# Patient Record
Sex: MALE | Race: WHITE | Employment: UNEMPLOYED | URBAN - METROPOLITAN AREA
[De-identification: names, ages, dates, MRNs, and addresses within clinical notes are randomized per-mention and may not be internally consistent; named-entity substitution may affect disease eponyms.]

---

## 2018-03-14 ENCOUNTER — HOSPITAL ENCOUNTER (EMERGENCY)
Facility: CLINIC | Age: 28
Discharge: HOME | End: 2018-03-14
Attending: FAMILY MEDICINE | Admitting: FAMILY MEDICINE
Payer: COMMERCIAL

## 2018-03-14 VITALS
HEART RATE: 94 BPM | SYSTOLIC BLOOD PRESSURE: 131 MMHG | BODY MASS INDEX: 41.75 KG/M2 | DIASTOLIC BLOOD PRESSURE: 74 MMHG | WEIGHT: 315 LBS | TEMPERATURE: 96.6 F | RESPIRATION RATE: 20 BRPM | HEIGHT: 73 IN | OXYGEN SATURATION: 95 %

## 2018-03-14 DIAGNOSIS — K21.9 GASTROESOPHAGEAL REFLUX DISEASE WITHOUT ESOPHAGITIS: ICD-10-CM

## 2018-03-14 DIAGNOSIS — R07.89 ATYPICAL CHEST PAIN: ICD-10-CM

## 2018-03-14 LAB
ALBUMIN SERPL-MCNC: 3.8 G/DL (ref 3.4–5)
ALP SERPL-CCNC: 87 U/L (ref 40–150)
ALT SERPL W P-5'-P-CCNC: 50 U/L (ref 0–70)
ANION GAP SERPL CALCULATED.3IONS-SCNC: 10 MMOL/L (ref 3–14)
AST SERPL W P-5'-P-CCNC: 23 U/L (ref 0–45)
BASOPHILS # BLD AUTO: 0 10E9/L (ref 0–0.2)
BASOPHILS NFR BLD AUTO: 0.3 %
BILIRUB SERPL-MCNC: 0.2 MG/DL (ref 0.2–1.3)
BUN SERPL-MCNC: 9 MG/DL (ref 7–30)
CALCIUM SERPL-MCNC: 9 MG/DL (ref 8.5–10.1)
CHLORIDE SERPL-SCNC: 103 MMOL/L (ref 94–109)
CO2 SERPL-SCNC: 26 MMOL/L (ref 20–32)
CREAT SERPL-MCNC: 0.69 MG/DL (ref 0.66–1.25)
DIFFERENTIAL METHOD BLD: ABNORMAL
EOSINOPHIL # BLD AUTO: 0.1 10E9/L (ref 0–0.7)
EOSINOPHIL NFR BLD AUTO: 0.7 %
ERYTHROCYTE [DISTWIDTH] IN BLOOD BY AUTOMATED COUNT: 14 % (ref 10–15)
GFR SERPL CREATININE-BSD FRML MDRD: >90 ML/MIN/1.7M2
GLUCOSE SERPL-MCNC: 127 MG/DL (ref 70–99)
HCT VFR BLD AUTO: 41.8 % (ref 40–53)
HGB BLD-MCNC: 12.8 G/DL (ref 13.3–17.7)
IMM GRANULOCYTES # BLD: 0.1 10E9/L (ref 0–0.4)
IMM GRANULOCYTES NFR BLD: 1 %
LYMPHOCYTES # BLD AUTO: 3.7 10E9/L (ref 0.8–5.3)
LYMPHOCYTES NFR BLD AUTO: 25.8 %
MCH RBC QN AUTO: 27 PG (ref 26.5–33)
MCHC RBC AUTO-ENTMCNC: 30.6 G/DL (ref 31.5–36.5)
MCV RBC AUTO: 88 FL (ref 78–100)
MONOCYTES # BLD AUTO: 1.7 10E9/L (ref 0–1.3)
MONOCYTES NFR BLD AUTO: 11.6 %
NEUTROPHILS # BLD AUTO: 8.8 10E9/L (ref 1.6–8.3)
NEUTROPHILS NFR BLD AUTO: 60.6 %
PLATELET # BLD AUTO: 279 10E9/L (ref 150–450)
POTASSIUM SERPL-SCNC: 4.1 MMOL/L (ref 3.4–5.3)
PROT SERPL-MCNC: 8.6 G/DL (ref 6.8–8.8)
RBC # BLD AUTO: 4.74 10E12/L (ref 4.4–5.9)
SODIUM SERPL-SCNC: 139 MMOL/L (ref 133–144)
TROPONIN I SERPL-MCNC: <0.015 UG/L (ref 0–0.04)
WBC # BLD AUTO: 14.5 10E9/L (ref 4–11)

## 2018-03-14 PROCEDURE — 85025 COMPLETE CBC W/AUTO DIFF WBC: CPT | Performed by: FAMILY MEDICINE

## 2018-03-14 PROCEDURE — 93010 ELECTROCARDIOGRAM REPORT: CPT | Mod: Z6 | Performed by: FAMILY MEDICINE

## 2018-03-14 PROCEDURE — 80053 COMPREHEN METABOLIC PANEL: CPT | Performed by: FAMILY MEDICINE

## 2018-03-14 PROCEDURE — 99284 EMERGENCY DEPT VISIT MOD MDM: CPT | Performed by: FAMILY MEDICINE

## 2018-03-14 PROCEDURE — 99284 EMERGENCY DEPT VISIT MOD MDM: CPT | Mod: 25 | Performed by: FAMILY MEDICINE

## 2018-03-14 PROCEDURE — 84484 ASSAY OF TROPONIN QUANT: CPT | Performed by: FAMILY MEDICINE

## 2018-03-14 PROCEDURE — 93005 ELECTROCARDIOGRAM TRACING: CPT | Performed by: FAMILY MEDICINE

## 2018-03-14 RX ORDER — NICOTINE POLACRILEX 4 MG/1
20 GUM, CHEWING ORAL 2 TIMES DAILY
Qty: 30 TABLET | Refills: 0 | Status: SHIPPED | OUTPATIENT
Start: 2018-03-14 | End: 2019-09-13

## 2018-03-14 ASSESSMENT — ENCOUNTER SYMPTOMS
DYSURIA: 0
FREQUENCY: 0
SHORTNESS OF BREATH: 0
WEAKNESS: 0
FEVER: 0
POLYDIPSIA: 0
VOMITING: 0
DIZZINESS: 0
LIGHT-HEADEDNESS: 0
DIARRHEA: 0
EYE PAIN: 0
SINUS PAIN: 0
HEADACHES: 0
COUGH: 0
ABDOMINAL PAIN: 0
SORE THROAT: 0
EYE REDNESS: 0
NERVOUS/ANXIOUS: 0
CHILLS: 0
NAUSEA: 0
BACK PAIN: 0
CONFUSION: 0

## 2018-03-14 NOTE — ED AVS SNAPSHOT
[facility] Emergency Department    [address]     [place] [address]    Phone:  [phone]    Fax:  [phone]                                       Bertram Foreman   MRN: [id number]    Department:  [facility] Emergency Department   Date of Visit:  3/14/2018           Patient Information     Date Of Birth          1990        Your diagnoses for this visit were:     Gastroesophageal reflux disease without esophagitis     Atypical chest pain        You were seen by Lauren, Kenneth IRIZARRY MD.      Follow-up Information     Follow up with Duran Lowe MD In 1 week.    Specialty:  Family Practice    Contact information:    [facility]  [address]  [facility] [address] [phone]          Follow up with [facility] Emergency Department.    Specialty:  EMERGENCY MEDICINE    Why:  If symptoms worsen    Contact information:    [address]   [place] [id number] [phone]    Additional information:    From [address]: Exit at [address] on south side of [place]. Turn right on [address]. Turn left at stoplight on [address]. [facility] will be in view two blocks ahead        Discharge Instructions         *CHEST PAIN, NONCARDIAC    Based on your visit today, the exact cause of your chest pain is not certain. Your condition does not seem serious and your pain does not appear to be coming from your heart. However, sometimes the signs of a serious problem take more time to appear. Therefore, please watch for the warning signs listed below.  HOME CARE:  1. Rest today and avoid strenuous activity.  2. Take any prescribed medicine as directed.  FOLLOW UP with your doctor in 1-3 days.   GET PROMPT MEDICAL ATTENTION if any of the following occur:    A change in the type of pain: if it feels different, becomes more severe, lasts longer, or begins to spread into your shoulder, arm, neck, jaw or back    Shortness of  breath or increased pain with breathing    Cough with blood or dark colored sputum (phlegm)    Weakness, dizziness, or fainting    Fever over 101  F (38.3  C)    Swelling, pain or redness in one leg    0304-9788 [employer]. [address] [place] [address], [place] [address]. All rights reserved. This information is not intended as a substitute for professional medical care. Always follow your healthcare professional's instructions.  This information has been modified by your health care provider with permission from the publisher.      Discharge References/Attachments     GERD (ADULT) (ENGLISH)    CHEST PAIN, NONCARDIAC  (ENGLISH)      24 Hour Appointment Hotline       To make an appointment at any [facility], call [phone] ([phone]). If you don't have a family doctor or clinic, we will help you find one. [facility] clinics are conveniently located to serve the needs of you and your family.             Review of your medicines      START taking        Dose / Directions Last dose taken    omeprazole 20 MG tablet   Dose:  20 mg   Quantity:  30 tablet        Take 1 tablet (20 mg) by mouth 2 times daily   Refills:  0          Our records show that you are taking the medicines listed below. If these are incorrect, please call your family doctor or clinic.        Dose / Directions Last dose taken    albuterol 108 (90 BASE) MCG/ACT Inhaler   Commonly known as:  PROAIR HFA/PROVENTIL HFA/VENTOLIN HFA   Dose:  2 puff   Quantity:  1 Inhaler        Inhale 2 puffs into the lungs every 6 hours as needed for shortness of breath / dyspnea or wheezing   Refills:  0        benzonatate 200 MG capsule   Commonly known as:  TESSALON   Dose:  200 mg   Quantity:  21 capsule        Take 1 capsule (200 mg) by mouth 3 times daily as needed for cough   Refills:  0                Prescriptions were sent or printed at these locations (1 Prescription)                   [facility] - [place], [place] - [address]   [address], [facility] [address]    Telephone:  [phone]   Fax:  [phone]   Hours:                  E-Prescribed (1 of 1)         omeprazole 20 MG tablet                Procedures and tests performed during your visit     CBC with platelets differential    Comprehensive metabolic panel    EKG 12-lead, tracing only    Troponin I      Orders Needing Specimen Collection     None      Pending Results     No orders found from 3/12/2018 to 3/15/2018.            Pending Culture Results     No orders found from 3/12/2018 to 3/15/2018.            Pending Results Instructions     If you had any lab results that were not finalized at the time of your Discharge, you can call the ED Lab Result RN at [phone]. You will be contacted by this team for any positive Lab results or changes in treatment. The nurses are available 7 days a week from 10A to 6:30P.  You can leave a message 24 hours per day and they will return your call.        Thank you for choosing [facility]       Thank you for choosing [facility] for your care. Our goal is always to provide you with excellent care. Hearing back from our patients is one way we can continue to improve our services. Please take a few minutes to complete the written survey that you may receive in the mail after you visit with us. Thank you!        [employer] Information     [employer] lets you send messages to your doctor, view your test results, renew your prescriptions, schedule appointments and more. To sign up, go to [email or web address] . Click on \"Log in\" on the left side of the screen, which will take you to the Welcome page. Then click on \"Sign up Now\" on the right side of the page.     You will be asked to enter the access code listed below, as well as some personal information. Please follow the directions to create your username and password.     Your access code is: [email or web address]  Expires: 2018  7:36 PM     Your access code will  in 90 days. If you need help or a new " code, please call your [facility] clinic or [phone].        Care EveryWhere ID     This is your Care EveryWhere ID. This could be used by other organizations to access your [facility] medical records  [id number]        Equal Access to Services     SOTO GÓMEZ : Levi Kelsey, wamariyada luqadaha, qaybta kaalmada arash, fransisco fisher. So [facility] [phone].    ATENCIÓN: Si habla español, tiene a keyes disposición servicios gratuitos de asistencia lingüística. Llame al [phone].    We comply with applicable federal civil rights laws and [place] laws. We do not discriminate on the basis of race, color, national origin, age, disability, sex, sexual orientation, or gender identity.            After Visit Summary       This is your record. Keep this with you and show to your community pharmacist(s) and doctor(s) at your next visit.

## 2018-03-14 NOTE — ED AVS SNAPSHOT
[facility] Emergency Department    [address] [place] [address]    Phone:  [phone]    Fax:  [phone]                                       Bertram Foreman   MRN: [id number]    Department:  [facility] Emergency Department   Date of Visit:  3/14/2018           After Visit Summary Signature Page     I have received my discharge instructions, and my questions have been answered. I have discussed any challenges I see with this plan with the nurse or doctor.    ..........................................................................................................................................  Patient/Patient Representative Signature      ..........................................................................................................................................  Patient Representative Print Name and Relationship to Patient    ..................................................               ................................................  Date                                            Time    ..........................................................................................................................................  Reviewed by Signature/Title    ...................................................              ..............................................  Date                                                            Time

## 2018-03-14 NOTE — ED PROVIDER NOTES
History     Chief Complaint   Patient presents with     Chest Pain     HPI  Bertram Foreman is a 27 year old male who presents to the emergency room with intermittent chest pains. He attributes these pains to reflux and heartburn. He was laying quietly in bed when he had an episode last night. This improved when he sat up and eventually went away in 10-15 minutes. He did not take any antacids for it. He has had this problems in the past. He had a few more episodes today that went away without treatment. Denies any associated shortness of breath nausea or sweating.    Problem List:    There are no active problems to display for this patient.       Past Medical History:    History reviewed. No pertinent past medical history.    Past Surgical History:    History reviewed. No pertinent surgical history.    Family History:    No family history on file.    Social History:  Marital Status:  Single [1]  Social History   Substance Use Topics     Smoking status: Former Smoker     Packs/day: 0.30     Years: 0.50     Types: Cigarettes     Quit date: 3/1/2014     Smokeless tobacco: Not on file     Alcohol use 0.0 oz/week     0 Standard drinks or equivalent per week      Comment: occ        Medications:      omeprazole 20 MG tablet   benzonatate (TESSALON) 200 MG capsule   albuterol (PROAIR HFA, PROVENTIL HFA, VENTOLIN HFA) 108 (90 BASE) MCG/ACT inhaler         Review of Systems   Constitutional: Negative for chills and fever.   HENT: Negative for congestion, sinus pain and sore throat.    Eyes: Negative for pain and redness.   Respiratory: Negative for cough and shortness of breath.    Cardiovascular: Negative for chest pain.   Gastrointestinal: Negative for abdominal pain, diarrhea, nausea and vomiting.   Endocrine: Negative for polydipsia and polyuria.   Genitourinary: Negative for dysuria and frequency.   Musculoskeletal: Negative for back pain.   Skin: Negative for rash.   Allergic/Immunologic: Negative for  "immunocompromised state.   Neurological: Negative for dizziness, weakness, light-headedness and headaches.   Psychiatric/Behavioral: Negative for confusion. The patient is not nervous/anxious.    All other systems reviewed and are negative.      Physical Exam   BP: 147/63  Pulse: 94  Temp: 96.6  F (35.9  C)  Resp: 16  Height: 185.4 cm (6' 1\")  Weight: (!) 167.8 kg (370 lb)  SpO2: 97 %      Physical Exam   Constitutional: He is oriented to person, place, and time. He appears well-developed and well-nourished.   HENT:   Head: Normocephalic and atraumatic.   Right Ear: External ear normal.   Left Ear: External ear normal.   Nose: Nose normal.   Mouth/Throat: Oropharynx is clear and moist.   Eyes: Conjunctivae and EOM are normal. Pupils are equal, round, and reactive to light.   Neck: Normal range of motion. Neck supple.   Cardiovascular: Normal rate, regular rhythm and normal heart sounds.    Pulmonary/Chest: Effort normal and breath sounds normal.   Abdominal: Soft. Bowel sounds are normal.   Musculoskeletal: Normal range of motion.   Neurological: He is alert and oriented to person, place, and time.   Skin: Skin is warm and dry.   Psychiatric: He has a normal mood and affect. His behavior is normal.   Vitals reviewed.      ED Course     ED Course          EKG Interpretation:      Interpreted by Kenneth Benavides at time of EKG: None   Rhythm: Normal sinus   Rate: Normal  Axis: Normal  Ectopy: None  Conduction: Normal  ST Segments/ T Waves: No ST-T wave changes and No acute ischemic changes  Q Waves: None  Comparison to prior: No old EKG available    Clinical Impression: normal EKG--normal sinus rhythm at a rate of 98 with no acute ST-T changes. Poor R-wave progression which has previously been noted.      Procedures               Critical Care time:  none               Results for orders placed or performed during the hospital encounter of 03/14/18 (from the past 24 hour(s))   CBC with platelets differential "   Result Value Ref Range    WBC 14.5 (H) 4.0 - 11.0 10e9/L    RBC Count 4.74 4.4 - 5.9 10e12/L    Hemoglobin 12.8 (L) 13.3 - 17.7 g/dL    Hematocrit 41.8 40.0 - 53.0 %    MCV 88 78 - 100 fl    MCH 27.0 26.5 - 33.0 pg    MCHC 30.6 (L) 31.5 - 36.5 g/dL    RDW 14.0 10.0 - 15.0 %    Platelet Count 279 150 - 450 10e9/L    Diff Method Automated Method     % Neutrophils 60.6 %    % Lymphocytes 25.8 %    % Monocytes 11.6 %    % Eosinophils 0.7 %    % Basophils 0.3 %    % Immature Granulocytes 1.0 %    Absolute Neutrophil 8.8 (H) 1.6 - 8.3 10e9/L    Absolute Lymphocytes 3.7 0.8 - 5.3 10e9/L    Absolute Monocytes 1.7 (H) 0.0 - 1.3 10e9/L    Absolute Eosinophils 0.1 0.0 - 0.7 10e9/L    Absolute Basophils 0.0 0.0 - 0.2 10e9/L    Abs Immature Granulocytes 0.1 0 - 0.4 10e9/L   Comprehensive metabolic panel   Result Value Ref Range    Sodium 139 133 - 144 mmol/L    Potassium 4.1 3.4 - 5.3 mmol/L    Chloride 103 94 - 109 mmol/L    Carbon Dioxide 26 20 - 32 mmol/L    Anion Gap 10 3 - 14 mmol/L    Glucose 127 (H) 70 - 99 mg/dL    Urea Nitrogen 9 7 - 30 mg/dL    Creatinine 0.69 0.66 - 1.25 mg/dL    GFR Estimate >90 >60 mL/min/1.7m2    GFR Estimate If Black >90 >60 mL/min/1.7m2    Calcium 9.0 8.5 - 10.1 mg/dL    Bilirubin Total 0.2 0.2 - 1.3 mg/dL    Albumin 3.8 3.4 - 5.0 g/dL    Protein Total 8.6 6.8 - 8.8 g/dL    Alkaline Phosphatase 87 40 - 150 U/L    ALT 50 0 - 70 U/L    AST 23 0 - 45 U/L   Troponin I   Result Value Ref Range    Troponin I ES <0.015 0.000 - 0.045 ug/L       Medications - No data to display    Assessments & Plan (with Medical Decision Making)   MDM--27-year-old male who presents to emergency room with some episodes of chest pain. He does have some reflux related symptoms with these. He has not tried any antacids. He has no known cardiac risk factors. He has quit smoking. Does not know his cholesterol. He has no diabetes. He is not being treated for hypertension. There is no exertional component to these episodes.  There is a positional relation in they are worse at night and with him laying flat. I did discuss the possibility that this is reflux related and would recommend treating as such. Avoid eating 2 hours before bed. Some weight loss would improve his overall health. I do not feel these are cardiac however signs and symptoms of cardiac pain and problems were discussed with him and if he has these he needs to return to the emergency room. If he has ongoing problems he may benefit from an endoscopy but at this time I would start him on a PPI and treat him presumptively. Patient is comfortable with this evaluation treatment and discharge plan. All his questions were answered to his satisfaction he is discharged in improved condition.  I have reviewed the nursing notes.    I have reviewed the findings, diagnosis, plan and need for follow up with the patient.       New Prescriptions    OMEPRAZOLE 20 MG TABLET    Take 1 tablet (20 mg) by mouth 2 times daily       Final diagnoses:   Gastroesophageal reflux disease without esophagitis   Atypical chest pain       3/14/2018   [facility] EMERGENCY DEPARTMENT     Lauren, Kenneth IRIZARRY MD  03/14/18 4283

## 2018-03-14 NOTE — ED NOTES
Pt arrives to ED with complaints of stabbing mid chest pain last night when he was in bed. He has been worked up for this before at [facility] and they told him it was Heartburn.

## 2018-03-15 NOTE — DISCHARGE INSTRUCTIONS
*CHEST PAIN, NONCARDIAC    Based on your visit today, the exact cause of your chest pain is not certain. Your condition does not seem serious and your pain does not appear to be coming from your heart. However, sometimes the signs of a serious problem take more time to appear. Therefore, please watch for the warning signs listed below.  HOME CARE:  1. Rest today and avoid strenuous activity.  2. Take any prescribed medicine as directed.  FOLLOW UP with your doctor in 1-3 days.   GET PROMPT MEDICAL ATTENTION if any of the following occur:    A change in the type of pain: if it feels different, becomes more severe, lasts longer, or begins to spread into your shoulder, arm, neck, jaw or back    Shortness of breath or increased pain with breathing    Cough with blood or dark colored sputum (phlegm)    Weakness, dizziness, or fainting    Fever over 101  F (38.3  C)    Swelling, pain or redness in one leg    7681-0159 [employer]. [address] [place] [address]. All rights reserved. This information is not intended as a substitute for professional medical care. Always follow your healthcare professional's instructions.  This information has been modified by your health care provider with permission from the publisher.

## 2018-10-20 ENCOUNTER — HOSPITAL ENCOUNTER (EMERGENCY)
Facility: CLINIC | Age: 28
Discharge: HOME | End: 2018-10-20
Attending: EMERGENCY MEDICINE | Admitting: EMERGENCY MEDICINE
Payer: COMMERCIAL

## 2018-10-20 ENCOUNTER — APPOINTMENT (OUTPATIENT)
Dept: GENERAL RADIOLOGY | Facility: CLINIC | Age: 28
End: 2018-10-20
Attending: EMERGENCY MEDICINE
Payer: COMMERCIAL

## 2018-10-20 VITALS
OXYGEN SATURATION: 96 % | BODY MASS INDEX: 46.18 KG/M2 | RESPIRATION RATE: 16 BRPM | SYSTOLIC BLOOD PRESSURE: 158 MMHG | DIASTOLIC BLOOD PRESSURE: 103 MMHG | TEMPERATURE: 98.9 F | WEIGHT: 315 LBS

## 2018-10-20 DIAGNOSIS — S90.112A CONTUSION OF LEFT GREAT TOE WITHOUT DAMAGE TO NAIL, INITIAL ENCOUNTER: ICD-10-CM

## 2018-10-20 PROCEDURE — 25000132 ZZH RX MED GY IP 250 OP 250 PS 637: Performed by: EMERGENCY MEDICINE

## 2018-10-20 PROCEDURE — 73660 X-RAY EXAM OF TOE(S): CPT | Mod: TC,LT

## 2018-10-20 PROCEDURE — 99283 EMERGENCY DEPT VISIT LOW MDM: CPT | Mod: Z6 | Performed by: EMERGENCY MEDICINE

## 2018-10-20 PROCEDURE — 99283 EMERGENCY DEPT VISIT LOW MDM: CPT | Performed by: EMERGENCY MEDICINE

## 2018-10-20 RX ORDER — IBUPROFEN 800 MG/1
800 TABLET, FILM COATED ORAL ONCE
Status: COMPLETED | OUTPATIENT
Start: 2018-10-20 | End: 2018-10-20

## 2018-10-20 RX ADMIN — IBUPROFEN 800 MG: 800 TABLET ORAL at 17:23

## 2018-10-20 NOTE — ED AVS SNAPSHOT
[facility] Emergency Department    [address] [place] [address]    Phone:  [phone]    Fax:  [phone]                                       Bertram Foreman   MRN: [id number]    Department:  [facility] Emergency Department   Date of Visit:  10/20/2018           Patient Information     Date Of Birth          1990        Your diagnoses for this visit were:     Contusion of left great toe without damage to nail, initial encounter        You were seen by Jame Garzon MD.      Follow-up Information     Follow up with Duran Lowe MD.    Specialty:  Family Practice    Why:  As needed    Contact information:    [facility]  [address] [place]  [facility] [address]  [phone]          Discharge Instructions         Soft Tissue Contusion  You have a contusion. This is also called a bruise. There is swelling and some bleeding under the skin. This injury generally takes a few days to a few weeks to heal.  During that time, the bruise will typically change in color from reddish, to purple-blue, to greenish-yellow, then to yellow-brown.  Home care    Elevate the injured area to reduce pain and swelling. As much as possible, sit or lie down with the injured area raised about the level of your heart. This is especially important during the first 48 hours.    Ice the injured area to help reduce pain and swelling. Wrap a cold source (ice pack or ice cubes in a plastic bag) in a thin towel. Apply to the bruised area for 20 minutes every 1 to 2 hours the first day. Continue this 3 to 4 times a day until the pain and swelling goes away.    Unless another medicine was prescribed, you can take acetaminophen, ibuprofen, or naproxen to control pain. (If you have chronic liver or kidney disease or ever had a stomach ulcer or gastrointestinal bleeding, talk with your doctor before using these medicines.)  Follow-up care  Follow up with your healthcare provider  or our staff as advised. Call if you are not better in 1 to 2 weeks.  When to seek medical advice   Call your healthcare provider right away if you have any of the following:    Increased pain or swelling    Bruise is on an arm or leg and arm or leg becomes cold, blue, numb or tingly    Signs of infection: Warmth, drainage, or increased redness or pain around the contusion    Inability to move the injured area or body part     Bruise is near your eye and you have problems with your eyesight or eye     Frequent bruising for unknown reasons  Date Last Reviewed: 5/1/2017 2000-2017 [employer]. [address] [place], [place] [address]. All rights reserved. This information is not intended as a substitute for professional medical care. Always follow your healthcare professional's instructions.          24 Hour Appointment Hotline       To make an appointment at any [facility] call [phone]). If you don't have a family doctor or clinic, we will help you find one. [facility] clinics are conveniently located to serve the needs of you and your family.             Review of your medicines      Our records show that you are taking the medicines listed below. If these are incorrect, please call your family doctor or clinic.        Dose / Directions Last dose taken    albuterol 108 (90 Base) MCG/ACT inhaler   Commonly known as:  PROAIR HFA/PROVENTIL HFA/VENTOLIN HFA   Dose:  2 puff   Quantity:  1 Inhaler        Inhale 2 puffs into the lungs every 6 hours as needed for shortness of breath / dyspnea or wheezing   Refills:  0        benzonatate 200 MG capsule   Commonly known as:  TESSALON   Dose:  200 mg   Quantity:  21 capsule        Take 1 capsule (200 mg) by mouth 3 times daily as needed for cough   Refills:  0        DEPAKOTE PO        Refills:  0        omeprazole 20 MG tablet   Dose:  20 mg   Quantity:  30 tablet        Take 1 tablet (20 mg) by mouth 2 times daily   Refills:  0      "   TRAZODONE HCL PO        Refills:  0                Procedures and tests performed during your visit     XR Toe Left G/E 2 Views      Orders Needing Specimen Collection     None      Pending Results     Date and Time Order Name Status Description    10/20/2018 1618 XR Toe Left G/E 2 Views Preliminary             Pending Culture Results     No orders found from 10/18/2018 to 10/21/2018.            Pending Results Instructions     If you had any lab results that were not finalized at the time of your Discharge, you can call the ED Lab Result RN at [phone]. You will be contacted by this team for any positive Lab results or changes in treatment. The nurses are available 7 days a week from 10A to 6:30P.  You can leave a message 24 hours per day and they will return your call.        Thank you for choosing [facility]       Thank you for choosing [facility] for your care. Our goal is always to provide you with excellent care. Hearing back from our patients is one way we can continue to improve our services. Please take a few minutes to complete the written survey that you may receive in the mail after you visit with us. Thank you!        [employer] Information     [employer] lets you send messages to your doctor, view your test results, renew your prescriptions, schedule appointments and more. To sign up, go to [email or web address] . Click on \"Log in\" on the left side of the screen, which will take you to the Welcome page. Then click on \"Sign up Now\" on the right side of the page.     You will be asked to enter the access code listed below, as well as some personal information. Please follow the directions to create your username and password.     Your access code is: [email or web address]  Expires: 2019  5:14 PM     Your access code will  in 90 days. If you need help or a new code, please call your [facility] clinic or [phone].        Care EveryWhere ID     This is your Care EveryWhere ID. This could be used by other " organizations to access your [facility] medical records  [id number]        Equal Access to Services     SOTO GÓMEZ : Levi Kelsey, allen hutchison, fransisco bruce. So [facility] [phone].    ATENCIÓN: Si habla español, tiene a keyes disposición servicios gratuitos de asistencia lingüística. Llame al [phone].    We comply with applicable federal civil rights laws and [place] laws. We do not discriminate on the basis of race, color, national origin, age, disability, sex, sexual orientation, or gender identity.            After Visit Summary       This is your record. Keep this with you and show to your community pharmacist(s) and doctor(s) at your next visit.

## 2018-10-20 NOTE — ED AVS SNAPSHOT
[facility] Emergency Department    [address] [place] [address]    Phone:  [phone]    Fax:  [phone]                                       Bertram Foreman   MRN: [id number]    Department:  [facility] Emergency Department   Date of Visit:  10/20/2018           After Visit Summary Signature Page     I have received my discharge instructions, and my questions have been answered. I have discussed any challenges I see with this plan with the nurse or doctor.    ..........................................................................................................................................  Patient/Patient Representative Signature      ..........................................................................................................................................  Patient Representative Print Name and Relationship to Patient    ..................................................               ................................................  Date                                   Time    ..........................................................................................................................................  Reviewed by Signature/Title    ...................................................              ..............................................  Date                                               Time          22EPIC Rev 08/18

## 2018-10-20 NOTE — DISCHARGE INSTRUCTIONS
Soft Tissue Contusion  You have a contusion. This is also called a bruise. There is swelling and some bleeding under the skin. This injury generally takes a few days to a few weeks to heal.  During that time, the bruise will typically change in color from reddish, to purple-blue, to greenish-yellow, then to yellow-brown.  Home care    Elevate the injured area to reduce pain and swelling. As much as possible, sit or lie down with the injured area raised about the level of your heart. This is especially important during the first 48 hours.    Ice the injured area to help reduce pain and swelling. Wrap a cold source (ice pack or ice cubes in a plastic bag) in a thin towel. Apply to the bruised area for 20 minutes every 1 to 2 hours the first day. Continue this 3 to 4 times a day until the pain and swelling goes away.    Unless another medicine was prescribed, you can take acetaminophen, ibuprofen, or naproxen to control pain. (If you have chronic liver or kidney disease or ever had a stomach ulcer or gastrointestinal bleeding, talk with your doctor before using these medicines.)  Follow-up care  Follow up with your healthcare provider or our staff as advised. Call if you are not better in 1 to 2 weeks.  When to seek medical advice   Call your healthcare provider right away if you have any of the following:    Increased pain or swelling    Bruise is on an arm or leg and arm or leg becomes cold, blue, numb or tingly    Signs of infection: Warmth, drainage, or increased redness or pain around the contusion    Inability to move the injured area or body part     Bruise is near your eye and you have problems with your eyesight or eye     Frequent bruising for unknown reasons  Date Last Reviewed: 5/1/2017 2000-2017 [employer]. [address] [place] [address], [place] [address]. All rights reserved. This information is not intended as a substitute for professional medical care. Always follow your healthcare  professional's instructions.

## 2018-11-07 ENCOUNTER — ALLIED HEALTH/NURSE VISIT (OUTPATIENT)
Dept: FAMILY MEDICINE | Facility: OTHER | Age: 28
End: 2018-11-07
Payer: COMMERCIAL

## 2018-11-07 DIAGNOSIS — Z23 NEED FOR PROPHYLACTIC VACCINATION AND INOCULATION AGAINST INFLUENZA: Primary | ICD-10-CM

## 2018-11-07 PROCEDURE — 99207 ZZC NO CHARGE NURSE ONLY: CPT

## 2018-11-07 PROCEDURE — 90471 IMMUNIZATION ADMIN: CPT

## 2018-11-07 PROCEDURE — 90686 IIV4 VACC NO PRSV 0.5 ML IM: CPT

## 2018-11-07 NOTE — MR AVS SNAPSHOT
"              After Visit Summary   11/7/2018    Bertram Foreman    MRN: [id number]           Patient Information     Date Of Birth          1990        Visit Information        Provider Department      [facility]        Today's Diagnoses     Need for prophylactic vaccination and inoculation against influenza    -  1       Follow-ups after your visit        Your next 10 appointments already scheduled     Nov 07, 2018  1:30 PM CST   Nurse Only with [facility])    [address] [facility] [id number] [phone]              Who to contact     If you have questions or need follow up information about today's clinic visit or your schedule please contact [facility] directly at [phone].  Normal or non-critical lab and imaging results will be communicated to you by MyChart, letter or phone within 4 business days after the clinic has received the results. If you do not hear from us within 7 days, please contact the clinic through MyChart or phone. If you have a critical or abnormal lab result, we will notify you by phone as soon as possible.  Submit refill requests through [employer] ([employer]) or call your pharmacy and they will forward the refill request to us. Please allow 3 business days for your refill to be completed.          Additional Information About Your Visit        MyChart Information     [employer]Docs ([employer]) lets you send messages to your doctor, view your test results, renew your prescriptions, schedule appointments and more. To sign up, go to [email or web address] ([employer]) . Click on \"Log in\" on the left side of the screen, which will take you to the Welcome page. Then click on \"Sign up Now\" on the right side of the page.     You will be asked to enter the access code listed below, as well as some personal information. Please follow the directions to create your username and password.     Your access code is: " [email or web address]  Expires: 2019  4:14 PM     Your access code will  in 90 days. If you need help or a new code, please call your [facility] clinic or [phone].        Care EveryWhere ID     This is your Care EveryWhere ID. This could be used by other organizations to access your [facility] medical records  [id number]         Blood Pressure from Last 3 Encounters:   10/20/18 (!) 158/103   18 131/74   16 (!) 156/95    Weight from Last 3 Encounters:   10/20/18 (!) 350 lb (158.8 kg)   18 (!) 370 lb (167.8 kg)   16 (!) 355 lb (161 kg)              We Performed the Following     FLU VACCINE, SPLIT VIRUS, IM (QUADRIVALENT) [63244]- >3 YRS     Vaccine Administration, Initial [46704]        Primary Care Provider Office Phone # Fax #    Duran Lowe [phone]       [facility] [address] [place] [address] [place] [address]        Equal Access to Services     Kern ValleyJOSSELINE : Hadii aad mao hadasho Soomaali, waaxda luqadaha, qaybta kaalmada adeegyada, fransisco brown . So [facility] [phone].    ATENCIÓN: Si habla español, tiene a keyes disposición servicios gratuitos de asistencia lingüística. Llame al [phone].    We comply with applicable federal civil rights laws and [place] laws. We do not discriminate on the basis of race, color, national origin, age, disability, sex, sexual orientation, or gender identity.            Thank you!     Thank you for choosing [facility]  for your care. Our goal is always to provide you with excellent care. Hearing back from our patients is one way we can continue to improve our services. Please take a few minutes to complete the written survey that you may receive in the mail after your visit with us. Thank you!             Your Updated Medication List - Protect others around you: Learn how to safely use, store and throw away your medicines at www.disposemymeds.org.          This  list is accurate as of 11/7/18  1:24 PM.  Always use your most recent med list.                   Brand Name Dispense Instructions for use Diagnosis    albuterol 108 (90 Base) MCG/ACT inhaler    PROAIR HFA/PROVENTIL HFA/VENTOLIN HFA    1 Inhaler    Inhale 2 puffs into the lungs every 6 hours as needed for shortness of breath / dyspnea or wheezing    Cough, Acute bronchitis with coexisting condition requiring prophylactic treatment       benzonatate 200 MG capsule    TESSALON    21 capsule    Take 1 capsule (200 mg) by mouth 3 times daily as needed for cough    Cough       DEPAKOTE PO           omeprazole 20 MG tablet     30 tablet    Take 1 tablet (20 mg) by mouth 2 times daily        TRAZODONE HCL PO

## 2018-11-07 NOTE — NURSING NOTE
Prior to injection verified patient identity using patient's name and date of birth.  Due to injection administration, patient instructed to remain in clinic for 15 minutes  afterwards, and to report any adverse reaction to me immediately.    Celena Serrano MA 11/7/2018  1:22 PM

## 2018-11-07 NOTE — PROGRESS NOTES

## 2018-11-26 ENCOUNTER — NURSE TRIAGE (OUTPATIENT)
Dept: NURSING | Facility: CLINIC | Age: 28
End: 2018-11-26

## 2018-11-27 ENCOUNTER — HOSPITAL ENCOUNTER (EMERGENCY)
Facility: CLINIC | Age: 28
Discharge: HOME | End: 2018-11-27
Attending: FAMILY MEDICINE | Admitting: FAMILY MEDICINE
Payer: COMMERCIAL

## 2018-11-27 VITALS
OXYGEN SATURATION: 97 % | HEART RATE: 76 BPM | WEIGHT: 315 LBS | TEMPERATURE: 98.1 F | SYSTOLIC BLOOD PRESSURE: 145 MMHG | BODY MASS INDEX: 45.39 KG/M2 | RESPIRATION RATE: 18 BRPM | DIASTOLIC BLOOD PRESSURE: 80 MMHG

## 2018-11-27 DIAGNOSIS — M54.16 LUMBAR RADICULOPATHY: ICD-10-CM

## 2018-11-27 PROCEDURE — 99282 EMERGENCY DEPT VISIT SF MDM: CPT | Performed by: FAMILY MEDICINE

## 2018-11-27 PROCEDURE — 99283 EMERGENCY DEPT VISIT LOW MDM: CPT | Mod: Z6 | Performed by: FAMILY MEDICINE

## 2018-11-27 RX ORDER — PREDNISONE 20 MG/1
TABLET ORAL
Qty: 10 TABLET | Refills: 0 | Status: SHIPPED | OUTPATIENT
Start: 2018-11-27 | End: 2019-01-08

## 2018-11-27 RX ORDER — CYCLOBENZAPRINE HCL 10 MG
10 TABLET ORAL 3 TIMES DAILY PRN
Qty: 20 TABLET | Refills: 0 | Status: SHIPPED | OUTPATIENT
Start: 2018-11-27 | End: 2019-02-27

## 2018-11-27 RX ORDER — IBUPROFEN 800 MG/1
800 TABLET, FILM COATED ORAL EVERY 8 HOURS PRN
Qty: 30 TABLET | Refills: 0 | COMMUNITY
Start: 2018-11-27 | End: 2019-12-10

## 2018-11-27 NOTE — ED AVS SNAPSHOT
[facility] Emergency Department    [address] [place] [address]    Phone:  [phone]    Fax:  [phone]                                       Bertram Foreman   MRN: [id number]    Department:  [facility] Emergency Department   Date of Visit:  11/27/2018           Patient Information     Date Of Birth          1990        Your diagnoses for this visit were:     Lumbar radiculopathy        You were seen by Duc Wellington MD.      Follow-up Information     Follow up with Duran Lowe MD. Schedule an appointment as soon as possible for a visit in 5 days.    Specialty:  Family Practice    Why:  If not improving.    Contact information:    [facility]  [address] [place] [address] [place] [address]  [phone]          Discharge Instructions         Possible Causes of Low Back or Leg Pain    The symptoms in your back or leg may be due to pressure on a nerve. This pressure may be caused by a damaged disk or by abnormal bone growth. Either way, you may feel pain, burning, tingling, or numbness. If you have pressure on a nerve that connects to the sciatic nerve, pain may shoot down your leg.    Pressure from the disk  Constant wear and tear can weaken a disk over time and cause back pain. The disk can then be damaged by a sudden movement or injury. If its soft center starts to bulge, the disk may press on a nerve. Or the outside of the disk may tear, and the soft center may squeeze through and pinch a nerve.    Pressure from bone  As a disk wears out, the vertebrae right above and below the disk start to touch. This can put pressure on a nerve. Often, abnormal bone (called bone spurs) grows where the vertebrae rub against each other. This can cause the foramen or the spinal canal to narrow (called stenosis) and press against a nerve.  Date Last Reviewed: 3/1/2018    6153-2689 [employer]. [address] [place] [address], [place] [address].  All rights reserved. This information is not intended as a substitute for professional medical care. Always follow your healthcare professional's instructions.          Relieving Back Pain  Back pain is a common problem. You can strain back muscles by lifting too much weight or just by moving the wrong way. Back strain can be uncomfortable, even painful. And it can take weeks or months to improve. To help yourself feel better and prevent future back strains, try these tips.  Important: Don't give aspirin to children or teens without first discussing it with your child's healthcare provider.   Ice    Ice reduces muscle pain and swelling. It helps most during the first 24 to 48 hours after an injury.    Wrap an ice pack or a bag of frozen peas in a thin towel. Never put ice directly on your skin.    Place the ice where your back hurts the most.    Don t ice for more than 20 minutes at a time.    You can use ice several times a day.  Medicines  Over-the-counter pain relievers include acetaminophen and anti-inflammatory medicines, which includes aspirin, naproxen, or ibuprofen. They can help ease discomfort. Some also reduce swelling.    Tell your healthcare provider about any medicines you are already taking.    Take medicines only as directed.  Manipulation and massage  Having manipulation by an osteopathic doctor or chiropractor may be helpful. Getting a massage also may help.   Heat  After the first 48 hours, heat can relax sore muscles and improve blood flow.    Try a warm bath or shower. Or use a heating pad set on low. To prevent a burn, keep a cloth between you and the heating pad.    Don t use a heating pad for more than 15 minutes at a time. Never sleep on a heating pad.  Date Last Reviewed: 6/1/2018 2000-2018 [employer]. [address] [place] [address], [place] [address]. All rights reserved. This information is not intended as a substitute for professional medical care. Always follow your healthcare  professional's instructions.          Back Care Tips    Caring for your back  These are things you can do to prevent a recurrence of acute back pain and to reduce symptoms from chronic back pain:    Maintain a healthy weight. If you are overweight, losing weight will help most types of back pain.    Exercise is an important part of recovery from most types of back pain. The muscles behind and in front of the spine support the back. This means strengthening both the back muscles and the abdominal muscles will provide better support for your spine.     Swimming and brisk walking are good overall exercises to improve your fitness level.    Practice safe lifting methods (below).    Practice good posture when sitting, standing and walking. Avoid prolonged sitting. This puts more stress on the lower back than standing or walking.    Wear quality shoes with sufficient arch support. Foot and ankle alignment can affect back symptoms. Women should avoid wearing high heels.    Therapeutic massage can help relax the back muscles without stretching them.    During the first 24 to 72 hours after an acute injury or flare-up of chronic back pain, apply an ice pack to the painful area for 20 minutes and then remove it for 20 minutes, over a period of 60 to 90 minutes, or several times a day. As a safety precaution, do not use a heating pad at bedtime. Sleeping on a heating pad can lead to skin burns or tissue damage.    You can alternate ice and heat therapies.  Medicines  Talk to your healthcare provider before using medicines, especially if you have other medical problems or are taking other medicines.    You may use acetaminophen or ibuprofen to control pain, unless your healthcare provider prescribed other pain medicine. If you have chronic conditions like diabetes, liver or kidney disease, stomach ulcers, or gastrointestinal bleeding, or are taking blood thinners, talk with your healthcare provider before taking any  medicines.    Be careful if you are given prescription pain medicines, narcotics, or medicine for muscle spasm. They can cause drowsiness, affect your coordination, reflexes, and judgment. Do not drive or operate heavy machinery while taking these types of medicines. Take prescription pain medicine only as prescribed by your healthcare provider.  Lumbar stretch  Here is a simple stretching exercise that will help relax muscle spasm and keep your back more limber. If exercise makes your back pain worse, don t do it.    Lie on your back with your knees bent and both feet on the ground.    Slowly raise your left knee to your chest as you flatten your lower back against the floor. Hold for 5 seconds.    Relax and repeat the exercise with your right knee.    Do 10 of these exercises for each leg.  Safe lifting method    Don t bend over at the waist to lift an object off the floor.  Instead, bend your knees and hips in a squat.     Keep your back and head upright    Hold the object close to your body, directly in front of you.    Straighten your legs to lift the object.     Lower the object to the floor in the reverse fashion.    If you must slide something across the floor, push it.  Posture tips  Sitting  Sit in chairs with straight backs or low-back support. Keep your knees lower than your hips, with your feet flat on the floor.  When driving, sit up straight. Adjust the seat forward so you are not leaning toward the steering wheel.  A small pillow or rolled towel behind your lower back may help if you are driving long distances.   Standing  When standing for long periods, shift most of your weight to one leg at a time. Alternate legs every few minutes.   Sleeping  The best way to sleep is on your side with your knees bent. Put a low pillow under your head to support your neck in a neutral spine position. Avoid thick pillows that bend your neck to one side. Put a pillow between your legs to further relax your lower  back. If you sleep on your back, put pillows under your knees to support your legs in a slightly flexed position. Use a firm mattress. If your mattress sags, replace it, or use a 1/2-inch plywood board under the mattress to add support.  Follow-up care  Follow up with your healthcare provider, or as advised.  If X-rays, a CT scan or an MRI scan were taken, they will be reviewed by a radiologist. You will be notified of any new findings that may affect your care.  Call 911  Call 911 if any of the following occur:    Trouble breathing    Confusion    Very drowsy    Fainting or loss of consciousness    Rapid or very slow heart rate    Loss of  bowel or bladder control  When to seek medical advice  Call your healthcare provider right away if any of the following occur:    Pain becomes worse or spreads to your arms or legs    Weakness or numbness in one or both arms or legs    Numbness in the groin area  Date Last Reviewed: 6/1/2016 2000-2018 [employer]. [address] [place] [address]. All rights reserved. This information is not intended as a substitute for professional medical care. Always follow your healthcare professional's instructions.          24 Hour Appointment Hotline       To make an appointment at any [facility] clinic, call [phone] ([phone]). If you don't have a family doctor or clinic, we will help you find one. [facility] clinics are conveniently located to serve the needs of you and your family.             Review of your medicines      START taking        Dose / Directions Last dose taken    cyclobenzaprine 10 MG tablet   Commonly known as:  FLEXERIL   Dose:  10 mg   Quantity:  20 tablet        Take 1 tablet (10 mg) by mouth 3 times daily as needed for muscle spasms   Refills:  0        ibuprofen 800 MG tablet   Commonly known as:  ADVIL/MOTRIN   Dose:  800 mg   Quantity:  30 tablet        Take 1 tablet (800 mg) by mouth every 8 hours as needed for pain   Refills:   0        predniSONE 20 MG tablet   Commonly known as:  DELTASONE   Quantity:  10 tablet        Take two tablets (= 40mg) each day for 5 (five) days   Refills:  0          Our records show that you are taking the medicines listed below. If these are incorrect, please call your family doctor or clinic.        Dose / Directions Last dose taken    albuterol 108 (90 Base) MCG/ACT inhaler   Commonly known as:  PROAIR HFA/PROVENTIL HFA/VENTOLIN HFA   Dose:  2 puff   Quantity:  1 Inhaler        Inhale 2 puffs into the lungs every 6 hours as needed for shortness of breath / dyspnea or wheezing   Refills:  0        benzonatate 200 MG capsule   Commonly known as:  TESSALON   Dose:  200 mg   Quantity:  21 capsule        Take 1 capsule (200 mg) by mouth 3 times daily as needed for cough   Refills:  0        DEPAKOTE PO        Refills:  0        omeprazole 20 MG tablet   Dose:  20 mg   Quantity:  30 tablet        Take 1 tablet (20 mg) by mouth 2 times daily   Refills:  0        TRAZODONE HCL PO        Refills:  0                Prescriptions were sent or printed at these locations (3 Prescriptions)                   [address]   [address] [facility] [address]    Telephone:  [phone]   Fax:  [phone]   Hours:                  E-Prescribed (2 of 3)         cyclobenzaprine (FLEXERIL) 10 MG tablet               predniSONE (DELTASONE) 20 MG tablet                 Not Printed or Sent (1 of 3)         ibuprofen (ADVIL/MOTRIN) 800 MG tablet                Orders Needing Specimen Collection     None      Pending Results     No orders found from 11/25/2018 to 11/28/2018.            Pending Culture Results     No orders found from 11/25/2018 to 11/28/2018.            Pending Results Instructions     If you had any lab results that were not finalized at the time of your Discharge, you can call the ED Lab Result RN at [phone]. You will be contacted by this team for any positive Lab results  "or changes in treatment. The nurses are available 7 days a week from 10A to 6:30P.  You can leave a message 24 hours per day and they will return your call.        Thank you for choosing [facility]       Thank you for choosing [facility] for your care. Our goal is always to provide you with excellent care. Hearing back from our patients is one way we can continue to improve our services. Please take a few minutes to complete the written survey that you may receive in the mail after you visit with us. Thank you!        [employer] Information     [employer] lets you send messages to your doctor, view your test results, renew your prescriptions, schedule appointments and more. To sign up, go to [email or web address] . Click on \"Log in\" on the left side of the screen, which will take you to the Welcome page. Then click on \"Sign up Now\" on the right side of the page.     You will be asked to enter the access code listed below, as well as some personal information. Please follow the directions to create your username and password.     Your access code is: [email or web address]  Expires: 2019  4:14 PM     Your access code will  in 90 days. If you need help or a new code, please call your [facility] clinic or [phone].        Care EveryWhere ID     This is your Care EveryWhere ID. This could be used by other organizations to access your [facility] medical records  [id number]        Equal Access to Services     SOTO GÓMEZ : Hadii ramesh Kelsey, waaxda lucharlotte, qaybta kaalmada arash, fransisco brown . So [facility] [phone].    ATENCIÓN: Si habla español, tiene a keyes disposición servicios gratuitos de asistencia lingüística. Llame al [phone].    We comply with applicable federal civil rights laws and [place] laws. We do not discriminate on the basis of race, color, national origin, age, disability, sex, sexual orientation, or gender identity.            After Visit Summary       This " is your record. Keep this with you and show to your community pharmacist(s) and doctor(s) at your next visit.

## 2018-11-27 NOTE — ED AVS SNAPSHOT
[facility] Emergency Department    [address] [place] [address]    Phone:  [phone]    Fax:  [phone]                                       Bertram Foreman   MRN: [id number]    Department:  [facility] Emergency Department   Date of Visit:  11/27/2018           After Visit Summary Signature Page     I have received my discharge instructions, and my questions have been answered. I have discussed any challenges I see with this plan with the nurse or doctor.    ..........................................................................................................................................  Patient/Patient Representative Signature      ..........................................................................................................................................  Patient Representative Print Name and Relationship to Patient    ..................................................               ................................................  Date                                   Time    ..........................................................................................................................................  Reviewed by Signature/Title    ...................................................              ..............................................  Date                                               Time          22EPIC Rev 08/18

## 2018-11-27 NOTE — DISCHARGE INSTRUCTIONS
Possible Causes of Low Back or Leg Pain    The symptoms in your back or leg may be due to pressure on a nerve. This pressure may be caused by a damaged disk or by abnormal bone growth. Either way, you may feel pain, burning, tingling, or numbness. If you have pressure on a nerve that connects to the sciatic nerve, pain may shoot down your leg.    Pressure from the disk  Constant wear and tear can weaken a disk over time and cause back pain. The disk can then be damaged by a sudden movement or injury. If its soft center starts to bulge, the disk may press on a nerve. Or the outside of the disk may tear, and the soft center may squeeze through and pinch a nerve.    Pressure from bone  As a disk wears out, the vertebrae right above and below the disk start to touch. This can put pressure on a nerve. Often, abnormal bone (called bone spurs) grows where the vertebrae rub against each other. This can cause the foramen or the spinal canal to narrow (called stenosis) and press against a nerve.  Date Last Reviewed: 3/1/2018    2930-6531 [employer]. [address] [place] [address]. All rights reserved. This information is not intended as a substitute for professional medical care. Always follow your healthcare professional's instructions.          Relieving Back Pain  Back pain is a common problem. You can strain back muscles by lifting too much weight or just by moving the wrong way. Back strain can be uncomfortable, even painful. And it can take weeks or months to improve. To help yourself feel better and prevent future back strains, try these tips.  Important: Don't give aspirin to children or teens without first discussing it with your child's healthcare provider.   Ice    Ice reduces muscle pain and swelling. It helps most during the first 24 to 48 hours after an injury.    Wrap an ice pack or a bag of frozen peas in a thin towel. Never put ice directly on your skin.    Place the ice where your  back hurts the most.    Don t ice for more than 20 minutes at a time.    You can use ice several times a day.  Medicines  Over-the-counter pain relievers include acetaminophen and anti-inflammatory medicines, which includes aspirin, naproxen, or ibuprofen. They can help ease discomfort. Some also reduce swelling.    Tell your healthcare provider about any medicines you are already taking.    Take medicines only as directed.  Manipulation and massage  Having manipulation by an osteopathic doctor or chiropractor may be helpful. Getting a massage also may help.   Heat  After the first 48 hours, heat can relax sore muscles and improve blood flow.    Try a warm bath or shower. Or use a heating pad set on low. To prevent a burn, keep a cloth between you and the heating pad.    Don t use a heating pad for more than 15 minutes at a time. Never sleep on a heating pad.  Date Last Reviewed: 6/1/2018 2000-2018 [employer]. [address] [place] [address]. All rights reserved. This information is not intended as a substitute for professional medical care. Always follow your healthcare professional's instructions.          Back Care Tips    Caring for your back  These are things you can do to prevent a recurrence of acute back pain and to reduce symptoms from chronic back pain:    Maintain a healthy weight. If you are overweight, losing weight will help most types of back pain.    Exercise is an important part of recovery from most types of back pain. The muscles behind and in front of the spine support the back. This means strengthening both the back muscles and the abdominal muscles will provide better support for your spine.     Swimming and brisk walking are good overall exercises to improve your fitness level.    Practice safe lifting methods (below).    Practice good posture when sitting, standing and walking. Avoid prolonged sitting. This puts more stress on the lower back than standing or  walking.    Wear quality shoes with sufficient arch support. Foot and ankle alignment can affect back symptoms. Women should avoid wearing high heels.    Therapeutic massage can help relax the back muscles without stretching them.    During the first 24 to 72 hours after an acute injury or flare-up of chronic back pain, apply an ice pack to the painful area for 20 minutes and then remove it for 20 minutes, over a period of 60 to 90 minutes, or several times a day. As a safety precaution, do not use a heating pad at bedtime. Sleeping on a heating pad can lead to skin burns or tissue damage.    You can alternate ice and heat therapies.  Medicines  Talk to your healthcare provider before using medicines, especially if you have other medical problems or are taking other medicines.    You may use acetaminophen or ibuprofen to control pain, unless your healthcare provider prescribed other pain medicine. If you have chronic conditions like diabetes, liver or kidney disease, stomach ulcers, or gastrointestinal bleeding, or are taking blood thinners, talk with your healthcare provider before taking any medicines.    Be careful if you are given prescription pain medicines, narcotics, or medicine for muscle spasm. They can cause drowsiness, affect your coordination, reflexes, and judgment. Do not drive or operate heavy machinery while taking these types of medicines. Take prescription pain medicine only as prescribed by your healthcare provider.  Lumbar stretch  Here is a simple stretching exercise that will help relax muscle spasm and keep your back more limber. If exercise makes your back pain worse, don t do it.    Lie on your back with your knees bent and both feet on the ground.    Slowly raise your left knee to your chest as you flatten your lower back against the floor. Hold for 5 seconds.    Relax and repeat the exercise with your right knee.    Do 10 of these exercises for each leg.  Safe lifting method    Don t bend  over at the waist to lift an object off the floor.  Instead, bend your knees and hips in a squat.     Keep your back and head upright    Hold the object close to your body, directly in front of you.    Straighten your legs to lift the object.     Lower the object to the floor in the reverse fashion.    If you must slide something across the floor, push it.  Posture tips  Sitting  Sit in chairs with straight backs or low-back support. Keep your knees lower than your hips, with your feet flat on the floor.  When driving, sit up straight. Adjust the seat forward so you are not leaning toward the steering wheel.  A small pillow or rolled towel behind your lower back may help if you are driving long distances.   Standing  When standing for long periods, shift most of your weight to one leg at a time. Alternate legs every few minutes.   Sleeping  The best way to sleep is on your side with your knees bent. Put a low pillow under your head to support your neck in a neutral spine position. Avoid thick pillows that bend your neck to one side. Put a pillow between your legs to further relax your lower back. If you sleep on your back, put pillows under your knees to support your legs in a slightly flexed position. Use a firm mattress. If your mattress sags, replace it, or use a 1/2-inch plywood board under the mattress to add support.  Follow-up care  Follow up with your healthcare provider, or as advised.  If X-rays, a CT scan or an MRI scan were taken, they will be reviewed by a radiologist. You will be notified of any new findings that may affect your care.  Call 911  Call 911 if any of the following occur:    Trouble breathing    Confusion    Very drowsy    Fainting or loss of consciousness    Rapid or very slow heart rate    Loss of  bowel or bladder control  When to seek medical advice  Call your healthcare provider right away if any of the following occur:    Pain becomes worse or spreads to your arms or legs    Weakness  or numbness in one or both arms or legs    Numbness in the groin area  Date Last Reviewed: 6/1/2016 2000-2018 The [employer] [address] [place] [address], [place] [address]. All rights reserved. This information is not intended as a substitute for professional medical care. Always follow your healthcare professional's instructions.

## 2018-11-27 NOTE — ED PROVIDER NOTES
History     Chief Complaint   Patient presents with     Back Pain     HPI  Bertram Foreman is a 28 year old male who presents with exacerbation of his chronic low back pain.  Patient has been dealing with back pain issues for many years, he has been seen by his primary care doctor who is referred him to physical therapy in the past.  He states for the last 3 days his back pain has been getting significantly worse.  He was just seen in the last week or so for his back pain.  Patient denies any bowel or bladder incontinence but the pain does radiate down his right leg to his calf.  He describes the pain as a sharp stabbing pain.  Patient denies any extremity numbness or weakness.  Patient states hard for him to get up and down out of the chair but he is able to walk.  Patient has been taking ibuprofen but this is not been helping much.    Problem List:    There are no active problems to display for this patient.       Past Medical History:    No past medical history on file.    Past Surgical History:    No past surgical history on file.    Family History:    No family history on file.    Social History:  Marital Status:  Single [1]  Social History   Substance Use Topics     Smoking status: Former Smoker     Packs/day: 0.30     Years: 0.50     Types: Cigarettes     Quit date: 3/1/2014     Smokeless tobacco: Never Used     Alcohol use 0.0 oz/week     0 Standard drinks or equivalent per week      Comment: occ        Medications:      albuterol (PROAIR HFA, PROVENTIL HFA, VENTOLIN HFA) 108 (90 BASE) MCG/ACT inhaler   benzonatate (TESSALON) 200 MG capsule   Divalproex Sodium (DEPAKOTE PO)   omeprazole 20 MG tablet   TRAZODONE HCL PO         Review of Systems   All other systems reviewed and are negative.      Physical Exam   BP: 145/80  Pulse: 76  Temp: 98.1  F (36.7  C)  Resp: 18  Weight: (!) 156 kg (344 lb)  SpO2: 97 %      Physical Exam   Constitutional: He appears well-developed and well-nourished. No distress.    HENT:   Head: Normocephalic and atraumatic.   Mouth/Throat: No oropharyngeal exudate.   Cardiovascular: Normal rate, regular rhythm and normal heart sounds.    No murmur heard.  Musculoskeletal:        Lumbar back: He exhibits tenderness, bony tenderness and pain. He exhibits normal range of motion, no swelling, no edema, no deformity, no laceration, no spasm and normal pulse.        Back:    Skin: He is not diaphoretic.   Nursing note and vitals reviewed.      ED Course     ED Course     Procedures    Patient presents with exacerbation of his chronic back pain.  Patient's been seen multiple times for this in the past.  Exam shows some tenderness over the right lower part of his back.  He has pain that is radiating down into his leg.  I am concerned that the patient could possibly have some type of nerve root irritation, possibly from a herniated disc.  It looks like the patient has not had any imaging of his back and with the patient having multiple bouts of physical therapy with no improvement, patient may benefit from further evaluation with an MRI.  I will have the patient follow-up with his primary care doctor to decide if that is the next best step.  In the meantime again try the patient on a short course of some steroids to see if this helps if this is a nerve root irritation.  Patient will continue ibuprofen and patient was given some muscle relaxants also to use.    Assessments & Plan (with Medical Decision Making)  Lumbar radiculopathy     I have reviewed the nursing notes.    I have reviewed the findings, diagnosis, plan and need for follow up with the patient.              11/27/2018   [facility] EMERGENCY DEPARTMENT     Duc Wellington MD  11/27/18 [id number]

## 2018-11-27 NOTE — ED TRIAGE NOTES
Patient presents with concerns for low back pain radiating into R) leg thigh to foot. He reports seeing a chiropractor and told it was inflammation. He states he has chronic back pain. Soledad Beauchamp RN

## 2018-11-28 ENCOUNTER — DOCUMENTATION ONLY (OUTPATIENT)
Dept: OTHER | Facility: CLINIC | Age: 28
End: 2018-11-28

## 2019-01-08 ENCOUNTER — OFFICE VISIT (OUTPATIENT)
Dept: FAMILY MEDICINE | Facility: OTHER | Age: 29
End: 2019-01-08
Payer: COMMERCIAL

## 2019-01-08 VITALS
BODY MASS INDEX: 40.43 KG/M2 | TEMPERATURE: 96.7 F | RESPIRATION RATE: 16 BRPM | OXYGEN SATURATION: 98 % | HEART RATE: 114 BPM | HEIGHT: 74 IN | DIASTOLIC BLOOD PRESSURE: 86 MMHG | WEIGHT: 315 LBS | SYSTOLIC BLOOD PRESSURE: 118 MMHG

## 2019-01-08 DIAGNOSIS — Z00.00 ROUTINE HISTORY AND PHYSICAL EXAMINATION OF ADULT: Primary | ICD-10-CM

## 2019-01-08 DIAGNOSIS — E66.01 MORBID OBESITY (H): ICD-10-CM

## 2019-01-08 LAB
ALBUMIN SERPL-MCNC: 3.6 G/DL (ref 3.4–5)
ALP SERPL-CCNC: 79 U/L (ref 40–150)
ALT SERPL W P-5'-P-CCNC: 55 U/L (ref 0–70)
ANION GAP SERPL CALCULATED.3IONS-SCNC: 7 MMOL/L (ref 3–14)
AST SERPL W P-5'-P-CCNC: 27 U/L (ref 0–45)
BILIRUB SERPL-MCNC: 0.3 MG/DL (ref 0.2–1.3)
BUN SERPL-MCNC: 9 MG/DL (ref 7–30)
CALCIUM SERPL-MCNC: 9.2 MG/DL (ref 8.5–10.1)
CHLORIDE SERPL-SCNC: 104 MMOL/L (ref 94–109)
CO2 SERPL-SCNC: 28 MMOL/L (ref 20–32)
CREAT SERPL-MCNC: 0.72 MG/DL (ref 0.66–1.25)
ERYTHROCYTE [DISTWIDTH] IN BLOOD BY AUTOMATED COUNT: 13.3 % (ref 10–15)
GFR SERPL CREATININE-BSD FRML MDRD: >90 ML/MIN/{1.73_M2}
GLUCOSE SERPL-MCNC: 182 MG/DL (ref 70–99)
HCT VFR BLD AUTO: 40.7 % (ref 40–53)
HGB BLD-MCNC: 12.9 G/DL (ref 13.3–17.7)
LDLC SERPL DIRECT ASSAY-MCNC: 157 MG/DL
MCH RBC QN AUTO: 28.4 PG (ref 26.5–33)
MCHC RBC AUTO-ENTMCNC: 31.7 G/DL (ref 31.5–36.5)
MCV RBC AUTO: 90 FL (ref 78–100)
PLATELET # BLD AUTO: 280 10E9/L (ref 150–450)
POTASSIUM SERPL-SCNC: 4.1 MMOL/L (ref 3.4–5.3)
PROT SERPL-MCNC: 8.2 G/DL (ref 6.8–8.8)
RBC # BLD AUTO: 4.55 10E12/L (ref 4.4–5.9)
SODIUM SERPL-SCNC: 139 MMOL/L (ref 133–144)
TSH SERPL DL<=0.005 MIU/L-ACNC: 2.13 MU/L (ref 0.4–4)
WBC # BLD AUTO: 11.9 10E9/L (ref 4–11)

## 2019-01-08 PROCEDURE — 80053 COMPREHEN METABOLIC PANEL: CPT | Performed by: INTERNAL MEDICINE

## 2019-01-08 PROCEDURE — 85027 COMPLETE CBC AUTOMATED: CPT | Performed by: INTERNAL MEDICINE

## 2019-01-08 PROCEDURE — 84443 ASSAY THYROID STIM HORMONE: CPT | Performed by: INTERNAL MEDICINE

## 2019-01-08 PROCEDURE — 36415 COLL VENOUS BLD VENIPUNCTURE: CPT | Performed by: INTERNAL MEDICINE

## 2019-01-08 PROCEDURE — 83721 ASSAY OF BLOOD LIPOPROTEIN: CPT | Performed by: INTERNAL MEDICINE

## 2019-01-08 PROCEDURE — 99395 PREV VISIT EST AGE 18-39: CPT | Performed by: INTERNAL MEDICINE

## 2019-01-08 RX ORDER — CYCLOBENZAPRINE HCL 10 MG
10 TABLET ORAL
COMMUNITY
Start: 2018-12-13 | End: 2019-05-16

## 2019-01-08 RX ORDER — QUETIAPINE FUMARATE 300 MG/1
300 TABLET, FILM COATED ORAL AT BEDTIME
Refills: 0 | COMMUNITY
Start: 2018-12-31

## 2019-01-08 ASSESSMENT — MIFFLIN-ST. JEOR: SCORE: 2481.51

## 2019-01-08 ASSESSMENT — PAIN SCALES - GENERAL: PAINLEVEL: MILD PAIN (3)
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
## 2019-01-13 NOTE — RESULT ENCOUNTER NOTE
Please contact the patient and notify him of the following:  The thyroid is well adjusted.  The chemistry panel shows an elevated blood sugar of 182 but the kidney function is normal.  Liver tests are normal.  The cholesterol is elevated with an LDL of 157.  The blood cell count shows a very modest anemia with a hemoglobin of 12.9.    It would appear that this gentleman may have diabetes.  He should set up a follow-up appointment with myself or the provider of his choice to further evaluate this.    Thank you.   DO TERESA JinOI

## 2019-01-14 ENCOUNTER — TELEPHONE (OUTPATIENT)
Dept: INTERNAL MEDICINE | Facility: CLINIC | Age: 29
End: 2019-01-14

## 2019-01-14 NOTE — TELEPHONE ENCOUNTER
----- Message from Tremaine Ruvalcaba DO sent at 1/12/2019  7:25 PM CST -----  Please contact the patient and notify him of the following:  The thyroid is well adjusted.  The chemistry panel shows an elevated blood sugar of 182 but the kidney function is normal.  Liver tests are normal.  The cholesterol is elevated with an LDL of 157.  The blood cell count shows a very modest anemia with a hemoglobin of 12.9.    It would appear that this gentleman may have diabetes.  He should set up a follow-up appointment with myself or the provider of his choice to further evaluate this.    Thank you.   DO TERESA JinOI

## 2019-01-14 NOTE — TELEPHONE ENCOUNTER
Spoke with patient and informed of information below. Patient understood that he should follow up with any provider to discuss about diabetic treatment. At this time patient said he needed sometime to think about it and would call us back. Routing to provider as NICHO Love MA

## 2019-01-29 ENCOUNTER — TRANSFERRED RECORDS (OUTPATIENT)
Dept: HEALTH INFORMATION MANAGEMENT | Facility: CLINIC | Age: 29
End: 2019-01-29

## 2019-02-04 ENCOUNTER — OFFICE VISIT (OUTPATIENT)
Dept: FAMILY MEDICINE | Facility: OTHER | Age: 29
End: 2019-02-04
Payer: COMMERCIAL

## 2019-02-04 VITALS
SYSTOLIC BLOOD PRESSURE: 126 MMHG | TEMPERATURE: 96 F | BODY MASS INDEX: 43.73 KG/M2 | DIASTOLIC BLOOD PRESSURE: 80 MMHG | HEART RATE: 112 BPM | OXYGEN SATURATION: 98 % | WEIGHT: 315 LBS | RESPIRATION RATE: 20 BRPM

## 2019-02-04 DIAGNOSIS — F33.41 RECURRENT MAJOR DEPRESSIVE DISORDER, IN PARTIAL REMISSION (H): ICD-10-CM

## 2019-02-04 DIAGNOSIS — F79 INTELLECTUAL DISABILITY: ICD-10-CM

## 2019-02-04 DIAGNOSIS — R73.09 ELEVATED GLUCOSE LEVEL: Primary | ICD-10-CM

## 2019-02-04 LAB — HBA1C MFR BLD: 6.8 % (ref 0–5.6)

## 2019-02-04 PROCEDURE — 83036 HEMOGLOBIN GLYCOSYLATED A1C: CPT | Performed by: INTERNAL MEDICINE

## 2019-02-04 PROCEDURE — 99213 OFFICE O/P EST LOW 20 MIN: CPT | Performed by: INTERNAL MEDICINE

## 2019-02-04 PROCEDURE — 36415 COLL VENOUS BLD VENIPUNCTURE: CPT | Performed by: INTERNAL MEDICINE

## 2019-02-04 ASSESSMENT — PAIN SCALES - GENERAL: PAINLEVEL: NO PAIN (0)

## 2019-02-04 NOTE — PROGRESS NOTES
SUBJECTIVE:   Bertram Foreman is a 28 year old male who presents to clinic today for the following health issues:      Chief Complaint   Patient presents with     Follow Up     Lab results                         Chief Complaint         The patient is a pleasant 28-year-old male who presents today for follow-up of his recent lab work.  He has a history of multifaceted mental health conditions and sees a mental health care provider for this.  He presents today for follow-up of his recent lab work which demonstrated an elevated blood sugar.  Admits to our conversation regarding diabetes, he notes that he has diabetes.  He states that his health care provider has previously told him that he had an elevated blood sugar.  He is on no specific diet or treatment for this.  He denies any polyuria, polydipsia, or hypoglycemic episodes.                         PAST, FAMILY,SOCIAL HISTORY:     Medical  History:   has no past medical history on file.     Surgical History:   has no past surgical history on file.     Social History:   reports that he quit smoking about 4 years ago. His smoking use included cigarettes. He has a 0.15 pack-year smoking history. he has never used smokeless tobacco. He reports that he drinks alcohol. He reports that he does not use drugs.     Family History:  family history is not on file.            MEDICATIONS  Current Outpatient Medications   Medication Sig Dispense Refill     Divalproex Sodium (DEPAKOTE PO)        ibuprofen (ADVIL/MOTRIN) 800 MG tablet Take 1 tablet (800 mg) by mouth every 8 hours as needed for pain 30 tablet 0     omeprazole 20 MG tablet Take 1 tablet (20 mg) by mouth 2 times daily 30 tablet 0     QUEtiapine (SEROQUEL) 300 MG tablet Take 300 mg by mouth  0     TRAZODONE HCL PO Take 100 mg by mouth At Bedtime        cyclobenzaprine (FLEXERIL) 10 MG tablet Take 10 mg by mouth            --------------------------------------------------------------------------------------------------------------------                              Review of Systems       LUNGS: Pt denies: cough, excess sputum, hemoptysis, or shortness of breath.   HEART: Pt denies: chest pain, arrhythmia, syncope, tachy or bradyarrhythmia.   GI: Pt denies: nausea, vomiting, diarrhea, constipation, melena, or hematochezia.   NEURO: Pt denies: seizures, strokes, diplopia, weakness, paraesthesias, or paralysis.   SKIN: Pt denies: itching, rashes, discoloration, or specific lesions of concern. Denies recent hair loss.   PSYCH: The patient denies significant depression, anxiety, mood imbalance. Specifically denies any suicidal ideation.                                     Examination      /80 (BP Location: Right arm, Patient Position: Sitting, Cuff Size: Adult Large)   Pulse 112   Temp 96  F (35.6  C) (Temporal)   Resp 20   Wt (!) 152.4 kg (336 lb)   SpO2 98%   BMI 43.73 kg/m       LUNGS: clear bilaterally, airflow is brisk, no intercostal retraction or stridor is noted. No coughing is noted during visit.   HEART:  regular without rubs, clicks, gallops, or murmurs. PMI is nondisplaced. Upstrokes are brisk. S1,S2 are heard.   GI: Abdomen is soft, without rebound, guarding or tenderness. Bowel sounds are appropriate. No renal bruits are heard.   NEURO: Pt is alert and appropriate. No neurologic lateralization is noted. Cranial nerves 2-12 are intact. Peripheral sensory and motor function are grossly normal.    SKIN:  warm and dry. No erythema, or rashes are noted. No specific lesions of concern are noted.    PSYCH: The patient appears grossly appropriate. Maintains good eye contact, does not have any jittery or atypical motion. Displays appropriate affect.                                             Decision Making    1. Elevated glucose level  Check A1c and initiate therapy if indicated  - Hemoglobin A1c    2.  Recurrent major depressive disorder, in partial remission (H)  Continue current medication    3. Intellectual disability  Patient has ongoing external support                           FOLLOW UP   I have asked the patient to make an appointment for followup with me In 2 weeks            I have carefully explained the diagnosis and treatment options to the patient.  The patient has displayed an understanding of the above, and all subsequent questions were answered.          DO DONNELL Jin    Portions of this note were produced using [email or web address]  Although every attempt at real-time proof reading has been made, occasional grammar/syntax errors may have been missed.

## 2019-02-04 NOTE — LETTER
February 11, 2019      Bertram Foreman  [address]   [facility] [address]              Dear Bertram, your recent test results are attached.   Hemoglobin A1c is well controlled at 6.8.  This is consistent with Diet-controlled diabetes.  At this time no additional medication is necessary.     Feel free to contact me via the office or My Chart if you have any questions regarding the above.          Resulted Orders   Hemoglobin A1c   Result Value Ref Range    Hemoglobin A1C 6.8 (H) 0 - 5.6 %      Comment:      Normal <5.7% Prediabetes 5.7-6.4%  Diabetes 6.5% or higher - adopted from ADA   consensus guidelines.         If you have any questions or concerns, please call the clinic at the number listed above.       Sincerely,        Tremaine Ruvalcaba, DO

## 2019-02-06 ENCOUNTER — TRANSFERRED RECORDS (OUTPATIENT)
Dept: HEALTH INFORMATION MANAGEMENT | Facility: CLINIC | Age: 29
End: 2019-02-06

## 2019-02-10 NOTE — RESULT ENCOUNTER NOTE
Dear Bertram, your recent test results are attached.  Hemoglobin A1c is well controlled at 6.8.  This is consistent with Diet-controlled diabetes.  At this time no additional medication is necessary.    Feel free to contact me via the office or My Chart if you have any questions regarding the above.

## 2019-02-27 ENCOUNTER — OFFICE VISIT (OUTPATIENT)
Dept: FAMILY MEDICINE | Facility: OTHER | Age: 29
End: 2019-02-27
Payer: COMMERCIAL

## 2019-02-27 VITALS
WEIGHT: 315 LBS | TEMPERATURE: 96.5 F | HEART RATE: 98 BPM | DIASTOLIC BLOOD PRESSURE: 82 MMHG | SYSTOLIC BLOOD PRESSURE: 124 MMHG | OXYGEN SATURATION: 99 % | RESPIRATION RATE: 26 BRPM | BODY MASS INDEX: 44.51 KG/M2

## 2019-02-27 DIAGNOSIS — E66.01 MORBID OBESITY (H): ICD-10-CM

## 2019-02-27 DIAGNOSIS — F79 INTELLECTUAL DISABILITY: ICD-10-CM

## 2019-02-27 DIAGNOSIS — K29.00 ACUTE GASTRITIS WITHOUT HEMORRHAGE, UNSPECIFIED GASTRITIS TYPE: ICD-10-CM

## 2019-02-27 DIAGNOSIS — J01.00 ACUTE NON-RECURRENT MAXILLARY SINUSITIS: Primary | ICD-10-CM

## 2019-02-27 DIAGNOSIS — E11.9 TYPE 2 DIABETES MELLITUS WITHOUT COMPLICATION, WITHOUT LONG-TERM CURRENT USE OF INSULIN (H): ICD-10-CM

## 2019-02-27 PROCEDURE — 99214 OFFICE O/P EST MOD 30 MIN: CPT | Performed by: INTERNAL MEDICINE

## 2019-02-27 RX ORDER — AZITHROMYCIN 250 MG/1
TABLET, FILM COATED ORAL
Qty: 6 TABLET | Refills: 0 | Status: SHIPPED | OUTPATIENT
Start: 2019-02-27 | End: 2019-04-23

## 2019-02-27 RX ORDER — LORATADINE 10 MG/1
10 TABLET ORAL DAILY
Qty: 14 TABLET | Refills: 0 | Status: SHIPPED | OUTPATIENT
Start: 2019-02-27 | End: 2019-05-21

## 2019-02-27 ASSESSMENT — PATIENT HEALTH QUESTIONNAIRE - PHQ9: SUM OF ALL RESPONSES TO PHQ QUESTIONS 1-9: 0

## 2019-02-27 ASSESSMENT — PAIN SCALES - GENERAL: PAINLEVEL: NO PAIN (0)

## 2019-02-27 NOTE — PROGRESS NOTES
SUBJECTIVE:   Bertram Foreman is a 28 year old male who presents to clinic today for the following health issues:      Chief Complaint   Patient presents with     Follow Up     acid reflux is getting worse                 Chief Complaint  The patient is a pleasant 28-year-old gentleman who presents today with worsening of his gastroesophageal reflux.  He has been taking the omeprazole 40 mg twice daily and this has been working well.  He notes he does have a great deal posterior nasal drainage and this seems to be stimulating the reflux symptoms.  Has had no melena or hematochezia.  He has had no emesis.  He notes that when he wakes up in the morning, he has a great deal of posterior nasal drainage which stimulates his reflux symptoms.  Additionally, he has been taking ibuprofen on occasion and I have recommended that he limits this as possible.  With respect to his diabetes, his most recent glycosylated hemoglobin was 6.8.  He watches his diet fairly well and is not currently on any antihyperglycemic medications.  He denies any polyuria, polydipsia, vision changes etc.                       PAST, FAMILY,SOCIAL HISTORY:     Medical  History:   has no past medical history on file.     Surgical History:   has no past surgical history on file.     Social History:   reports that he quit smoking about 5 years ago. His smoking use included cigarettes. He has a 0.15 pack-year smoking history. he has never used smokeless tobacco. He reports that he drinks alcohol. He reports that he does not use drugs.     Family History:  family history is not on file.            MEDICATIONS  Current Outpatient Medications   Medication Sig Dispense Refill     Divalproex Sodium (DEPAKOTE PO)        ibuprofen (ADVIL/MOTRIN) 800 MG tablet Take 1 tablet (800 mg) by mouth every 8 hours as needed for pain 30 tablet 0     loratadine (CLARITIN) 10 MG tablet Take 1 tablet (10 mg) by mouth daily 14 tablet 0     omeprazole 20 MG  tablet Take 1 tablet (20 mg) by mouth 2 times daily 30 tablet 0     QUEtiapine (SEROQUEL) 300 MG tablet Take 300 mg by mouth  0     TRAZODONE HCL PO Take 100 mg by mouth At Bedtime        cyclobenzaprine (FLEXERIL) 10 MG tablet Take 10 mg by mouth           --------------------------------------------------------------------------------------------------------------------                              Review of Systems       LUNGS: Pt denies: cough, excess sputum, hemoptysis, or shortness of breath.   HEART: Pt denies: chest pain, arrhythmia, syncope, tachy or bradyarrhythmia.   GI: Pt denies: nausea, vomiting, diarrhea, constipation, melena, or hematochezia.   NEURO: Pt denies: seizures, strokes, diplopia, weakness, paraesthesias, or paralysis.   SKIN: Pt denies: itching, rashes, discoloration, or specific lesions of concern. Denies recent hair loss.   PSYCH: The patient denies significant depression, anxiety, mood imbalance. Specifically denies any suicidal ideation.                                     Examination      /82 (BP Location: Right arm, Patient Position: Sitting, Cuff Size: Adult Large)   Pulse 98   Temp 96.5  F (35.8  C) (Temporal)   Resp 26   Wt (!) 155.1 kg (342 lb)   SpO2 99%   BMI 44.51 kg/m     Constitutional: The patient appears to be in no acute distress. The patient appears to be adequately hydrated. No acute respiratory or hemodynamic distress is noted at this time.     LUNGS: clear bilaterally, airflow is brisk, no intercostal retraction or stridor is noted. No coughing is noted during visit.   HEART:  regular without rubs, clicks, gallops, or murmurs. PMI is nondisplaced. Upstrokes are brisk. S1,S2 are heard.   GI: Abdomen is soft, without rebound, guarding or tenderness. Bowel sounds are appropriate. No renal bruits are heard.   NEURO: Pt is alert and appropriate. No neurologic lateralization is noted. Cranial nerves 2-12 are intact. Peripheral sensory and motor function are  grossly normal.    SKIN:  warm and dry. No erythema, or rashes are noted. No specific lesions of concern are noted.    PSYCH: The patient appears grossly appropriate. Maintains good eye contact, does not have any jittery or atypical motion. Displays appropriate affect.   ENT: Pharynx is mildly-erythemous, marked/voluminous/yellow PND, no significant nasal obstruction, TM's not red or retracted, hearing intact bilaterally. No carotid bruits are heard. No JVD seen. Thyroid is not nodular or enlarged.                                           Decision Making    1. Acute non-recurrent maxillary sinusitis  Antibiotic, antihistamine, Tylenol, rest.  - azithromycin (ZITHROMAX) 250 MG tablet; Take 2 tablets (500 mg) by mouth daily for 1 day, THEN 1 tablet (250 mg) daily for 4 days.  Dispense: 6 tablet; Refill: 0  - loratadine (CLARITIN) 10 MG tablet; Take 1 tablet (10 mg) by mouth daily  Dispense: 14 tablet; Refill: 0    2. Intellectual disability  Stable and functional    3. Acute gastritis without hemorrhage, unspecified gastritis type  Continue the proton pump inhibitor, elevate the head of bed slightly    4. Morbid obesity (H)  Recommend caloric restriction as tolerated and exercise.    5. Type 2 diabetes mellitus without complication, without long-term current use of insulin (H)  Doing well, will follow blood sugars closely and initiate therapy if symptoms worsen                             FOLLOW UP   I have asked the patient to make an appointment for followup with me in 1 month        I have carefully explained the diagnosis and treatment options to the patient.  The patient has displayed an understanding of the above, and all subsequent questions were answered.      DO DONNELL Jin    Portions of this note were produced using [employer]  Although every attempt at real-time proof reading has been made, occasional grammar/syntax errors may have been missed.

## 2019-04-23 ENCOUNTER — OFFICE VISIT (OUTPATIENT)
Dept: FAMILY MEDICINE | Facility: OTHER | Age: 29
End: 2019-04-23
Payer: COMMERCIAL

## 2019-04-23 VITALS
TEMPERATURE: 97.1 F | WEIGHT: 315 LBS | BODY MASS INDEX: 44.53 KG/M2 | RESPIRATION RATE: 22 BRPM | DIASTOLIC BLOOD PRESSURE: 76 MMHG | SYSTOLIC BLOOD PRESSURE: 120 MMHG | HEART RATE: 96 BPM | OXYGEN SATURATION: 98 %

## 2019-04-23 DIAGNOSIS — Z13.89 SCREENING FOR DIABETIC PERIPHERAL NEUROPATHY: ICD-10-CM

## 2019-04-23 DIAGNOSIS — E11.9 TYPE 2 DIABETES MELLITUS WITHOUT COMPLICATION, WITHOUT LONG-TERM CURRENT USE OF INSULIN (H): ICD-10-CM

## 2019-04-23 DIAGNOSIS — Z11.4 ENCOUNTER FOR SCREENING FOR HIV: Primary | ICD-10-CM

## 2019-04-23 DIAGNOSIS — F79 INTELLECTUAL DISABILITY: ICD-10-CM

## 2019-04-23 DIAGNOSIS — E66.01 MORBID OBESITY (H): ICD-10-CM

## 2019-04-23 LAB
CREAT UR-MCNC: 351 MG/DL
MICROALBUMIN UR-MCNC: 26 MG/L
MICROALBUMIN/CREAT UR: 7.38 MG/G CR (ref 0–17)

## 2019-04-23 PROCEDURE — 99207 C FOOT EXAM  NO CHARGE: CPT | Performed by: INTERNAL MEDICINE

## 2019-04-23 PROCEDURE — 99213 OFFICE O/P EST LOW 20 MIN: CPT | Performed by: INTERNAL MEDICINE

## 2019-04-23 PROCEDURE — 82043 UR ALBUMIN QUANTITATIVE: CPT | Performed by: INTERNAL MEDICINE

## 2019-04-23 ASSESSMENT — PAIN SCALES - GENERAL: PAINLEVEL: MODERATE PAIN (5)

## 2019-04-23 NOTE — PROGRESS NOTES
SUBJECTIVE:   Bertram Foreman is a 28 year old male who presents to clinic today for the following health issues:      HPI  Patient is here today to discuss about a referral for weight loss surgery, wondering if there is something he can take to help him keep focus and have more energy at work. Feels like he is slowing down and is body is not ready for being a  at the [employer].                           Chief Complaint         The patient is a well-known 28-year-old gentleman who presents today to discuss medical weight loss.  He has a history of some mild intellectual disability and recently has started working at a local [employer] as a .  He notes that he has been slightly more tired now that he is doing this.  Recent laboratory work including thyroid were all unremarkable.  He also notes that he is finding himself less attractive to the opposite sex and believes this is a result of his weight.  We have also discussed his ongoing diabetes, weight, smoking, and the combined risk for progressive heart disease.  He will take this under consideration as well.                         PAST, FAMILY,SOCIAL HISTORY:     Medical  History:   has no past medical history on file.     Surgical History:   has no past surgical history on file.     Social History:   reports that he quit smoking about 5 years ago. His smoking use included cigarettes. He has a 0.15 pack-year smoking history. He has never used smokeless tobacco. He reports that he drinks alcohol. He reports that he does not use drugs.     Family History:  family history is not on file.            MEDICATIONS  Current Outpatient Medications   Medication Sig Dispense Refill     Divalproex Sodium (DEPAKOTE PO)        ibuprofen (ADVIL/MOTRIN) 800 MG tablet Take 1 tablet (800 mg) by mouth every 8 hours as needed for pain 30 tablet 0     omeprazole 20 MG tablet Take 1 tablet (20 mg) by mouth 2 times daily 30 tablet 0     QUEtiapine (SEROQUEL) 300 MG tablet Take  300 mg by mouth  0     TRAZODONE HCL PO Take 100 mg by mouth At Bedtime        cyclobenzaprine (FLEXERIL) 10 MG tablet Take 10 mg by mouth       loratadine (CLARITIN) 10 MG tablet Take 1 tablet (10 mg) by mouth daily (Patient not taking: Reported on 4/23/2019) 14 tablet 0         --------------------------------------------------------------------------------------------------------------------                              Review of Systems       LUNGS: Pt denies: cough, excess sputum, hemoptysis, or shortness of breath.   HEART: Pt denies: chest pain, arrhythmia, syncope, tachy or bradyarrhythmia.   GI: Pt denies: nausea, vomiting, diarrhea, constipation, melena, or hematochezia.   NEURO: Pt denies: seizures, strokes, diplopia, weakness, paraesthesias, or paralysis.   SKIN: Pt denies: itching, rashes, discoloration, or specific lesions of concern. Denies recent hair loss.   PSYCH: The patient denies significant depression, anxiety, mood imbalance. Specifically denies any suicidal ideation.                                     Examination      /76 (BP Location: Right arm, Patient Position: Sitting, Cuff Size: Adult Large)   Pulse 96   Temp 97.1  F (36.2  C) (Temporal)   Resp 22   Wt (!) 155.2 kg (342 lb 3 oz)   SpO2 98%   BMI 44.53 kg/m     Constitutional: The patient appears to be in no acute distress. The patient appears to be adequately hydrated. No acute respiratory or hemodynamic distress is noted at this time.     LUNGS: clear bilaterally, airflow is brisk, no intercostal retraction or stridor is noted. No coughing is noted during visit.   HEART:  regular without rubs, clicks, gallops, or murmurs. PMI is nondisplaced. Upstrokes are brisk. S1,S2 are heard.   GI: Abdomen is soft, without rebound, guarding or tenderness. Bowel sounds are appropriate. No renal bruits are heard.  Abdomen is obese.   NEURO: Pt is alert and appropriate. No neurologic lateralization is noted. Cranial nerves 2-12 are intact.  Peripheral sensory and motor function are grossly normal.    SKIN:  warm and dry. No erythema, or rashes are noted. No specific lesions of concern are noted.    PSYCH: The patient appears grossly appropriate. Maintains good eye contact, does not have any jittery or atypical motion. Displays appropriate affect.   Feet: no evidence of skin breakdown or ulceration is noted.  Sensation is intact to monofilament and vibration.  Pulses are strong, capillary refill is brisk.                                        Decision Making    1. Morbid obesity (H)  We will set up for bariatric referral  - BARIATRIC ADULT REFERRAL    2. Type 2 diabetes mellitus without complication, without long-term current use of insulin (H)  Check microalbumin, continue caloric restriction.  We will discuss ACE inhibitors and statin medications in the future.  Currently, I am concerned regarding compliance issues by starting too many medications simultaneously.  Additionally, I believe that if he loses weight, the diabetes will be self correcting.  - Albumin Random Urine Quantitative with Creat Ratio    3. Encounter for screening for HIV  Given    4. Screening for diabetic peripheral neuropathy  The unremarkable  - FOOT EXAM  NO CHARGE [43808.625]    5. Intellectual disability  Stable.  Socially functional                           FOLLOW UP   I have asked the patient to make an appointment for followup with me on an as-needed basis or in 6 months.        I have carefully explained the diagnosis and treatment options to the patient.  The patient has displayed an understanding of the above, and all subsequent questions were answered.      DO DONNELL Jin    Portions of this note were produced using [employer]  Although every attempt at real-time proof reading has been made, occasional grammar/syntax errors may have been missed.

## 2019-04-23 NOTE — LETTER
My Depression Action Plan  Name: Bertram Foreman   Date of Birth 1990  Date: 4/23/2019    My doctor: Duran Lowe   My clinic: [place] [address] [facility] [address] [phone]          GREEN    ZONE   Good Control    What it looks like:     Things are going generally well. You have normal up s and down s. You may even feel depressed from time to time, but bad moods usually last less than a day.   What you need to do:  1. Continue to care for yourself (see self care plan)  2. Check your depression survival kit and update it as needed  3. Follow your physician s recommendations including any medication.  4. Do not stop taking medication unless you consult with your physician first.           YELLOW         ZONE Getting Worse    What it looks like:     Depression is starting to interfere with your life.     It may be hard to get out of bed; you may be starting to isolate yourself from others.    Symptoms of depression are starting to last most all day and this has happened for several days.     You may have suicidal thoughts but they are not constant.   What you need to do:     1. Call your care team, your response to treatment will improve if you keep your care team informed of your progress. Yellow periods are signs an adjustment may need to be made.     2. Continue your self-care, even if you have to fake it!    3. Talk to someone in your support network    4. Open up your depression survival kit           RED    ZONE Medical Alert - Get Help    What it looks like:     Depression is seriously interfering with your life.     You may experience these or other symptoms: You can t get out of bed most days, can t work or engage in other necessary activities, you have trouble taking care of basic hygiene, or basic responsibilities, thoughts of suicide or death that will not go away, self-injurious behavior.     What you need to do:  1. Call your care team and  request a same-day appointment. If they are not available (weekends or after hours) call your local crisis line, emergency room or 911.            Depression Self Care Plan / Survival Kit    Self-Care for Depression  Here s the deal. Your body and mind are really not as separate as most people think.  What you do and think affects how you feel and how you feel influences what you do and think. This means if you do things that people who feel good do, it will help you feel better.  Sometimes this is all it takes.  There is also a place for medication and therapy depending on how severe your depression is, so be sure to consult with your medical provider and/ or Behavioral Health Consultant if your symptoms are worsening or not improving.     In order to better manage my stress, I will:    Exercise  Get some form of exercise, every day. This will help reduce pain and release endorphins, the  feel good  chemicals in your brain. This is almost as good as taking antidepressants!  This is not the same as joining a gym and then never going! (they count on that by the way ) It can be as simple as just going for a walk or doing some gardening, anything that will get you moving.      Hygiene   Maintain good hygiene (Get out of bed in the morning, Make your bed, Brush your teeth, Take a shower, and Get dressed like you were going to work, even if you are unemployed).  If your clothes don't fit try to get ones that do.    Diet  I will strive to eat foods that are good for me, drink plenty of water, and avoid excessive sugar, caffeine, alcohol, and other mood-altering substances.  Some foods that are helpful in depression are: complex carbohydrates, B vitamins, flaxseed, fish or fish oil, fresh fruits and vegetables.    Psychotherapy  I agree to participate in Individual Therapy (if recommended).    Medication  If prescribed medications, I agree to take them.  Missing doses can result in serious side effects.  I understand that  drinking alcohol, or other illicit drug use, may cause potential side effects.  I will not stop my medication abruptly without first discussing it with my provider.    Staying Connected With Others  I will stay in touch with my friends, family members, and my primary care provider/team.    Use your imagination  Be creative.  We all have a creative side; it doesn t matter if it s oil painting, sand castles, or mud pies! This will also kick up the endorphins.    Witness Beauty  (AKA stop and smell the roses) Take a look outside, even in mid-winter. Notice colors, textures. Watch the squirrels and birds.     Service to others  Be of service to others.  There is always someone else in need.  By helping others we can  get out of ourselves  and remember the really important things.  This also provides opportunities for practicing all the other parts of the program.    Humor  Laugh and be silly!  Adjust your TV habits for less news and crime-drama and more comedy.    Control your stress  Try breathing deep, massage therapy, biofeedback, and meditation. Find time to relax each day.     My support system    Clinic Contact:  Phone number:    Contact 1:  Phone number:    Contact 2:  Phone number:    Denominational/:  Phone number:    Therapist:  Phone number:    Local crisis center:    Phone number:    Other community support:  Phone number:

## 2019-04-29 NOTE — RESULT ENCOUNTER NOTE
Dear Bertram, your recent test results are attached.  Microalbumin is normal.    Feel free to contact me via the office or My Chart if you have any questions regarding the above.  Sincerely,  Tremaine Ruvalcaba,  FACOI

## 2019-05-16 ENCOUNTER — OFFICE VISIT (OUTPATIENT)
Dept: SLEEP MEDICINE | Facility: CLINIC | Age: 29
End: 2019-05-16
Payer: COMMERCIAL

## 2019-05-16 VITALS
SYSTOLIC BLOOD PRESSURE: 122 MMHG | HEIGHT: 74 IN | DIASTOLIC BLOOD PRESSURE: 81 MMHG | HEART RATE: 63 BPM | OXYGEN SATURATION: 96 % | WEIGHT: 315 LBS | BODY MASS INDEX: 40.43 KG/M2

## 2019-05-16 DIAGNOSIS — G47.33 OSA (OBSTRUCTIVE SLEEP APNEA): Primary | ICD-10-CM

## 2019-05-16 PROCEDURE — 99203 OFFICE O/P NEW LOW 30 MIN: CPT | Performed by: OTOLARYNGOLOGY

## 2019-05-16 ASSESSMENT — MIFFLIN-ST. JEOR: SCORE: 2583.11

## 2019-05-16 NOTE — PROGRESS NOTES
Sleep Consultation:    Date on this visit: 5/16/2019    Bertram Foreman is sent by Rachell Montoya for a sleep consultation regarding ADAL.    Primary Physician: Tremaine Ruvalcaba     Bertram Foreman reports nightly snoring and poor quality of sleep and apneas for years..     Bertram goes to sleep at 8:00 PM during the week. He wakes up at 9:00 AM with an alarm. He falls asleep in 10 minutes.  Bertram denies difficulty falling asleep.  He wakes up 5-8 times a night for 10 minutes before falling back to sleep.  Bertram wakes up to go to the bathroom and uncertain reasons.  On weekends, Bertram goes to sleep at 10:00 PM.  He wakes up at 10:00 AM with an alarm. He falls asleep in 10 minutes.  Patient gets an average of 12 hours of sleep per night.     Patient does use electronics in bed, watch TV in bed and read in bed.     Bertram does not do shift work.  He works day shifts.      Bertram was diagnosed with ADAL at [facility] lab in 2015 was started on CPAP but had difficulty using it so stopped. He ws told that he has severe ADAL.  Patient is type II diabetic with h/o depression.    He denies sleep walking, sleep talking, enuresis and sleep terrors as a child.  Bertram denies difficulty breathing through his nose, claustrophobia, reflux at night and heartburn.      Bertram has gained 0-5 pounds in the last year.  Patient's [place] Sleepiness score 14/24 consistent with excessive  daytime sleepiness.      Bertram naps 1-2 times per week for 30-60 minutes, feels refreshed after naps. He takes no inadvertant naps.  He doesn't drive..   He uses  3 to 6 sodas/day. Last caffeine intake is usually before noon.    Allergies:    Allergies   Allergen Reactions     Ceclor [Cefaclor] Swelling     Erythromycin GI Disturbance       Medications:    Current Outpatient Medications   Medication Sig Dispense Refill     cyclobenzaprine (FLEXERIL) 10 MG tablet Take 10 mg by mouth       Divalproex Sodium (DEPAKOTE PO)        ibuprofen (ADVIL/MOTRIN)  800 MG tablet Take 1 tablet (800 mg) by mouth every 8 hours as needed for pain 30 tablet 0     loratadine (CLARITIN) 10 MG tablet Take 1 tablet (10 mg) by mouth daily (Patient not taking: Reported on 2019) 14 tablet 0     omeprazole 20 MG tablet Take 1 tablet (20 mg) by mouth 2 times daily 30 tablet 0     QUEtiapine (SEROQUEL) 300 MG tablet Take 300 mg by mouth  0     TRAZODONE HCL PO Take 100 mg by mouth At Bedtime          Problem List:  Patient Active Problem List    Diagnosis Date Noted     Diabetes mellitus, type 2 (H) 2019     Priority: Medium     Intellectual disability 2019     Priority: Medium     Recurrent major depressive disorder, in partial remission (H) 2019     Priority: Medium     Elevated glucose level 2019     Priority: Medium     Morbid obesity (H) 2019     Priority: Medium        Past Medical/Surgical History:  No past medical history on file.  No past surgical history on file.    Social History:  Social History     Socioeconomic History     Marital status: Single     Spouse name: Not on file     Number of children: Not on file     Years of education: Not on file     Highest education level: Not on file   Occupational History     Not on file   Social Needs     Financial resource strain: Not on file     Food insecurity:     Worry: Not on file     Inability: Not on file     Transportation needs:     Medical: Not on file     Non-medical: Not on file   Tobacco Use     Smoking status: Former Smoker     Packs/day: 0.30     Years: 0.50     Pack years: 0.15     Types: Cigarettes     Last attempt to quit: 3/1/2014     Years since quittin.2     Smokeless tobacco: Never Used   Substance and Sexual Activity     Alcohol use: Yes     Alcohol/week: 0.0 oz     Comment: occ     Drug use: No     Sexual activity: Never   Lifestyle     Physical activity:     Days per week: Not on file     Minutes per session: Not on file     Stress: Not on file   Relationships     Social  connections:     Talks on phone: Not on file     Gets together: Not on file     Attends Muslim service: Not on file     Active member of club or organization: Not on file     Attends meetings of clubs or organizations: Not on file     Relationship status: Not on file     Intimate partner violence:     Fear of current or ex partner: Not on file     Emotionally abused: Not on file     Physically abused: Not on file     Forced sexual activity: Not on file   Other Topics Concern     Not on file   Social History Narrative     Not on file       Family History:  No family history on file.    Review of Systems:  A complete review of systems reviewed by me is negative with the exeption of what has been mentioned in the history of present illness.  CONSTITUTIONAL: NEGATIVE for weight gain/loss, fever, chills, sweats or night sweats, drug allergies.  EYES: NEGATIVE for changes in vision, blind spots, double vision.  ENT: NEGATIVE for ear pain, sore throat, sinus pain, post-nasal drip, runny nose, bloody nose  CARDIAC: NEGATIVE for fast heartbeats or fluttering in chest, chest pain or pressure, breathlessness when lying flat, swollen legs or swollen feet.  NEUROLOGIC: NEGATIVE headaches, weakness or numbness in the arms or legs.  DERMATOLOGIC: NEGATIVE for rashes, new moles or change in mole(s)  PULMONARY: NEGATIVE SOB at rest, SOB with activity, dry cough, productive cough, coughing up blood, wheezing or whistling when breathing.    GASTROINTESTINAL: NEGATIVE for nausea or vomitting, loose or watery stools, fat or grease in stools, constipation, abdominal pain, bowel movements black in color or blood noted.  GENITOURINARY: NEGATIVE for pain during urination, blood in urine, urinating more frequently than usual, irregular menstrual periods.  MUSCULOSKELETAL: NEGATIVE for muscle pain, bone or joint pain, swollen joints.  ENDOCRINE: NEGATIVE for increased thirst or urination, diabetes.  LYMPHATIC: NEGATIVE for swollen lymph  "nodes, lumps or bumps in the breasts or nipple discharge.    Physical Examination:  Vitals: Ht 1.867 m (6' 1.5\")   Wt (!) 155.1 kg (342 lb)   BMI 44.51 kg/m    BMI= Body mass index is 44.51 kg/m .         No flowsheet data found.         GENERAL APPEARANCE: healthy, alert, no distress, cooperative and over weight  EYES: Eyes grossly normal to inspection, PERRL and conjunctivae and sclerae normal  HENT: ear canals and TM's normal, nose and mouth without ulcers or lesions, oropharynx crowded, uvula elongated, soft palate dependent and tongue base enlarged  NECK: no adenopathy, no asymmetry, masses, or scars, thyroid normal to palpation and very large neck size  NEURO: Normal strength and tone, mentation intact and speech normal  PSYCH: mentation appears normal and affect normal/bright  Mallampati Class: III.  Tonsillar Stage: 1  hidden by pillars.    Impression/Plan:    The patient with h/o ADAL diagnosed in 2015, unable to use CPAP.  Before trying any therapy we need to have sleep study results will request from [facility].   after his sleep study has been received will sit down  to review the results and discuss plan of care.       Polysomnography reviewed.  Obstructive sleep apnea reviewed.  Complications of untreated sleep apnea were reviewed.  Your BMI is Body mass index is 44.51 kg/m .    What is BMI?  Body mass index (BMI) is one way to tell whether you are at a healthy weight, overweight, or obese. It measures your weight in relation to your height.  A BMI of 18.5 to 24.9 is in the healthy range. A person with a BMI of 25 to 29.9 is considered overweight, and someone with a BMI of 30 or greater is considered obese.  Another way to find out if you are at risk for health problems caused by overweight and obesity is to measure your waist. If you are a woman and your waist is more than 35 inches, or if you are a man and your waist is more than 40 inches, your risk of disease may be higher.  More than " two-thirds of American adults are considered overweight or obese. Being overweight or obese increases the risk for further weight gain.  Excess weight may lead to heart disease and diabetes. Creating and following plans for healthy eating and physical activity may help you improve your health.    Methods for maintaining or losing weight.  Weight control is part of healthy lifestyle and includes exercise, emotional health, and healthy eating habits.  Careful eating habits lifelong is the mainstay of weight control.  Though there are significant health benefits from weight loss, long-term weight loss with diet alone may be very difficult to achieve- studies show long-term success with dietary management in less than 10% of people. Attaining a healthy weight may be especially difficult to achieve in those with severe obesity. In some cases, medications, devices and surgical management might be considered.    What can you do?  If you are overweight or obese and are interested in methods for weight loss, you should discuss this with your provider. In addition, we recommend that you review healthy life styles and methods for weight loss available through the National Institutes of Health patient information sites:   http://win.niddk.nih.gov/publications/index.htm        Liborio Hidalgo MD      CC: Rachell Montoya

## 2019-05-21 ENCOUNTER — OFFICE VISIT (OUTPATIENT)
Dept: FAMILY MEDICINE | Facility: OTHER | Age: 29
End: 2019-05-21
Payer: COMMERCIAL

## 2019-05-21 VITALS
OXYGEN SATURATION: 98 % | HEART RATE: 106 BPM | BODY MASS INDEX: 44.89 KG/M2 | RESPIRATION RATE: 16 BRPM | WEIGHT: 315 LBS | DIASTOLIC BLOOD PRESSURE: 76 MMHG | SYSTOLIC BLOOD PRESSURE: 128 MMHG

## 2019-05-21 DIAGNOSIS — K21.00 GASTROESOPHAGEAL REFLUX DISEASE WITH ESOPHAGITIS: ICD-10-CM

## 2019-05-21 DIAGNOSIS — B09 VIRAL EXANTHEM: Primary | ICD-10-CM

## 2019-05-21 PROCEDURE — 99213 OFFICE O/P EST LOW 20 MIN: CPT | Performed by: INTERNAL MEDICINE

## 2019-05-21 RX ORDER — ACETAMINOPHEN 325 MG/1
325-650 TABLET ORAL PRN
COMMUNITY

## 2019-05-21 ASSESSMENT — PAIN SCALES - GENERAL: PAINLEVEL: WORST PAIN (10)

## 2019-05-21 NOTE — PROGRESS NOTES
Subjective     Bertram Foreman is a 28 year old male who presents to clinic today for the following health issues:    HPI   Rash  Onset: x 2 days    Description:   Location: both hands/wrists  Character: round, raised, red  Itching (Pruritis): no     Progression of Symptoms:  worsening    Accompanying Signs & Symptoms:  Fever: no   Body aches or joint pain: no   Sore throat symptoms: no   Recent cold symptoms: no     History:   Previous similar rash: no     Precipitating factors:   Exposure to similar rash: no   New exposures: None   Recent travel: no     Alleviating factors:  none    Therapies Tried and outcome: lotion                      Chief Complaint         The patient is a pleasant 28-year-old gentleman who presents today with a rash on his hands.  He has a very sparse papular viral exanthem involving his forearms.  They are mildly erythematous, nonpruritic, and show no signs of secondary infection or pustules.  He notes that he recently had an upper respiratory tract infection which has spontaneously resolved.  His other concern is that he would like to discontinue his omeprazole in favor of a probiotic medication and says it is good for heartburn.  I explained the nature of probiotic medication and suggested that this probably will do nothing for his reflux symptoms and he will continue his omeprazole.                                       Examination      /76   Pulse 106   Resp 16   Wt (!) 156.4 kg (344 lb 14.4 oz)   SpO2 98%   BMI 44.89 kg/m     Constitutional: The patient appears to be in no acute distress. The patient appears to be adequately hydrated. No acute respiratory or hemodynamic distress is noted at this time.   LUNGS: clear bilaterally, airflow is brisk, no intercostal retraction or stridor is noted. No coughing is noted during visit.   HEART:  regular without rubs, clicks, gallops, or murmurs. PMI is nondisplaced. Upstrokes are brisk. S1,S2 are heard.   GI: Abdomen is soft,  without rebound, guarding or tenderness. Bowel sounds are appropriate. No renal bruits are heard.  Abdomen is obese.                                           Decision Making    1. Viral exanthem  Observation    2. Gastroesophageal reflux disease with esophagitis  Continue omeprazole, elevation of the head of bed, eating earlier in the evening.                             FOLLOW UP   I have asked the patient to make an appointment for followup with me in 6 months or as needed        I have carefully explained the diagnosis and treatment options to the patient.  The patient has displayed an understanding of the above, and all subsequent questions were answered.      DO DONNELL Jin    Portions of this note were produced using [employer]  Although every attempt at real-time proof reading has been made, occasional grammar/syntax errors may have been missed.

## 2019-05-27 ENCOUNTER — APPOINTMENT (OUTPATIENT)
Dept: GENERAL RADIOLOGY | Facility: CLINIC | Age: 29
End: 2019-05-27
Attending: NURSE PRACTITIONER
Payer: COMMERCIAL

## 2019-05-27 ENCOUNTER — HOSPITAL ENCOUNTER (EMERGENCY)
Facility: CLINIC | Age: 29
Discharge: HOME | End: 2019-05-27
Attending: NURSE PRACTITIONER | Admitting: NURSE PRACTITIONER
Payer: COMMERCIAL

## 2019-05-27 VITALS
SYSTOLIC BLOOD PRESSURE: 153 MMHG | BODY MASS INDEX: 44.85 KG/M2 | DIASTOLIC BLOOD PRESSURE: 92 MMHG | RESPIRATION RATE: 18 BRPM | TEMPERATURE: 98.4 F | OXYGEN SATURATION: 94 % | WEIGHT: 315 LBS | HEART RATE: 82 BPM

## 2019-05-27 DIAGNOSIS — J06.9 VIRAL URI WITH COUGH: ICD-10-CM

## 2019-05-27 PROCEDURE — 99284 EMERGENCY DEPT VISIT MOD MDM: CPT | Mod: Z6 | Performed by: EMERGENCY MEDICINE

## 2019-05-27 PROCEDURE — 25000125 ZZHC RX 250: Performed by: NURSE PRACTITIONER

## 2019-05-27 PROCEDURE — 71046 X-RAY EXAM CHEST 2 VIEWS: CPT | Mod: TC

## 2019-05-27 PROCEDURE — 94640 AIRWAY INHALATION TREATMENT: CPT

## 2019-05-27 PROCEDURE — 99283 EMERGENCY DEPT VISIT LOW MDM: CPT | Mod: 25 | Performed by: EMERGENCY MEDICINE

## 2019-05-27 PROCEDURE — 40000275 ZZH STATISTIC RCP TIME EA 10 MIN

## 2019-05-27 PROCEDURE — 40000274 ZZH STATISTIC RCP CONSULT EA 30 MIN

## 2019-05-27 RX ORDER — CODEINE PHOSPHATE AND GUAIFENESIN 10; 100 MG/5ML; MG/5ML
1-2 SOLUTION ORAL
Qty: 118 ML | Refills: 0 | Status: SHIPPED | OUTPATIENT
Start: 2019-05-27 | End: 2019-06-18

## 2019-05-27 RX ORDER — ALBUTEROL SULFATE 90 UG/1
2 AEROSOL, METERED RESPIRATORY (INHALATION) EVERY 4 HOURS PRN
Qty: 8.5 G | Refills: 0 | Status: SHIPPED | OUTPATIENT
Start: 2019-05-27 | End: 2019-06-18

## 2019-05-27 RX ORDER — IPRATROPIUM BROMIDE AND ALBUTEROL SULFATE 2.5; .5 MG/3ML; MG/3ML
3 SOLUTION RESPIRATORY (INHALATION) ONCE
Status: COMPLETED | OUTPATIENT
Start: 2019-05-27 | End: 2019-05-27

## 2019-05-27 RX ADMIN — IPRATROPIUM BROMIDE AND ALBUTEROL SULFATE 3 ML: .5; 3 SOLUTION RESPIRATORY (INHALATION) at 14:05

## 2019-05-27 NOTE — PROGRESS NOTES
S- Respiratory Therapy  B- Cough/ ADAL  A- Patient is on room air SpO2 = 94%.  Breath sounds diminished through out no change post neb.

## 2019-05-27 NOTE — ED TRIAGE NOTES
Pt w/cough x 2 days with expiratory wheezing -audible and some trouble breathing also notes a sore throat r/t cough.  No known ill contacts.  Up a lot last night with cough.  Denies any n/v/d/f/c.

## 2019-05-27 NOTE — ED PROVIDER NOTES
History     Chief Complaint   Patient presents with     Cough     HPI  Bertram Foreman is a 28 year old male who presents to the ED today with c/o cough, wheezing, nasal congestion, sore throat related to cough and generalized fatigue for 2 days.  Patient denies any hx of asthma or reactive airway disease.  Patient has had no vomiting or diarrhea, has been trying to stay hydrated.  Patient up most of last night coughing.  Patient quit smoking 2-4 weeks ago. Patient has been taking Tylenol for his symptoms with little relief.  He has not had anything for his cough which gets worse when patient is supine.     Allergies:  Allergies   Allergen Reactions     Ceclor [Cefaclor] Swelling     Erythromycin GI Disturbance       Problem List:    Patient Active Problem List    Diagnosis Date Noted     Diabetes mellitus, type 2 (H) 2019     Priority: Medium     Intellectual disability 2019     Priority: Medium     Recurrent major depressive disorder, in partial remission (H) 2019     Priority: Medium     Elevated glucose level 2019     Priority: Medium     Morbid obesity (H) 2019     Priority: Medium        Past Medical History:    History reviewed. No pertinent past medical history.    Past Surgical History:    History reviewed. No pertinent surgical history.    Family History:    History reviewed. No pertinent family history.    Social History:  Marital Status:  Single [1]  Social History     Tobacco Use     Smoking status: Former Smoker     Packs/day: 0.30     Years: 0.50     Pack years: 0.15     Types: Cigarettes     Last attempt to quit: 3/1/2014     Years since quittin.2     Smokeless tobacco: Never Used   Substance Use Topics     Alcohol use: Yes     Alcohol/week: 0.0 oz     Comment: occ     Drug use: No        Medications:      acetaminophen (TYLENOL) 325 MG tablet   Divalproex Sodium (DEPAKOTE PO)   QUEtiapine (SEROQUEL) 300 MG tablet   TRAZODONE HCL PO   ibuprofen (ADVIL/MOTRIN)  800 MG tablet   omeprazole 20 MG tablet         Review of Systems   All other systems reviewed and are negative.      Physical Exam   BP: (!) 153/92  Pulse: 82  Temp: 98.4  F (36.9  C)  Resp: 18  Weight: (!) 156.3 kg (344 lb 9.6 oz)  SpO2: 97 %      Physical Exam   Constitutional: He is oriented to person, place, and time. He appears well-developed and well-nourished. No distress.   Obese male sitting in the chair, mildly pale, clammy, in no acute distress.   HENT:   Head: Normocephalic.   Mouth/Throat: Oropharynx is clear and moist. No oropharyngeal exudate.   Eyes: EOM are normal.   Neck: Normal range of motion.   Cardiovascular: Normal rate, regular rhythm, normal heart sounds and intact distal pulses.   No murmur heard.  Pulmonary/Chest: Effort normal. No respiratory distress. He has wheezes (faint end expiratory right upper). He exhibits no tenderness.   Abdominal: Soft. Bowel sounds are normal. He exhibits no distension. There is no tenderness.   Musculoskeletal: Normal range of motion.   Neurological: He is alert and oriented to person, place, and time.   Skin: Skin is warm and dry. Capillary refill takes less than 2 seconds. He is not diaphoretic. There is pallor.   Psychiatric: He has a normal mood and affect.       ED Course        Procedures    Results for orders placed or performed during the hospital encounter of 05/27/19 (from the past 24 hour(s))   XR Chest 2 Views    Narrative    CHEST TWO VIEWS  5/27/2019 1:33 PM     COMPARISON: None.    HISTORY: Cough.    FINDINGS: The cardiac silhouette, pulmonary vasculature, lungs and  pleural spaces are within normal limits.      Impression    IMPRESSION: Clear lungs.        Medications   ipratropium - albuterol 0.5 mg/2.5 mg/3 mL (DUONEB) neb solution 3 mL (has no administration in time range)       Assessments & Plan (with Medical Decision Making)  Viral URI with cough/wheezing.  Xray negative for infiltrate.  Improved with Duo Neb, recommend ongoing  supportive care at home, rest, fluids, Tylenol/Ibuprofen.  Inhaler for wheezing/sob.  Robitussin AC at bedtime to help sleep.  Narcotic safety and side effects discussed, patient agreeable to plan of care and discharged in stable condition.      I have reviewed the nursing notes.    I have reviewed the findings, diagnosis, plan and need for follow up with the patient.       Medication List      Started    albuterol 108 (90 Base) MCG/ACT inhaler  Commonly known as:  PROAIR HFA  2 puffs, Inhalation, EVERY 4 HOURS PRN     guaiFENesin-codeine 100-10 MG/5ML solution  Commonly known as:  ROBITUSSIN AC  1-2 tsp., Oral, AT BEDTIME PRN            Final diagnoses:   Viral URI with cough       5/27/2019   [facility] EMERGENCY DEPARTMENT     Ksenia Perez, MADELYN CNP  05/27/19 7527

## 2019-05-27 NOTE — ED AVS SNAPSHOT
[facility] Emergency Department  [address] [place] [address]  Phone:  [phone]  Fax:  [phone]                                    Bertram Foreman   MRN: [id number]    Department:  [facility] Emergency Department   Date of Visit:  5/27/2019           After Visit Summary Signature Page    I have received my discharge instructions, and my questions have been answered. I have discussed any challenges I see with this plan with the nurse or doctor.    ..........................................................................................................................................  Patient/Patient Representative Signature      ..........................................................................................................................................  Patient Representative Print Name and Relationship to Patient    ..................................................               ................................................  Date                                   Time    ..........................................................................................................................................  Reviewed by Signature/Title    ...................................................              ..............................................  Date                                               Time          22EPIC Rev 08/18

## 2019-06-01 ENCOUNTER — NURSE TRIAGE (OUTPATIENT)
Dept: NURSING | Facility: CLINIC | Age: 29
End: 2019-06-01

## 2019-06-01 NOTE — TELEPHONE ENCOUNTER
"C/o nasal and sinus congestion since yesterday. No breathing or swallowing difficulty. No sore throat. No fever. o ear or sinus pain. Mild frontal headache. Occasional cough. Seen at ED 5/27 for \"bronchitis\". Asks if he can \"come in and get some medicine for this\". Advised home care. Disc'd home treatments per guideline care advice; disc'd visit usually not needed for cold but call if SOB or other new sx.       Additional Information    Negative: Severe difficulty breathing (e.g., struggling for each breath, speaks in single words)    Negative: Sounds like a life-threatening emergency to the triager    Negative: Runny nose is caused by pollen or other allergies    Negative: Cough is main symptom    Negative: Severe sore throat    Negative: Fever > 104 F (40 C)    Negative: [1] Difficulty breathing AND [2] not severe AND [3] not from stuffy nose (e.g., not relieved by cleaning out the nose)    Negative: Patient sounds very sick or weak to the triager    Negative: [1] Fever > 101 F (38.3 C) AND [2] age > 60    Negative: [1] Fever > 100.0 F (37.8 C) AND [2] bedridden (e.g., nursing home patient, CVA, chronic illness, recovering from surgery)    Negative: [1] Fever > 100.0 F (37.8 C) AND [2] diabetes mellitus or weak immune system (e.g., HIV positive, cancer chemo, splenectomy, chronic steroids)    Negative: Fever present > 3 days (72 hours)    Negative: [1] Fever returns after gone for over 24 hours AND [2] symptoms worse or not improved    Negative: [1] Sinus pain (not just congestion) AND [2] fever    Negative: Earache    Negative: [1] SEVERE sore throat AND [2] present > 24 hours    Negative: [1] Sinus congestion (pressure, fullness) AND [2] present > 10 days    Negative: [1] Nasal discharge AND [2] present > 10 days    Negative: [1] Using nasal washes and pain medicine > 24 hours AND [2] sinus pain (lower forehead, cheekbone, or eye) persists    Negative: Sores with yellow scabs around the nasal opening    Cold " with no complications    Protocols used: COMMON COLD-A-AH

## 2019-06-04 DIAGNOSIS — J01.00 ACUTE NON-RECURRENT MAXILLARY SINUSITIS: ICD-10-CM

## 2019-06-04 RX ORDER — LORATADINE 10 MG/1
TABLET ORAL
Qty: 30 TABLET | Refills: 1 | Status: SHIPPED | OUTPATIENT
Start: 2019-06-04 | End: 2019-12-10

## 2019-06-04 NOTE — TELEPHONE ENCOUNTER
"Requested Prescriptions   Pending Prescriptions Disp Refills     loratadine (CLARITIN) 10 MG tablet [Pharmacy Med Name: LORATADINE 10MG TABS] 14 tablet 0     Sig: TAKE ONE TABLET BY MOUTH ONCE DAILY   Last Written Prescription Date:  NA  Last Fill Quantity: NA,  # refills: NA   Last office visit: 5/21/2019 with prescribing provider:  5/21/19   Future Office Visit:        Antihistamines Protocol Failed - 6/4/2019  3:34 AM        Failed - Medication is active on med list        Passed - Patient is 3-64 years of age     Apply weight-based dosing for peds patients age 3 - 12 years of age.    Forward request to provider for patients under the age of 3 or over the age of 64.          Passed - Recent (12 mo) or future (30 days) visit within the authorizing provider's specialty     Patient had office visit in the last 12 months or has a visit in the next 30 days with authorizing provider or within the authorizing provider's specialty.  See \"Patient Info\" tab in inbasket, or \"Choose Columns\" in Meds & Orders section of the refill encounter.              "

## 2019-06-04 NOTE — TELEPHONE ENCOUNTER
Routing refill request to provider for review/approval because:  Drug not active on patient's medication list    PRASANTH Mckeon, RN  [facility]

## 2019-06-18 ENCOUNTER — OFFICE VISIT (OUTPATIENT)
Dept: ORTHOPEDICS | Facility: CLINIC | Age: 29
End: 2019-06-18
Payer: COMMERCIAL

## 2019-06-18 VITALS
WEIGHT: 315 LBS | HEART RATE: 71 BPM | SYSTOLIC BLOOD PRESSURE: 129 MMHG | HEIGHT: 74 IN | DIASTOLIC BLOOD PRESSURE: 77 MMHG | BODY MASS INDEX: 40.43 KG/M2

## 2019-06-18 DIAGNOSIS — R26.89 FUNCTIONAL GAIT ABNORMALITY: ICD-10-CM

## 2019-06-18 DIAGNOSIS — M25.571 ACUTE RIGHT ANKLE PAIN: Primary | ICD-10-CM

## 2019-06-18 PROCEDURE — 99203 OFFICE O/P NEW LOW 30 MIN: CPT | Performed by: PHYSICAL MEDICINE & REHABILITATION

## 2019-06-18 ASSESSMENT — MIFFLIN-ST. JEOR: SCORE: 2569.5

## 2019-06-18 NOTE — PROGRESS NOTES
Sports Medicine Clinic Visit    PCP: Tremaine Ruvalcaba    CC: Patient presents with:  Right Ankle - Pain      HPI:  Bertram Foreman is a 28 year old male who is seen as a self referral.   He notes right ankle pain that began 2 months ago. He notes that he walks on the outside of his foot. He notes that it feels somewhat unstable in this position. He denies any injury or rolling of the ankle. He notes pain over the lateral ankle. He is not tender over the lateral malleolus. He rates the pain at a  10/10 at its worst and a 0/10 currently.  Symptoms are relieved with no treatment tried to date. Symptoms are worsened by walking. He endorses instability and weakness.   He denies swelling, bruising, popping, grinding, catching, locking, numbness and tingling.  Other treatment has included nothing. He notes difficulty with being on his feet.       Review of Systems:  Musculoskeletal: as above  Remainder of review of systems is negative including constitutional, eyes, ENT, CV, pulmonary, GI, , endocrine, skin, hematologic, and neurologic except as noted in HPI or medical history.    History reviewed. No pertinent past surgical/medical/family/social history other than as mentioned in HPI.    Patient Active Problem List   Diagnosis     Morbid obesity (H)     Intellectual disability     Recurrent major depressive disorder, in partial remission (H)     Elevated glucose level     Diabetes mellitus, type 2 (H)     No past medical history on file.  No past surgical history on file.  No family history on file.  Social History     Socioeconomic History     Marital status: Single     Spouse name: Not on file     Number of children: Not on file     Years of education: Not on file     Highest education level: Not on file   Occupational History     Not on file   Social Needs     Financial resource strain: Not on file     Food insecurity:     Worry: Not on file     Inability: Not on file     Transportation needs:      "Medical: Not on file     Non-medical: Not on file   Tobacco Use     Smoking status: Former Smoker     Packs/day: 0.30     Years: 0.50     Pack years: 0.15     Types: Cigarettes     Last attempt to quit: 3/1/2014     Years since quittin.3     Smokeless tobacco: Never Used   Substance and Sexual Activity     Alcohol use: Yes     Alcohol/week: 0.0 oz     Comment: occ     Drug use: No     Sexual activity: Never   Lifestyle     Physical activity:     Days per week: Not on file     Minutes per session: Not on file     Stress: Not on file   Relationships     Social connections:     Talks on phone: Not on file     Gets together: Not on file     Attends Church service: Not on file     Active member of club or organization: Not on file     Attends meetings of clubs or organizations: Not on file     Relationship status: Not on file     Intimate partner violence:     Fear of current or ex partner: Not on file     Emotionally abused: Not on file     Physically abused: Not on file     Forced sexual activity: Not on file   Other Topics Concern     Not on file   Social History Narrative     Not on file       He works as a  at [employer] in [place]    Current Outpatient Medications   Medication     acetaminophen (TYLENOL) 325 MG tablet     Divalproex Sodium (DEPAKOTE PO)     loratadine (CLARITIN) 10 MG tablet     omeprazole 20 MG tablet     QUEtiapine (SEROQUEL) 300 MG tablet     TRAZODONE HCL PO     ibuprofen (ADVIL/MOTRIN) 800 MG tablet     No current facility-administered medications for this visit.      Allergies   Allergen Reactions     Ceclor [Cefaclor] Swelling     Erythromycin GI Disturbance         Objective:  /77   Pulse 71   Ht 1.867 m (6' 1.5\")   Wt (!) 153.8 kg (339 lb)   BMI 44.12 kg/m      General: Alert and in no distress    Head: Normocephalic, atraumatic  Eyes: no scleral icterus or conjunctival erythema   Oropharynx:  Mucous membranes moist  Skin: no erythema, petechiae, or jaundice  CV: " regular rhythm by palpation, 2+ distal pulses  Resp: normal respiratory effort without conversational dyspnea   Psych: normal mood and affect    Gait: Walks on the lateral portion of his feet  Neuro: Motor strength and sensation as noted below    Musculoskeletal:    Bilateral Foot and Ankle Exam:    Inspection:       no visible ecchymosis       no visible edema or effusion    Foot inspection:  -Fused 2nd and 3rd toes proximally on the left    Tenderness:  None    ROM:        Full active ROM with ankle dorsiflexion, plantarflexion, inversion, eversion, great toe dorsiflexion, and great toe plantarflexion    Strength:       ankle dorsiflexion 5/5 bilaterally       plantarflexion 5/5 bilaterally       inversion 5/5 bilaterally       eversion 5/5 bilaterally       great toe dorsiflexion 5/5 bilaterally       great toe plantarflexion 5/5 bilaterally    Neurovascular:       2+ peripheral pulses bilaterally        sensation grossly intact      Radiology:  No imaging obtained today    Assessment:  1. Acute right ankle pain    2. Functional gait abnormality        Plan:  Discussed the assessment with the patient and developed a plan together:  -Rehab: Physical Therapy: [facility] - [phone]. Please do 5-6 days of exercises per week between formal sessions and the home exercises they provide.  -Referral to orthotics for custom foot orthotics.   -Ice or ice massage 15-20 minutes for pain relief or swelling as needed  -Patient's preferred over the counter medication as directed on packaging as needed for pain or soreness.    Follow up as needed if symptoms fail to improve or worsen. Please call with any questions or concerns.       Celena Nair MD, CAQ Sports Medicine  [facility]

## 2019-06-18 NOTE — LETTER
6/18/2019         RE: Bertram Foreman  [address]  [facility] [address]        Dear Colleague,    Thank you for referring your patient, Bertram Foreman, to the [facility]. Please see a copy of my visit note below.    Sports Medicine Clinic Visit    PCP: Tremaine Ruvalcaba    CC: Patient presents with:  Right Ankle - Pain      HPI:  Bertram Foreman is a 28 year old male who is seen as a self referral.   He notes right ankle pain that began 2 months ago. He notes that he walks on the outside of his foot. He notes that it feels somewhat unstable in this position. He denies any injury or rolling of the ankle. He notes pain over the lateral ankle. He is not tender over the lateral malleolus. He rates the pain at a  10/10 at its worst and a 0/10 currently.  Symptoms are relieved with no treatment tried to date. Symptoms are worsened by walking. He endorses instability and weakness.   He denies swelling, bruising, popping, grinding, catching, locking, numbness and tingling.  Other treatment has included nothing. He notes difficulty with being on his feet.       Review of Systems:  Musculoskeletal: as above  Remainder of review of systems is negative including constitutional, eyes, ENT, CV, pulmonary, GI, , endocrine, skin, hematologic, and neurologic except as noted in HPI or medical history.    History reviewed. No pertinent past surgical/medical/family/social history other than as mentioned in HPI.    Patient Active Problem List   Diagnosis     Morbid obesity (H)     Intellectual disability     Recurrent major depressive disorder, in partial remission (H)     Elevated glucose level     Diabetes mellitus, type 2 (H)     No past medical history on file.  No past surgical history on file.  No family history on file.  Social History     Socioeconomic History     Marital status: Single     Spouse name: Not on file     Number of children: Not on file     Years of education: Not on  "file     Highest education level: Not on file   Occupational History     Not on file   Social Needs     Financial resource strain: Not on file     Food insecurity:     Worry: Not on file     Inability: Not on file     Transportation needs:     Medical: Not on file     Non-medical: Not on file   Tobacco Use     Smoking status: Former Smoker     Packs/day: 0.30     Years: 0.50     Pack years: 0.15     Types: Cigarettes     Last attempt to quit: 3/1/2014     Years since quittin.3     Smokeless tobacco: Never Used   Substance and Sexual Activity     Alcohol use: Yes     Alcohol/week: 0.0 oz     Comment: occ     Drug use: No     Sexual activity: Never   Lifestyle     Physical activity:     Days per week: Not on file     Minutes per session: Not on file     Stress: Not on file   Relationships     Social connections:     Talks on phone: Not on file     Gets together: Not on file     Attends [employer] service: Not on file     Active member of club or organization: Not on file     Attends meetings of clubs or organizations: Not on file     Relationship status: Not on file     Intimate partner violence:     Fear of current or ex partner: Not on file     Emotionally abused: Not on file     Physically abused: Not on file     Forced sexual activity: Not on file   Other Topics Concern     Not on file   Social History Narrative     Not on file       He works as a  at [employer] in [place]    Current Outpatient Medications   Medication     acetaminophen (TYLENOL) 325 MG tablet     Divalproex Sodium (DEPAKOTE PO)     loratadine (CLARITIN) 10 MG tablet     omeprazole 20 MG tablet     QUEtiapine (SEROQUEL) 300 MG tablet     TRAZODONE HCL PO     ibuprofen (ADVIL/MOTRIN) 800 MG tablet     No current facility-administered medications for this visit.      Allergies   Allergen Reactions     Ceclor [Cefaclor] Swelling     Erythromycin GI Disturbance         Objective:  /77   Pulse 71   Ht 1.867 m (6' 1.5\")   Wt (!) 153.8 " kg (339 lb)   BMI 44.12 kg/m       General: Alert and in no distress    Head: Normocephalic, atraumatic  Eyes: no scleral icterus or conjunctival erythema   Oropharynx:  Mucous membranes moist  Skin: no erythema, petechiae, or jaundice  CV: regular rhythm by palpation, 2+ distal pulses  Resp: normal respiratory effort without conversational dyspnea   Psych: normal mood and affect    Gait: Walks on the lateral portion of his feet  Neuro: Motor strength and sensation as noted below    Musculoskeletal:    Bilateral Foot and Ankle Exam:    Inspection:       no visible ecchymosis       no visible edema or effusion    Foot inspection:  -Fused 2nd and 3rd toes proximally on the left    Tenderness:  None    ROM:        Full active ROM with ankle dorsiflexion, plantarflexion, inversion, eversion, great toe dorsiflexion, and great toe plantarflexion    Strength:       ankle dorsiflexion 5/5 bilaterally       plantarflexion 5/5 bilaterally       inversion 5/5 bilaterally       eversion 5/5 bilaterally       great toe dorsiflexion 5/5 bilaterally       great toe plantarflexion 5/5 bilaterally    Neurovascular:       2+ peripheral pulses bilaterally        sensation grossly intact      Radiology:  No imaging obtained today    Assessment:  1. Acute right ankle pain    2. Functional gait abnormality        Plan:  Discussed the assessment with the patient and developed a plan together:  -Rehab: Physical Therapy: [facility] - [phone]. Please do 5-6 days of exercises per week between formal sessions and the home exercises they provide.  -Referral to orthotics for custom foot orthotics.   -Ice or ice massage 15-20 minutes for pain relief or swelling as needed  -Patient's preferred over the counter medication as directed on packaging as needed for pain or soreness.    Follow up as needed if symptoms fail to improve or worsen. Please call with any questions or concerns.       Celena Nair MD, CAQ Sports  Medicine  [facility]      Again, thank you for allowing me to participate in the care of your patient.        Sincerely,        Elaine Nair MD

## 2019-06-18 NOTE — PATIENT INSTRUCTIONS
Today's Plan of Care:  -Rehab: Physical Therapy: [facility] - [phone]. Please do 5-6 days of exercises per week between formal sessions and the home exercises they provide.  -Referral to orthotics. Scheduling will call you.   -Ice or ice massage 15-20 minutes for pain relief or swelling as needed  -Patient's preferred over the counter medication as directed on packaging as needed for pain or soreness.    Follow Up: As needed if symptoms fail to improve or worsen. Please call with any questions or concerns.

## 2019-06-20 ENCOUNTER — HOSPITAL ENCOUNTER (OUTPATIENT)
Dept: PHYSICAL THERAPY | Facility: OTHER | Age: 29
Setting detail: THERAPIES SERIES
End: 2019-06-20
Attending: PHYSICAL MEDICINE & REHABILITATION
Payer: COMMERCIAL

## 2019-06-20 PROCEDURE — 97110 THERAPEUTIC EXERCISES: CPT | Mod: GP | Performed by: PHYSICAL THERAPIST

## 2019-06-20 PROCEDURE — 97161 PT EVAL LOW COMPLEX 20 MIN: CPT | Mod: GP | Performed by: PHYSICAL THERAPIST

## 2019-06-20 NOTE — PROGRESS NOTES
06/20/19 1331   General Information   Type of Visit Initial OP Ortho PT Evaluation   Start of Care Date 06/20/19   Referring Physician khai casiano MD   Patient/Family Goals Statement right ankle pain    Orders Evaluate and Treat   Date of Order 06/18/19   Certification Required? Yes   Medical Diagnosis acute right ankle pain M25.571 functional gait abnormality r26.89   Surgical/Medical history reviewed Yes   Precautions/Limitations no known precautions/limitations   Weight-Bearing Status - LLE full weight-bearing   Weight-Bearing Status - RLE full weight-bearing   Body Part(s)   Body Part(s) Ankle/Foot   Presentation and Etiology   Pertinent history of current problem (include personal factors and/or comorbidities that impact the POC) patient reports started a new job as  and doing alot more walking and started to have right medial foot when he is walking , has turned in 2 week notice . only hurts when at work and walk . has good shoes for work and has regular tennis shoes for outside work . PMH overweight , HTN , DM has a gurdian due to learning disabilty. . 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    Impairments A. Pain;H. Impaired gait   Functional Limitations perform activities of daily living;perform required work activities;perform desired leisure / sports activities   Symptom Location right laterial  foot pain    Onset date of current episode/exacerbation 05/20/19   Chronicity New   Pain rating (0-10 point scale) Best (/10);Worst (/10)   Best (/10) 0/10   Worst (/10) 8/10   Pain quality A. Sharp;B. Dull;C. Aching   Frequency of pain/symptoms C. With activity   Pain/symptoms exacerbated by B. Walking;J. ADL;L. Work tasks   Pain/symptoms eased by C. Rest   Progression of symptoms since onset: Unchanged   Prior Level of Function   Functional Level Prior Comment walk around apartment building    Current Level of Function   Current Community Support Family/friend caregiver    Patient role/employment history A. Employed   Fall Risk Screen   Fall screen completed by PT   Have you fallen 2 or more times in the past year? No   Have you fallen and had an injury in the past year? No   Is patient a fall risk? No   Ankle/Foot Objective Findings   Side (if bilateral, select both right and left) Right   Foot Position In Standing patient stand excessively on heel and laterial foot right > left , shoes are excessively worn on right laterial heel    Ankle/Foot Strength Comments strength is good and no excessive tone noted    Ankle/Foot Flexibility Comments dorsiflexion 0 90/90 right 50 left 45    Anterior Drawer Test neg   Ankle/Foot Special Tests Comments neg inversion / eversion    Planned Therapy Interventions   Planned Therapy Interventions stretching   Clinical Impression   Criteria for Skilled Therapeutic Interventions Met yes, treatment indicated   PT Diagnosis right laterial foot pain    Functional limitations due to impairments LEFS 52/80   Clinical Presentation Stable/Uncomplicated   Clinical Presentation Rationale patient seen due to c/o right laterial foot pain , presents with good strength but decrease flexibility , he is instrucion in HEP and is going to get custom orthotics    Clinical Decision Making (Complexity) Low complexity   Predicted Duration of Therapy Intervention (days/wks) 1x instruction in HEP will leave chart open x 1 month to follow up if needed    Risk & Benefits of therapy have been explained Yes   Patient, Family & other staff in agreement with plan of care Yes   Education Assessment   Preferred Learning Style Listening;Reading;Demonstration   Barriers to Learning No barriers;Cognitive   ORTHO GOALS   PT Ortho Eval Goals 1   Ortho Goal 1   Goal Identifier 1   Goal Description instruction in HEP for gentle stretching    Target Date 07/20/19   Total Evaluation Time   PT Eval, Low Complexity Minutes (61771) 15   Therapy Certification   Certification date from 06/20/19    Certification date to 07/20/19   Medical Diagnosis acute right ankle pain M25.571 functional gait abnormality r26.89

## 2019-06-20 NOTE — PROGRESS NOTES
[facility]          OUTPATIENT PHYSICAL THERAPY ORTHOPEDIC EVALUATION  PLAN OF TREATMENT FOR OUTPATIENT REHABILITATION  (COMPLETE FOR INITIAL CLAIMS ONLY)  Patient's Last Name, First Name, M.I.  YOB: 1990  Bertram Foreman    Provider s Name:  [facility]   Medical Record No.  [id number]   Start of Care Date:  06/20/19   Onset Date:  05/20/19   Type:     _X__PT   ___OT   ___SLP Medical Diagnosis:  acute right ankle pain M25.571 functional gait abnormality r26.89     PT Diagnosis:  right laterial foot pain    Visits from SOC:  1      _________________________________________________________________________________  Plan of Treatment/Functional Goals:  stretching           Goals  Goal Identifier: 1  Goal Description: instruction in HEP for gentle stretching   Target Date: 07/20/19                                                                                 Therapy Frequency:     Predicted Duration of Therapy Intervention:  1x instruction in HEP will leave chart open x 1 month to follow up if needed     Rachael Begum, PT                 I CERTIFY THE NEED FOR THESE SERVICES FURNISHED UNDER        THIS PLAN OF TREATMENT AND WHILE UNDER MY CARE     (Physician co-signature of this document indicates review and certification of the therapy plan).                       Certification Date From:  06/20/19   Certification Date To:  07/20/19    Referring Provider:  khai casiano MD    Initial Assessment        See Epic Evaluation Start of Care Date: 06/20/19

## 2019-07-05 ENCOUNTER — TELEPHONE (OUTPATIENT)
Dept: FAMILY MEDICINE | Facility: OTHER | Age: 29
End: 2019-07-05

## 2019-07-05 DIAGNOSIS — Z79.899 HIGH RISK MEDICATION USE: ICD-10-CM

## 2019-07-05 DIAGNOSIS — Z13.21 ENCOUNTER FOR VITAMIN DEFICIENCY SCREENING: Primary | ICD-10-CM

## 2019-07-05 LAB
ALBUMIN SERPL-MCNC: 3.4 G/DL (ref 3.4–5)
ALP SERPL-CCNC: 82 U/L (ref 40–150)
ALT SERPL W P-5'-P-CCNC: 36 U/L (ref 0–70)
ANION GAP SERPL CALCULATED.3IONS-SCNC: 10 MMOL/L (ref 3–14)
AST SERPL W P-5'-P-CCNC: 18 U/L (ref 0–45)
BASOPHILS # BLD AUTO: 0 10E9/L (ref 0–0.2)
BASOPHILS NFR BLD AUTO: 0.2 %
BILIRUB SERPL-MCNC: 0.2 MG/DL (ref 0.2–1.3)
BUN SERPL-MCNC: 7 MG/DL (ref 7–30)
CALCIUM SERPL-MCNC: 8.9 MG/DL (ref 8.5–10.1)
CHLORIDE SERPL-SCNC: 104 MMOL/L (ref 94–109)
CHOLEST SERPL-MCNC: 226 MG/DL
CO2 SERPL-SCNC: 26 MMOL/L (ref 20–32)
CREAT SERPL-MCNC: 0.56 MG/DL (ref 0.66–1.25)
DEPRECATED CALCIDIOL+CALCIFEROL SERPL-MC: 22 UG/L (ref 20–75)
DIFFERENTIAL METHOD BLD: ABNORMAL
EOSINOPHIL # BLD AUTO: 0.1 10E9/L (ref 0–0.7)
EOSINOPHIL NFR BLD AUTO: 1 %
ERYTHROCYTE [DISTWIDTH] IN BLOOD BY AUTOMATED COUNT: 13.8 % (ref 10–15)
GFR SERPL CREATININE-BSD FRML MDRD: >90 ML/MIN/{1.73_M2}
GLUCOSE SERPL-MCNC: 138 MG/DL (ref 70–99)
HCT VFR BLD AUTO: 39.5 % (ref 40–53)
HDLC SERPL-MCNC: 38 MG/DL
HGB BLD-MCNC: 12.5 G/DL (ref 13.3–17.7)
LDLC SERPL CALC-MCNC: 152 MG/DL
LYMPHOCYTES # BLD AUTO: 4 10E9/L (ref 0.8–5.3)
LYMPHOCYTES NFR BLD AUTO: 32.1 %
MCH RBC QN AUTO: 27.7 PG (ref 26.5–33)
MCHC RBC AUTO-ENTMCNC: 31.6 G/DL (ref 31.5–36.5)
MCV RBC AUTO: 87 FL (ref 78–100)
MONOCYTES # BLD AUTO: 1.1 10E9/L (ref 0–1.3)
MONOCYTES NFR BLD AUTO: 8.4 %
NEUTROPHILS # BLD AUTO: 7.4 10E9/L (ref 1.6–8.3)
NEUTROPHILS NFR BLD AUTO: 58.3 %
NONHDLC SERPL-MCNC: 188 MG/DL
PLATELET # BLD AUTO: 272 10E9/L (ref 150–450)
POTASSIUM SERPL-SCNC: 4 MMOL/L (ref 3.4–5.3)
PROLACTIN SERPL-MCNC: 11 UG/L (ref 2–18)
PROT SERPL-MCNC: 7.9 G/DL (ref 6.8–8.8)
RBC # BLD AUTO: 4.52 10E12/L (ref 4.4–5.9)
SODIUM SERPL-SCNC: 140 MMOL/L (ref 133–144)
T4 FREE SERPL-MCNC: 0.79 NG/DL (ref 0.76–1.46)
TRIGL SERPL-MCNC: 182 MG/DL
TSH SERPL DL<=0.005 MIU/L-ACNC: 3.4 MU/L (ref 0.4–4)
WBC # BLD AUTO: 12.6 10E9/L (ref 4–11)

## 2019-07-05 PROCEDURE — 80061 LIPID PANEL: CPT | Performed by: PHYSICIAN ASSISTANT

## 2019-07-05 PROCEDURE — 80053 COMPREHEN METABOLIC PANEL: CPT | Performed by: PHYSICIAN ASSISTANT

## 2019-07-05 PROCEDURE — 84439 ASSAY OF FREE THYROXINE: CPT | Performed by: PHYSICIAN ASSISTANT

## 2019-07-05 PROCEDURE — 85025 COMPLETE CBC W/AUTO DIFF WBC: CPT | Performed by: PHYSICIAN ASSISTANT

## 2019-07-05 PROCEDURE — 84443 ASSAY THYROID STIM HORMONE: CPT | Performed by: PHYSICIAN ASSISTANT

## 2019-07-05 PROCEDURE — 84146 ASSAY OF PROLACTIN: CPT | Performed by: PHYSICIAN ASSISTANT

## 2019-07-05 PROCEDURE — 82306 VITAMIN D 25 HYDROXY: CPT | Performed by: PHYSICIAN ASSISTANT

## 2019-07-05 PROCEDURE — 36415 COLL VENOUS BLD VENIPUNCTURE: CPT | Performed by: PHYSICIAN ASSISTANT

## 2019-07-05 NOTE — TELEPHONE ENCOUNTER
"Bertram Foreman is a 28 year old male who calls with superglue stuck to his fingers. He states it got on his hands.\"The superglue container states to contact your physician if it gets on your skin\"    Currently his fingers are NOT stuck together. He denies having anything stuck to his hands at this time.  After discussing things Bertram could use to get it off we narrowed it down to soap and water. He will try that.     Agnieszka Rainey RN      "

## 2019-07-08 ENCOUNTER — ALLIED HEALTH/NURSE VISIT (OUTPATIENT)
Dept: FAMILY MEDICINE | Facility: OTHER | Age: 29
End: 2019-07-08
Payer: COMMERCIAL

## 2019-07-08 DIAGNOSIS — Z79.899 ENCOUNTER FOR LONG-TERM (CURRENT) USE OF HIGH-RISK MEDICATION: Primary | ICD-10-CM

## 2019-07-08 PROCEDURE — 93000 ELECTROCARDIOGRAM COMPLETE: CPT

## 2019-07-08 NOTE — PROGRESS NOTES
EKG was done.    Patient presented to clinic with orders from [employer] [facility] from [facility]. High risk medication use.     EKG was reviwed by patients pcp Dr. Jordon sanabria for patient to leave today.   EKG was faxed to [facility] and Order was sent to scanning.  [phone]    Stacy Love MA

## 2019-07-16 ENCOUNTER — TRANSFERRED RECORDS (OUTPATIENT)
Dept: HEALTH INFORMATION MANAGEMENT | Facility: CLINIC | Age: 29
End: 2019-07-16

## 2019-08-21 ENCOUNTER — OFFICE VISIT (OUTPATIENT)
Dept: FAMILY MEDICINE | Facility: OTHER | Age: 29
End: 2019-08-21
Payer: COMMERCIAL

## 2019-08-21 VITALS
SYSTOLIC BLOOD PRESSURE: 130 MMHG | BODY MASS INDEX: 44.51 KG/M2 | WEIGHT: 315 LBS | OXYGEN SATURATION: 99 % | TEMPERATURE: 97.7 F | DIASTOLIC BLOOD PRESSURE: 74 MMHG | RESPIRATION RATE: 18 BRPM | HEART RATE: 106 BPM

## 2019-08-21 DIAGNOSIS — E66.01 MORBID OBESITY (H): ICD-10-CM

## 2019-08-21 DIAGNOSIS — E11.65 TYPE 2 DIABETES MELLITUS WITH HYPERGLYCEMIA, WITHOUT LONG-TERM CURRENT USE OF INSULIN (H): Primary | ICD-10-CM

## 2019-08-21 DIAGNOSIS — F79 INTELLECTUAL DISABILITY: ICD-10-CM

## 2019-08-21 LAB
ANION GAP SERPL CALCULATED.3IONS-SCNC: 11 MMOL/L (ref 3–14)
BUN SERPL-MCNC: 6 MG/DL (ref 7–30)
CALCIUM SERPL-MCNC: 9 MG/DL (ref 8.5–10.1)
CHLORIDE SERPL-SCNC: 103 MMOL/L (ref 94–109)
CO2 SERPL-SCNC: 26 MMOL/L (ref 20–32)
CREAT SERPL-MCNC: 0.59 MG/DL (ref 0.66–1.25)
GFR SERPL CREATININE-BSD FRML MDRD: >90 ML/MIN/{1.73_M2}
GLUCOSE SERPL-MCNC: 184 MG/DL (ref 70–99)
INSULIN SERPL-ACNC: 46.4 MU/L (ref 3–25)
POTASSIUM SERPL-SCNC: 3.5 MMOL/L (ref 3.4–5.3)
SODIUM SERPL-SCNC: 140 MMOL/L (ref 133–144)

## 2019-08-21 PROCEDURE — 36415 COLL VENOUS BLD VENIPUNCTURE: CPT | Performed by: INTERNAL MEDICINE

## 2019-08-21 PROCEDURE — 80048 BASIC METABOLIC PNL TOTAL CA: CPT | Performed by: INTERNAL MEDICINE

## 2019-08-21 PROCEDURE — 99214 OFFICE O/P EST MOD 30 MIN: CPT | Performed by: INTERNAL MEDICINE

## 2019-08-21 PROCEDURE — 83525 ASSAY OF INSULIN: CPT | Performed by: INTERNAL MEDICINE

## 2019-08-21 ASSESSMENT — PAIN SCALES - GENERAL: PAINLEVEL: NO PAIN (0)

## 2019-08-21 NOTE — PROGRESS NOTES
Subjective     Bertram Foreman is a 29 year old male who presents to clinic today for the following health issues:    HPI   Chief Complaint   Patient presents with     Results     discuss results, concerns with high A1C                     Chief Complaint         The patient is a pleasant 29-year-old gentleman who presents today with abnormal lab work.  He had a performed by another provider and most notably he has an A1c of 12.5.  He complains of polyuria with polydipsia.  He has been drinking a lot of soda pop as he has been thirsty a lot.  Oddly, and A1c was performed here in February where it was 6.8.  At that point it was slightly elevated and he was watching his blood sugars with diet.  Obviously that has not worked.  He denies any open wounds.  He has had some acute vision changes but did not recognize this until we discussed it.  He does have a family history of diabetes in his father.  The details are otherwise unknown.                       PAST, FAMILY,SOCIAL HISTORY:     Medical  History:   has no past medical history on file.     Surgical History:   has no past surgical history on file.     Social History:   reports that he quit smoking about 5 years ago. His smoking use included cigarettes. He has a 0.15 pack-year smoking history. He has never used smokeless tobacco. He reports that he drinks alcohol. He reports that he does not use drugs.     Family History:  family history is not on file.            MEDICATIONS  Current Outpatient Medications   Medication Sig Dispense Refill     metFORMIN (GLUCOPHAGE) 500 MG tablet Take 1 tablet (500 mg) by mouth 2 times daily (with meals) 28 tablet 0     acetaminophen (TYLENOL) 325 MG tablet Take 325-650 mg by mouth every 6 hours as needed for mild pain       Divalproex Sodium (DEPAKOTE PO)        ibuprofen (ADVIL/MOTRIN) 800 MG tablet Take 1 tablet (800 mg) by mouth every 8 hours as needed for pain (Patient not taking: Reported on 5/21/2019) 30 tablet 0      loratadine (CLARITIN) 10 MG tablet TAKE ONE TABLET BY MOUTH ONCE DAILY 30 tablet 1     omeprazole 20 MG tablet Take 1 tablet (20 mg) by mouth 2 times daily 30 tablet 0     QUEtiapine (SEROQUEL) 300 MG tablet Take 300 mg by mouth  0     TRAZODONE HCL PO Take 100 mg by mouth At Bedtime            --------------------------------------------------------------------------------------------------------------------                              Review of Systems       LUNGS: Pt denies: cough, excess sputum, hemoptysis, or shortness of breath.   HEART: Pt denies: chest pain, arrhythmia, syncope, tachy or bradyarrhythmia.   GI: Pt denies: nausea, vomiting, diarrhea, constipation, melena, or hematochezia.   NEURO: Pt denies: seizures, strokes, diplopia, weakness, paraesthesias, or paralysis.   SKIN: Pt denies: itching, rashes, discoloration, or specific lesions of concern. Denies recent hair loss.   PSYCH: The patient denies significant depression, anxiety, mood imbalance. Specifically denies any suicidal ideation.   URINARY: Pt denies:  urgency, incontinence, or hesitancy of urine. Denies hematuria, or flank pain.  Patient does have polyuria with polydipsia.  Also notes foam in the urine suggestive of protein.                                     Examination    /74 (BP Location: Right arm, Patient Position: Sitting, Cuff Size: Adult Large)   Pulse 106   Temp 97.7  F (36.5  C) (Temporal)   Resp 18   Wt (!) 155.1 kg (342 lb)   SpO2 99%   BMI 44.51 kg/m     Constitutional: The patient appears to be in no acute distress. The patient appears to be adequately hydrated. No acute respiratory or hemodynamic distress is noted at this time.   LUNGS: clear bilaterally, airflow is brisk, no intercostal retraction or stridor is noted. No coughing is noted during visit.   HEART:  regular without rubs, clicks, gallops, or murmurs. PMI is nondisplaced. Upstrokes are brisk. S1,S2 are heard.   GI: Abdomen is soft, without rebound,  guarding or tenderness. Bowel sounds are appropriate. No renal bruits are heard.   NEURO: Pt is alert and appropriate. No neurologic lateralization is noted. Cranial nerves 2-12 are intact. Peripheral sensory and motor function are grossly normal.    SKIN:  warm and dry. No erythema, or rashes are noted. No specific lesions of concern are noted.    PSYCH: The patient appears grossly appropriate. Maintains good eye contact, does not have any jittery or atypical motion. Displays appropriate affect.                                           Decision Making  1. Type 2 diabetes mellitus with hyperglycemia, without long-term current use of insulin (H)  We will set up diabetic education,  Stop drinking soda pop,  Discussed low carbohydrate diet,  Will obtain insulin level to rule out type 1 diabetes (doubt)  Start metformin      - metFORMIN (GLUCOPHAGE) 500 MG tablet; Take 1 tablet (500 mg) by mouth 2 times daily (with meals)  Dispense: 28 tablet; Refill: 0  - DIABETES EDUCATOR REFERRAL  - Insulin level  - Basic metabolic panel    2. Morbid obesity (H)  Recommended weight loss to responsible caloric restriction/metformin may help    3. Intellectual disability  Continue to follow-up with psychiatric providers as needed.                         FOLLOW UP   I have asked the patient to make an appointment for followup with me in 2 weeks        I have carefully explained the diagnosis and treatment options to the patient.  The patient has displayed an understanding of the above, and all subsequent questions were answered.      DO DONNELL Jin    Portions of this note were produced using Performable  Although every attempt at real-time proof reading has been made, occasional grammar/syntax errors may have been missed.

## 2019-08-27 NOTE — RESULT ENCOUNTER NOTE
Dear Bertram, your recent test results are attached.  The insulin level is elevated suggesting type 2 diabetes.  The chemistry panel shows an elevated blood sugar 184 with excellent kidney function.    Feel free to contact me via the office or My Chart if you have any questions regarding the above.  Sincerely,  DO DONNELL Jin

## 2019-08-28 ENCOUNTER — MYC MEDICAL ADVICE (OUTPATIENT)
Dept: FAMILY MEDICINE | Facility: OTHER | Age: 29
End: 2019-08-28

## 2019-09-11 ENCOUNTER — ALLIED HEALTH/NURSE VISIT (OUTPATIENT)
Dept: EDUCATION SERVICES | Facility: CLINIC | Age: 29
End: 2019-09-11
Payer: COMMERCIAL

## 2019-09-11 DIAGNOSIS — E11.9 TYPE 2 DIABETES MELLITUS WITHOUT COMPLICATION, WITHOUT LONG-TERM CURRENT USE OF INSULIN (H): Primary | ICD-10-CM

## 2019-09-11 PROCEDURE — G0108 DIAB MANAGE TRN  PER INDIV: HCPCS

## 2019-09-11 RX ORDER — LOPERAMIDE HYDROCHLORIDE 2 MG/1
2 TABLET ORAL DAILY PRN
COMMUNITY
End: 2021-01-19

## 2019-09-11 RX ORDER — FLASH GLUCOSE SENSOR
1 KIT MISCELLANEOUS
Qty: 2 EACH | Refills: 11 | Status: SHIPPED | OUTPATIENT
Start: 2019-09-11 | End: 2020-01-16

## 2019-09-11 RX ORDER — FLASH GLUCOSE SCANNING READER
1 EACH MISCELLANEOUS CONTINUOUS
Qty: 1 DEVICE | Refills: 0 | Status: SHIPPED | OUTPATIENT
Start: 2019-09-11 | End: 2020-01-16

## 2019-09-11 RX ORDER — CHOLECALCIFEROL (VITAMIN D3) 50 MCG
1 TABLET ORAL DAILY
COMMUNITY
End: 2021-01-19

## 2019-09-11 NOTE — PROGRESS NOTES
"Diabetes Self-Management Education & Support    Diabetes Education Self Management & Training    SUBJECTIVE/OBJECTIVE:  Diabetes education in the past 24mo: No  Diabetes type: Type 2  Disease course: Improving  Cultural Influences/Ethnic Background:  American  NEW DIAGNOSIS    Diabetes Symptoms & Complications  Blurred vision: No  Fatigue: Yes  Foot ulcerations: No  Polydipsia: Yes  Polyphagia: Yes  Polyuria: Yes  Visual change: No  Weakness: No  Weight loss: Yes  Slow healing wounds: No  Weight trend: Decreasing steadily  Autonomic neuropathy: No  CVA: No  Heart disease: No  Nephropathy: No  Peripheral neuropathy: No  Peripheral Vascular Disease: No  Retinopathy: No  Sexual dysfunction: No    Patient Problem List and Family Medical History reviewed for relevant medical history, current medical status, and diabetes risk factors.    Vitals:  There were no vitals taken for this visit.  Estimated body mass index is 44.51 kg/m  as calculated from the following:    Height as of 6/18/19: 1.867 m (6' 1.5\").    Weight as of 8/21/19: 155.1 kg (342 lb).   Last 3 BP:   BP Readings from Last 3 Encounters:   08/21/19 130/74   06/18/19 129/77   05/27/19 (!) 153/92       History   Smoking Status     Former Smoker     Packs/day: 0.30     Years: 0.50     Types: Cigarettes     Quit date: 3/1/2014   Smokeless Tobacco     Never Used       Labs:  Lab Results   Component Value Date    A1C 6.8 02/04/2019     Lab Results   Component Value Date     08/21/2019     Lab Results   Component Value Date     07/05/2019     HDL Cholesterol   Date Value Ref Range Status   07/05/2019 38 (L) >39 mg/dL Final   ]  GFR Estimate   Date Value Ref Range Status   08/21/2019 >90 >60 mL/min/[1.73_m2] Final     Comment:     Non  GFR Calc  Starting 12/18/2018, serum creatinine based estimated GFR (eGFR) will be   calculated using the Chronic Kidney Disease Epidemiology Collaboration   (CKD-EPI) equation.       GFR Estimate If Black "   Date Value Ref Range Status   08/21/2019 >90 >60 mL/min/[1.73_m2] Final     Comment:      GFR Calc  Starting 12/18/2018, serum creatinine based estimated GFR (eGFR) will be   calculated using the Chronic Kidney Disease Epidemiology Collaboration   (CKD-EPI) equation.       Lab Results   Component Value Date    CR 0.59 08/21/2019     No results found for: MICROALBUMIN    Healthy Eating  Cultural/Jewish diet restrictions?: No  Meal planning: None  Usually no breakfast. Been having something small such as banana with morning medicine.   Lunch yesterday nothing  Supper yesterday was pot pies.   Sometimes eat out: Chinese, burger, sandwich. Friend cooks. Meal examples: sloppy joes. Vegetables - corn, cucumber.   Snacks: chips, cookies.   Meals include: Lunch, Dinner, Snacks  Beverages: Milk, Juice: orange, apple or Uche D, Diet soda, Soda, Sports drinks, Alcohol  Has patient met with a dietitian in the past?: No    Being Active  How intense was your typical exercise? : Light (like stretching or slow walking)  Barrier to exercise: Safety  Walks most days 15-20 minutes    Monitoring  Blood Glucose Meter: Other  Home Glucose (Sugar) Monitoring: Never  Blood glucose trend: Increasing steadily    Taking Medications  Diabetes Medication(s)     Biguanides       metFORMIN (GLUCOPHAGE) 500 MG tablet    Take 1 tablet (500 mg) by mouth 2 times daily (with meals)          Current Treatments: None    Problem Solving  Medical alert: No  Severe weather/disaster plan for diabetes management?: No  DKA prevention plan?: No  Sick day plan for diabetes management?: (P) No    Hypoglycemia symptoms  Confusion: No  Dizziness or Light-Headedness: No  Headaches: Yes  Hunger: Yes  Mood changes: No  Nervousness/Anxiety: Yes  Sleepiness: Yes  Speech difficulty: Yes  Sweats: Yes  Tremors: Yes    Hypoglycemia Complications  Blackouts: No  Hospitalization: No  Nocturnal hypoglycemia: No  Required assistance: No  Required glucagon  injection: No  Seizures: No    Reducing Risks  CAD Risks: Stress  Has dilated eye exam at least once a year?: Yes  Sees dentist every 6 months?: Yes  Sees podiatrist (foot doctor)?: No    Healthy Coping  Informal Support system:: Family, Friends, Neighbors  Patient Activation Measure Survey Score:  YESY Score (Last Two) 4/29/2019   YESY Raw Score 33   Activation Score 65.8   YESY Level 3       ASSESSMENT:  Patient newly diagnosed?  Taking metformin with no side effects. He does have some trouble remembering morning dose. He is avoiding too much sugar intake. Does some walking.   Recommend he start monitoring BG with meter or CGM. Ordered Greer for him today. Also provided         Patient's most recent   Lab Results   Component Value Date    A1C 6.8 02/04/2019    is meeting goal of <7.0    INTERVENTION:   Diabetes knowledge and skills assessment:     Patient is knowledgeable in diabetes management concepts related to:     Patient needs further education on the following diabetes management concepts: Healthy Eating, Being Active, Monitoring, Taking Medication, Problem Solving, Reducing Risks and Healthy Coping    Based on learning assessment above, most appropriate setting for further diabetes education would be: Individual setting.    Education provided today on:  AADE Self-Care Behaviors:  Healthy Eating: portion control  Being Active: relationship to blood glucose  Monitoring: purpose, proper technique, log and interpret results, individual blood glucose targets and frequency of monitoring  Taking Medication: side effects of prescribed medications and when to take medications  Reducing Risks: A1C - goals, relating to blood glucose levels, how often to check  Healthy Coping: recognize feelings about diagnosis and benefits of making appropriate lifestyle changes  Patient was instructed on Accu-Chek Guide meter and was able to provide an accurate return demonstration. Patient's blood glucose reading today was 145  mg/dL.    Opportunities for ongoing education and support in diabetes-self management were discussed.    Pt verbalized understanding of concepts discussed and recommendations provided today.       Education Materials Provided:  [facility] Understanding Diabetes Booklet, BG Log Sheet and Accu-Chek Guide meter kit    PLAN:  See Patient Instructions for co-developed, patient-stated behavior change goals.  AVS printed and provided to patient today. See Follow-Up section for recommended follow-up.    Chiara Hair RDN, BOBY, CDE    Time Spent: 60 minutes  Encounter Type: Individual    Any diabetes medication dose changes were made via the CDE Protocol and Collaborative Practice Agreement with the patient's primary care provider. A copy of this encounter was shared with the provider.

## 2019-09-11 NOTE — PATIENT INSTRUCTIONS
Plan:   -schedule follow up with Dr. Ruvalcaba  -bring your sensor, reader and meter to next appointment  -if any questions you can mychart message me

## 2019-09-11 NOTE — Clinical Note
Just a heads up he did state he has a little trouble remembering metformin in morning.  I did order him a sensor and will help him get started when I see him in 2 weeks. I did recommend he make a follow up appointment with you also. Chiara Hair RDN, LD, CDE

## 2019-09-13 ENCOUNTER — MYC MEDICAL ADVICE (OUTPATIENT)
Dept: FAMILY MEDICINE | Facility: OTHER | Age: 29
End: 2019-09-13

## 2019-09-13 DIAGNOSIS — K29.00 ACUTE GASTRITIS WITHOUT HEMORRHAGE, UNSPECIFIED GASTRITIS TYPE: Primary | ICD-10-CM

## 2019-09-16 ENCOUNTER — PATIENT OUTREACH (OUTPATIENT)
Dept: EDUCATION SERVICES | Facility: CLINIC | Age: 29
End: 2019-09-16

## 2019-09-16 DIAGNOSIS — E11.9 TYPE 2 DIABETES MELLITUS WITHOUT COMPLICATION, WITHOUT LONG-TERM CURRENT USE OF INSULIN (H): Primary | ICD-10-CM

## 2019-09-16 NOTE — PROGRESS NOTES
Patient called requesting strips for Accuchek Guide meter. He will also be getting sensors this week. Will start that at next visit.     Chiara Hair RDN, BBOY, CDE

## 2019-09-20 ENCOUNTER — TELEPHONE (OUTPATIENT)
Dept: FAMILY MEDICINE | Facility: OTHER | Age: 29
End: 2019-09-20

## 2019-09-20 DIAGNOSIS — K29.00 ACUTE GASTRITIS WITHOUT HEMORRHAGE, UNSPECIFIED GASTRITIS TYPE: Primary | ICD-10-CM

## 2019-09-20 NOTE — TELEPHONE ENCOUNTER
PA initiated by pharmacy via Cover my [employer] Interactives  KEY: [id number]      Prior Authorization Retail Medication Request    Medication/Dose: LANsoprazole (PREVACID SOLUTAB) 30 MG ODT  ICD code (if different than what is on RX):  Acute gastritis without hemorrhage, unspecified gastritis type [K29.00]  - Primary   Previously Tried and Failed:  NA  Rationale:  NA    Insurance Name:  PA initiated by pharmacy via Cover my Meds  Insurance ID:  NA      Pharmacy Information (if different than what is on RX)  Name:  [facility]   Phone:  [phone]

## 2019-09-24 NOTE — TELEPHONE ENCOUNTER
PA Initiation    Medication: LANsoprazole (PREVACID SOLUTAB) 30 MG ODT  Insurance Company: [employer] - Phone [phone] Fax [phone]  Pharmacy Filling the Rx: [facility] - [place] - [address]  Filling Pharmacy Phone: [phone]  Filling Pharmacy Fax:    Start Date: 9/24/2019    Central Prior Authorization Team   Phone: [phone]

## 2019-09-25 NOTE — TELEPHONE ENCOUNTER
As it was seem that the insurance company does not wish to assist with the use of the Prevacid, I would recommend the patient uses Prilosec 20 mg twice daily.  This can be obtained over-the-counter or if he chooses, he can request a prescription.    Jordon

## 2019-09-25 NOTE — TELEPHONE ENCOUNTER
Spoke with patient and informed of message below. Patient informed he would like to see if you can send the script for the Prilosec and see if insurance will cover. He said its too much for him to be getting medication OTC.     Stacy Love MA

## 2019-10-07 ENCOUNTER — ALLIED HEALTH/NURSE VISIT (OUTPATIENT)
Dept: EDUCATION SERVICES | Facility: CLINIC | Age: 29
End: 2019-10-07
Payer: COMMERCIAL

## 2019-10-07 DIAGNOSIS — E11.9 TYPE 2 DIABETES MELLITUS WITHOUT COMPLICATION, WITHOUT LONG-TERM CURRENT USE OF INSULIN (H): Primary | ICD-10-CM

## 2019-10-07 PROCEDURE — G0108 DIAB MANAGE TRN  PER INDIV: HCPCS

## 2019-10-07 RX ORDER — LANCETS
1 EACH MISCELLANEOUS 2 TIMES DAILY
Qty: 102 EACH | Refills: 3 | Status: SHIPPED | OUTPATIENT
Start: 2019-10-07 | End: 2020-11-23

## 2019-10-07 NOTE — PROGRESS NOTES
"Diabetes Self-Management Education & Support    Diabetes Education Self Management & Training    SUBJECTIVE/OBJECTIVE:  Presents for: Follow-up  Accompanied by: Self  Diabetes education in the past 24mo: No  Focus of Visit: Monitoring, Healthy Eating  Diabetes type: Type 2  Date of diagnosis: new this year  Disease course: Improving  Diabetes management related comments/concerns: Working on eating healthier  Cultural Influences/Ethnic Background:  American      Diabetes Symptoms & Complications  Blurred vision: No  Fatigue: Yes  Foot ulcerations: No  Polydipsia: Yes  Polyphagia: Yes - but improved  Polyuria: Yes  Visual change: No  Weakness: No  Weight loss: Yes  Slow healing wounds: No  Weight trend: Decreasing steadily  Autonomic neuropathy: No  CVA: No  Heart disease: No  Nephropathy: No  Peripheral neuropathy: No  Peripheral Vascular Disease: No  Retinopathy: No  Sexual dysfunction: No    Patient Problem List and Family Medical History reviewed for relevant medical history, current medical status, and diabetes risk factors.    Vitals:  There were no vitals taken for this visit.  Estimated body mass index is 44.51 kg/m  as calculated from the following:    Height as of 6/18/19: 1.867 m (6' 1.5\").    Weight as of 8/21/19: 155.1 kg (342 lb).   Last 3 BP:   BP Readings from Last 3 Encounters:   08/21/19 130/74   06/18/19 129/77   05/27/19 (!) 153/92       History   Smoking Status     Former Smoker     Packs/day: 0.30     Years: 0.50     Types: Cigarettes     Quit date: 3/1/2014   Smokeless Tobacco     Never Used       Labs:  Lab Results   Component Value Date    A1C 6.8 02/04/2019     Lab Results   Component Value Date     08/21/2019     Lab Results   Component Value Date     07/05/2019     HDL Cholesterol   Date Value Ref Range Status   07/05/2019 38 (L) >39 mg/dL Final   ]  GFR Estimate   Date Value Ref Range Status   08/21/2019 >90 >60 mL/min/[1.73_m2] Final     Comment:     Non  GFR " Calc  Starting 2018, serum creatinine based estimated GFR (eGFR) will be   calculated using the Chronic Kidney Disease Epidemiology Collaboration   (CKD-EPI) equation.       GFR Estimate If Black   Date Value Ref Range Status   2019 >90 >60 mL/min/[1.73_m2] Final     Comment:      GFR Calc  Starting 2018, serum creatinine based estimated GFR (eGFR) will be   calculated using the Chronic Kidney Disease Epidemiology Collaboration   (CKD-EPI) equation.       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 : Yes  Blood Glucose Meter: Accu-check, CGM  Home Glucose (Sugar) Monitorin-2 times per day  Blood glucose trend: Decreasing steadily  Low Glucose Range (mg/dL): 110-130  High Glucose Range (mg/dL): >200  Overall Range (mg/dL): 140-180    14 day average 152.     Taking Medications  Diabetes Medication(s)     Biguanides       metFORMIN (GLUCOPHAGE) 500 MG tablet    Take 1 tablet (500 mg) by mouth 2 times daily (with meals)          Taking Medication Assessed Today: Yes  Current Treatments: Oral Agent (monotherapy)  Problems taking diabetes medications regularly?: Yes  Diabetes  medication side effects?: Yes  Treatment Compliance: Some of the time    Problem Solving  Problem Solving Assessed Today: No  Medical alert: No  Severe weather/disaster plan for diabetes management?: No  DKA prevention plan?: No    Hypoglycemia symptoms  Confusion: No  Dizziness or Light-Headedness: No  Headaches: Yes  Hunger: Yes  Mood changes: No  Nervousness/Anxiety: Yes  Sleepiness: Yes  Speech difficulty: Yes  Sweats: Yes  Tremors: Yes    Hypoglycemia Complications  Blackouts: No  Hospitalization: No  Nocturnal hypoglycemia: No  Required assistance: No  Required glucagon injection: No  Seizures: No    Reducing Risks  CAD Risks: Stress  Has dilated eye exam at least once a year?: Yes  Sees dentist every 6 months?: Yes  Sees podiatrist (foot doctor)?: No    Healthy Coping  Healthy Coping Assessed Today: Yes  Emotional response to diabetes: Acceptance  Informal Support system:: Family, Friends, Neighbors  Stage of change: ACTION (Actively working towards change)  Difficulty affording diabetes management supplies?: No  Patient Activation Measure Survey Score:  YESY Score (Last Two) 4/29/2019   YESY Raw Score 33   Activation Score 65.8   YESY Level 3       ASSESSMENT:  Patient was not able to bring his Greer equipment today for start but has meter, numbers and food journal.   BG are doing fairly well other than a few around 200 in evening. Food journal reveals frequent processed food intake. Would benefit from less processed foods and easy meals with balanced protein/starch and vegetables. His ILS worker does help him with grocery shopping and cooking meals 2 days/week.         Patient's most recent   Lab Results   Component Value Date    A1C 6.8 02/04/2019    is meeting goal of <7.0    INTERVENTION:   Diabetes knowledge and skills assessment:     Patient is knowledgeable in diabetes management concepts related to: Monitoring    Patient needs further education on the following diabetes management concepts: Healthy  Eating and Being Active    Based on learning assessment above, most appropriate setting for further diabetes education would be: Individual setting.    Education provided today on:  AADE Self-Care Behaviors:  Healthy Eating: healthy protein foods  Monitoring: review of BG log and encouragement on average  Taking Medication: side effects of prescribed medications    Opportunities for ongoing education and support in diabetes-self management were discussed.    Pt verbalized understanding of concepts discussed and recommendations provided today.       Education Materials Provided:  My Plate Planner    PLAN:  See Patient Instructions for co-developed, patient-stated behavior change goals.  AVS printed and provided to patient today. See Follow-Up section for recommended follow-up.    Chiara Hair RDN, LD, CDE    Time Spent: 40 minutes  Encounter Type: Individual    Any diabetes medication dose changes were made via the CDE Protocol and Collaborative Practice Agreement with the patient's primary care provider. A copy of this encounter was shared with the provider.

## 2019-10-11 ENCOUNTER — OFFICE VISIT (OUTPATIENT)
Dept: FAMILY MEDICINE | Facility: OTHER | Age: 29
End: 2019-10-11
Payer: COMMERCIAL

## 2019-10-11 VITALS
TEMPERATURE: 96.4 F | SYSTOLIC BLOOD PRESSURE: 120 MMHG | RESPIRATION RATE: 18 BRPM | OXYGEN SATURATION: 98 % | DIASTOLIC BLOOD PRESSURE: 74 MMHG | HEART RATE: 66 BPM | BODY MASS INDEX: 44.44 KG/M2 | WEIGHT: 315 LBS

## 2019-10-11 DIAGNOSIS — F79 INTELLECTUAL DISABILITY: ICD-10-CM

## 2019-10-11 DIAGNOSIS — E66.01 MORBID OBESITY (H): ICD-10-CM

## 2019-10-11 DIAGNOSIS — E11.65 TYPE 2 DIABETES MELLITUS WITH HYPERGLYCEMIA, WITHOUT LONG-TERM CURRENT USE OF INSULIN (H): Primary | ICD-10-CM

## 2019-10-11 LAB — HBA1C MFR BLD: 6.9 % (ref 0–5.6)

## 2019-10-11 PROCEDURE — 99213 OFFICE O/P EST LOW 20 MIN: CPT | Performed by: INTERNAL MEDICINE

## 2019-10-11 PROCEDURE — 83036 HEMOGLOBIN GLYCOSYLATED A1C: CPT | Performed by: INTERNAL MEDICINE

## 2019-10-11 PROCEDURE — 36416 COLLJ CAPILLARY BLOOD SPEC: CPT | Performed by: INTERNAL MEDICINE

## 2019-10-11 ASSESSMENT — PAIN SCALES - GENERAL: PAINLEVEL: NO PAIN (0)

## 2019-10-11 ASSESSMENT — PATIENT HEALTH QUESTIONNAIRE - PHQ9: SUM OF ALL RESPONSES TO PHQ QUESTIONS 1-9: 17

## 2019-10-11 NOTE — PROGRESS NOTES
Subjective     Bertram Foreman is a 29 year old male who presents to clinic today for the following health issues:    HPI   Diabetes Follow-up      How often are you checking your blood sugar? Two times daily    What time of day are you checking your blood sugars (select all that apply)?  Before and after meals    Have you had any blood sugars above 200?  Yes rare    Have you had any blood sugars below 70?  Yes rare    What symptoms do you notice when your blood sugar is low?  None    What concerns do you have today about your diabetes? None     Do you have any of these symptoms? (Select all that apply)  No numbness or tingling in feet.  No redness, sores or blisters on feet.  No complaints of excessive thirst.  No reports of blurry vision.  No significant changes to weight.     Have you had a diabetic eye exam in the last 12 months? No    BP Readings from Last 2 Encounters:   10/11/19 120/74   08/21/19 130/74     Hemoglobin A1C (%)   Date Value   02/04/2019 6.8 (H)     LDL Cholesterol Calculated (mg/dL)   Date Value   07/05/2019 152 (H)     LDL Cholesterol Direct (mg/dL)   Date Value   01/08/2019 157 (H)       Diabetes Management Resources      How many servings of fruits and vegetables do you eat daily?  0-1    On average, how many sweetened beverages do you drink each day (soda, juice, sweet tea, etc)?   1  How many days per week do you miss taking your medication? 1    What makes it hard for you to take your medications?  remembering to take                    Chief Complaint         The patient is a pleasant 29-year-old male who presents today for follow-up of his diabetes.  He is currently taking metformin and has had no problems with hypoglycemia.  He is watching his diet modestly.  His weight has plateaued.  He previously had lost about 20 pounds but has not gained this back.  He is working on diet and exercise.  He denies any hyper or hypoglycemic episodes.  Denies any polyuria or polydipsia.  He denies  acute vision changes.  A1c performed today demonstrates a value of 6.9.  His insulin level is 46 suggesting type 2 diabetes.                         PAST, FAMILY,SOCIAL HISTORY:     Medical  History:   has no past medical history on file.     Surgical History:   has no past surgical history on file.     Social History:   reports that he quit smoking about 5 years ago. His smoking use included cigarettes. He has a 0.15 pack-year smoking history. He has never used smokeless tobacco. He reports current alcohol use. He reports that he does not use drugs.     Family History:  family history is not on file.            MEDICATIONS  Current Outpatient Medications   Medication Sig Dispense Refill     acetaminophen (TYLENOL) 325 MG tablet Take 325-650 mg by mouth every 6 hours as needed for mild pain       blood glucose (ACCU-CHEK GUIDE) test strip Use to test blood sugar 2 times daily or as directed. 50 strip 6     blood glucose monitoring (ACCU-CHEK FASTCLIX) lancets 1 each 2 times daily Use to test blood sugar 1 times daily or as directed. 102 each 3     Divalproex Sodium (DEPAKOTE PO)        loperamide (IMODIUM A-D) 2 MG tablet Take 2 mg by mouth daily as needed for diarrhea (Take 2 pills after the first stool. 1 pill after each subsequent loose stool but no more than 4 pills in 24 hours)       metFORMIN (GLUCOPHAGE) 500 MG tablet Take 1 tablet (500 mg) by mouth 2 times daily (with meals) 28 tablet 0     omeprazole (PRILOSEC) 20 MG DR capsule Take 1 capsule (20 mg) by mouth 2 times daily 180 capsule 0     QUEtiapine (SEROQUEL) 300 MG tablet Take 300 mg by mouth  0     TRAZODONE HCL PO Take 100 mg by mouth At Bedtime        vitamin D3 (CHOLECALCIFEROL) 2000 units (50 mcg) tablet Take 1 tablet by mouth daily       Continuous Blood Gluc  (FREESTYLE WILIAN 14 DAY READER) MARQUIS 1 Device continuous (Patient not taking: Reported on 10/11/2019) 1 Device 0     Continuous Blood Gluc Sensor (FREESTYLE WILIAN 14 DAY SENSOR) [facility]  1 each every 14 days (Patient not taking: Reported on 10/11/2019) 2 each 11     ibuprofen (ADVIL/MOTRIN) 800 MG tablet Take 1 tablet (800 mg) by mouth every 8 hours as needed for pain (Patient not taking: Reported on 5/21/2019) 30 tablet 0     loratadine (CLARITIN) 10 MG tablet TAKE ONE TABLET BY MOUTH ONCE DAILY 30 tablet 1         --------------------------------------------------------------------------------------------------------------------                              Review of Systems       LUNGS: Pt denies: cough, excess sputum, hemoptysis, or shortness of breath.   HEART: Pt denies: chest pain, arrhythmia, syncope, tachy or bradyarrhythmia.   GI: Pt denies: nausea, vomiting, diarrhea, constipation, melena, or hematochezia.   NEURO: Pt denies: seizures, strokes, diplopia, weakness, paraesthesias, or paralysis.   SKIN: Pt denies: itching, rashes, discoloration, or specific lesions of concern. Denies recent hair loss.   PSYCH: The patient denies significant depression, anxiety, mood imbalance. Specifically denies any suicidal ideation.                                     Examination    /74 (BP Location: Right arm, Patient Position: Chair, Cuff Size: Adult Large)   Pulse 66   Temp 96.4  F (35.8  C) (Temporal)   Resp 18   Wt (!) 154.9 kg (341 lb 7 oz)   SpO2 98%   BMI 44.44 kg/m       Constitutional: The patient appears to be in no acute distress. The patient appears to be adequately hydrated. No acute respiratory or hemodynamic distress is noted at this time.   LUNGS: clear bilaterally, airflow is brisk, no intercostal retraction or stridor is noted. No coughing is noted during visit.   HEART:  regular without rubs, clicks, gallops, or murmurs. PMI is nondisplaced. Upstrokes are brisk. S1,S2 are heard.   GI: Abdomen is soft, without rebound, guarding or tenderness. Bowel sounds are appropriate. No renal bruits are heard.  Abdomen is morbidly obese.   NEURO: Pt is alert and appropriate. No  neurologic lateralization is noted. Cranial nerves 2-12 are intact. Peripheral sensory and motor function are grossly normal.    SKIN:  warm and dry. No erythema, or rashes are noted. No specific lesions of concern are noted.    PSYCH: The patient appears grossly appropriate. Maintains good eye contact, does not have any jittery or atypical motion. Displays appropriate affect.                                           Decision Making    1. Type 2 diabetes mellitus with hyperglycemia, without long-term current use of insulin (H)  Lab work as noted.  We will continue metformin.  Will eventually bring on board that statin and ACE inhibitor.  (Patient is somewhat hesitant to initiate multiple medications)  - Hemoglobin A1c    2. Morbid obesity (H)  Recommend weight loss through caloric restriction and exercise as possible    3. Intellectual disability  Modest, otherwise a very pleasant individual.                             FOLLOW UP   I have asked the patient to make an appointment for followup with me in 3 months        I have carefully explained the diagnosis and treatment options to the patient.  The patient has displayed an understanding of the above, and all subsequent questions were answered.      DO DONNELL Jin    Portions of this note were produced using Workboard  Although every attempt at real-time proof reading has been made, occasional grammar/syntax errors may have been missed.

## 2019-10-15 NOTE — RESULT ENCOUNTER NOTE
Dear Bertram, your recent test results are attached.  The A1c is well controlled at 6.9.  Good job controlling her blood sugar.    Feel free to contact me via the office or My Chart if you have any questions regarding the above.  Sincerely,  Tremaine Ruvalcaba,  FACOI

## 2019-10-24 ENCOUNTER — ALLIED HEALTH/NURSE VISIT (OUTPATIENT)
Dept: EDUCATION SERVICES | Facility: CLINIC | Age: 29
End: 2019-10-24
Payer: COMMERCIAL

## 2019-10-24 DIAGNOSIS — E11.9 TYPE 2 DIABETES MELLITUS WITHOUT COMPLICATION, WITHOUT LONG-TERM CURRENT USE OF INSULIN (H): Primary | ICD-10-CM

## 2019-10-24 PROCEDURE — G0108 DIAB MANAGE TRN  PER INDIV: HCPCS

## 2019-10-24 NOTE — PROGRESS NOTES
"Diabetes Self-Management Education & Support    Diabetes Education Self Management & Training    SUBJECTIVE/OBJECTIVE:  Presents for: Follow-up  Accompanied by: Self, Other  Diabetes education in the past 24mo: No  Focus of Visit: Monitoring, Healthy Eating, Being Active  Diabetes type: Type 2  Date of diagnosis: new this year  Disease course: Improving  Diabetes management related comments/concerns: Working on eating healthier  Cultural Influences/Ethnic Background:  American  Accompanied by personal care assistant.     Diabetes Symptoms & Complications  Blurred vision: No  Fatigue: Yes  Foot ulcerations: No  Polydipsia: Yes  Polyphagia: Yes  Polyuria: Yes  Visual change: No  Weakness: No  Weight loss: Yes  Slow healing wounds: No  Weight trend: Decreasing steadily  Autonomic neuropathy: No  CVA: No  Heart disease: No  Nephropathy: No  Peripheral neuropathy: No  Peripheral Vascular Disease: No  Retinopathy: No  Sexual dysfunction: No    Patient Problem List and Family Medical History reviewed for relevant medical history, current medical status, and diabetes risk factors.    Vitals:  There were no vitals taken for this visit.  Estimated body mass index is 44.44 kg/m  as calculated from the following:    Height as of 6/18/19: 1.867 m (6' 1.5\").    Weight as of 10/11/19: 154.9 kg (341 lb 7 oz).   Last 3 BP:   BP Readings from Last 3 Encounters:   10/11/19 120/74   08/21/19 130/74   06/18/19 129/77       History   Smoking Status     Former Smoker     Packs/day: 0.30     Years: 0.50     Types: Cigarettes     Quit date: 3/1/2014   Smokeless Tobacco     Never Used       Labs:  Lab Results   Component Value Date    A1C 6.9 10/11/2019     Lab Results   Component Value Date     08/21/2019     Lab Results   Component Value Date     07/05/2019     HDL Cholesterol   Date Value Ref Range Status   07/05/2019 38 (L) >39 mg/dL Final   ]  GFR Estimate   Date Value Ref Range Status   08/21/2019 >90 >60 mL/min/[1.73_m2] " Final     Comment:     Non  GFR Calc  Starting 2018, serum creatinine based estimated GFR (eGFR) will be   calculated using the Chronic Kidney Disease Epidemiology Collaboration   (CKD-EPI) equation.       GFR Estimate If Black   Date Value Ref Range Status   2019 >90 >60 mL/min/[1.73_m2] Final     Comment:      GFR Calc  Starting 2018, serum creatinine based estimated GFR (eGFR) will be   calculated using the Chronic Kidney Disease Epidemiology Collaboration   (CKD-EPI) equation.       Lab Results   Component Value Date    CR 0.59 2019     No results found for: MICROALBUMIN    Healthy Eating  Healthy Eating Assessed Today: Yes  Cultural/Jew diet restrictions?: No  Meal planning: None  Meals include: Lunch, Dinner, Snacks  Breakfast: some days hungry; some days not  Other: ILS worker helps cook 2 days/week  Beverages: Milk, Juice, Diet soda, Sports drinks, Alcohol  Has patient met with a dietitian in the past?: No    Being Active  Being Active Assessed Today: Yes  Barrier to exercise: Safety, Other    Monitoring  Monitoring Assessed Today: Yes  Did patient bring glucose meter to appointment? : Yes  Blood Glucose Meter: Accu-check, CGM  Home Glucose (Sugar) Monitorin-2 times per day  Blood glucose trend: Decreasing steadily  Low Glucose Range (mg/dL): 110-130  High Glucose Range (mg/dL): >200  Overall Range (mg/dL): 140-180      Taking Medications  Diabetes Medication(s)     Biguanides       metFORMIN (GLUCOPHAGE) 500 MG tablet    Take 1 tablet (500 mg) by mouth 2 times daily (with meals)          Taking Medication Assessed Today: Yes  Current Treatments: Oral Agent (monotherapy)  Problems taking diabetes medications regularly?: Yes  Diabetes medication side effects?: Yes  Treatment Compliance: Some of the time    Problem Solving  Problem Solving Assessed Today: No  Medical alert: No  Severe weather/disaster plan for diabetes management?: No  DKA  prevention plan?: No    Hypoglycemia symptoms  Confusion: No  Dizziness or Light-Headedness: No  Headaches: Yes  Hunger: Yes  Mood changes: No  Nervousness/Anxiety: Yes  Sleepiness: Yes  Speech difficulty: Yes  Sweats: Yes  Tremors: Yes    Hypoglycemia Complications  Blackouts: No  Hospitalization: No  Nocturnal hypoglycemia: No  Required assistance: No  Required glucagon injection: No  Seizures: No    Reducing Risks  CAD Risks: Stress  Has dilated eye exam at least once a year?: Yes  Sees dentist every 6 months?: Yes  Sees podiatrist (foot doctor)?: No    Healthy Coping  Healthy Coping Assessed Today: Yes  Emotional response to diabetes: Acceptance  Informal Support system:: Family, Friends, Neighbors  Stage of change: ACTION (Actively working towards change)  Difficulty affording diabetes management supplies?: No  Support resources: Offerings in Clinic Communities  Patient Activation Measure Survey Score:  YESY Score (Last Two) 4/29/2019   YESY Raw Score 33   Activation Score 65.8   YESY Level 3       ASSESSMENT:  Bertram is not sure he wants to use the Greer sensor because he does not want it to show when he goes swimming. Assured him it is his choice. A1c is in target and he continues to work with his care assistant on activity and healthy eating.     Goals Addressed as of 10/24/2019 at 4:27 PM       Being Active (pt-stated)     Added 10/24/19 by Chiara Hair RD     My Goal: I will go to [facility] once weekly most weeks to swim.     I plan to meet my goal by this date: 12/12/19            Patient's most recent   Lab Results   Component Value Date    A1C 6.9 10/11/2019    is meeting goal of <7.0    INTERVENTION:   Diabetes knowledge and skills assessment:     Patient is knowledgeable in diabetes management concepts related to: Being Active, Monitoring and Taking Medication    Patient needs further education on the following diabetes management concepts: Healthy Eating, Being Active, Monitoring and Reducing  Risks    Based on learning assessment above, most appropriate setting for further diabetes education would be: Individual setting.    Education provided today on:  AADE Self-Care Behaviors:  Healthy Eating: plate planning method and water  Being Active: relationship to blood glucose  Monitoring: individual blood glucose targets and frequency of monitoring  Reducing Risks: A1C - goals, relating to blood glucose levels, how often to check    Opportunities for ongoing education and support in diabetes-self management were discussed.    Pt verbalized understanding of concepts discussed and recommendations provided today.       Education Materials Provided:  BG Log Sheet    PLAN:  See Patient Instructions for co-developed, patient-stated behavior change goals.  AVS printed and provided to patient today. See Follow-Up section for recommended follow-up.    Chiara Hair RDN, BOBY, CDE    Time Spent: 60 minutes  Encounter Type: Individual    Any diabetes medication dose changes were made via the CDE Protocol and Collaborative Practice Agreement with the patient's primary care provider. A copy of this encounter was shared with the provider.

## 2019-10-29 DIAGNOSIS — E11.65 TYPE 2 DIABETES MELLITUS WITH HYPERGLYCEMIA, WITHOUT LONG-TERM CURRENT USE OF INSULIN (H): ICD-10-CM

## 2019-10-30 NOTE — TELEPHONE ENCOUNTER
"Metformin  Last Written Prescription Date:  08/21/2019  Last Fill Quantity: 28,  # refills: 0   Last office visit: 10/11/2019 with prescribing provider:     Future Office Visit:      Prescription approved per [facility] Refill Protocol.    Requested Prescriptions   Pending Prescriptions Disp Refills     metFORMIN (GLUCOPHAGE) 500 MG tablet 28 tablet 0     Sig: Take 1 tablet (500 mg) by mouth 2 times daily (with meals)       Biguanide Agents Passed - 10/29/2019  6:05 PM        Passed - Blood pressure less than 140/90 in past 6 months     BP Readings from Last 3 Encounters:   10/11/19 120/74   08/21/19 130/74   06/18/19 129/77                 Passed - Patient has documented LDL within the past 12 mos.     Recent Labs   Lab Test 07/05/19  1009   *             Passed - Patient has had a Microalbumin in the past 15 mos.     Recent Labs   Lab Test 04/23/19  1508   MICROL 26   UMALCR 7.38             Passed - Patient is age 10 or older        Passed - Patient has documented A1c within the specified period of time.     If HgbA1C is 8 or greater, it needs to be on file within the past 3 months.  If less than 8, must be on file within the past 6 months.     Recent Labs   Lab Test 10/11/19  1533   A1C 6.9*             Passed - Patient's CR is NOT>1.4 OR Patient's EGFR is NOT<45 within past 12 mos.     Recent Labs   Lab Test 08/21/19  0756   GFRESTIMATED >90   GFRESTBLACK >90       Recent Labs   Lab Test 08/21/19  0756   CR 0.59*             Passed - Patient does NOT have a diagnosis of CHF.        Passed - Medication is active on med list        Passed - Recent (6 mo) or future (30 days) visit within the authorizing provider's specialty     Patient had office visit in the last 6 months or has a visit in the next 30 days with authorizing provider or within the authorizing provider's specialty.  See \"Patient Info\" tab in inbasket, or \"Choose Columns\" in Meds & Orders section of the refill encounter.              "

## 2019-11-02 ENCOUNTER — MYC REFILL (OUTPATIENT)
Dept: EDUCATION SERVICES | Facility: CLINIC | Age: 29
End: 2019-11-02

## 2019-11-02 DIAGNOSIS — E11.9 TYPE 2 DIABETES MELLITUS WITHOUT COMPLICATION, WITHOUT LONG-TERM CURRENT USE OF INSULIN (H): ICD-10-CM

## 2019-11-05 NOTE — TELEPHONE ENCOUNTER
"  Requested Prescriptions   Pending Prescriptions Disp Refills     blood glucose (ACCU-CHEK GUIDE) test strip 50 strip 6     Sig: Use to test blood sugar 2 times daily or as directed.   Last Written Prescription Date:  9/16/2019  Last Fill Quantity: 50,  # refills: 6   Last office visit: 10/11/2019 with prescribing provider:     Future Office Visit:        Diabetic Supplies Protocol Passed - 11/5/2019  9:09 AM        Passed - Medication is active on med list        Passed - Patient is 18 years of age or older        Passed - Recent (6 mo) or future (30 days) visit within the authorizing provider's specialty     Patient had office visit in the last 6 months or has a visit in the next 30 days with authorizing provider.  See \"Patient Info\" tab in inbasket, or \"Choose Columns\" in Meds & Orders section of the refill encounter.          Refills current  Denied  Alicia Freeman RN      "

## 2019-11-07 ENCOUNTER — PATIENT OUTREACH (OUTPATIENT)
Dept: EDUCATION SERVICES | Facility: CLINIC | Age: 29
End: 2019-11-07

## 2019-11-07 DIAGNOSIS — E11.9 TYPE 2 DIABETES MELLITUS WITHOUT COMPLICATION, WITHOUT LONG-TERM CURRENT USE OF INSULIN (H): ICD-10-CM

## 2019-11-07 NOTE — TELEPHONE ENCOUNTER
Returned patient call. He would like 2 bottles of the Accu-chek Guide strips ordered to [facility] pharmacy.     Sent new order.     Chiara Hair RDN, LD, CDE

## 2019-12-10 ENCOUNTER — OFFICE VISIT (OUTPATIENT)
Dept: FAMILY MEDICINE | Facility: OTHER | Age: 29
End: 2019-12-10
Payer: COMMERCIAL

## 2019-12-10 VITALS
SYSTOLIC BLOOD PRESSURE: 132 MMHG | TEMPERATURE: 96.2 F | BODY MASS INDEX: 45.29 KG/M2 | DIASTOLIC BLOOD PRESSURE: 88 MMHG | WEIGHT: 315 LBS | RESPIRATION RATE: 16 BRPM | HEART RATE: 100 BPM | OXYGEN SATURATION: 96 %

## 2019-12-10 DIAGNOSIS — Z23 NEED FOR PROPHYLACTIC VACCINATION AND INOCULATION AGAINST INFLUENZA: ICD-10-CM

## 2019-12-10 DIAGNOSIS — G47.33 OSA (OBSTRUCTIVE SLEEP APNEA): Primary | ICD-10-CM

## 2019-12-10 PROCEDURE — 99213 OFFICE O/P EST LOW 20 MIN: CPT | Mod: 25 | Performed by: INTERNAL MEDICINE

## 2019-12-10 PROCEDURE — 90686 IIV4 VACC NO PRSV 0.5 ML IM: CPT | Performed by: INTERNAL MEDICINE

## 2019-12-10 PROCEDURE — 90471 IMMUNIZATION ADMIN: CPT | Performed by: INTERNAL MEDICINE

## 2019-12-10 ASSESSMENT — PAIN SCALES - GENERAL: PAINLEVEL: MODERATE PAIN (4)

## 2019-12-10 NOTE — PROGRESS NOTES
Chief Complaint   Patient presents with     Forms     paperwork for handicapped sticker     The patient who is a pleasant 29-year-old gentleman who I see walking all over town is requesting a handicap parking sticker.  Notably, he does not drive a car.  He states that he is on disability and therefore should have a parking sticker.  His PCA seems quite adamant that he has 1.  I cannot see why he needs a special sticker to park his car close to the store when he does not have a car and is more than capable of walking literally miles per day.  Subsequently I have refused this.  The patient was quite unaffected by this decision but his PCA appeared to be a little miffed.    He also notes that he has been having increased snoring and symptoms associated with obstructive sleep apnea.  I recommended that we set up a sleep study as he certainly has a body habitus consistent with this.  He does have some daytime drowsiness.      Otherwise he is doing well and there are no concerns at this time.                                   Examination      /88   Pulse 100   Temp 96.2  F (35.7  C) (Temporal)   Resp 16   Wt (!) 157.9 kg (348 lb)   SpO2 96%   BMI 45.29 kg/m     LUNGS: clear bilaterally, airflow is brisk, no intercostal retraction or stridor is noted. No coughing is noted during visit.   HEART:  regular without rubs, clicks, gallops, or murmurs. PMI is nondisplaced. Upstrokes are brisk. S1,S2 are heard.   GI: Abdomen is soft, without rebound, guarding or tenderness. Bowel sounds are appropriate. No renal bruits are heard.  Abdomen is morbidly obese.   NEURO: Pt is alert and appropriate. No neurologic lateralization is noted. Cranial nerves 2-12 are intact. Peripheral sensory and motor function are grossly normal.    SKIN:  warm and dry. No erythema, or rashes are noted. No specific lesions of concern are noted.                                            Decision Making    1. ADAL (obstructive sleep apnea)  Set  up sleep study  - SLEEP EVALUATION & MANAGEMENT REFERRAL - [facility] [phone] (Age 13 and up if over 100 lbs); Future    2. Need for prophylactic vaccination and inoculation against influenza  Vaccination given  - INFLUENZA VACCINE IM > 6 MONTHS VALENT IIV4 [78247]  - ADMIN INFLUENZA (For MEDICARE Patients ONLY) []      No paperwork filled out for handicap driving permit.              I have carefully explained the diagnosis and treatment options to the patient.  The patient has displayed an understanding of the above, and all subsequent questions were answered.      DO DONNELL Jin    Portions of this note were produced using [employer]Hub  Although every attempt at real-time proof reading has been made, occasional grammar/syntax errors may have been missed.

## 2019-12-19 ENCOUNTER — TELEPHONE (OUTPATIENT)
Dept: FAMILY MEDICINE | Facility: OTHER | Age: 29
End: 2019-12-19

## 2019-12-19 NOTE — TELEPHONE ENCOUNTER
Reason for Call:  Other prescription    Detailed comments: Patient calling to verify the prescription for Metformin and if it should be taken once a day or twice a day. Please verify     Phone Number Patient can be reached at: Cell number on file:    Telephone Information:   Mobile [phone]       Best Time:      Can we leave a detailed message on this number? YES    Call taken on 12/19/2019 at 10:46 AM by Zara Rainey

## 2020-01-02 ENCOUNTER — ALLIED HEALTH/NURSE VISIT (OUTPATIENT)
Dept: EDUCATION SERVICES | Facility: CLINIC | Age: 30
End: 2020-01-02
Payer: COMMERCIAL

## 2020-01-02 DIAGNOSIS — E11.9 TYPE 2 DIABETES MELLITUS WITHOUT COMPLICATION, WITHOUT LONG-TERM CURRENT USE OF INSULIN (H): Primary | ICD-10-CM

## 2020-01-02 PROCEDURE — G0108 DIAB MANAGE TRN  PER INDIV: HCPCS

## 2020-01-02 NOTE — PROGRESS NOTES
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    Vitals:  There were no vitals taken for this visit.  Estimated body mass index is 45.29 kg/m  as calculated from the following:    Height as of 6/18/19: 1.867 m (6' 1.5\").    Weight as of 12/10/19: 157.9 kg (348 lb).   Last 3 BP:   BP Readings from Last 3 Encounters:   12/10/19 132/88   10/11/19 120/74   08/21/19 130/74       History   Smoking Status     Former Smoker     Packs/day: 0.30     Years: 0.50     Types: Cigarettes     Quit date: 3/1/2014   Smokeless Tobacco     Never Used       Labs:  Lab Results   Component Value Date    A1C 6.9 10/11/2019     Lab Results   Component Value Date     08/21/2019     Lab Results   Component Value Date     07/05/2019     HDL Cholesterol   Date Value Ref Range Status   07/05/2019 38 (L) >39 mg/dL Final   ]  GFR Estimate   Date Value Ref Range Status   08/21/2019 >90 >60 mL/min/[1.73_m2] Final     Comment:     Non  GFR Calc  Starting 12/18/2018, serum creatinine based estimated GFR (eGFR) will be   calculated using the Chronic Kidney Disease Epidemiology Collaboration   (CKD-EPI) equation.       GFR Estimate If Black "   Date Value Ref Range Status   2019 >90 >60 mL/min/[1.73_m2] Final     Comment:      GFR Calc  Starting 2018, serum creatinine based estimated GFR (eGFR) will be   calculated using the Chronic Kidney Disease Epidemiology Collaboration   (CKD-EPI) equation.       Lab Results   Component Value Date    CR 0.59 2019     No results found for: MICROALBUMIN    Healthy Eating  Cultural/Mandaen diet restrictions?: No  Meal planning: Avoiding sweets, Carbohydrate counting, Smaller portions  Meals include: Breakfast, Lunch, Dinner, Snacks  Beverages: Water, Tea, Milk, Juice, Diet soda, Alcohol  Has patient met with a dietitian in the past?: Yes    Being Active  How intense was your typical exercise? : Light (like stretching or slow walking)  Barrier to exercise: None    Monitoring  Blood Glucose Meter: Unknown  Home Glucose (Sugar) Monitorin-2 times per day  Blood glucose trend: Increasing steadily    Forgot meter but numbers have been <180. Yesterday .     Taking Medications  Diabetes Medication(s)     Biguanides       metFORMIN (GLUCOPHAGE) 500 MG tablet    Take 1 tablet (500 mg) by mouth 2 times daily (with meals)          Current Treatments: Diet, Oral Agent (dual therapy)    Problem Solving  Hypoglycemia Frequency: Never  Medical alert: No  Severe weather/disaster plan for diabetes management?: No  DKA prevention plan?: No  Sick day plan for diabetes management?: (P) No       Reducing Risks  CAD Risks: Diabetes Mellitus, Dyslipidemia, Family history, Male sex, Obesity  Has dilated eye exam at least once a year?: Yes  Sees dentist every 6 months?: Yes  Sees podiatrist (foot doctor)?: Yes    Healthy Coping  Informal Support system:: Family, Friends, Other  Difficulty affording diabetes management supplies?: No  Patient Activation Measure Survey Score:  YESY Score (Last Two) 2019   YESY Raw Score 33   Activation Score 65.8   YESY Level 3       ASSESSMENT:  Wants to lose weight  this year. Goes to [place] for swimming about once a week.   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   Doing well overall with food, activity, monitoring and medication. Needs education on preventing complications and follow up guidelines.       Patient's most recent   Lab Results   Component Value Date    A1C 6.9 10/11/2019    is meeting goal of <7.0    INTERVENTION:   Diabetes knowledge and skills assessment:     Patient is knowledgeable in diabetes management concepts related to: Being Active, Monitoring, Taking Medication and Reducing Risks    Patient needs further education on the following diabetes management concepts: Taking Medication and Reducing Risks    Based on learning assessment above, most appropriate setting for further diabetes education would be: Individual setting.    Education provided today on:  AADE Self-Care Behaviors:  Taking Medication: side effects of prescribed medications and confirmation of metformin dose  Reducing Risks: major complications of diabetes, prevention, early diagnostic measures and treatment of complications, foot care, appropriate dental care, annual eye exam and A1C - goals, relating to blood glucose levels, how often to check    Opportunities for ongoing education and support in diabetes-self management were discussed.    Pt verbalized understanding of concepts discussed and recommendations provided today.       Education Materials Provided:  [facility] Taking Charge of Your Diabetes Book    PLAN:  Initial diabetes education complete.   See Patient Instructions for co-developed, patient-stated behavior change goals.      Chiara Hair RDN, BOBY, CDE    Time Spent: 40 minutes  Encounter Type: Individual    Any diabetes medication dose changes were made via the CDE Protocol and Collaborative Practice Agreement with the patient's primary care provider. A copy of this encounter was shared with the provider.

## 2020-01-08 ENCOUNTER — PATIENT OUTREACH (OUTPATIENT)
Dept: EDUCATION SERVICES | Facility: CLINIC | Age: 30
End: 2020-01-08

## 2020-01-08 DIAGNOSIS — E11.9 TYPE 2 DIABETES MELLITUS WITHOUT COMPLICATION, WITHOUT LONG-TERM CURRENT USE OF INSULIN (H): ICD-10-CM

## 2020-01-16 ENCOUNTER — OFFICE VISIT (OUTPATIENT)
Dept: FAMILY MEDICINE | Facility: OTHER | Age: 30
End: 2020-01-16
Payer: COMMERCIAL

## 2020-01-16 VITALS
OXYGEN SATURATION: 97 % | DIASTOLIC BLOOD PRESSURE: 74 MMHG | RESPIRATION RATE: 18 BRPM | HEART RATE: 106 BPM | HEIGHT: 74 IN | SYSTOLIC BLOOD PRESSURE: 132 MMHG | TEMPERATURE: 97 F | BODY MASS INDEX: 40.43 KG/M2 | WEIGHT: 315 LBS

## 2020-01-16 DIAGNOSIS — F79 INTELLECTUAL DISABILITY: ICD-10-CM

## 2020-01-16 DIAGNOSIS — E66.01 MORBID OBESITY (H): ICD-10-CM

## 2020-01-16 DIAGNOSIS — E78.5 HYPERLIPIDEMIA LDL GOAL <130: ICD-10-CM

## 2020-01-16 DIAGNOSIS — Z00.00 ROUTINE GENERAL MEDICAL EXAMINATION AT A HEALTH CARE FACILITY: Primary | ICD-10-CM

## 2020-01-16 DIAGNOSIS — E11.65 TYPE 2 DIABETES MELLITUS WITH HYPERGLYCEMIA, WITHOUT LONG-TERM CURRENT USE OF INSULIN (H): ICD-10-CM

## 2020-01-16 LAB
ALBUMIN SERPL-MCNC: 3.8 G/DL (ref 3.4–5)
ALBUMIN UR-MCNC: ABNORMAL MG/DL
ALP SERPL-CCNC: 75 U/L (ref 40–150)
ALT SERPL W P-5'-P-CCNC: 49 U/L (ref 0–70)
ANION GAP SERPL CALCULATED.3IONS-SCNC: 9 MMOL/L (ref 3–14)
APPEARANCE UR: CLEAR
AST SERPL W P-5'-P-CCNC: 22 U/L (ref 0–45)
BACTERIA #/AREA URNS HPF: ABNORMAL /HPF
BILIRUB SERPL-MCNC: 0.2 MG/DL (ref 0.2–1.3)
BILIRUB UR QL STRIP: NEGATIVE
BUN SERPL-MCNC: 13 MG/DL (ref 7–30)
CALCIUM SERPL-MCNC: 9.4 MG/DL (ref 8.5–10.1)
CHLORIDE SERPL-SCNC: 105 MMOL/L (ref 94–109)
CHOLEST SERPL-MCNC: 230 MG/DL
CO2 SERPL-SCNC: 26 MMOL/L (ref 20–32)
COLOR UR AUTO: YELLOW
CREAT SERPL-MCNC: 0.67 MG/DL (ref 0.66–1.25)
CREAT UR-MCNC: 240 MG/DL
GFR SERPL CREATININE-BSD FRML MDRD: >90 ML/MIN/{1.73_M2}
GLUCOSE SERPL-MCNC: 155 MG/DL (ref 70–99)
GLUCOSE UR STRIP-MCNC: NEGATIVE MG/DL
HBA1C MFR BLD: 7.2 % (ref 0–5.6)
HDLC SERPL-MCNC: 43 MG/DL
HGB UR QL STRIP: ABNORMAL
HYALINE CASTS #/AREA URNS LPF: ABNORMAL /LPF
KETONES UR STRIP-MCNC: ABNORMAL MG/DL
LDLC SERPL CALC-MCNC: 151 MG/DL
LEUKOCYTE ESTERASE UR QL STRIP: NEGATIVE
MICROALBUMIN UR-MCNC: 49 MG/L
MICROALBUMIN/CREAT UR: 19.71 MG/G CR (ref 0–17)
MUCOUS THREADS #/AREA URNS LPF: PRESENT /LPF
NITRATE UR QL: NEGATIVE
NON-SQ EPI CELLS #/AREA URNS LPF: ABNORMAL /LPF
NONHDLC SERPL-MCNC: 187 MG/DL
PH UR STRIP: 6 PH (ref 5–7)
POTASSIUM SERPL-SCNC: 4.2 MMOL/L (ref 3.4–5.3)
PROT SERPL-MCNC: 8.6 G/DL (ref 6.8–8.8)
RBC #/AREA URNS AUTO: ABNORMAL /HPF
SODIUM SERPL-SCNC: 140 MMOL/L (ref 133–144)
SOURCE: ABNORMAL
SP GR UR STRIP: >1.03 (ref 1–1.03)
TRIGL SERPL-MCNC: 180 MG/DL
UROBILINOGEN UR STRIP-ACNC: 0.2 EU/DL (ref 0.2–1)
WBC #/AREA URNS AUTO: ABNORMAL /HPF

## 2020-01-16 PROCEDURE — 82043 UR ALBUMIN QUANTITATIVE: CPT | Performed by: INTERNAL MEDICINE

## 2020-01-16 PROCEDURE — 83036 HEMOGLOBIN GLYCOSYLATED A1C: CPT | Performed by: INTERNAL MEDICINE

## 2020-01-16 PROCEDURE — 80061 LIPID PANEL: CPT | Performed by: INTERNAL MEDICINE

## 2020-01-16 PROCEDURE — 36415 COLL VENOUS BLD VENIPUNCTURE: CPT | Performed by: INTERNAL MEDICINE

## 2020-01-16 PROCEDURE — 99395 PREV VISIT EST AGE 18-39: CPT | Performed by: INTERNAL MEDICINE

## 2020-01-16 PROCEDURE — 80053 COMPREHEN METABOLIC PANEL: CPT | Performed by: INTERNAL MEDICINE

## 2020-01-16 PROCEDURE — 81001 URINALYSIS AUTO W/SCOPE: CPT | Performed by: INTERNAL MEDICINE

## 2020-01-16 ASSESSMENT — PAIN SCALES - GENERAL: PAINLEVEL: NO PAIN (0)

## 2020-01-16 ASSESSMENT — MIFFLIN-ST. JEOR: SCORE: 2610.78

## 2020-01-16 NOTE — RESULT ENCOUNTER NOTE
Dear Bertram, your recent test results are attached.  The urine sample is unremarkable.  The hemoglobin A1c is up slightly at 7.2 from the previous 6.9 only 3 months ago.  This would suggest a slight worsening in overall blood sugar control.    Feel free to contact me via the office or My Chart if you have any questions regarding the above.  Sincerely,  Tremaine Ruvalcaba,  FACOI

## 2020-01-16 NOTE — PROGRESS NOTES
3  SUBJECTIVE:   CC: Bertram Foreman is an 29 year old male who presents for preventive health visit.     Healthy Habits:    Do you get at least three servings of calcium containing foods daily (dairy, green leafy vegetables, etc.)? yes and no, taking calcium and/or vitamin D supplement: no    Amount of exercise or daily activities, outside of work: 1 day(s) per week [employer]    Problems taking medications regularly No    Medication side effects: No    Have you had an eye exam in the past two years? yes    Do you see a dentist twice per year? yes    Do you have sleep apnea, excessive snoring or daytime drowsiness?yes      -------------------------------------    Today's PHQ-2 Score:   PHQ-2 (  [employer]) 2020   Q1: Little interest or pleasure in doing things 1 1   Q2: Feeling down, depressed or hopeless 1 1   PHQ-2 Score 2 2       Abuse: Current or Past(Physical, Sexual or Emotional)- No  Do you feel safe in your environment? Yes        Social History     Tobacco Use     Smoking status: Former Smoker     Packs/day: 0.30     Years: 0.50     Pack years: 0.15     Types: Cigarettes     Last attempt to quit: 3/1/2014     Years since quittin.8     Smokeless tobacco: Never Used   Substance Use Topics     Alcohol use: Yes     Alcohol/week: 0.0 standard drinks     Comment: occ     If you drink alcohol do you typically have >3 drinks per day or >7 drinks per week? No                      Last PSA: No results found for: PSA    Reviewed orders with patient. Reviewed health maintenance and updated orders accordingly - Yes  Lab work is in process  Labs reviewed in EPIC  BP Readings from Last 3 Encounters:   20 132/74   12/10/19 132/88   10/11/19 120/74    Wt Readings from Last 3 Encounters:   20 (!) 157.4 kg (347 lb 1.6 oz)   12/10/19 (!) 157.9 kg (348 lb)   10/11/19 (!) 154.9 kg (341 lb 7 oz)                  Patient Active Problem List   Diagnosis     Morbid obesity (H)     Intellectual  disability     Recurrent major depressive disorder, in partial remission (H)     Elevated glucose level     Diabetes mellitus, type 2 (H)     History reviewed. No pertinent surgical history.    Social History     Tobacco Use     Smoking status: Former Smoker     Packs/day: 0.30     Years: 0.50     Pack years: 0.15     Types: Cigarettes     Last attempt to quit: 3/1/2014     Years since quittin.8     Smokeless tobacco: Never Used   Substance Use Topics     Alcohol use: Yes     Alcohol/week: 0.0 standard drinks     Comment: occ     History reviewed. No pertinent family history.      Current Outpatient Medications   Medication Sig Dispense Refill     acetaminophen (TYLENOL) 325 MG tablet Take 325-650 mg by mouth every 6 hours as needed for mild pain       atorvastatin (LIPITOR) 20 MG tablet Take 1 tablet (20 mg) by mouth daily 60 tablet 0     Bismuth Subsalicylate (PEPTO-BISMOL PO) Take by mouth as needed       blood glucose (ACCU-CHEK GUIDE) test strip Use to test blood sugar 2 times daily or as directed. 100 strip 6     blood glucose monitoring (ACCU-CHEK FASTCLIX) lancets 1 each 2 times daily Use to test blood sugar 1 times daily or as directed. 102 each 3     Divalproex Sodium (DEPAKOTE PO)        loperamide (IMODIUM A-D) 2 MG tablet Take 2 mg by mouth daily as needed for diarrhea (Take 2 pills after the first stool. 1 pill after each subsequent loose stool but no more than 4 pills in 24 hours)       metFORMIN (GLUCOPHAGE) 500 MG tablet Take 1 tablet (500 mg) by mouth 2 times daily (with meals) 60 tablet 5     omeprazole (PRILOSEC) 20 MG DR capsule Take 1 capsule (20 mg) by mouth 2 times daily 180 capsule 0     QUEtiapine (SEROQUEL) 300 MG tablet Take 300 mg by mouth  0     TRAZODONE HCL PO Take 100 mg by mouth At Bedtime        vitamin D3 (CHOLECALCIFEROL) 2000 units (50 mcg) tablet Take 1 tablet by mouth daily       Allergies   Allergen Reactions     Ceclor [Cefaclor] Swelling     Erythromycin GI Disturbance  "      Reviewed and updated as needed this visit by clinical staff  Tobacco  Allergies  Meds  Med Hx  Surg Hx  Fam Hx  Soc Hx        Reviewed and updated as needed this visit by Provider        History reviewed. No pertinent past medical history.   History reviewed. No pertinent surgical history.    ROS:  CONSTITUTIONAL: NEGATIVE for fever, chills, change in weight  INTEGUMENTARY/SKIN: NEGATIVE for worrisome rashes, moles or lesions  EYES: NEGATIVE for vision changes or irritation  ENT: NEGATIVE for ear, mouth and throat problems  RESP: NEGATIVE for significant cough or SOB  CV: NEGATIVE for chest pain, palpitations or peripheral edema  GI: NEGATIVE for nausea, abdominal pain, heartburn, or change in bowel habits   male: negative for dysuria, hematuria, decreased urinary stream, erectile dysfunction, urethral discharge  MUSCULOSKELETAL: NEGATIVE for significant arthralgias or myalgia  NEURO: NEGATIVE for weakness, dizziness or paresthesias  PSYCHIATRIC: NEGATIVE for changes in mood or affect    OBJECTIVE:   /74 (BP Location: Left arm, Patient Position: Sitting, Cuff Size: Adult Large)   Pulse 106   Temp 97  F (36.1  C) (Temporal)   Resp 18   Ht 1.882 m (6' 2.1\")   Wt (!) 157.4 kg (347 lb 1.6 oz)   SpO2 97%   BMI 44.44 kg/m    EXAM:  GENERAL: healthy, alert and no distress  EYES: Eyes grossly normal to inspection, PERRL and conjunctivae and sclerae normal  HENT: ear canals and TM's normal, nose and mouth without ulcers or lesions  NECK: no adenopathy, no asymmetry, masses, or scars and thyroid normal to palpation  RESP: lungs clear to auscultation - no rales, rhonchi or wheezes  CV: regular rate and rhythm, normal S1 S2, no S3 or S4, no murmur, click or rub, no peripheral edema and peripheral pulses strong  ABDOMEN: soft, nontender, no hepatosplenomegaly, no masses and bowel sounds normal  MS: no gross musculoskeletal defects noted, no edema  SKIN: no suspicious lesions or rashes  NEURO: Normal " strength and tone, mentation intact and speech normal  PSYCH: mentation appears normal, affect normal/bright    Diagnostic Test Results:  Results for orders placed or performed in visit on 01/16/20   Hemoglobin A1c     Status: Abnormal   Result Value Ref Range    Hemoglobin A1C 7.2 (H) 0 - 5.6 %   Comprehensive metabolic panel     Status: Abnormal   Result Value Ref Range    Sodium 140 133 - 144 mmol/L    Potassium 4.2 3.4 - 5.3 mmol/L    Chloride 105 94 - 109 mmol/L    Carbon Dioxide 26 20 - 32 mmol/L    Anion Gap 9 3 - 14 mmol/L    Glucose 155 (H) 70 - 99 mg/dL    Urea Nitrogen 13 7 - 30 mg/dL    Creatinine 0.67 0.66 - 1.25 mg/dL    GFR Estimate >90 >60 mL/min/[1.73_m2]    GFR Estimate If Black >90 >60 mL/min/[1.73_m2]    Calcium 9.4 8.5 - 10.1 mg/dL    Bilirubin Total 0.2 0.2 - 1.3 mg/dL    Albumin 3.8 3.4 - 5.0 g/dL    Protein Total 8.6 6.8 - 8.8 g/dL    Alkaline Phosphatase 75 40 - 150 U/L    ALT 49 0 - 70 U/L    AST 22 0 - 45 U/L   Lipid Profile     Status: Abnormal   Result Value Ref Range    Cholesterol 230 (H) <200 mg/dL    Triglycerides 180 (H) <150 mg/dL    HDL Cholesterol 43 >39 mg/dL    LDL Cholesterol Calculated 151 (H) <100 mg/dL    Non HDL Cholesterol 187 (H) <130 mg/dL   *UA reflex to Microscopic and Culture ([facility] (except [place] and [place])     Status: Abnormal   Result Value Ref Range    Color Urine Yellow     Appearance Urine Clear     Glucose Urine Negative NEG^Negative mg/dL    Bilirubin Urine Negative NEG^Negative    Ketones Urine Trace (A) NEG^Negative mg/dL    Specific Gravity Urine >1.030 1.003 - 1.035    Blood Urine Trace (A) NEG^Negative    pH Urine 6.0 5.0 - 7.0 pH    Protein Albumin Urine Trace (A) NEG^Negative mg/dL    Urobilinogen Urine 0.2 0.2 - 1.0 EU/dL    Nitrite Urine Negative NEG^Negative    Leukocyte Esterase Urine Negative NEG^Negative    Source Midstream Urine    Albumin Random Urine Quantitative with Creat Ratio     Status: Abnormal   Result Value  Ref Range    Creatinine Urine 240 mg/dL    Albumin Urine mg/L 49 mg/L    Albumin Urine mg/g Cr 19.71 (H) 0 - 17 mg/g Cr   Urine Microscopic     Status: Abnormal   Result Value Ref Range    WBC Urine 0 - 5 OTO5^0 - 5 /HPF    RBC Urine O - 2 OTO2^O - 2 /HPF    Hyaline Casts O - 2 OTO2^O - 2 /LPF    Squamous Epithelial /LPF Urine Few FEW^Few /LPF    Bacteria Urine Few (A) NEG^Negative /HPF    Mucous Urine Present (A) NEG^Negative /LPF   Results for orders placed or performed in visit on 10/11/19   Hemoglobin A1c     Status: Abnormal   Result Value Ref Range    Hemoglobin A1C 6.9 (H) 0 - 5.6 %   Results for orders placed or performed in visit on 08/21/19   Insulin level     Status: Abnormal   Result Value Ref Range    Insulin 46.4 (H) 3 - 25 mU/L   Basic metabolic panel     Status: Abnormal   Result Value Ref Range    Sodium 140 133 - 144 mmol/L    Potassium 3.5 3.4 - 5.3 mmol/L    Chloride 103 94 - 109 mmol/L    Carbon Dioxide 26 20 - 32 mmol/L    Anion Gap 11 3 - 14 mmol/L    Glucose 184 (H) 70 - 99 mg/dL    Urea Nitrogen 6 (L) 7 - 30 mg/dL    Creatinine 0.59 (L) 0.66 - 1.25 mg/dL    GFR Estimate >90 >60 mL/min/[1.73_m2]    GFR Estimate If Black >90 >60 mL/min/[1.73_m2]    Calcium 9.0 8.5 - 10.1 mg/dL   Results for orders placed or performed in visit on 07/05/19   Comprehensive metabolic panel (BMP + Alb, Alk Phos, ALT, AST, Total. Bili, TP)     Status: Abnormal   Result Value Ref Range    Sodium 140 133 - 144 mmol/L    Potassium 4.0 3.4 - 5.3 mmol/L    Chloride 104 94 - 109 mmol/L    Carbon Dioxide 26 20 - 32 mmol/L    Anion Gap 10 3 - 14 mmol/L    Glucose 138 (H) 70 - 99 mg/dL    Urea Nitrogen 7 7 - 30 mg/dL    Creatinine 0.56 (L) 0.66 - 1.25 mg/dL    GFR Estimate >90 >60 mL/min/[1.73_m2]    GFR Estimate If Black >90 >60 mL/min/[1.73_m2]    Calcium 8.9 8.5 - 10.1 mg/dL    Bilirubin Total 0.2 0.2 - 1.3 mg/dL    Albumin 3.4 3.4 - 5.0 g/dL    Protein Total 7.9 6.8 - 8.8 g/dL    Alkaline Phosphatase 82 40 - 150 U/L     ALT 36 0 - 70 U/L    AST 18 0 - 45 U/L   CBC with platelets and differential     Status: Abnormal   Result Value Ref Range    WBC 12.6 (H) 4.0 - 11.0 10e9/L    RBC Count 4.52 4.4 - 5.9 10e12/L    Hemoglobin 12.5 (L) 13.3 - 17.7 g/dL    Hematocrit 39.5 (L) 40.0 - 53.0 %    MCV 87 78 - 100 fl    MCH 27.7 26.5 - 33.0 pg    MCHC 31.6 31.5 - 36.5 g/dL    RDW 13.8 10.0 - 15.0 %    Platelet Count 272 150 - 450 10e9/L    % Neutrophils 58.3 %    % Lymphocytes 32.1 %    % Monocytes 8.4 %    % Eosinophils 1.0 %    % Basophils 0.2 %    Absolute Neutrophil 7.4 1.6 - 8.3 10e9/L    Absolute Lymphocytes 4.0 0.8 - 5.3 10e9/L    Absolute Monocytes 1.1 0.0 - 1.3 10e9/L    Absolute Eosinophils 0.1 0.0 - 0.7 10e9/L    Absolute Basophils 0.0 0.0 - 0.2 10e9/L    Diff Method Automated Method    Lipid Profile (Chol, Trig, HDL, LDL calc)     Status: Abnormal   Result Value Ref Range    Cholesterol 226 (H) <200 mg/dL    Triglycerides 182 (H) <150 mg/dL    HDL Cholesterol 38 (L) >39 mg/dL    LDL Cholesterol Calculated 152 (H) <100 mg/dL    Non HDL Cholesterol 188 (H) <130 mg/dL   Prolactin     Status: None   Result Value Ref Range    Prolactin 11 2 - 18 ug/L   TSH     Status: None   Result Value Ref Range    TSH 3.40 0.40 - 4.00 mU/L   T4, free     Status: None   Result Value Ref Range    T4 Free 0.79 0.76 - 1.46 ng/dL   Vitamin D Deficiency     Status: None   Result Value Ref Range    Vitamin D Deficiency screening 22 20 - 75 ug/L   Results for orders placed or performed during the hospital encounter of 05/27/19   XR Chest 2 Views     Status: None    Narrative    CHEST TWO VIEWS  5/27/2019 1:33 PM     COMPARISON: None.    HISTORY: Cough.    FINDINGS: The cardiac silhouette, pulmonary vasculature, lungs and  pleural spaces are within normal limits.      Impression    IMPRESSION: Clear lungs.     AMY MELENDEZ MD   Results for orders placed or performed in visit on 04/23/19   Albumin Random Urine Quantitative with Creat Ratio     Status: None    Result Value Ref Range    Creatinine Urine 351 mg/dL    Albumin Urine mg/L 26 mg/L    Albumin Urine mg/g Cr 7.38 0 - 17 mg/g Cr   Results for orders placed or performed in visit on 02/04/19   Hemoglobin A1c     Status: Abnormal   Result Value Ref Range    Hemoglobin A1C 6.8 (H) 0 - 5.6 %   Results for orders placed or performed in visit on 01/08/19   Comprehensive metabolic panel     Status: Abnormal   Result Value Ref Range    Sodium 139 133 - 144 mmol/L    Potassium 4.1 3.4 - 5.3 mmol/L    Chloride 104 94 - 109 mmol/L    Carbon Dioxide 28 20 - 32 mmol/L    Anion Gap 7 3 - 14 mmol/L    Glucose 182 (H) 70 - 99 mg/dL    Urea Nitrogen 9 7 - 30 mg/dL    Creatinine 0.72 0.66 - 1.25 mg/dL    GFR Estimate >90 >60 mL/min/[1.73_m2]    GFR Estimate If Black >90 >60 mL/min/[1.73_m2]    Calcium 9.2 8.5 - 10.1 mg/dL    Bilirubin Total 0.3 0.2 - 1.3 mg/dL    Albumin 3.6 3.4 - 5.0 g/dL    Protein Total 8.2 6.8 - 8.8 g/dL    Alkaline Phosphatase 79 40 - 150 U/L    ALT 55 0 - 70 U/L    AST 27 0 - 45 U/L   CBC with platelets     Status: Abnormal   Result Value Ref Range    WBC 11.9 (H) 4.0 - 11.0 10e9/L    RBC Count 4.55 4.4 - 5.9 10e12/L    Hemoglobin 12.9 (L) 13.3 - 17.7 g/dL    Hematocrit 40.7 40.0 - 53.0 %    MCV 90 78 - 100 fl    MCH 28.4 26.5 - 33.0 pg    MCHC 31.7 31.5 - 36.5 g/dL    RDW 13.3 10.0 - 15.0 %    Platelet Count 280 150 - 450 10e9/L   TSH with free T4 reflex     Status: None   Result Value Ref Range    TSH 2.13 0.40 - 4.00 mU/L   LDL cholesterol direct     Status: Abnormal   Result Value Ref Range    LDL Cholesterol Direct 157 (H) <100 mg/dL   Results for orders placed or performed during the hospital encounter of 10/20/18   XR Toe Left G/E 2 Views     Status: None    Narrative    TOE(S) TWO-THREE VIEWS LEFT  10/20/2018 4:51 PM     HISTORY: Dropped a Bible onto his toe;     COMPARISON: None.    FINDINGS: There is normal osseous alignment.  No fractures are  identified.      Impression    IMPRESSION: Osseous  structures appear intact.    DARÍO EDGE MD   Results for orders placed or performed during the hospital encounter of 03/14/18   CBC with platelets differential     Status: Abnormal   Result Value Ref Range    WBC 14.5 (H) 4.0 - 11.0 10e9/L    RBC Count 4.74 4.4 - 5.9 10e12/L    Hemoglobin 12.8 (L) 13.3 - 17.7 g/dL    Hematocrit 41.8 40.0 - 53.0 %    MCV 88 78 - 100 fl    MCH 27.0 26.5 - 33.0 pg    MCHC 30.6 (L) 31.5 - 36.5 g/dL    RDW 14.0 10.0 - 15.0 %    Platelet Count 279 150 - 450 10e9/L    Diff Method Automated Method     % Neutrophils 60.6 %    % Lymphocytes 25.8 %    % Monocytes 11.6 %    % Eosinophils 0.7 %    % Basophils 0.3 %    % Immature Granulocytes 1.0 %    Absolute Neutrophil 8.8 (H) 1.6 - 8.3 10e9/L    Absolute Lymphocytes 3.7 0.8 - 5.3 10e9/L    Absolute Monocytes 1.7 (H) 0.0 - 1.3 10e9/L    Absolute Eosinophils 0.1 0.0 - 0.7 10e9/L    Absolute Basophils 0.0 0.0 - 0.2 10e9/L    Abs Immature Granulocytes 0.1 0 - 0.4 10e9/L   Comprehensive metabolic panel     Status: Abnormal   Result Value Ref Range    Sodium 139 133 - 144 mmol/L    Potassium 4.1 3.4 - 5.3 mmol/L    Chloride 103 94 - 109 mmol/L    Carbon Dioxide 26 20 - 32 mmol/L    Anion Gap 10 3 - 14 mmol/L    Glucose 127 (H) 70 - 99 mg/dL    Urea Nitrogen 9 7 - 30 mg/dL    Creatinine 0.69 0.66 - 1.25 mg/dL    GFR Estimate >90 >60 mL/min/1.7m2    GFR Estimate If Black >90 >60 mL/min/1.7m2    Calcium 9.0 8.5 - 10.1 mg/dL    Bilirubin Total 0.2 0.2 - 1.3 mg/dL    Albumin 3.8 3.4 - 5.0 g/dL    Protein Total 8.6 6.8 - 8.8 g/dL    Alkaline Phosphatase 87 40 - 150 U/L    ALT 50 0 - 70 U/L    AST 23 0 - 45 U/L   Troponin I     Status: None   Result Value Ref Range    Troponin I ES <0.015 0.000 - 0.045 ug/L   Results for orders placed or performed during the hospital encounter of 12/30/16   XR Abdomen 2 Views     Status: None    Narrative    ABDOMEN TWO VIEWS 12/30/2016 5:09 PM     HISTORY: abdominal pain    COMPARISON: None.      Impression    IMPRESSION:  "Stomach is distended with fluid. Normal bowel gas pattern.  No free air. Increased colonic stool could indicate constipation.    KRISHNA HURT MD   UA reflex to Microscopic and Culture     Status: Abnormal   Result Value Ref Range    Color Urine Yellow     Appearance Urine Clear     Glucose Urine Negative NEG mg/dL    Bilirubin Urine Negative NEG    Ketones Urine Trace (A) NEG mg/dL    Specific Gravity Urine 1.025 1.003 - 1.035    Blood Urine Trace (A) NEG    pH Urine 6.0 5.0 - 7.0 pH    Protein Albumin Urine Negative NEG mg/dL    Urobilinogen Urine 0.2 0.2 - 1.0 EU/dL    Nitrite Urine Negative NEG    Leukocyte Esterase Urine Negative NEG    Source Unspecified Urine    Urine Microscopic     Status: Abnormal   Result Value Ref Range    WBC Urine O - 2 0 - 2 /HPF    RBC Urine 2-5 (A) 0 - 2 /HPF       ASSESSMENT/PLAN:       ICD-10-CM    1. Routine general medical examination at a health care facility Z00.00 Urine Microscopic   2. Type 2 diabetes mellitus with hyperglycemia, without long-term current use of insulin (H) E11.65 Hemoglobin A1c     Comprehensive metabolic panel     *UA reflex to Microscopic and Culture ([place] [facility] (except [place] and [place])     Albumin Random Urine Quantitative with Creat Ratio   3. Morbid obesity (H) E66.01    4. Intellectual disability F79    5. Hyperlipidemia LDL goal <130 E78.5 Lipid Profile     atorvastatin (LIPITOR) 20 MG tablet       COUNSELING:  Reviewed preventive health counseling, as reflected in patient instructions       Regular exercise       Healthy diet/nutrition       Vision screening       Hearing screening    Estimated body mass index is 44.44 kg/m  as calculated from the following:    Height as of this encounter: 1.882 m (6' 2.1\").    Weight as of this encounter: 157.4 kg (347 lb 1.6 oz).    Weight management plan: Discussed healthy diet and exercise guidelines     reports that he quit smoking about 5 years ago. His smoking use included cigarettes. " He has a 0.15 pack-year smoking history. He has never used smokeless tobacco.      Counseling Resources:  ATP IV Guidelines  Pooled Cohorts Equation Calculator  FRAX Risk Assessment  ICSI Preventive Guidelines  Dietary Guidelines for Americans, 2010  USDA's MyPlate  ASA Prophylaxis  Lung CA Screening    Tremaine Ruvalcaba [facility]

## 2020-01-20 RX ORDER — ATORVASTATIN CALCIUM 20 MG/1
20 TABLET, FILM COATED ORAL DAILY
Qty: 60 TABLET | Refills: 0 | Status: SHIPPED | OUTPATIENT
Start: 2020-01-20 | End: 2020-03-17

## 2020-01-21 ENCOUNTER — TRANSFERRED RECORDS (OUTPATIENT)
Dept: HEALTH INFORMATION MANAGEMENT | Facility: CLINIC | Age: 30
End: 2020-01-21

## 2020-01-21 LAB — RETINOPATHY: NEGATIVE

## 2020-01-21 NOTE — RESULT ENCOUNTER NOTE
Dear Bertram, your recent test results are attached.  The microalbumin is normal.  The blood sugar is slightly elevated at 105 the kidney and liver tests are normal.  The cholesterol is elevated with an LDL of 151.  I would strongly recommend initiating statin therapy.    I have sent a prescription for Lipitor 20 mg to be taken daily to your pharmacy.      We should check your cholesterol and liver tests in about 1 month.    Feel free to contact me via the office or My Chart if you have any questions regarding the above.  Sincerely,  Tremaine Ruvalcaba DO [facility]

## 2020-02-11 ENCOUNTER — TRANSFERRED RECORDS (OUTPATIENT)
Dept: HEALTH INFORMATION MANAGEMENT | Facility: CLINIC | Age: 30
End: 2020-02-11

## 2020-02-11 LAB — PHQ9 SCORE: 16

## 2020-02-17 DIAGNOSIS — K29.00 ACUTE GASTRITIS WITHOUT HEMORRHAGE, UNSPECIFIED GASTRITIS TYPE: ICD-10-CM

## 2020-02-17 NOTE — TELEPHONE ENCOUNTER
Prescription approved per [facility] Refill Protocol.    NESSA MckeonN, RN  [facility]

## 2020-02-17 NOTE — TELEPHONE ENCOUNTER
"Requested Prescriptions   Pending Prescriptions Disp Refills     omeprazole (PRILOSEC) 20 MG DR capsule [Pharmacy Med Name: OMEPRAZOLE 20MG CPDR] 56 capsule 1     Sig: TAKE ONE CAPSULE BY MOUTH TWICE A DAY (AM & PM-PLUSPAK)   Last Written Prescription Date:  9/25/19  Last Fill Quantity: 180,  # refills: 0   Last office visit: 1/16/2020 with prescribing provider:  1/16/2020   Future Office Visit:        PPI Protocol Passed - 2/17/2020 11:24 AM        Passed - Not on Clopidogrel (unless Pantoprazole ordered)        Passed - No diagnosis of osteoporosis on record        Passed - Recent (12 mo) or future (30 days) visit within the authorizing provider's specialty     Patient has had an office visit with the authorizing provider or a provider within the authorizing providers department within the previous 12 mos or has a future within next 30 days. See \"Patient Info\" tab in inbasket, or \"Choose Columns\" in Meds & Orders section of the refill encounter.              Passed - Medication is active on med list        Passed - Patient is age 18 or older        "

## 2020-02-18 ENCOUNTER — TELEPHONE (OUTPATIENT)
Dept: FAMILY MEDICINE | Facility: OTHER | Age: 30
End: 2020-02-18

## 2020-02-18 NOTE — TELEPHONE ENCOUNTER
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 Please mail to address on chart. Please advise.     Phone Number Patient can be reached at: Home number on file [phone] (home)    Best Time:     Can we leave a detailed message on this number? YES    Call taken on 2/18/2020 at 8:15 AM by Nadia Arredondo

## 2020-02-18 NOTE — LETTER
"[facility]  [address] [facility] [address]  Phone: [phone]    February 18, 2020        Bertram Foreman  [address]   [facility] [address]          To whom it may concern:    RE: Bertram Dimashunter    The above named patient suffers from a modest intellectual deficit.  He has previously been determined to be \"handicapped\".  Please allow him to have a \"permanent fishing license\".    Please contact me for questions or concerns.      Sincerely,        Tremaine Ruvalcaba, DO  "

## 2020-03-02 ENCOUNTER — OFFICE VISIT (OUTPATIENT)
Dept: FAMILY MEDICINE | Facility: OTHER | Age: 30
End: 2020-03-02
Payer: COMMERCIAL

## 2020-03-02 VITALS
RESPIRATION RATE: 20 BRPM | BODY MASS INDEX: 45.07 KG/M2 | OXYGEN SATURATION: 98 % | WEIGHT: 315 LBS | SYSTOLIC BLOOD PRESSURE: 128 MMHG | TEMPERATURE: 96.6 F | DIASTOLIC BLOOD PRESSURE: 76 MMHG | HEART RATE: 120 BPM

## 2020-03-02 DIAGNOSIS — E11.9 TYPE 2 DIABETES MELLITUS WITHOUT COMPLICATION, WITHOUT LONG-TERM CURRENT USE OF INSULIN (H): ICD-10-CM

## 2020-03-02 DIAGNOSIS — M25.571 PAIN IN JOINT, ANKLE AND FOOT, RIGHT: Primary | ICD-10-CM

## 2020-03-02 PROCEDURE — 99213 OFFICE O/P EST LOW 20 MIN: CPT | Performed by: INTERNAL MEDICINE

## 2020-03-02 ASSESSMENT — PAIN SCALES - GENERAL: PAINLEVEL: NO PAIN (0)

## 2020-03-02 NOTE — PROGRESS NOTES
Subjective     Bertram Foreman is a 29 year old male who presents to clinic today for the following health issues:    HPI   Musculoskeletal problem/pain      Duration: months     Description  Location: right leg, lower     Accompanying signs and symptoms: none    History  Previous similar problem: no   Previous evaluation:  none    Precipitating or alleviating factors:  Trauma or overuse: no   Aggravating factors include: more bothersome at night when he is in bed     Therapies tried and outcome: nothing                      Chief Complaint         The patient is a pleasant 29-year-old gentleman who presents today with right foot discomfort.  He notes that it does not really hurt now or does not hurt when he walks on it.  He states that last summer he was walking on the outside edge of his foot (he does not really know why) but began experiencing some pain.  Now when he goes to bed at night, his right ankle inverts and when he wakes in the morning the lateral aspect of his ankle is uncomfortable.  He notes that upon standing, putting his shoes on, and walking the symptoms resolved.  This is now been happening for several months.                         PAST, FAMILY,SOCIAL HISTORY:     Medical  History:   has no past medical history on file.     Surgical History:   has no past surgical history on file.     Social History:   reports that he quit smoking about 6 years ago. His smoking use included cigarettes. He has a 0.15 pack-year smoking history. He has never used smokeless tobacco. He reports current alcohol use. He reports that he does not use drugs.     Family History:  family history is not on file.            MEDICATIONS  Current Outpatient Medications   Medication Sig Dispense Refill     acetaminophen (TYLENOL) 325 MG tablet Take 325-650 mg by mouth every 6 hours as needed for mild pain       Bismuth Subsalicylate (PEPTO-BISMOL PO) Take by mouth as needed       blood glucose (ACCU-CHEK GUIDE) test strip Use  to test blood sugar 2 times daily or as directed. 100 strip 6     blood glucose monitoring (ACCU-CHEK FASTCLIX) lancets 1 each 2 times daily Use to test blood sugar 1 times daily or as directed. 102 each 3     Divalproex Sodium (DEPAKOTE PO)        loperamide (IMODIUM A-D) 2 MG tablet Take 2 mg by mouth daily as needed for diarrhea (Take 2 pills after the first stool. 1 pill after each subsequent loose stool but no more than 4 pills in 24 hours)       metFORMIN (GLUCOPHAGE) 500 MG tablet Take 1 tablet (500 mg) by mouth 2 times daily (with meals) 60 tablet 5     omeprazole (PRILOSEC) 20 MG DR capsule TAKE ONE CAPSULE BY MOUTH TWICE A DAY (AM & PM-PLUSPAK) 56 capsule 3     QUEtiapine (SEROQUEL) 300 MG tablet Take 300 mg by mouth  0     TRAZODONE HCL PO Take 100 mg by mouth At Bedtime        vitamin D3 (CHOLECALCIFEROL) 2000 units (50 mcg) tablet Take 1 tablet by mouth daily       atorvastatin (LIPITOR) 20 MG tablet Take 1 tablet (20 mg) by mouth daily (Patient not taking: Reported on 3/2/2020) 60 tablet 0         --------------------------------------------------------------------------------------------------------------------                              Review of Systems     LUNGS: Pt denies: cough, excess sputum, hemoptysis, or shortness of breath.   HEART: Pt denies: chest pain, arrhythmia, syncope, tachy or bradyarrhythmia.   GI: Pt denies: nausea, vomiting, diarrhea, constipation, melena, or hematochezia.   NEURO: Pt denies: seizures, strokes, diplopia, weakness, paraesthesias, or paralysis.      Examination       /76 (BP Location: Right arm, Patient Position: Sitting, Cuff Size: Adult Large)   Pulse 120   Temp 96.6  F (35.9  C) (Temporal)   Resp 20   Wt (!) 159.7 kg (352 lb)   SpO2 98%   BMI 45.07 kg/m       Constitutional: The patient appears to be in no acute distress. The patient appears to be adequately hydrated. No acute respiratory or hemodynamic distress is noted at this time.   LUNGS: clear  bilaterally, airflow is brisk, no intercostal retraction or stridor is noted. No coughing is noted during visit.   HEART:  regular without rubs, clicks, gallops, or murmurs. PMI is nondisplaced. Upstrokes are brisk. S1,S2 are heard.   GI: Abdomen is soft, without rebound, guarding or tenderness. Bowel sounds are appropriate. No renal bruits are heard.  Abdomen is obese.   MS: Minimal crepitance is noted in the ankle. No deformity is present. Muscle strength is appropriate and equal bilaterally. No acute joint erythema or swelling is present.  No ligamentous laxity is noted in the ankle.       Decision Making       1. Pain in joint, ankle and foot, right  We will set up with physical therapy to evaluate possible mismatched tenderness tension  - PHYSICAL THERAPY REFERRAL; Future    2. Type 2 diabetes mellitus without complication, without long-term current use of insulin (H)  Discussed diabetes, currently controlled.  Patient will continue current medication.                              FOLLOW UP   I have asked the patient to make an appointment for followup with me in 4 months            I have carefully explained the diagnosis and treatment options to the patient.  The patient has displayed an understanding of the above, and all subsequent questions were answered.      DO DONNELL Jin    Portions of this note were produced using [employer]  Although every attempt at real-time proof reading has been made, occasional grammar/syntax errors may have been missed.

## 2020-03-10 ENCOUNTER — OFFICE VISIT (OUTPATIENT)
Dept: SLEEP MEDICINE | Facility: CLINIC | Age: 30
End: 2020-03-10
Payer: COMMERCIAL

## 2020-03-10 VITALS
HEIGHT: 74 IN | HEART RATE: 78 BPM | OXYGEN SATURATION: 95 % | DIASTOLIC BLOOD PRESSURE: 89 MMHG | WEIGHT: 315 LBS | SYSTOLIC BLOOD PRESSURE: 125 MMHG | BODY MASS INDEX: 40.43 KG/M2

## 2020-03-10 DIAGNOSIS — G47.19 EXCESSIVE DAYTIME SLEEPINESS: ICD-10-CM

## 2020-03-10 DIAGNOSIS — F51.04 PSYCHOPHYSIOLOGICAL INSOMNIA: ICD-10-CM

## 2020-03-10 DIAGNOSIS — R06.83 SNORING: Primary | ICD-10-CM

## 2020-03-10 PROCEDURE — 99213 OFFICE O/P EST LOW 20 MIN: CPT | Performed by: OTOLARYNGOLOGY

## 2020-03-10 RX ORDER — ZOLPIDEM TARTRATE 5 MG/1
TABLET ORAL
Qty: 1 TABLET | Refills: 0 | Status: SHIPPED | OUTPATIENT
Start: 2020-03-10 | End: 2021-01-19

## 2020-03-10 ASSESSMENT — MIFFLIN-ST. JEOR: SCORE: 2633

## 2020-03-10 NOTE — NURSING NOTE
Does Bertram have a CPAP/Bipap?  No     Weight management plan: Patient was referred to their PCP to discuss a diet and exercise plan.

## 2020-03-10 NOTE — PROGRESS NOTES
Sleep Consultation:    Date on this visit: 3/10/2020    Bertram Foreman is sent by Tremaine Denis* for a sleep consultation regarding insomnia possible sleep apnea possible use of inspire device.  Patient is very interested in the inspire device that becomes his first question.  For some reason he has understanding that makes his sleep better.  Today we counseled the patient explained him with inspire device actually is 4..    Primary Physician: Tremaine Ruvalcaba   Patient saw me last year in May 19 at that time there was some issues with sleep maintenance insomnia sleep hygiene.  Patient has special needs and comes in with his .  Does appear that he has sleep schedule is inconsistent.  He does wake up multiple times at night.  Last time he saw me in May 2019 he did not complain of difficulty falling asleep admitted to fall asleep within 10 minutes but now says it is more difficult for sleep as well.  He snores was told he has apneas by his mother.  He had a sleep study almost 5 years ago which showed AHI of 15 but at that time he had great majority of central apneas.  Bertram Foreman Allergies:    Allergies   Allergen Reactions     Ceclor [Cefaclor] Swelling     Erythromycin GI Disturbance       Medications:    Current Outpatient Medications   Medication Sig Dispense Refill     acetaminophen (TYLENOL) 325 MG tablet Take 325-650 mg by mouth every 6 hours as needed for mild pain       atorvastatin (LIPITOR) 20 MG tablet Take 1 tablet (20 mg) by mouth daily (Patient not taking: Reported on 3/2/2020) 60 tablet 0     Bismuth Subsalicylate (PEPTO-BISMOL PO) Take by mouth as needed       blood glucose (ACCU-CHEK GUIDE) test strip Use to test blood sugar 2 times daily or as directed. 100 strip 6     blood glucose monitoring (ACCU-CHEK FASTCLIX) lancets 1 each 2 times daily Use to test blood sugar 1 times daily or as directed. 102 each 3     Divalproex Sodium (DEPAKOTE PO)         loperamide (IMODIUM A-D) 2 MG tablet Take 2 mg by mouth daily as needed for diarrhea (Take 2 pills after the first stool. 1 pill after each subsequent loose stool but no more than 4 pills in 24 hours)       metFORMIN (GLUCOPHAGE) 500 MG tablet Take 1 tablet (500 mg) by mouth 2 times daily (with meals) 60 tablet 5     omeprazole (PRILOSEC) 20 MG DR capsule TAKE ONE CAPSULE BY MOUTH TWICE A DAY (AM & PM-PLUSPAK) 56 capsule 3     QUEtiapine (SEROQUEL) 300 MG tablet Take 300 mg by mouth  0     TRAZODONE HCL PO Take 100 mg by mouth At Bedtime        vitamin D3 (CHOLECALCIFEROL) 2000 units (50 mcg) tablet Take 1 tablet by mouth daily         Problem List:  Patient Active Problem List    Diagnosis Date Noted     Diabetes mellitus, type 2 (H) 2019     Priority: Medium     Intellectual disability 2019     Priority: Medium     Recurrent major depressive disorder, in partial remission (H) 2019     Priority: Medium     Elevated glucose level 2019     Priority: Medium     Morbid obesity (H) 2019     Priority: Medium        Past Medical/Surgical History:  No past medical history on file.  No past surgical history on file.    Social History:  Social History     Socioeconomic History     Marital status: Single     Spouse name: Not on file     Number of children: Not on file     Years of education: Not on file     Highest education level: Not on file   Occupational History     Not on file   Social Needs     Financial resource strain: Not on file     Food insecurity     Worry: Not on file     Inability: Not on file     Transportation needs     Medical: Not on file     Non-medical: Not on file   Tobacco Use     Smoking status: Former Smoker     Packs/day: 0.30     Years: 0.50     Pack years: 0.15     Types: Cigarettes     Last attempt to quit: 3/1/2014     Years since quittin.0     Smokeless tobacco: Never Used   Substance and Sexual Activity     Alcohol use: Yes     Alcohol/week: 0.0 standard drinks      Comment: occ     Drug use: No     Sexual activity: Never   Lifestyle     Physical activity     Days per week: Not on file     Minutes per session: Not on file     Stress: Not on file   Relationships     Social connections     Talks on phone: Not on file     Gets together: Not on file     Attends Congregation service: Not on file     Active member of club or organization: Not on file     Attends meetings of clubs or organizations: Not on file     Relationship status: Not on file     Intimate partner violence     Fear of current or ex partner: Not on file     Emotionally abused: Not on file     Physically abused: Not on file     Forced sexual activity: Not on file   Other Topics Concern     Not on file   Social History Narrative     Not on file       Family History:  No family history on file.    Review of Systems:  A complete review of systems reviewed by me is negative with the exeption of what has been mentioned in the history of present illness.  CONSTITUTIONAL: NEGATIVE for weight gain/loss, fever, chills, sweats or night sweats, drug allergies.  EYES: NEGATIVE for changes in vision, blind spots, double vision.  ENT: NEGATIVE for ear pain, sore throat, sinus pain, post-nasal drip, runny nose, bloody nose  CARDIAC: NEGATIVE for fast heartbeats or fluttering in chest, chest pain or pressure, breathlessness when lying flat, swollen legs or swollen feet.  NEUROLOGIC: NEGATIVE headaches, weakness or numbness in the arms or legs.  DERMATOLOGIC: NEGATIVE for rashes, new moles or change in mole(s)  PULMONARY: NEGATIVE SOB at rest, SOB with activity, dry cough, productive cough, coughing up blood, wheezing or whistling when breathing.    GASTROINTESTINAL: NEGATIVE for nausea or vomitting, loose or watery stools, fat or grease in stools, constipation, abdominal pain, bowel movements black in color or blood noted.  GENITOURINARY: NEGATIVE for pain during urination, blood in urine, urinating more frequently than usual,  irregular menstrual periods.  MUSCULOSKELETAL: NEGATIVE for muscle pain, bone or joint pain, swollen joints.  ENDOCRINE: NEGATIVE for increased thirst or urination, diabetes.  LYMPHATIC: NEGATIVE for swollen lymph nodes, lumps or bumps in the breasts or nipple discharge.    Physical Examination:  Vitals: There were no vitals taken for this visit.  BMI= There is no height or weight on file to calculate BMI.         [place] Total Score 5/16/2019   Total score - [place] 14            GENERAL APPEARANCE: healthy, alert, no distress, cooperative and over weight  EYES: Eyes grossly normal to inspection, PERRL and conjunctivae and sclerae normal  HENT: ear canals and TM's normal, nose and mouth without ulcers or lesions and tongue base enlarged  NECK: no adenopathy, no asymmetry, masses, or scars and thyroid normal to palpation  NEURO: Normal strength and tone, mentation intact and speech normal  PSYCH: mentation appears normal and affect normal/bright  Mallampati Class: II.  Tonsillar Stage: 0  surgically removed.  There is evidence of previous uvulopalatopharyngoplasty with a nice square opening of the oropharyngeal airway.  Impression/Plan:    Patient with history of sleep disordered breathing had uvulopalatopharyngoplasty in the past when not sure as to the status of his sleep apnea now.  Would like to get a comprehensive sleep study to further evaluate.  In regard to his insomnia it appears to have strong component of poor sleep hygiene.  We discussed some sleep hygiene issues.  Also will order comprehensive sleep study to understand his sleep in lab.  Your BMI is Body mass index is 45.07 kg/m .    What is BMI?  Body mass index (BMI) is one way to tell whether you are at a healthy weight, overweight, or obese. It measures your weight in relation to your height.  A BMI of 18.5 to 24.9 is in the healthy range. A person with a BMI of 25 to 29.9 is considered overweight, and someone with a BMI of 30 or greater is  considered obese.  Another way to find out if you are at risk for health problems caused by overweight and obesity is to measure your waist. If you are a woman and your waist is more than 35 inches, or if you are a man and your waist is more than 40 inches, your risk of disease may be higher.  More than two-thirds of American adults are considered overweight or obese. Being overweight or obese increases the risk for further weight gain.  Excess weight may lead to heart disease and diabetes. Creating and following plans for healthy eating and physical activity may help you improve your health.    Methods for maintaining or losing weight.  Weight control is part of healthy lifestyle and includes exercise, emotional health, and healthy eating habits.  Careful eating habits lifelong is the mainstay of weight control.  Though there are significant health benefits from weight loss, long-term weight loss with diet alone may be very difficult to achieve- studies show long-term success with dietary management in less than 10% of people. Attaining a healthy weight may be especially difficult to achieve in those with severe obesity. In some cases, medications, devices and surgical management might be considered.    What can you do?  If you are overweight or obese and are interested in methods for weight loss, you should discuss this with your provider. In addition, we recommend that you review healthy life styles and methods for weight loss available through the National Institutes of Health patient information sites:   http://win.niddk.nih.gov/publications/index.htm      He will follow up with me in approximately two weeks after his sleep study has been competed to review the results and discuss plan of care.       Polysomnography reviewed.  Obstructive sleep apnea reviewed.  Complications of untreated sleep apnea were reviewed.    Liborio Hidalgo MD      CC: Tremaine Denis*

## 2020-03-16 DIAGNOSIS — E78.5 HYPERLIPIDEMIA LDL GOAL <130: ICD-10-CM

## 2020-03-17 RX ORDER — ATORVASTATIN CALCIUM 20 MG/1
TABLET, FILM COATED ORAL
Qty: 56 TABLET | Refills: 0 | Status: SHIPPED | OUTPATIENT
Start: 2020-03-17 | End: 2020-05-14

## 2020-03-17 NOTE — TELEPHONE ENCOUNTER
"Routing refill request to provider for review/approval because:  Labs out of range:  LDL    Lipitor   Last Written Prescription Date:  1/20/20  Last Fill Quantity: 60,  # refills: 0   Last office visit: 3/2/2020 with prescribing provider:  Jordon   Future Office Visit:      Requested Prescriptions   Pending Prescriptions Disp Refills     atorvastatin (LIPITOR) 20 MG tablet [Pharmacy Med Name: ATORVASTATIN CALCIUM 20MG TABS] 56 tablet 0     Sig: TAKE ONE TABLET BY MOUTH ONCE DAILY-AM PLUSPAK       Statins Protocol Passed - 3/16/2020 11:45 AM        Passed - LDL on file in past 12 months     Recent Labs   Lab Test 01/16/20  1056   *             Passed - No abnormal creatine kinase in past 12 months     No lab results found.             Passed - Recent (12 mo) or future (30 days) visit within the authorizing provider's specialty     Patient has had an office visit with the authorizing provider or a provider within the authorizing providers department within the previous 12 mos or has a future within next 30 days. See \"Patient Info\" tab in inbasket, or \"Choose Columns\" in Meds & Orders section of the refill encounter.              Passed - Medication is active on med list        Passed - Patient is age 18 or older           Zully Locke RN on 3/17/2020 at 11:40 AM    "

## 2020-03-26 ENCOUNTER — MYC MEDICAL ADVICE (OUTPATIENT)
Dept: FAMILY MEDICINE | Facility: OTHER | Age: 30
End: 2020-03-26

## 2020-04-25 ENCOUNTER — TELEPHONE (OUTPATIENT)
Dept: FAMILY MEDICINE | Facility: OTHER | Age: 30
End: 2020-04-25

## 2020-04-25 NOTE — TELEPHONE ENCOUNTER
Reason for call:  Other   Patient called regarding (reason for call): call back  Additional comments: Patient would like tomake an appt. Schedule not avaliable to central scheduling    Phone number to reach patient:  Home number on file [phone] (home)    Best Time:  Anytime    Can we leave a detailed message on this number?  YES    Travel screening: Not Applicable

## 2020-04-27 NOTE — TELEPHONE ENCOUNTER
If the patient has the technological capabilities, I would like to have him set up a video visit.  If he does not have the technological capabilities, I suppose, a phone visit would be adequate.  I do not believe he needs to come to the clinic at this time for symptoms that have been present for over a year.    Jordon

## 2020-04-27 NOTE — TELEPHONE ENCOUNTER
Called and spoke to the patient. He said for the last year, every time he yawns his throat feels like it locks up. He said he thought it was normal until he talked to a friend and she said it was not normal. Symptoms have not gotten worse over the last year. He denies any trouble breathing or swallowing.     Patient was hoping to get in to see Dr. Ruvalcaba. RN is not sure if this could be a telephone visit? F2F? Or wait until pandemic is over?    Will route to provider to review.     NESSA MckeonN, RN  [facility]

## 2020-04-27 NOTE — TELEPHONE ENCOUNTER
"Spoke to patient to get more details on what he needs to be seen for. He states\"when yawning his throat locks up.\" He declined a telephone visit, saying, \"It's not that bad. I can just wait until July when the schedules open up.\"   Please triage, and if appropriate for an in person visit, please schedule.    "

## 2020-04-27 NOTE — TELEPHONE ENCOUNTER
Patient states he has had to many phone visits and doesn't want to do anymore states they dont work. He will wait till July and make a real appt.   Karli Cooley MA 4/27/2020

## 2020-05-04 ENCOUNTER — NURSE TRIAGE (OUTPATIENT)
Dept: NURSING | Facility: CLINIC | Age: 30
End: 2020-05-04

## 2020-05-12 ENCOUNTER — TRANSFERRED RECORDS (OUTPATIENT)
Dept: HEALTH INFORMATION MANAGEMENT | Facility: CLINIC | Age: 30
End: 2020-05-12

## 2020-05-14 DIAGNOSIS — E78.5 HYPERLIPIDEMIA LDL GOAL <130: ICD-10-CM

## 2020-05-14 RX ORDER — ATORVASTATIN CALCIUM 20 MG/1
TABLET, FILM COATED ORAL
Qty: 56 TABLET | Refills: 1 | Status: SHIPPED | OUTPATIENT
Start: 2020-05-14 | End: 2020-08-31

## 2020-05-14 NOTE — TELEPHONE ENCOUNTER
Prescription approved per [facility] Refill Protocol.    Pinky Calderon RN

## 2020-05-18 DIAGNOSIS — E11.65 TYPE 2 DIABETES MELLITUS WITH HYPERGLYCEMIA, WITHOUT LONG-TERM CURRENT USE OF INSULIN (H): ICD-10-CM

## 2020-05-19 ENCOUNTER — TELEPHONE (OUTPATIENT)
Dept: FAMILY MEDICINE | Facility: OTHER | Age: 30
End: 2020-05-19

## 2020-05-19 NOTE — TELEPHONE ENCOUNTER
Patient has a diabetic follow up appointment scheduled for tomorrow. Will wait to see if medication gets filled at appointment.     PRASANTH Mckeon, RN  [facility]

## 2020-05-19 NOTE — TELEPHONE ENCOUNTER
Please offer the patient the opportunity to set up a video visit.  We can set up  lab work separate from the visit if he chooses.    Otherwise, he will need to come to [place] and set up a face-to-face visit with lab work.      Jordon

## 2020-05-19 NOTE — TELEPHONE ENCOUNTER
Reason for Call:  Same Day Appointment, Requested Provider:  Tremaine Ruvalcaba DO    PCP: Tremaine Ruvalcaba    Reason for visit: Check My A1c Level, Extreme Sweating + Thirst & Diarrhea.    Duration of symptoms: unknown    Have you been treated for this in the past?     Additional comments: Bertram requested an appointment through [facility], can these symptoms be seen as a telephone visit or does he need a face to face appointment. Please advise.    Can we leave a detailed message on this number? YES    Phone number patient can be reached at: Home number on file [phone] (home)    Best Time:     Call taken on 5/19/2020 at 7:17 AM by Zara Valdovinos

## 2020-05-19 NOTE — TELEPHONE ENCOUNTER
Spoke with patient and informed of message below. Patient states unable to do video, appointment made for face to face patient does not have any COVID symptoms.     Stacy Love MA

## 2020-05-20 ENCOUNTER — OFFICE VISIT (OUTPATIENT)
Dept: FAMILY MEDICINE | Facility: CLINIC | Age: 30
End: 2020-05-20
Payer: COMMERCIAL

## 2020-05-20 VITALS
TEMPERATURE: 96.5 F | WEIGHT: 315 LBS | BODY MASS INDEX: 44.97 KG/M2 | DIASTOLIC BLOOD PRESSURE: 78 MMHG | OXYGEN SATURATION: 96 % | SYSTOLIC BLOOD PRESSURE: 120 MMHG | HEART RATE: 107 BPM | RESPIRATION RATE: 18 BRPM

## 2020-05-20 DIAGNOSIS — E66.01 MORBID OBESITY (H): Primary | ICD-10-CM

## 2020-05-20 DIAGNOSIS — E11.65 TYPE 2 DIABETES MELLITUS WITH HYPERGLYCEMIA, WITHOUT LONG-TERM CURRENT USE OF INSULIN (H): ICD-10-CM

## 2020-05-20 DIAGNOSIS — F79 INTELLECTUAL DISABILITY: ICD-10-CM

## 2020-05-20 DIAGNOSIS — L73.9 FOLLICULITIS: ICD-10-CM

## 2020-05-20 LAB
ANION GAP SERPL CALCULATED.3IONS-SCNC: 7 MMOL/L (ref 3–14)
BUN SERPL-MCNC: 13 MG/DL (ref 7–30)
CALCIUM SERPL-MCNC: 9.1 MG/DL (ref 8.5–10.1)
CHLORIDE SERPL-SCNC: 102 MMOL/L (ref 94–109)
CO2 SERPL-SCNC: 29 MMOL/L (ref 20–32)
CREAT SERPL-MCNC: 0.63 MG/DL (ref 0.66–1.25)
GFR SERPL CREATININE-BSD FRML MDRD: >90 ML/MIN/{1.73_M2}
GLUCOSE SERPL-MCNC: 204 MG/DL (ref 70–99)
HBA1C MFR BLD: 8 % (ref 0–5.6)
POTASSIUM SERPL-SCNC: 4.2 MMOL/L (ref 3.4–5.3)
SODIUM SERPL-SCNC: 138 MMOL/L (ref 133–144)

## 2020-05-20 PROCEDURE — 36415 COLL VENOUS BLD VENIPUNCTURE: CPT | Performed by: INTERNAL MEDICINE

## 2020-05-20 PROCEDURE — 80048 BASIC METABOLIC PNL TOTAL CA: CPT | Performed by: INTERNAL MEDICINE

## 2020-05-20 PROCEDURE — 83036 HEMOGLOBIN GLYCOSYLATED A1C: CPT | Performed by: INTERNAL MEDICINE

## 2020-05-20 PROCEDURE — 99214 OFFICE O/P EST MOD 30 MIN: CPT | Performed by: INTERNAL MEDICINE

## 2020-05-20 RX ORDER — GLIMEPIRIDE 4 MG/1
4 TABLET ORAL
Qty: 90 TABLET | Refills: 0 | Status: SHIPPED | OUTPATIENT
Start: 2020-05-20 | End: 2020-08-04

## 2020-05-20 NOTE — RESULT ENCOUNTER NOTE
Dear Bertram, your recent test results are attached.  The chemistry panel shows a blood sugar of 204.  The hemoglobin A1c has actually gone up from 6.9 only 7 months ago to 8.0.  This would show persistent worsening of blood sugar control.    Please start the glimepiride in addition to her metformin as discussed during her visit      You will be contacted with any outstanding results when they are available.  Feel free to contact me via the office or My Chart if you have any questions regarding the above.  Sincerely,  Tremaine Ruvalcaba DO FACOI

## 2020-05-20 NOTE — PROGRESS NOTES
Subjective     Bertram Foreman is a 29 year old male who presents to clinic today for the following health issues:    HPI   Diabetes Follow-up    How often are you checking your blood sugar? Two times daily  Blood sugar testing frequency justification:  Patient modifying lifestyle changes (diet, exercise) with blood sugars  What time of day are you checking your blood sugars (select all that apply)?  Before and after meals  Have you had any blood sugars above 200?  Yes 5/19 was 242 after breakfast   Have you had any blood sugars below 70?  No    What symptoms do you notice when your blood sugar is low?  None and Not applicable    What concerns do you have today about your diabetes? None and Blood sugar is often over 200     Do you have any of these symptoms? (Select all that apply)  Excessive thirst      BP Readings from Last 2 Encounters:   05/20/20 120/78   03/10/20 125/89     Hemoglobin A1C (%)   Date Value   01/16/2020 7.2 (H)   10/11/2019 6.9 (H)     LDL Cholesterol Calculated (mg/dL)   Date Value   01/16/2020 151 (H)   07/05/2019 152 (H)                 How many servings of fruits and vegetables do you eat daily?  0-1    On average, how many sweetened beverages do you drink each day (Examples: soda, juice, sweet tea, etc.  Do NOT count diet or artificially sweetened beverages)?   2    How many days per week do you exercise enough to make your heart beat faster? 7    How many minutes a day do you exercise enough to make your heart beat faster? 10 - 19  How many days per week do you miss taking your medication? 3, mainly misses morning sometimes, a lot better than before     What makes it hard for you to take your medications?  remembering to take                      Chief Complaint         The patient is a pleasant 29-year-old gentleman who presents today with his supervisor.  He has a history of type 2 diabetes and is currently on metformin 500 mg twice daily.  He has been checking his sugars twice daily  and notes that values are generally under 250.  He very seldom has values under 125.  He has had no hypoglycemic episodes.  He denies any paresthesias.  He has had some mild polyuria and polydipsia.  He notes that he is not drinking any regular soda pop but does drink 2 bottles of diet pop daily but has increased his fluid intake with water considerably.  He denies any edema, shortness of breath or chest pain.  He is tolerating the current pandemic.  He is seldom going out.  He does not wear a mask generally.  He is an obese individual that he is losing some weight slowly.  His current BMI is 45.  He also has some concurrent intellectual disability but is quite pleasant and knowledgeable of current affairs.  He has good insight into his medical condition.                         PAST, FAMILY,SOCIAL HISTORY:     Medical  History:   has no past medical history on file.     Surgical History:   has no past surgical history on file.     Social History:   reports that he quit smoking about 6 years ago. His smoking use included cigarettes. He has a 0.15 pack-year smoking history. He has never used smokeless tobacco. He reports current alcohol use. He reports that he does not use drugs.     Family History:  family history is not on file.            MEDICATIONS  Current Outpatient Medications   Medication Sig Dispense Refill     acetaminophen (TYLENOL) 325 MG tablet Take 325-650 mg by mouth every 6 hours as needed for mild pain       Bismuth Subsalicylate (PEPTO-BISMOL PO) Take by mouth as needed       blood glucose (ACCU-CHEK GUIDE) test strip Use to test blood sugar 2 times daily or as directed. 100 strip 6     blood glucose monitoring (ACCU-CHEK FASTCLIX) lancets 1 each 2 times daily Use to test blood sugar 1 times daily or as directed. 102 each 3     Divalproex Sodium (DEPAKOTE PO)        glimepiride (AMARYL) 4 MG tablet Take 1 tablet (4 mg) by mouth every morning (before breakfast) 90 tablet 0     loperamide (IMODIUM  A-D) 2 MG tablet Take 2 mg by mouth daily as needed for diarrhea (Take 2 pills after the first stool. 1 pill after each subsequent loose stool but no more than 4 pills in 24 hours)       metFORMIN (GLUCOPHAGE) 500 MG tablet Take 1 tablet (500 mg) by mouth 2 times daily (with meals) 60 tablet 5     omeprazole (PRILOSEC) 20 MG DR capsule TAKE ONE CAPSULE BY MOUTH TWICE A DAY (AM & PM-PLUSPAK) 56 capsule 3     QUEtiapine (SEROQUEL) 300 MG tablet Take 300 mg by mouth  0     TRAZODONE HCL PO Take 100 mg by mouth At Bedtime        zolpidem (AMBIEN) 5 MG tablet Take tablet by mouth 15 minutes prior to sleep, for Sleep Study 1 tablet 0     atorvastatin (LIPITOR) 20 MG tablet TAKE ONE TABLET BY MOUTH EVERY MORNING (A.M PLUSPAK) 56 tablet 1     vitamin D3 (CHOLECALCIFEROL) 2000 units (50 mcg) tablet Take 1 tablet by mouth daily           --------------------------------------------------------------------------------------------------------------------                              Review of Systems       LUNGS: Pt denies: cough, excess sputum, hemoptysis, or shortness of breath.   HEART: Pt denies: chest pain, arrhythmia, syncope, tachy or bradyarrhythmia.   GI: Pt denies: nausea, vomiting, diarrhea, constipation, melena, or hematochezia.   NEURO: Pt denies: seizures, strokes, diplopia, weakness, paraesthesias, or paralysis.   SKIN: Pt denies: itching,  discoloration, or specific lesions of concern. Denies recent hair loss.  Does have a slight follicular rash on his bilateral forearms.  Attributes this to possibly washing dishes in dirty water.   PSYCH: The patient denies significant depression, anxiety, mood imbalance. Specifically denies any suicidal ideation.        Examination    /78   Pulse 107   Temp 96.5  F (35.8  C) (Temporal)   Resp 18   Wt (!) 159.3 kg (351 lb 3 oz)   SpO2 96%   BMI 44.97 kg/m      Constitutional: The patient appears to be in no acute distress. The patient appears to be adequately  hydrated. No acute respiratory or hemodynamic distress is noted at this time.   LUNGS: clear bilaterally, airflow is brisk, no intercostal retraction or stridor is noted. No coughing is noted during visit.   HEART:  regular without rubs, clicks, gallops, or murmurs. PMI is nondisplaced. Upstrokes are brisk. S1,S2 are heard.   GI: Abdomen is soft, without rebound, guarding or tenderness. Bowel sounds are appropriate. No renal bruits are heard.   NEURO: Pt is alert and appropriate. No neurologic lateralization is noted. Cranial nerves 2-12 are intact. Peripheral sensory and motor function are grossly normal.    SKIN:  warm and dry. No erythema, is noted. No specific lesions of concern are noted.  Minimal follicular inflammation of the forearms is noted.  It is the distal third of the radial ulnar area.  Minimal inflammation is present.   PSYCH: The patient appears grossly appropriate. Maintains good eye contact, does not have any jittery or atypical motion. Displays appropriate affect.       Decision Making       1. Type 2 diabetes mellitus with hyperglycemia, without long-term current use of insulin (H)  Add glimepiride and check A1c and lab work  Continue weight loss and diet  Limit soda pop  - metFORMIN (GLUCOPHAGE) 500 MG tablet; Take 1 tablet (500 mg) by mouth 2 times daily (with meals)  Dispense: 60 tablet; Refill: 5  - Hemoglobin A1c  - glimepiride (AMARYL) 4 MG tablet; Take 1 tablet (4 mg) by mouth every morning (before breakfast)  Dispense: 90 tablet; Refill: 0  - Basic metabolic panel    2. Morbid obesity (H)  Continue weight loss through caloric restriction    3. Intellectual disability  Stable and functional    4. Folliculitis  Avoid using dirty water, recommend Betadine scrubs with showering for the next week..                           FOLLOW UP   I have asked the patient to make an appointment for followup with me in 4 months for face-to-face and lab work            I have carefully explained the  diagnosis and treatment options to the patient.  The patient has displayed an understanding of the above, and all subsequent questions were answered.      DO DONNELL Jin    Portions of this note were produced using [employer]  Although every attempt at real-time proof reading has been made, occasional grammar/syntax errors may have been missed.

## 2020-05-27 ENCOUNTER — TELEPHONE (OUTPATIENT)
Dept: FAMILY MEDICINE | Facility: OTHER | Age: 30
End: 2020-05-27

## 2020-05-27 NOTE — TELEPHONE ENCOUNTER
Patient is due for a PHQ-9.  Index start date:2/11/2020  Index end date:6/10/2020    Please call patient. Pt is active on [employer]. I have sent a PHQ-9 via [employer] and will postpone the encounter. Dulce Dunham CMA ([facility])

## 2020-06-01 NOTE — TELEPHONE ENCOUNTER
Pt completed PHQ-9.    PHQ 5/31/2020   PHQ-9 Total Score 12   Q9: Thoughts of better off dead/self-harm past 2 weeks Not at all     Dulce Dunham CMA ([facility])

## 2020-06-08 DIAGNOSIS — K29.00 ACUTE GASTRITIS WITHOUT HEMORRHAGE, UNSPECIFIED GASTRITIS TYPE: ICD-10-CM

## 2020-06-10 NOTE — TELEPHONE ENCOUNTER
"  Requested Prescriptions   Pending Prescriptions Disp Refills     omeprazole (PRILOSEC) 20 MG DR capsule [Pharmacy Med Name: OMEPRAZOLE 20MG CPDR] 56 capsule 3     Sig: TAKE ONE CAPSULE BY MOUTH TWICE A DAY (AM & PM-PLUSPAK)   Last Written Prescription Date:  2/17/2020  Last Fill Quantity: 56,  # refills: 3   Last office visit: 5/20/2020 with prescribing provider:     Future Office Visit:          PPI Protocol Passed - 6/8/2020 12:26 PM        Passed - Not on Clopidogrel (unless Pantoprazole ordered)        Passed - No diagnosis of osteoporosis on record        Passed - Recent (12 mo) or future (30 days) visit within the authorizing provider's specialty     Patient has had an office visit with the authorizing provider or a provider within the authorizing providers department within the previous 12 mos or has a future within next 30 days. See \"Patient Info\" tab in inbasket, or \"Choose Columns\" in Meds & Orders section of the refill encounter.              Passed - Medication is active on med list        Passed - Patient is age 18 or older           Prescription approved per [facility] Refill Protocol.  Alicia Freeman RN    "

## 2020-07-06 ENCOUNTER — TELEPHONE (OUTPATIENT)
Dept: FAMILY MEDICINE | Facility: OTHER | Age: 30
End: 2020-07-06

## 2020-07-14 ENCOUNTER — OFFICE VISIT (OUTPATIENT)
Dept: FAMILY MEDICINE | Facility: CLINIC | Age: 30
End: 2020-07-14
Payer: COMMERCIAL

## 2020-07-14 VITALS
HEART RATE: 108 BPM | BODY MASS INDEX: 45.58 KG/M2 | OXYGEN SATURATION: 99 % | SYSTOLIC BLOOD PRESSURE: 126 MMHG | RESPIRATION RATE: 22 BRPM | WEIGHT: 315 LBS | DIASTOLIC BLOOD PRESSURE: 78 MMHG | TEMPERATURE: 96.2 F

## 2020-07-14 DIAGNOSIS — E11.65 TYPE 2 DIABETES MELLITUS WITH HYPERGLYCEMIA, WITHOUT LONG-TERM CURRENT USE OF INSULIN (H): ICD-10-CM

## 2020-07-14 DIAGNOSIS — K63.89 BACTERIAL OVERGROWTH SYNDROME: Primary | ICD-10-CM

## 2020-07-14 DIAGNOSIS — G47.33 OBSTRUCTIVE SLEEP APNEA SYNDROME: ICD-10-CM

## 2020-07-14 DIAGNOSIS — E66.01 MORBID OBESITY (H): ICD-10-CM

## 2020-07-14 PROCEDURE — 99214 OFFICE O/P EST MOD 30 MIN: CPT | Performed by: INTERNAL MEDICINE

## 2020-07-14 RX ORDER — METRONIDAZOLE 500 MG/1
500 TABLET ORAL 3 TIMES DAILY
Qty: 21 TABLET | Refills: 0 | Status: SHIPPED | OUTPATIENT
Start: 2020-07-14 | End: 2020-07-28

## 2020-07-14 RX ORDER — CIPROFLOXACIN 500 MG/1
500 TABLET, FILM COATED ORAL 2 TIMES DAILY
Qty: 14 TABLET | Refills: 0 | Status: SHIPPED | OUTPATIENT
Start: 2020-07-14 | End: 2020-07-28

## 2020-07-14 ASSESSMENT — PAIN SCALES - GENERAL: PAINLEVEL: NO PAIN (0)

## 2020-07-14 NOTE — PROGRESS NOTES
"Subjective     Bertram Foreman is a 29 year old male who presents to clinic today for the following health issues:    HPI   Diarrhea      Duration: on going, worse for 1 month     Description:       Consistency of stool: watery and runny       Blood in stool: no        Number of loose stools past 24 hours: 3    Accompanying signs and symptoms:       Fever: no        Nausea/vomitting: YES- nausea        Abdominal pain: no        Weight loss: no     History (recent antibiotics or travel/ill contacts/med changes/testing done): none     Precipitating or alleviating factors: None    Therapies tried and outcome: PeptoBismol and other oct medications, does not remember the names                   Chief Complaint         The patient is a pleasant 29-year-old gentleman who presents today with his PCA.  He has modestly limited intelligence but is quite understanding of his current situation and very articulately explains the concerns.  He has had some previous nausea which has improved.  He is having \"diarrhea\" with approximately 3 stools per day.  He is are generally soft and foul-smelling.  He notes that they are not high-volume.  They are not associated with any abdominal cramping.  He has tried some Pepto-Bismol and this does seem to be helping.  He has had no significant weight loss.  He is a rather obese individual.  He does have a history of type 2 diabetes which he treats with Amaryl and metformin.  He is currently taking metformin at 500 mg twice daily.  He has not had previous diarrhea associated with metformin in the past.                         PAST, FAMILY,SOCIAL HISTORY:     Medical  History:   has no past medical history on file.     Surgical History:   has no past surgical history on file.     Social History:   reports that he quit smoking about 6 years ago. His smoking use included cigarettes. He has a 0.15 pack-year smoking history. He has never used smokeless tobacco. He reports current alcohol use. He " reports that he does not use drugs.     Family History:  family history is not on file.            MEDICATIONS  Current Outpatient Medications   Medication Sig Dispense Refill     acetaminophen (TYLENOL) 325 MG tablet Take 325-650 mg by mouth every 6 hours as needed for mild pain       Bismuth Subsalicylate (PEPTO-BISMOL PO) Take by mouth as needed       blood glucose (ACCU-CHEK GUIDE) test strip Use to test blood sugar 2 times daily or as directed. 100 strip 6     blood glucose monitoring (ACCU-CHEK FASTCLIX) lancets 1 each 2 times daily Use to test blood sugar 1 times daily or as directed. 102 each 3     ciprofloxacin (CIPRO) 500 MG tablet Take 1 tablet (500 mg) by mouth 2 times daily 14 tablet 0     Divalproex Sodium (DEPAKOTE PO)        glimepiride (AMARYL) 4 MG tablet Take 1 tablet (4 mg) by mouth every morning (before breakfast) 90 tablet 0     loperamide (IMODIUM A-D) 2 MG tablet Take 2 mg by mouth daily as needed for diarrhea (Take 2 pills after the first stool. 1 pill after each subsequent loose stool but no more than 4 pills in 24 hours)       metFORMIN (GLUCOPHAGE) 500 MG tablet Take 1 tablet (500 mg) by mouth 2 times daily (with meals) 60 tablet 5     metroNIDAZOLE (FLAGYL) 500 MG tablet Take 1 tablet (500 mg) by mouth 3 times daily for 7 days 21 tablet 0     omeprazole (PRILOSEC) 20 MG DR capsule TAKE ONE CAPSULE BY MOUTH TWICE A DAY (AM & PM-PLUSPAK) 56 capsule 3     QUEtiapine (SEROQUEL) 300 MG tablet Take 300 mg by mouth  0     TRAZODONE HCL PO Take 100 mg by mouth At Bedtime        vitamin D3 (CHOLECALCIFEROL) 2000 units (50 mcg) tablet Take 1 tablet by mouth daily       zolpidem (AMBIEN) 5 MG tablet Take tablet by mouth 15 minutes prior to sleep, for Sleep Study 1 tablet 0     atorvastatin (LIPITOR) 20 MG tablet TAKE ONE TABLET BY MOUTH EVERY MORNING (A.M PLUSPAK) 56 tablet 1  "        --------------------------------------------------------------------------------------------------------------------                              Review of Systems       LUNGS: Pt denies: cough, excess sputum, hemoptysis, or shortness of breath.   HEART: Pt denies: chest pain, arrhythmia, syncope, tachy or bradyarrhythmia.   GI: Pt denies:  constipation, melena, or hematochezia.  Has had some intermittent nausea with the \"diarrhea\" as described.  Most notably, he notes the foul smell the stool.   NEURO: Pt denies: seizures, strokes, diplopia, weakness, paraesthesias, or paralysis.   SKIN: Pt denies: itching, rashes, discoloration, or specific lesions of concern. Denies recent hair loss.   PSYCH: The patient denies significant depression, anxiety, mood imbalance. Specifically denies any suicidal ideation.        Examination    /78 (BP Location: Right arm, Patient Position: Sitting, Cuff Size: Adult Large)   Pulse 108   Temp 96.2  F (35.7  C) (Temporal)   Resp 22   Wt (!) 161.5 kg (356 lb)   SpO2 99%   BMI 45.58 kg/m      Constitutional: The patient appears to be in no acute distress. The patient appears to be adequately hydrated. No acute respiratory or hemodynamic distress is noted at this time.   LUNGS: clear bilaterally, airflow is brisk, no intercostal retraction or stridor is noted. No coughing is noted during visit.   HEART:  regular without rubs, clicks, gallops, or murmurs. PMI is nondisplaced. Upstrokes are brisk. S1,S2 are heard.   GI: Abdomen is soft, without rebound, guarding or tenderness. Bowel sounds are appropriate. No renal bruits are heard.   NEURO: Pt is alert and appropriate. No neurologic lateralization is noted. Cranial nerves 2-12 are intact. Peripheral sensory and motor function are grossly normal.    SKIN:  warm and dry. No erythema, or rashes are noted. No specific lesions of concern are noted.    PSYCH: The patient appears grossly appropriate. Maintains good eye contact, " does not have any jittery or atypical motion. Displays appropriate affect.       Decision Making       1. Bacterial overgrowth syndrome  Based on his history.  I believe that he is looking a bacterial overgrowth syndrome.  Will place on short course of antibiotics and if this does not show substantial improvement, will then do a pan stool cultures.  At this time I do not believe the patient is capable or willing of doing cultures unless it becomes absolutely necessary.  - ciprofloxacin (CIPRO) 500 MG tablet; Take 1 tablet (500 mg) by mouth 2 times daily  Dispense: 14 tablet; Refill: 0  - metroNIDAZOLE (FLAGYL) 500 MG tablet; Take 1 tablet (500 mg) by mouth 3 times daily for 7 days  Dispense: 21 tablet; Refill: 0    2. Obstructive sleep apnea syndrome  Continue with CPAP use.  Recommend weight loss.    3. Type 2 diabetes mellitus with hyperglycemia, without long-term current use of insulin (H)  Blood sugars have been slowly escalating with most recent A1c of 8.0.  This is primarily as result of dietary indiscretion.  Patient is requesting referral for diabetic education update and I agree with this completely.  - AMBULATORY ADULT DIABETES EDUCATOR REFERRAL; Future    4. Morbid obesity (H)  Recommend weight loss through caloric restriction and exercise as tolerated.                           FOLLOW UP   I have asked the patient to make an appointment for followup with me in 2 weeks if not completely resolved.            I have carefully explained the diagnosis and treatment options to the patient.  The patient has displayed an understanding of the above, and all subsequent questions were answered.      DO DNONELL Jin    Portions of this note were produced using [employer]  Although every attempt at real-time proof reading has been made, occasional grammar/syntax errors may have been missed.

## 2020-07-24 ENCOUNTER — MYC MEDICAL ADVICE (OUTPATIENT)
Dept: FAMILY MEDICINE | Facility: CLINIC | Age: 30
End: 2020-07-24

## 2020-07-27 DIAGNOSIS — K63.89 BACTERIAL OVERGROWTH SYNDROME: ICD-10-CM

## 2020-07-28 RX ORDER — METRONIDAZOLE 500 MG/1
TABLET ORAL
Qty: 21 TABLET | Refills: 0 | Status: SHIPPED | OUTPATIENT
Start: 2020-07-28 | End: 2020-09-08

## 2020-07-28 RX ORDER — CIPROFLOXACIN 500 MG/1
TABLET, FILM COATED ORAL
Qty: 14 TABLET | Refills: 0 | Status: SHIPPED | OUTPATIENT
Start: 2020-07-28 | End: 2020-09-08

## 2020-07-28 NOTE — TELEPHONE ENCOUNTER
Routing refill request to provider for review/approval because:  Drug not on the [facility] refill protocol     PRASANTH Mckeon, RN  [facility]

## 2020-07-31 ENCOUNTER — OFFICE VISIT (OUTPATIENT)
Dept: FAMILY MEDICINE | Facility: CLINIC | Age: 30
End: 2020-07-31
Payer: COMMERCIAL

## 2020-07-31 VITALS
WEIGHT: 315 LBS | SYSTOLIC BLOOD PRESSURE: 130 MMHG | HEART RATE: 84 BPM | BODY MASS INDEX: 46.39 KG/M2 | DIASTOLIC BLOOD PRESSURE: 82 MMHG | TEMPERATURE: 95.9 F | RESPIRATION RATE: 20 BRPM

## 2020-07-31 DIAGNOSIS — K64.4 EXTERNAL HEMORRHOIDS: Primary | ICD-10-CM

## 2020-07-31 PROCEDURE — 99213 OFFICE O/P EST LOW 20 MIN: CPT | Performed by: PHYSICIAN ASSISTANT

## 2020-07-31 RX ORDER — HYDROCORTISONE ACETATE 25 MG/1
25 SUPPOSITORY RECTAL 2 TIMES DAILY
Qty: 10 SUPPOSITORY | Refills: 0 | Status: SHIPPED | OUTPATIENT
Start: 2020-07-31 | End: 2020-08-05

## 2020-07-31 RX ORDER — HYDROCORTISONE 25 MG/G
CREAM TOPICAL 2 TIMES DAILY PRN
Qty: 1 TUBE | Refills: 0 | Status: SHIPPED | OUTPATIENT
Start: 2020-07-31 | End: 2021-03-22

## 2020-07-31 RX ORDER — HYDROCORTISONE VALERATE 2 MG/G
OINTMENT TOPICAL
Qty: 60 G | Refills: 0 | Status: SHIPPED | OUTPATIENT
Start: 2020-07-31 | End: 2020-07-31

## 2020-07-31 ASSESSMENT — PAIN SCALES - GENERAL: PAINLEVEL: MILD PAIN (2)

## 2020-07-31 NOTE — NURSING NOTE
Health Maintenance Due   Topic Date Due     HIV SCREENING  08/15/2005     PNEUMOCOCCAL IMMUNIZATION 19-64 MEDIUM RISK (1 of 1 - PPSV23) 08/15/2009     DIABETIC FOOT EXAM  04/23/2020     Amina DURON LPN

## 2020-07-31 NOTE — PATIENT INSTRUCTIONS
Patient Education     Understanding Hemorrhoids    Hemorrhoid tissues are  cushions  of blood vessels that swell slightly during bowel movements. Too much pressure on the anal canal can make these tissues remain enlarged, become inflamed, and cause symptoms. This can happen both inside and outside the anal canal.  Parts of the anal canal  The parts of the anal canal are:    Internal hemorrhoid tissue is in the upper area of the anal canal.    The rectum is the last several inches of the colon. This is where stool is stored prior to bowel movements.    Anal sphincters are ring-shaped muscles that expand and contract to control the anal opening.    External hemorrhoid tissue lies under the anal skin.    The anus is the passage between the rectum and the outside of the body.  Normal hemorrhoid tissue  Hemorrhoid tissues play an important role in helping your body eliminate waste. Food passes from the stomach through the intestines. The waste (stool) then travels through the colon to the rectum. It is stored in the rectum until it s ready to be passed from the anus. During bowel movements, hemorrhoids swell with blood and become slightly larger. This swelling helps protect and cushion the anal canal as stool passes from the body. Once the stool has passed, the tissues stop swelling and return to normal.  Problem hemorrhoids  Pressure due to straining or other factors can cause hemorrhoid tissues to remain swollen. When this happens to the hemorrhoid tissues in the anal canal they re called internal hemorrhoids. Swollen tissues around the anal opening are called external hemorrhoids. Depending on the location, your symptoms can differ.    Internal hemorrhoids often happen in clusters around the wall of the anal canal. They are usually painless. But they may prolapse (protrude out of the anus) due to straining or pressure from hard stool. After the bowel movement is over, they may then reduce (return inside the body).  Internal hemorrhoids often bleed. They can also discharge mucus.      External hemorrhoids are located at the anal opening, just beneath the skin. These tissues rarely cause problems unless they thrombose (form a blood clot). When this happens, a hard, bluish lump may appear. A thrombosed hemorrhoid also causes sudden, severe pain. In time, the clot may go away on its own. This sometimes leaves a  skin tag  of tissue stretched by the clot.  Hemorrhoid symptoms  Hemorrhoid symptoms may include:    Pain or a burning sensation    Bleeding during bowel movements    Protrusion of tissue from the anus    Itching around the anus  Causes of hemorrhoids  There s no single cause of hemorrhoids. Most often, though, they are caused by too much pressure on the anal canal. This can be due to:    Chronic (ongoing) constipation    Straining during bowel movements    Sitting too long on the toilet    Strenuous exercise or heavy lifting    Pregnancy and childbirth    Aging    Diarrhea  Date Last Reviewed: 7/1/2016 2000-2019 [employer]. [address] [place] [address]. All rights reserved. This information is not intended as a substitute for professional medical care. Always follow your healthcare professional's instructions.           Patient Education     Treating Hemorrhoids: Self-Care    Follow your healthcare provider s advice about caring for your hemorrhoids at home. Some treatments help relieve symptoms right away. Others involve making changes in your diet and exercise habits. These can help ease constipation and prevent hemorrhoid symptoms from coming back.  Relieving symptoms  Your healthcare provider may prescribe anti-inflammatory medicine to help ease your symptoms. The following tips will also help relieve pain and swelling.    Take sitz baths. Taking a sitz bath means sitting in a few inches of warm bath water. Soaking for 10 minutes twice a day can provide welcome relief from painful  hemorrhoids. It can also help the area stay clean.    Develop good bowel habits. Use the bathroom when you need to. Don t ignore the urge to move your bowels. This can lead to constipation, hard stools, and straining. Also, don t read while on the toilet. Sit only as long as needed. Wipe gently with soft, unscented toilet tissue or baby wipes.    Use ice packs. Placing an ice pack on a thrombosed external hemorrhoid can help relieve pain right away. It will also help reduce the blood clot. Use the ice for 15 to 20 minutes at a time. Keep a cloth between the ice and your skin to prevent skin damage.    Use other measures. Laxatives and enemas can help ease constipation. But use them only on your healthcare provider s advice. For symptom relief, try using cotton pads soaked in witch hazel. These are available at most drugstores. Over-the-counter hemorrhoid ointments and petroleum jelly can also provide relief.  Add fiber to your diet  Adding fiber to your diet can help relieve constipation by making stools softer and easier to pass. To increase your fiber intake, your healthcare provider may recommend a bulking agent, such as psyllium. This is a high-fiber supplement available at most grocery stores and drugstores. Eating more fiber-rich foods will also help. There are two types of fiber:    Insoluble fiber is the main ingredient in bulking agents. It s also found in foods such as wheat bran, whole-grain breads, fresh fruits, and vegetables.    Soluble fiber is found in foods such as oat bran. Although soluble fiber is good for you, it may not ease constipation as much as foods high in insoluble fiber.  Drink more water  Along with a high-fiber diet, drinking more water can help ease constipation. This is because insoluble fiber absorbs water, making stools soft and bulky. Be sure to drink plenty of water throughout the day. Drinking fruit juices, such as prune juice or apple juice, can also help prevent  constipation.  Get more exercise  Regular exercise aids digestion and helps prevent constipation. It s also great for your health. So talk with your healthcare provider about starting an exercise program. Low-impact activities, such as swimming or walking, are good places to start. Take it easy at first. And remember to drink plenty of water when you exercise.  High-fiber foods  High-fiber foods offer many benefits. By making your stools softer, they help heal and prevent swollen hemorrhoids. They may also help reduce the risk of colon and rectal cancer. Best of all, they re usually low in calories and taste great. Here are some examples of fiber-rich foods.    Whole grains, such as wheat bran, corn bran, and brown rice.    Vegetables, especially carrots, broccoli, cabbage, and peas.    Fruits, such as apples, bananas, raisins, peaches, and pears.    Nuts and legumes, especially peanuts, lentils, and kidney beans.  Easy ways to add fiber  The tips below offer some simple ways to add more high-fiber foods to your meals.    Start your day with a high-fiber breakfast. Eat a wheat bran cereal along with a sliced banana. Or, try peanut butter on whole-wheat toast.    Eat carrot sticks for snacks. They re easy to prepare, taste great, and are low in calories.    Use whole-grain breads instead of white bread for sandwiches.    Eat fruits for treats. Try an apple and some raisins instead of a candy bar.   Date Last Reviewed: 7/1/2016 2000-2019 [employer]. [address] [place] [address], [place] [address]. All rights reserved. This information is not intended as a substitute for professional medical care. Always follow your healthcare professional's instructions.

## 2020-07-31 NOTE — PROGRESS NOTES
Subjective     Bertram Foreman is a 29 year old male who presents to clinic today for the following health issues:    HPI       Diarrhea  Onset: months    Description:   Consistency of stool: watery, runny, loose, pasty, formed, yellow, frothy, green and explosive  Blood in stool: YES  Number of loose stools in past 24 hours: 3    Progression of Symptoms:  improving    Accompanying Signs & Symptoms:  Fever: no   Nausea or vomiting; no   Abdominal pain: no   Episodes of constipation: no   Weight loss: no     History:   Ill contacts: no   Recent use of antibiotics: YES   Recent travels: no          Recent medication-new or changes(Rx or OTC): YES- Antibiotics    Precipitating factors:   Eating Mexican food    Alleviating factors:   nothing    Therapies Tried and outcome:  Imodium AD, PeptoBismol, Flagyl and Ciprofloxacin; Outcome: no relief      The patient is a 29-year-old male who presents today with a caregiver for follow up on diarrhea and to discuss hemorrhoid treatment.  He has medical history of limited intelligence, but is able to report his own history without difficulty. He has been seen by his PCP for ongoing diarrhea this past month and is completing a course of flagyl with cipro for suspected bacterial overgrowth. Patient informs me that his loose stools are decreasing in frequency. However, he has been experiencing some bleeding when wiping as well as itching around his anus. He notes a history of hemorrhoids in the past and says that these have improved with topical creams. He would like to restart this treatment so that he can attend his father's birthday party in comfort. He is not doing sitz bathes as he cannot fit well in the bathtub in his apartment.     Reviewed and updated as needed this visit by Provider         Review of Systems   Constitutional, HEENT, cardiovascular, pulmonary, gi and gu systems are negative, except as otherwise noted.      Objective    /82 (BP Location: Left arm,  Patient Position: Chair, Cuff Size: Adult Large)   Pulse 84   Temp 95.9  F (35.5  C) (Temporal)   Resp 20   Wt (!) 164.3 kg (362 lb 4.8 oz)   BMI 46.39 kg/m    Body mass index is 46.39 kg/m .  Physical Exam   GENERAL: healthy, alert and no distress  RESP: lungs clear to auscultation - no rales, rhonchi or wheezes  CV: regular rate and rhythm, normal S1 S2, no S3 or S4, no murmur, click or rub, no peripheral edema and peripheral pulses strong  RECTAL (male): normal sphincter tone, no rectal masses, anal skin erythematous without obvious external hemorrhoid or fissure  MS: no gross musculoskeletal defects noted, no edema  NEURO: Normal strength and tone, mentation intact and speech normal  PSYCH: mentation appears normal, affect normal/bright    Diagnostic Test Results:  Labs reviewed in Epic        Assessment & Plan     1. External hemorrhoids  Given the patient's symptoms I will have him use both a suppository for suspected internal hemorrhoids and low strength topical hydrocortisone for external itching. I do not suspect pinworms as he has been having several loose stools over the past months which would trigger flare of hemorrhoids. Reviewed the importance of sitz baths or warm water exposure. Also encouraged regular consumption of fiber or fiber supplement, plenty of water and limit straining/sitting on toilet. Advised the patient to lose weight as well.   - hydrocortisone (ANUSOL-HC) 25 MG suppository; Place 1 suppository (25 mg) rectally 2 times daily for 5 days  Dispense: 10 suppository; Refill: 0  - hydrocortisone, Perianal, (HYDROCORTISONE) 2.5 % cream; Place rectally 2 times daily as needed for hemorrhoids  Dispense: 1 Tube; Refill: 0       No follow-ups on file.    Ted Hart PA-C  [facility]

## 2020-08-01 PROBLEM — E66.01 MORBID OBESITY WITH BODY MASS INDEX OF 45.0-49.9 IN ADULT (H): Status: ACTIVE | Noted: 2017-04-04

## 2020-08-03 ENCOUNTER — TELEPHONE (OUTPATIENT)
Dept: FAMILY MEDICINE | Facility: OTHER | Age: 30
End: 2020-08-03

## 2020-08-03 DIAGNOSIS — E11.65 TYPE 2 DIABETES MELLITUS WITH HYPERGLYCEMIA, WITHOUT LONG-TERM CURRENT USE OF INSULIN (H): ICD-10-CM

## 2020-08-03 NOTE — TELEPHONE ENCOUNTER
[facility] Prior Authorization Team Request    Medication: westcort oint -hydrocortisone valerate 0.2% oint  Dosing:   NDC (required for Medicaid members):     Insurance   BIN: [id number]  PCN: ma  Grp: [id number]  ID: [id number]    CoverMyMeds Key (if applicable):     Additional documentation: closed form-- need pa    [facility]  Phone Number: [phone]  Contact: [facility] (P [id number]) please send all responses to this pool.  Pharmacy NPI (required for Medicaid members):

## 2020-08-04 RX ORDER — GLIMEPIRIDE 4 MG/1
TABLET ORAL
Qty: 90 TABLET | Refills: 0 | Status: SHIPPED | OUTPATIENT
Start: 2020-08-04 | End: 2020-10-27

## 2020-08-04 NOTE — TELEPHONE ENCOUNTER
Central Prior Authorization Team   Phone: [phone]      PA Initiation    Medication: westcort oint -hydrocortisone valerate 0.2% oint-Initiated  Insurance Company: [employer] - Phone [phone] Fax [phone]  Pharmacy Filling the Rx: [facility] [place], [place] - [address]   Filling Pharmacy Phone: [phone]  Filling Pharmacy Fax:    Start Date: 8/4/2020

## 2020-08-04 NOTE — TELEPHONE ENCOUNTER
Routing refill request to provider for review/approval because:  Labs out of range:  Creatinine    NESSA MckeonN, RN  [facility]

## 2020-08-04 NOTE — TELEPHONE ENCOUNTER
Prior Authorization Approval    Authorization Effective Date: 7/5/2020  Authorization Expiration Date: 8/4/2021  Medication: westcort oint -hydrocortisone valerate 0.2% oint-APPROVED  Approved Dose/Quantity:   Reference #:     Insurance Company: [employer] - Phone [phone] Fax [phone]  Expected CoPay:       CoPay Card Available:      Foundation Assistance Needed:    Which Pharmacy is filling the prescription (Not needed for infusion/clinic administered): [facility] [place], [place] - [address] [place] [address]  Pharmacy Notified: Yes  Patient Notified: No    Pharmacy will notify patient when medication is ready.

## 2020-08-08 ENCOUNTER — MYC MEDICAL ADVICE (OUTPATIENT)
Dept: FAMILY MEDICINE | Facility: CLINIC | Age: 30
End: 2020-08-08

## 2020-08-08 DIAGNOSIS — A09 DIARRHEA OF INFECTIOUS ORIGIN: Primary | ICD-10-CM

## 2020-08-11 NOTE — TELEPHONE ENCOUNTER
"I have placed orders for multiple stool cultures/tests.  Please let the patient know that he should stop by the hospital and  the appropriate collection devices and then return them \"full\" to the pharmacy.    Jordon"

## 2020-08-11 NOTE — TELEPHONE ENCOUNTER
I have contacted the pt with the message below. He will  the supplies tomorrow from the lab. Dulce Dunham CMA ([facility])

## 2020-08-19 DIAGNOSIS — A09 DIARRHEA OF INFECTIOUS ORIGIN: ICD-10-CM

## 2020-08-19 LAB
C DIFF TOX B STL QL: NEGATIVE
HEMOCCULT STL QL IA: NEGATIVE
SPECIMEN SOURCE: NORMAL

## 2020-08-19 PROCEDURE — 87506 IADNA-DNA/RNA PROBE TQ 6-11: CPT | Performed by: INTERNAL MEDICINE

## 2020-08-19 PROCEDURE — 36415 COLL VENOUS BLD VENIPUNCTURE: CPT | Performed by: INTERNAL MEDICINE

## 2020-08-19 PROCEDURE — 87329 GIARDIA AG IA: CPT | Mod: 59 | Performed by: INTERNAL MEDICINE

## 2020-08-19 PROCEDURE — 87209 SMEAR COMPLEX STAIN: CPT | Performed by: INTERNAL MEDICINE

## 2020-08-19 PROCEDURE — 82274 ASSAY TEST FOR BLOOD FECAL: CPT | Performed by: INTERNAL MEDICINE

## 2020-08-19 PROCEDURE — 87177 OVA AND PARASITES SMEARS: CPT | Performed by: INTERNAL MEDICINE

## 2020-08-19 PROCEDURE — 87493 C DIFF AMPLIFIED PROBE: CPT | Mod: 59 | Performed by: INTERNAL MEDICINE

## 2020-08-19 PROCEDURE — 87328 CRYPTOSPORIDIUM AG IA: CPT | Performed by: INTERNAL MEDICINE

## 2020-08-20 LAB
C COLI+JEJUNI+LARI FUSA STL QL NAA+PROBE: NOT DETECTED
C PARVUM AG STL QL IA: NEGATIVE
EC STX1 GENE STL QL NAA+PROBE: NOT DETECTED
EC STX2 GENE STL QL NAA+PROBE: NOT DETECTED
ENTERIC PATHOGEN COMMENT: NORMAL
G LAMBLIA AG STL QL IA: NEGATIVE
NOROV GI+II ORF1-ORF2 JNC STL QL NAA+PR: NOT DETECTED
O+P STL MICRO: NORMAL
O+P STL MICRO: NORMAL
RVA NSP5 STL QL NAA+PROBE: NOT DETECTED
SALMONELLA SP RPOD STL QL NAA+PROBE: NOT DETECTED
SHIGELLA SP+EIEC IPAH STL QL NAA+PROBE: NOT DETECTED
SPECIMEN SOURCE: NORMAL
SPECIMEN SOURCE: NORMAL
V CHOL+PARA RFBL+TRKH+TNAA STL QL NAA+PR: NOT DETECTED
Y ENTERO RECN STL QL NAA+PROBE: NOT DETECTED

## 2020-08-25 ENCOUNTER — TRANSFERRED RECORDS (OUTPATIENT)
Dept: HEALTH INFORMATION MANAGEMENT | Facility: CLINIC | Age: 30
End: 2020-08-25

## 2020-08-26 NOTE — RESULT ENCOUNTER NOTE
Dear Bertram, your recent test results are attached.  The ova and parasite test is negative.  Giardia, Cryptosporidium are negative.  No detectable antigen is found for typical colon pathogens  There is no evidence of occult blood in the stool.  C. difficile is negative.  At this time, there is no discernible infectious cause for your diarrhea.    If the diarrhea is continuing, please set up an office visit so we may discuss further work-up.    You will be contacted with any outstanding results when they are available.  Feel free to contact me via the office or My Chart if you have any questions regarding the above.  Sincerely,  DO TERESA JinOI

## 2020-08-31 DIAGNOSIS — E78.5 HYPERLIPIDEMIA LDL GOAL <130: ICD-10-CM

## 2020-08-31 RX ORDER — ATORVASTATIN CALCIUM 20 MG/1
TABLET, FILM COATED ORAL
Qty: 56 TABLET | Refills: 1 | Status: SHIPPED | OUTPATIENT
Start: 2020-08-31 | End: 2020-12-15

## 2020-08-31 NOTE — TELEPHONE ENCOUNTER
Prescription approved per [facility] Refill Protocol.    NESSA MckeonN, RN  [facility]

## 2020-09-06 ENCOUNTER — MYC MEDICAL ADVICE (OUTPATIENT)
Dept: INTERNAL MEDICINE | Facility: CLINIC | Age: 30
End: 2020-09-06

## 2020-09-08 ENCOUNTER — OFFICE VISIT (OUTPATIENT)
Dept: INTERNAL MEDICINE | Facility: CLINIC | Age: 30
End: 2020-09-08
Payer: COMMERCIAL

## 2020-09-08 VITALS
RESPIRATION RATE: 20 BRPM | OXYGEN SATURATION: 100 % | TEMPERATURE: 95 F | WEIGHT: 315 LBS | HEART RATE: 114 BPM | SYSTOLIC BLOOD PRESSURE: 134 MMHG | DIASTOLIC BLOOD PRESSURE: 82 MMHG | BODY MASS INDEX: 46.48 KG/M2

## 2020-09-08 DIAGNOSIS — F33.1 MODERATE EPISODE OF RECURRENT MAJOR DEPRESSIVE DISORDER (H): ICD-10-CM

## 2020-09-08 DIAGNOSIS — Z79.899 HIGH RISK MEDICATION USE: Primary | ICD-10-CM

## 2020-09-08 DIAGNOSIS — F70 MILD INTELLECTUAL DISABILITIES: ICD-10-CM

## 2020-09-08 DIAGNOSIS — E11.9 TYPE 2 DIABETES MELLITUS WITHOUT COMPLICATION, WITHOUT LONG-TERM CURRENT USE OF INSULIN (H): ICD-10-CM

## 2020-09-08 DIAGNOSIS — E66.01 MORBID OBESITY (H): ICD-10-CM

## 2020-09-08 LAB
ALBUMIN SERPL-MCNC: 3.6 G/DL (ref 3.4–5)
ALP SERPL-CCNC: 84 U/L (ref 40–150)
ALT SERPL W P-5'-P-CCNC: 55 U/L (ref 0–70)
AMYLASE SERPL-CCNC: 85 U/L (ref 30–110)
ANION GAP SERPL CALCULATED.3IONS-SCNC: 8 MMOL/L (ref 3–14)
AST SERPL W P-5'-P-CCNC: 28 U/L (ref 0–45)
BASOPHILS # BLD AUTO: 0.1 10E9/L (ref 0–0.2)
BASOPHILS NFR BLD AUTO: 0.6 %
BILIRUB DIRECT SERPL-MCNC: <0.1 MG/DL (ref 0–0.2)
BILIRUB SERPL-MCNC: 0.2 MG/DL (ref 0.2–1.3)
BUN SERPL-MCNC: 9 MG/DL (ref 7–30)
CALCIUM SERPL-MCNC: 9.1 MG/DL (ref 8.5–10.1)
CHLORIDE SERPL-SCNC: 105 MMOL/L (ref 94–109)
CHOLEST SERPL-MCNC: 157 MG/DL
CO2 SERPL-SCNC: 27 MMOL/L (ref 20–32)
CREAT SERPL-MCNC: 0.47 MG/DL (ref 0.66–1.25)
DIFFERENTIAL METHOD BLD: ABNORMAL
EOSINOPHIL NFR BLD AUTO: 5.2 %
ERYTHROCYTE [DISTWIDTH] IN BLOOD BY AUTOMATED COUNT: 13.7 % (ref 10–15)
GFR SERPL CREATININE-BSD FRML MDRD: >90 ML/MIN/{1.73_M2}
GLUCOSE SERPL-MCNC: 142 MG/DL (ref 70–99)
HBA1C MFR BLD: 6.8 % (ref 0–5.6)
HCT VFR BLD AUTO: 40.6 % (ref 40–53)
HDLC SERPL-MCNC: 40 MG/DL
HGB BLD-MCNC: 12.7 G/DL (ref 13.3–17.7)
IMM GRANULOCYTES # BLD: 0.2 10E9/L (ref 0–0.4)
IMM GRANULOCYTES NFR BLD: 1.3 %
LDLC SERPL CALC-MCNC: 83 MG/DL
LYMPHOCYTES # BLD AUTO: 3.4 10E9/L (ref 0.8–5.3)
LYMPHOCYTES NFR BLD AUTO: 28.6 %
MCH RBC QN AUTO: 26.9 PG (ref 26.5–33)
MCHC RBC AUTO-ENTMCNC: 31.3 G/DL (ref 31.5–36.5)
MCV RBC AUTO: 86 FL (ref 78–100)
MONOCYTES # BLD AUTO: 1 10E9/L (ref 0–1.3)
MONOCYTES NFR BLD AUTO: 8.1 %
NEUTROPHILS # BLD AUTO: 6.7 10E9/L (ref 1.6–8.3)
NEUTROPHILS NFR BLD AUTO: 56.2 %
NONHDLC SERPL-MCNC: 117 MG/DL
NRBC # BLD AUTO: 0 10*3/UL
NRBC BLD AUTO-RTO: 0 /100
PLATELET # BLD AUTO: 297 10E9/L (ref 150–450)
POTASSIUM SERPL-SCNC: 4 MMOL/L (ref 3.4–5.3)
PROLACTIN SERPL-MCNC: 8 UG/L (ref 2–18)
PROT SERPL-MCNC: 8.1 G/DL (ref 6.8–8.8)
RBC # BLD AUTO: 4.72 10E12/L (ref 4.4–5.9)
SODIUM SERPL-SCNC: 140 MMOL/L (ref 133–144)
TRIGL SERPL-MCNC: 170 MG/DL
TSH SERPL DL<=0.005 MIU/L-ACNC: 3.18 MU/L (ref 0.4–4)
VALPROATE SERPL-MCNC: 75 MG/L (ref 50–100)
WBC # BLD AUTO: 12 10E9/L (ref 4–11)

## 2020-09-08 PROCEDURE — 83036 HEMOGLOBIN GLYCOSYLATED A1C: CPT | Performed by: INTERNAL MEDICINE

## 2020-09-08 PROCEDURE — 80048 BASIC METABOLIC PNL TOTAL CA: CPT | Performed by: INTERNAL MEDICINE

## 2020-09-08 PROCEDURE — 99207 C FOOT EXAM  NO CHARGE: CPT | Performed by: INTERNAL MEDICINE

## 2020-09-08 PROCEDURE — 80076 HEPATIC FUNCTION PANEL: CPT | Performed by: INTERNAL MEDICINE

## 2020-09-08 PROCEDURE — 36415 COLL VENOUS BLD VENIPUNCTURE: CPT | Performed by: INTERNAL MEDICINE

## 2020-09-08 PROCEDURE — 82306 VITAMIN D 25 HYDROXY: CPT | Performed by: INTERNAL MEDICINE

## 2020-09-08 PROCEDURE — 99214 OFFICE O/P EST MOD 30 MIN: CPT | Performed by: INTERNAL MEDICINE

## 2020-09-08 PROCEDURE — 84146 ASSAY OF PROLACTIN: CPT | Performed by: INTERNAL MEDICINE

## 2020-09-08 PROCEDURE — 80164 ASSAY DIPROPYLACETIC ACD TOT: CPT | Performed by: INTERNAL MEDICINE

## 2020-09-08 PROCEDURE — 85025 COMPLETE CBC W/AUTO DIFF WBC: CPT | Performed by: INTERNAL MEDICINE

## 2020-09-08 PROCEDURE — 82150 ASSAY OF AMYLASE: CPT | Performed by: INTERNAL MEDICINE

## 2020-09-08 PROCEDURE — 84443 ASSAY THYROID STIM HORMONE: CPT | Performed by: INTERNAL MEDICINE

## 2020-09-08 PROCEDURE — 80061 LIPID PANEL: CPT | Performed by: INTERNAL MEDICINE

## 2020-09-08 ASSESSMENT — PAIN SCALES - GENERAL: PAINLEVEL: MILD PAIN (3)

## 2020-09-08 NOTE — PROGRESS NOTES
"Subjective     Bertram Foreman is a 30 year old male who presents to clinic today for the following health issues:    HPI       Diabetes Follow-up    How often are you checking your blood sugar? A few times a month  What time of day are you checking your blood sugars (select all that apply)?  Before and after meals  Have you had any blood sugars above 200?  No  Have you had any blood sugars below 70?  No    What symptoms do you notice when your blood sugar is low?  None    What concerns do you have today about your diabetes? None     Do you have any of these symptoms? (Select all that apply)  Numbness in feet      BP Readings from Last 2 Encounters:   09/08/20 134/82   07/31/20 130/82     Hemoglobin A1C (%)   Date Value   05/20/2020 8.0 (H)   01/16/2020 7.2 (H)     LDL Cholesterol Calculated (mg/dL)   Date Value   01/16/2020 151 (H)   07/05/2019 152 (H)     Patient presents today with a series of \"orders\" from his psychiatric provider.  He would like to have these \"orders\" placed by me.  The patient notes that he is otherwise doing well.  Laboratory orders are placed as requested by his mental health care professional.  He does have a history of type 2 diabetes and notes his blood sugars have been doing better.  He is morbidly obese but notes no significant weight change.  He does have a history of some mild intellectual disability but contact me via My Chart earlier today requesting my opinion regarding male enhancement medications available by mail as promoted on the Internet.  He notes that he has been having some interpersonal relationship limitations due to his penile limitations.  I have suggested the \"medication\" is probably ineffective based on previous testimonial of previous people who purchased it.                         PAST, FAMILY,SOCIAL HISTORY:     Medical  History:   has no past medical history on file.     Surgical History:   has no past surgical history on file.     Social History:   reports " that he quit smoking about 6 years ago. His smoking use included cigarettes. He has a 0.15 pack-year smoking history. He has never used smokeless tobacco. He reports current alcohol use. He reports that he does not use drugs.     Family History:  family history is not on file.            MEDICATIONS  Current Outpatient Medications   Medication Sig Dispense Refill     acetaminophen (TYLENOL) 325 MG tablet Take 325-650 mg by mouth every 6 hours as needed for mild pain       atorvastatin (LIPITOR) 20 MG tablet TAKE ONE TABLET BY MOUTH EVERY MORNING (A.M PLUSPAK) 56 tablet 1     Bismuth Subsalicylate (PEPTO-BISMOL PO) Take by mouth as needed       blood glucose (ACCU-CHEK GUIDE) test strip Use to test blood sugar 2 times daily or as directed. 100 strip 6     blood glucose monitoring (ACCU-CHEK FASTCLIX) lancets 1 each 2 times daily Use to test blood sugar 1 times daily or as directed. 102 each 3     Divalproex Sodium (DEPAKOTE PO)        glimepiride (AMARYL) 4 MG tablet TAKE ONE TABLET BY MOUTH EVERY MORNING BEFORE BREAKFAST (AM PLUSPAK) 90 tablet 0     hydrocortisone, Perianal, (HYDROCORTISONE) 2.5 % cream Place rectally 2 times daily as needed for hemorrhoids 1 Tube 0     loperamide (IMODIUM A-D) 2 MG tablet Take 2 mg by mouth daily as needed for diarrhea (Take 2 pills after the first stool. 1 pill after each subsequent loose stool but no more than 4 pills in 24 hours)       metFORMIN (GLUCOPHAGE) 500 MG tablet Take 1 tablet (500 mg) by mouth 2 times daily (with meals) 60 tablet 5     omeprazole (PRILOSEC) 20 MG DR capsule TAKE ONE CAPSULE BY MOUTH TWICE A DAY (AM & PM-PLUSPAK) 56 capsule 3     QUEtiapine (SEROQUEL) 300 MG tablet Take 300 mg by mouth  0     TRAZODONE HCL PO Take 100 mg by mouth At Bedtime        vitamin D3 (CHOLECALCIFEROL) 2000 units (50 mcg) tablet Take 1 tablet by mouth daily       zolpidem (AMBIEN) 5 MG tablet Take tablet by mouth 15 minutes prior to sleep, for Sleep Study 1 tablet 0          --------------------------------------------------------------------------------------------------------------------                              Review of Systems       LUNGS: Pt denies: cough, excess sputum, hemoptysis, or shortness of breath.   HEART: Pt denies: chest pain, arrhythmia, syncope, tachy or bradyarrhythmia.   GI: Pt denies: nausea, vomiting, diarrhea, constipation, melena, or hematochezia.   NEURO: Pt denies: seizures, strokes, diplopia, weakness, paraesthesias, or paralysis.   SKIN: Pt denies: itching, rashes, discoloration, or specific lesions of concern. Denies recent hair loss.   PSYCH: The patient denies significant  anxiety, mood imbalance. Specifically denies any suicidal ideation.  He notes that his depression is improving with his medications.        Examination    /82 (BP Location: Right arm, Patient Position: Sitting, Cuff Size: Adult Large)   Pulse 114   Temp 95  F (35  C) (Temporal)   Resp 20   Wt (!) 164.7 kg (363 lb)   SpO2 100%   BMI 46.48 kg/m      Constitutional: The patient appears to be in no acute distress. The patient appears to be adequately hydrated. No acute respiratory or hemodynamic distress is noted at this time.   LUNGS: clear bilaterally, airflow is brisk, no intercostal retraction or stridor is noted. No coughing is noted during visit.   HEART:  regular without rubs, clicks, gallops, or murmurs. PMI is nondisplaced. Upstrokes are brisk. S1,S2 are heard.   GI: Abdomen is soft, without rebound, guarding or tenderness. Bowel sounds are appropriate. No renal bruits are heard.   NEURO: Pt is alert and appropriate. No neurologic lateralization is noted. Cranial nerves 2-12 are intact. Peripheral sensory and motor function are grossly normal.    SKIN:  warm and dry. No erythema, or rashes are noted. No specific lesions of concern are noted.    PSYCH: The patient appears grossly appropriate. Maintains good eye contact, does not have any jittery or atypical  motion. Displays appropriate affect.   Feet: no evidence of skin breakdown or ulceration is noted.  Sensation is intact to monofilament and vibration.  Pulses are strong, capillary refill is brisk.       Decision Making       1. Moderate episode of recurrent major depressive disorder (H)  Continue medications as directed by mental health care professional    2. Mild intellectual disabilities  Modest at best    3. High risk medication use  Ordered as per mental health care professional  - Prolactin  - Hemoglobin A1c  - Lipid panel reflex to direct LDL Fasting  - Basic metabolic panel  (Ca, Cl, CO2, Creat, Gluc, K, Na, BUN)  - Valproic acid  - Amylase  - CBC with platelets and differential  - Hepatic panel  - TSH with free T4 reflex  - Vitamin D Deficiency    4. Type 2 diabetes mellitus without complication, without long-term current use of insulin (H)  Improved.  Continue current therapy    5. Morbid obesity (H)  Recommend weight loss through responsible caloric restriction                                 FOLLOW UP   I have asked the patient to make an appointment for followup with me in 4 months            I have carefully explained the diagnosis and treatment options to the patient.  The patient has displayed an understanding of the above, and all subsequent questions were answered.      DO DONNELL Jin    Portions of this note were produced using [employer]  Although every attempt at real-time proof reading has been made, occasional grammar/syntax errors may have been missed.

## 2020-09-09 LAB — DEPRECATED CALCIDIOL+CALCIFEROL SERPL-MC: 31 UG/L (ref 20–75)

## 2020-09-09 NOTE — RESULT ENCOUNTER NOTE
Dear Bertram, your recent test results are attached.  Your valproic acid level is within normal limits.  Prolactin is normal.  Thyroid is normal.  Cholesterol is well controlled.  Liver tests are normal.  Blood sugar is slightly elevated at 142.  Kidney function is normal.  Amylase is normal.  Hemoglobin A1c has gone down from 8.0 only 3 months ago down to 6.8 suggesting a marked improvement in your blood sugar control.  Blood cell count shows a slight increase in the white count but yours has been consistently elevated for years and is showing no trending.  No anemia is noted.      You will be contacted with any outstanding results when they are available.  Feel free to contact me via the office or My Chart if you have any questions regarding the above.  Sincerely,  Tremaine Ruvalcaba DO FACOI

## 2020-09-11 NOTE — RESULT ENCOUNTER NOTE
Dear Bertram, your recent test results are attached.  Your vitamin D level is normal.      You will be contacted with any outstanding results when they are available.  Feel free to contact me via the office or My Chart if you have any questions regarding the above.  Sincerely,  DO DONNELL Jin

## 2020-09-28 DIAGNOSIS — K29.00 ACUTE GASTRITIS WITHOUT HEMORRHAGE, UNSPECIFIED GASTRITIS TYPE: ICD-10-CM

## 2020-09-29 NOTE — TELEPHONE ENCOUNTER
Prescription approved per [facility] [place]ill Protocol.    Pinky Calderon RN

## 2020-10-05 ENCOUNTER — MYC MEDICAL ADVICE (OUTPATIENT)
Dept: INTERNAL MEDICINE | Facility: CLINIC | Age: 30
End: 2020-10-05

## 2020-10-26 DIAGNOSIS — E11.65 TYPE 2 DIABETES MELLITUS WITH HYPERGLYCEMIA, WITHOUT LONG-TERM CURRENT USE OF INSULIN (H): ICD-10-CM

## 2020-10-27 RX ORDER — GLIMEPIRIDE 4 MG/1
TABLET ORAL
Qty: 90 TABLET | Refills: 1 | Status: SHIPPED | OUTPATIENT
Start: 2020-10-27 | End: 2020-12-31

## 2020-10-27 NOTE — TELEPHONE ENCOUNTER
Routing refill request to provider for review/approval because:  Labs out of range:  Creatinine    NESSA MckeonN, RN  [facility]

## 2020-11-10 ENCOUNTER — OFFICE VISIT (OUTPATIENT)
Dept: INTERNAL MEDICINE | Facility: CLINIC | Age: 30
End: 2020-11-10
Payer: COMMERCIAL

## 2020-11-10 VITALS
RESPIRATION RATE: 20 BRPM | OXYGEN SATURATION: 96 % | WEIGHT: 315 LBS | BODY MASS INDEX: 47.8 KG/M2 | TEMPERATURE: 95.6 F | DIASTOLIC BLOOD PRESSURE: 86 MMHG | HEART RATE: 105 BPM | SYSTOLIC BLOOD PRESSURE: 132 MMHG

## 2020-11-10 DIAGNOSIS — Z23 NEED FOR PROPHYLACTIC VACCINATION AND INOCULATION AGAINST INFLUENZA: Primary | ICD-10-CM

## 2020-11-10 DIAGNOSIS — E11.9 TYPE 2 DIABETES MELLITUS WITHOUT COMPLICATION, WITHOUT LONG-TERM CURRENT USE OF INSULIN (H): ICD-10-CM

## 2020-11-10 DIAGNOSIS — E66.01 MORBID OBESITY (H): ICD-10-CM

## 2020-11-10 PROCEDURE — 90686 IIV4 VACC NO PRSV 0.5 ML IM: CPT | Performed by: INTERNAL MEDICINE

## 2020-11-10 PROCEDURE — 90471 IMMUNIZATION ADMIN: CPT | Performed by: INTERNAL MEDICINE

## 2020-11-10 PROCEDURE — 99213 OFFICE O/P EST LOW 20 MIN: CPT | Mod: 25 | Performed by: INTERNAL MEDICINE

## 2020-11-10 ASSESSMENT — PAIN SCALES - GENERAL: PAINLEVEL: MILD PAIN (3)

## 2020-11-10 NOTE — PROGRESS NOTES
"Subjective     Bertram Foreman is a 30 year old male who presents to clinic today for the following health issues:    HPI         Patient would like to consult about weight loss surgery.        EMR reviewed including: History of chief complaint, pertinent distant history, medications, concurrent diagnoses.             Chief Complaint:  #1   Patient wishes to be evaluated for bariatric surgery.  Current weight 373 pounds and rising  Previous attempts at elective dietary restriction have been unsuccessful.  Patient has type 2 diabetes which is fairly well controlled with medication  Patient is only 30 years of age and his current obesity is going to significantly affect his longevity  Patient has a history of \"mild intellectual disability\" but seems to have a very reasonable and rational understanding of what he is asking for and what the results may be.                         PAST, FAMILY,SOCIAL HISTORY:     Medical  History:   has no past medical history on file.     Surgical History:   has no past surgical history on file.     Social History:   reports that he quit smoking about 6 years ago. His smoking use included cigarettes. He has a 0.15 pack-year smoking history. He has never used smokeless tobacco. He reports current alcohol use. He reports that he does not use drugs.     Family History:  family history is not on file.            MEDICATIONS  Current Outpatient Medications   Medication Sig Dispense Refill     acetaminophen (TYLENOL) 325 MG tablet Take 325-650 mg by mouth every 6 hours as needed for mild pain       atorvastatin (LIPITOR) 20 MG tablet TAKE ONE TABLET BY MOUTH EVERY MORNING (A.M PLUSPAK) 56 tablet 1     Bismuth Subsalicylate (PEPTO-BISMOL PO) Take by mouth as needed       blood glucose (ACCU-CHEK GUIDE) test strip Use to test blood sugar 2 times daily or as directed. 100 strip 6     blood glucose monitoring (ACCU-CHEK FASTCLIX) lancets 1 each 2 times daily Use to test blood sugar 1 times " daily or as directed. 102 each 3     Divalproex Sodium (DEPAKOTE PO)        glimepiride (AMARYL) 4 MG tablet TAKE ONE TABLET BY MOUTH EVERY MORNING BEFORE BREAKFAST (AM PLUSPAK) 90 tablet 1     hydrocortisone, Perianal, (HYDROCORTISONE) 2.5 % cream Place rectally 2 times daily as needed for hemorrhoids 1 Tube 0     loperamide (IMODIUM A-D) 2 MG tablet Take 2 mg by mouth daily as needed for diarrhea (Take 2 pills after the first stool. 1 pill after each subsequent loose stool but no more than 4 pills in 24 hours)       metFORMIN (GLUCOPHAGE) 500 MG tablet TAKE ONE TABLET BY MOUTH TWICE A DAY WITH MEALS (AM AND PM.  PLUSPAK) 180 tablet 1     omeprazole (PRILOSEC) 20 MG DR capsule TAKE ONE CAPSULE BY MOUTH TWICE A DAY (AM & PM-PLUSPAK) 56 capsule 3     QUEtiapine (SEROQUEL) 300 MG tablet Take 300 mg by mouth  0     TRAZODONE HCL PO Take 100 mg by mouth At Bedtime        vitamin D3 (CHOLECALCIFEROL) 2000 units (50 mcg) tablet Take 1 tablet by mouth daily       zolpidem (AMBIEN) 5 MG tablet Take tablet by mouth 15 minutes prior to sleep, for Sleep Study 1 tablet 0         --------------------------------------------------------------------------------------------------------------------                              Review of Systems       LUNGS: Pt denies: cough, excess sputum, hemoptysis, or shortness of breath.   HEART: Pt denies: chest pain, arrhythmia, syncope, tachy or bradyarrhythmia.   GI: Pt denies: nausea, vomiting, diarrhea, constipation, melena, or hematochezia.   NEURO: Pt denies: seizures, strokes, diplopia, weakness, paraesthesias, or paralysis.   SKIN: Pt denies: itching, rashes, discoloration, or specific lesions of concern. Denies recent hair loss.   PSYCH: The patient denies significant depression, anxiety, mood imbalance. Specifically denies any suicidal ideation.        Examination    /86   Pulse 105   Temp 95.6  F (35.3  C) (Temporal)   Resp 20   Wt (!) 169.3 kg (373 lb 5 oz)   SpO2 96%    BMI 47.80 kg/m      Constitutional: The patient appears to be in no acute distress. The patient appears to be adequately hydrated. No acute respiratory or hemodynamic distress is noted at this time.   LUNGS: clear bilaterally, airflow is brisk, no intercostal retraction or stridor is noted. No coughing is noted during visit.   HEART:  regular without rubs, clicks, gallops, or murmurs. PMI is nondisplaced. Upstrokes are brisk. S1,S2 are heard.   GI: Abdomen is soft, without rebound, guarding or tenderness. Bowel sounds are appropriate. No renal bruits are heard.  Abdomen is morbidly obese   NEURO: Pt is alert and appropriate. No neurologic lateralization is noted. Cranial nerves 2-12 are intact. Peripheral sensory and motor function are grossly normal.    SKIN:  warm and dry. No erythema, or rashes are noted. No specific lesions of concern are noted.    PSYCH: The patient appears grossly appropriate. Maintains good eye contact, does not have any jittery or atypical motion. Displays appropriate affect.         Decision Making       1. Need for prophylactic vaccination and inoculation against influenza  Given  - INFLUENZA VACCINE IM > 6 MONTHS VALENT IIV4 [09243]    2. Morbid obesity (H)  We will place referral for comprehensive weight management evaluation  - COMPREHENSIVE WEIGHT MANAGEMENT    3. Type 2 diabetes mellitus without complication, without long-term current use of insulin (H)  Continue current medication, diet restriction, activity as possible.                               FOLLOW UP   I have asked the patient to make an appointment for followup with me after he is contacted by the bariatric team.      I have carefully explained the diagnosis and treatment options to the patient.  The patient has displayed an understanding of the above, and all subsequent questions were answered.      DO DONNELL Jin    Portions of this note were produced using [employer]  Although every attempt at  real-time proof reading has been made, occasional grammar/syntax errors may have been missed.

## 2020-11-23 DIAGNOSIS — E11.9 TYPE 2 DIABETES MELLITUS WITHOUT COMPLICATION, WITHOUT LONG-TERM CURRENT USE OF INSULIN (H): ICD-10-CM

## 2020-11-23 RX ORDER — LANCETS
EACH MISCELLANEOUS
Qty: 102 EACH | Refills: 3 | Status: SHIPPED | OUTPATIENT
Start: 2020-11-23 | End: 2021-04-08

## 2020-11-23 NOTE — TELEPHONE ENCOUNTER
Prescription approved per [facility] Refill Protocol.    Pinky Calderon RN

## 2020-11-24 ENCOUNTER — TRANSFERRED RECORDS (OUTPATIENT)
Dept: HEALTH INFORMATION MANAGEMENT | Facility: CLINIC | Age: 30
End: 2020-11-24

## 2020-12-04 ENCOUNTER — OFFICE VISIT (OUTPATIENT)
Dept: FAMILY MEDICINE | Facility: CLINIC | Age: 30
End: 2020-12-04
Payer: COMMERCIAL

## 2020-12-04 VITALS
HEART RATE: 80 BPM | OXYGEN SATURATION: 93 % | SYSTOLIC BLOOD PRESSURE: 134 MMHG | WEIGHT: 315 LBS | BODY MASS INDEX: 47.81 KG/M2 | TEMPERATURE: 95.9 F | DIASTOLIC BLOOD PRESSURE: 80 MMHG | RESPIRATION RATE: 20 BRPM

## 2020-12-04 DIAGNOSIS — E66.01 MORBID OBESITY (H): ICD-10-CM

## 2020-12-04 DIAGNOSIS — K92.1 BLOODY STOOL: Primary | ICD-10-CM

## 2020-12-04 DIAGNOSIS — K13.79 MOUTH SORES: ICD-10-CM

## 2020-12-04 DIAGNOSIS — R19.7 DIARRHEA, UNSPECIFIED TYPE: ICD-10-CM

## 2020-12-04 PROCEDURE — 99214 OFFICE O/P EST MOD 30 MIN: CPT | Performed by: PHYSICIAN ASSISTANT

## 2020-12-04 RX ORDER — DIVALPROEX SODIUM 500 MG/1
TABLET, EXTENDED RELEASE ORAL
COMMUNITY
Start: 2020-11-24

## 2020-12-04 ASSESSMENT — PAIN SCALES - GENERAL: PAINLEVEL: WORST PAIN (10)

## 2020-12-04 NOTE — H&P (VIEW-ONLY)
Subjective     Bertram Foreman is a 30 year old male who presents to clinic today for the following health issues:    HPI         Diarrhea  Onset/Duration: on and off for 1 year  Description:       Consistency of stool: watery and green       Blood in stool: YES       Number of loose stools past 24 hours: 1  Progression of Symptoms: same  Accompanying signs and symptoms:       Fever: no       Nausea/Vomiting: YES       Abdominal pain: no       Weight loss: no       Episodes of constipation: no  History   Ill contacts: no  Recent use of antibiotics: no  Recent travels: no  Recent medication-new or changes(Rx or OTC): no  Precipitating or alleviating factors: banana, strawberry; slight relief  Therapies tried and outcome: Patient has had antibiotics earlier this year without improvement, has been using OTC medications with little relief    Radha is a 30 year old male who is accompanied by [place] county  to discuss ongoing diarrhea and loose stools. The patient says that his symptoms have been occurring for roughly the past year. He was treated for bacterial overgrowth syndrome this past summer by PCP without temporary relief. Stool cultures were subsequently checked which did not return with abnormal findings. Patient was seen by myself for blood while wiping following bowel movements. No external hemorrhoids were seen on exam, patient was given treatment for internal hemorrhoids. He informs me that this too only provided temporary relief. Patient informs me that he continues to experience loose stools, estimates up to 4-5x/day. He also says that at least 2-3x/week he has blood in the stool and while wiping. He has been working to improve his diet including reducing the amount of soda he consumes, but his worker informs me that when she is not around he does like to eat microwavable foods such as hot pockets. It is unclear as to whether his symptoms are worse following consumption of gluten based foods.  The patient also reported that he experiences pains in his knee and back. Though, I see that he has gained 20lbs over the past four months. He also says that he has been low in energy and has been having more canker sores than normal. Review of labs shows that the patient's diabetes is under good control and that he has been working with PCP to reduce weight by adjusting diet and exercise.     Review of Systems   Constitutional, HEENT, cardiovascular, pulmonary, gi and gu systems are negative, except as otherwise noted.      Objective    /80 (BP Location: Right arm, Patient Position: Chair, Cuff Size: Adult Large)   Pulse 80   Temp 95.9  F (35.5  C) (Temporal)   Resp 20   Wt (!) 169.4 kg (373 lb 6.4 oz)   SpO2 93%   BMI 47.81 kg/m    Body mass index is 47.81 kg/m .  Physical Exam   GENERAL: healthy, alert and no distress  EYES: Eyes grossly normal to inspection, PERRL and conjunctivae and sclerae normal  HENT: ear canals and TM's normal, nose and mouth without ulcers or lesions  NECK: no adenopathy, no asymmetry, masses, or scars and thyroid normal to palpation  RESP: lungs clear to auscultation - no rales, rhonchi or wheezes  CV: regular rate and rhythm, normal S1 S2, no S3 or S4, no murmur, click or rub, no peripheral edema and peripheral pulses strong  ABDOMEN: obese, soft, nontender, no hepatosplenomegaly, no masses and bowel sounds normal  MS: no gross musculoskeletal defects noted, no edema, large hump/fat over base of neck/shoulders  NEURO: Normal strength and tone, mentation intact and speech normal  PSYCH: mentation appears normal, affect normal/bright    Future orders placed.         Assessment & Plan     Bloody stool  Discussed with patient differential for ongoing loose stools and need for workup with likely colonoscopy to further evaluate bleeding. He was agreeable to this. I have asked him to keep a log detailing loose stools, diet, symptoms, etc. Advised bland diet and avoidance of foods  "such as hot pockets.   - CRP, inflammation; Future  - ESR: Erythrocyte sedimentation rate; Future  - Tissue transglutaminase joshua IgA and IgG; Future  - Endomysial Antibody IgA by IFA; Future    Diarrhea, unspecified type  See above.   - CRP, inflammation; Future  - ESR: Erythrocyte sedimentation rate; Future  - Tissue transglutaminase joshua IgA and IgG; Future  - Endomysial Antibody IgA by IFA; Future    Mouth sores  No sores present today. Patient will maintain good oral hygiene. Checking inflammatory markers for possible IBD. Consider colonoscopy pending lab results.   - CRP, inflammation; Future  - ESR: Erythrocyte sedimentation rate; Future  - Tissue transglutaminase joshua IgA and IgG; Future  - Endomysial Antibody IgA by IFA; Future    Morbid obesity (H)  Will check patient's cortisol levels, given weight gain and possible buffalo hump on exam.   - Cortisol; Future     BMI:   Estimated body mass index is 47.81 kg/m  as calculated from the following:    Height as of 3/10/20: 1.882 m (6' 2.1\").    Weight as of this encounter: 169.4 kg (373 lb 6.4 oz).   Weight management plan: Discussed healthy diet and exercise guidelines      Return in about 3 months (around 3/4/2021) for Diabetes Recheck.    Ted Hart PA-C  [facility]    "

## 2020-12-04 NOTE — PROGRESS NOTES
Subjective     Bertram Foreman is a 30 year old male who presents to clinic today for the following health issues:    HPI         Diarrhea  Onset/Duration: on and off for 1 year  Description:       Consistency of stool: watery and green       Blood in stool: YES       Number of loose stools past 24 hours: 1  Progression of Symptoms: same  Accompanying signs and symptoms:       Fever: no       Nausea/Vomiting: YES       Abdominal pain: no       Weight loss: no       Episodes of constipation: no  History   Ill contacts: no  Recent use of antibiotics: no  Recent travels: no  Recent medication-new or changes(Rx or OTC): no  Precipitating or alleviating factors: banana, strawberry; slight relief  Therapies tried and outcome: Patient has had antibiotics earlier this year without improvement, has been using OTC medications with little relief    Radha is a 30 year old male who is accompanied by his county  to discuss ongoing diarrhea and loose stools. The patient says that his symptoms have been occurring for roughly the past year. He was treated for bacterial overgrowth syndrome this past summer by PCP with temporary relief. Stool cultures were subsequently checked which did not return with abnormal findings. Patient was seen by myself for blood while wiping following bowel movements. No external hemorrhoids were seen on exam, patient was given treatment for internal hemorrhoids. He informs me that this too only provided temporary relief. Patient informs me that he continues to experience loose stools, estimates up to 4-5x/day. He also says that at least 2-3x/week he has blood in the stool and while wiping. He has been working to improve his diet including reducing the amount of soda he consumes, but his worker informs me that when she is not around he does like to eat microwavable foods such as hot pockets. It is unclear as to whether his symptoms are worse following consumption of gluten based foods. The  patient also reported that he experiences pains in his knee and back. Though, I see that he has gained 20lbs over the past four months. He also says that he has been low in energy and has been having more canker sores than normal. Review of labs shows that the patient's diabetes is under good control and that he has been working with PCP to reduce weight by adjusting diet and exercise.     Review of Systems   Constitutional, HEENT, cardiovascular, pulmonary, gi and gu systems are negative, except as otherwise noted.      Objective    /80 (BP Location: Right arm, Patient Position: Chair, Cuff Size: Adult Large)   Pulse 80   Temp 95.9  F (35.5  C) (Temporal)   Resp 20   Wt (!) 169.4 kg (373 lb 6.4 oz)   SpO2 93%   BMI 47.81 kg/m    Body mass index is 47.81 kg/m .  Physical Exam   GENERAL: healthy, alert and no distress  EYES: Eyes grossly normal to inspection, PERRL and conjunctivae and sclerae normal  HENT: ear canals and TM's normal, nose and mouth without ulcers or lesions  NECK: no adenopathy, no asymmetry, masses, or scars and thyroid normal to palpation  RESP: lungs clear to auscultation - no rales, rhonchi or wheezes  CV: regular rate and rhythm, normal S1 S2, no S3 or S4, no murmur, click or rub, no peripheral edema and peripheral pulses strong  ABDOMEN: obese, soft, nontender, no hepatosplenomegaly, no masses and bowel sounds normal  MS: no gross musculoskeletal defects noted, no edema, large hump/fat over base of neck/shoulders  NEURO: Normal strength and tone, mentation intact and speech normal  PSYCH: mentation appears normal, affect normal/bright    Future orders placed.         Assessment & Plan     Bloody stool  Discussed with patient differential for ongoing loose stools and need for workup with likely colonoscopy to further evaluate bleeding. He was agreeable to this. I have asked him to keep a log detailing loose stools, diet, symptoms, etc. Advised bland diet and avoidance of foods such  "as hot pockets.   - CRP, inflammation; Future  - ESR: Erythrocyte sedimentation rate; Future  - Tissue transglutaminase joshua IgA and IgG; Future  - Endomysial Antibody IgA by IFA; Future    Diarrhea, unspecified type  See above.   - CRP, inflammation; Future  - ESR: Erythrocyte sedimentation rate; Future  - Tissue transglutaminase joshua IgA and IgG; Future  - Endomysial Antibody IgA by IFA; Future    Mouth sores  No sores present today. Patient will maintain good oral hygiene. Checking inflammatory markers for possible IBD. Consider colonoscopy pending lab results.   - CRP, inflammation; Future  - ESR: Erythrocyte sedimentation rate; Future  - Tissue transglutaminase joshua IgA and IgG; Future  - Endomysial Antibody IgA by IFA; Future    Morbid obesity (H)  Will check patient's cortisol levels, given weight gain and possible buffalo hump on exam.   - Cortisol; Future     BMI:   Estimated body mass index is 47.81 kg/m  as calculated from the following:    Height as of 3/10/20: 1.882 m (6' 2.1\").    Weight as of this encounter: 169.4 kg (373 lb 6.4 oz).   Weight management plan: Discussed healthy diet and exercise guidelines      Return in about 3 months (around 3/4/2021) for Diabetes Recheck.    Ted Hart PA-C  [facility]    "

## 2020-12-04 NOTE — NURSING NOTE
Health Maintenance Due   Topic Date Due     Pneumococcal Vaccine: Pediatrics (0 to 5 Years) and At-Risk Patients (6 to 64 Years) (1 of 1 - PPSV23) 08/15/1996     HIV SCREENING  08/15/2005     HEPATITIS C SCREENING  08/15/2008     PHQ-9  11/27/2020     Amina DURON LPN

## 2020-12-05 ASSESSMENT — PATIENT HEALTH QUESTIONNAIRE - PHQ9: SUM OF ALL RESPONSES TO PHQ QUESTIONS 1-9: 12

## 2020-12-10 DIAGNOSIS — E66.01 MORBID OBESITY (H): ICD-10-CM

## 2020-12-10 DIAGNOSIS — K92.1 BLOODY STOOL: ICD-10-CM

## 2020-12-10 DIAGNOSIS — R19.7 DIARRHEA, UNSPECIFIED TYPE: ICD-10-CM

## 2020-12-10 DIAGNOSIS — K13.79 MOUTH SORES: ICD-10-CM

## 2020-12-10 LAB
CORTIS SERPL-MCNC: 9.9 UG/DL (ref 4–22)
CRP SERPL-MCNC: 8 MG/L (ref 0–8)
ERYTHROCYTE [SEDIMENTATION RATE] IN BLOOD BY WESTERGREN METHOD: 15 MM/H (ref 0–15)

## 2020-12-10 PROCEDURE — 86140 C-REACTIVE PROTEIN: CPT | Performed by: PHYSICIAN ASSISTANT

## 2020-12-10 PROCEDURE — 36415 COLL VENOUS BLD VENIPUNCTURE: CPT | Performed by: PHYSICIAN ASSISTANT

## 2020-12-10 PROCEDURE — 83516 IMMUNOASSAY NONANTIBODY: CPT | Performed by: PHYSICIAN ASSISTANT

## 2020-12-10 PROCEDURE — 99000 SPECIMEN HANDLING OFFICE-LAB: CPT | Performed by: PHYSICIAN ASSISTANT

## 2020-12-10 PROCEDURE — 85652 RBC SED RATE AUTOMATED: CPT | Performed by: PHYSICIAN ASSISTANT

## 2020-12-10 PROCEDURE — 86256 FLUORESCENT ANTIBODY TITER: CPT | Mod: 90 | Performed by: PHYSICIAN ASSISTANT

## 2020-12-10 PROCEDURE — 82533 TOTAL CORTISOL: CPT | Performed by: PHYSICIAN ASSISTANT

## 2020-12-11 LAB
TTG IGA SER-ACNC: <1 U/ML
TTG IGG SER-ACNC: 1 U/ML

## 2020-12-12 LAB — ENDOMYSIUM IGA TITR SER IF: NORMAL {TITER}

## 2020-12-14 ENCOUNTER — TELEPHONE (OUTPATIENT)
Dept: FAMILY MEDICINE | Facility: OTHER | Age: 30
End: 2020-12-14

## 2020-12-14 DIAGNOSIS — K92.1 BLOODY STOOL: Primary | ICD-10-CM

## 2020-12-14 DIAGNOSIS — Z11.59 ENCOUNTER FOR SCREENING FOR OTHER VIRAL DISEASES: Primary | ICD-10-CM

## 2020-12-14 DIAGNOSIS — R19.7 DIARRHEA, UNSPECIFIED TYPE: ICD-10-CM

## 2020-12-14 NOTE — TELEPHONE ENCOUNTER
Date of colonoscopy/EGD: 12/28  Surgeon: Dr. Hernandez  Prep:Miralax  Packet:Colonoscopy/EGD instructions mailed to patient's home address.   Date: 12/14/2020      Surgery Scheduler

## 2020-12-14 NOTE — LETTER

## 2020-12-15 DIAGNOSIS — E78.5 HYPERLIPIDEMIA LDL GOAL <130: ICD-10-CM

## 2020-12-15 RX ORDER — ATORVASTATIN CALCIUM 20 MG/1
TABLET, FILM COATED ORAL
Qty: 28 TABLET | Refills: 5 | Status: SHIPPED | OUTPATIENT
Start: 2020-12-15 | End: 2021-06-08

## 2020-12-21 ENCOUNTER — DOCUMENTATION ONLY (OUTPATIENT)
Dept: INTERNAL MEDICINE | Facility: CLINIC | Age: 30
End: 2020-12-21

## 2020-12-21 NOTE — PROGRESS NOTES
The patient should not take the Metformin or the glimepiride the morning of his colonoscopy.  He can review resume these medications the following day.    Thank you.    Jordon

## 2020-12-21 NOTE — PROGRESS NOTES
Called pt and relayed message. He wrote the 2 medications down so he wouldn't forget.  Janel Galdamez, [facility]

## 2020-12-26 DIAGNOSIS — Z11.59 ENCOUNTER FOR SCREENING FOR OTHER VIRAL DISEASES: ICD-10-CM

## 2020-12-26 LAB
SARS-COV-2 RNA SPEC QL NAA+PROBE: NORMAL
SPECIMEN SOURCE: NORMAL

## 2020-12-26 PROCEDURE — U0003 INFECTIOUS AGENT DETECTION BY NUCLEIC ACID (DNA OR RNA); SEVERE ACUTE RESPIRATORY SYNDROME CORONAVIRUS 2 (SARS-COV-2) (CORONAVIRUS DISEASE [COVID-19]), AMPLIFIED PROBE TECHNIQUE, MAKING USE OF HIGH THROUGHPUT TECHNOLOGIES AS DESCRIBED BY CMS-2020-01-R: HCPCS | Performed by: SURGERY

## 2020-12-27 ENCOUNTER — ANESTHESIA EVENT (OUTPATIENT)
Dept: GASTROENTEROLOGY | Facility: CLINIC | Age: 30
End: 2020-12-27
Payer: COMMERCIAL

## 2020-12-27 LAB
LABORATORY COMMENT REPORT: NORMAL
SARS-COV-2 RNA SPEC QL NAA+PROBE: NEGATIVE
SPECIMEN SOURCE: NORMAL

## 2020-12-27 SDOH — HEALTH STABILITY: MENTAL HEALTH: CURRENT SMOKER: 0

## 2020-12-27 ASSESSMENT — LIFESTYLE VARIABLES: TOBACCO_USE: 1

## 2020-12-27 NOTE — ANESTHESIA PREPROCEDURE EVALUATION
Anesthesia Pre-Procedure Evaluation    Patient: Bertram Foreman   MRN: [id number] : 1990          Preoperative Diagnosis: Bloody stool [K92.1]  Diarrhea, unspecified type [R19.7]    Procedure(s):  COLONOSCOPY    Past Medical History:   Diagnosis Date     Sleep apnea      History reviewed. No pertinent surgical history.    Anesthesia Evaluation     . Pt has not had prior anesthetic            ROS/MED HX    ENT/Pulmonary:     (+)sleep apnea, tobacco use, Past use doesn't use CPAP , . .    Neurologic:  - neg neurologic ROS     Cardiovascular:     (+) ----. : . . . :. . No previous cardiac testing       METS/Exercise Tolerance:     Hematologic:  - neg hematologic  ROS       Musculoskeletal:  - neg musculoskeletal ROS       GI/Hepatic:     (+) bowel prep,       Renal/Genitourinary:  - ROS Renal section negative       Endo:     (+) type II DM Obesity, .      Psychiatric:  - neg psychiatric ROS   (+) psychiatric history anxiety and depression      Infectious Disease:  - neg infectious disease ROS       Malignancy:      - no malignancy   Other:    - neg other ROS                      Physical Exam  Normal systems: cardiovascular, pulmonary and dental    Airway   Mallampati: II  TM distance: <3 FB  Neck ROM: full    Dental     Cardiovascular   Rhythm and rate: regular and normal      Pulmonary    breath sounds clear to auscultation            Lab Results   Component Value Date    WBC 12.0 (H) 2020    HGB 12.7 (L) 2020    HCT 40.6 2020     2020    CRP 8.0 12/10/2020    SED 15 12/10/2020     2020    POTASSIUM 4.0 2020    CHLORIDE 105 2020    CO2 27 2020    BUN 9 2020    CR 0.47 (L) 2020     (H) 2020    CORY 9.1 2020    ALBUMIN 3.6 2020    PROTTOTAL 8.1 2020    ALT 55 2020    AST 28 2020    ALKPHOS 84 2020    BILITOTAL 0.2 2020    AMYLASE 85 2020    TSH 3.18 2020    T4 0.79  "07/05/2019       Preop Vitals  BP Readings from Last 3 Encounters:   12/04/20 134/80   11/10/20 132/86   09/08/20 134/82    Pulse Readings from Last 3 Encounters:   12/04/20 80   11/10/20 105   09/08/20 114      Resp Readings from Last 3 Encounters:   12/04/20 20   11/10/20 20   09/08/20 20    SpO2 Readings from Last 3 Encounters:   12/04/20 93%   11/10/20 96%   09/08/20 100%      Temp Readings from Last 1 Encounters:   12/04/20 95.9  F (35.5  C) (Temporal)    Ht Readings from Last 1 Encounters:   03/10/20 1.882 m (6' 2.1\")      Wt Readings from Last 1 Encounters:   12/04/20 (!) 169.4 kg (373 lb 6.4 oz)    Estimated body mass index is 47.81 kg/m  as calculated from the following:    Height as of 3/10/20: 1.882 m (6' 2.1\").    Weight as of 12/4/20: 169.4 kg (373 lb 6.4 oz).       Anesthesia Plan      History & Physical Review  History and physical reviewed and following examination; no interval change.    ASA Status:  2 .        Plan for MAC with Intravenous and Propofol induction. Maintenance will be TIVA.  Reason for MAC:  Deep or markedly invasive procedure (G8)      The patient is not a current smoker      Postoperative Care      Consents  Anesthetic plan, risks, benefits and alternatives discussed with:  Patient.  Use of blood products discussed: No .   .                 MADELYN Thomas CRNA  "

## 2020-12-28 ENCOUNTER — ANESTHESIA (OUTPATIENT)
Dept: GASTROENTEROLOGY | Facility: CLINIC | Age: 30
End: 2020-12-28
Payer: COMMERCIAL

## 2020-12-28 ENCOUNTER — HOSPITAL ENCOUNTER (OUTPATIENT)
Facility: CLINIC | Age: 30
Discharge: HOME | End: 2020-12-28
Attending: SURGERY | Admitting: SURGERY
Payer: COMMERCIAL

## 2020-12-28 VITALS
OXYGEN SATURATION: 95 % | SYSTOLIC BLOOD PRESSURE: 151 MMHG | RESPIRATION RATE: 20 BRPM | DIASTOLIC BLOOD PRESSURE: 83 MMHG | HEART RATE: 68 BPM

## 2020-12-28 LAB
COLONOSCOPY: NORMAL
GLUCOSE BLDC GLUCOMTR-MCNC: 122 MG/DL (ref 70–99)

## 2020-12-28 PROCEDURE — 258N000003 HC RX IP 258 OP 636: Performed by: NURSE ANESTHETIST, CERTIFIED REGISTERED

## 2020-12-28 PROCEDURE — 45380 COLONOSCOPY AND BIOPSY: CPT | Performed by: SURGERY

## 2020-12-28 PROCEDURE — 250N000011 HC RX IP 250 OP 636: Performed by: NURSE ANESTHETIST, CERTIFIED REGISTERED

## 2020-12-28 PROCEDURE — 370N000002 HC ANESTHESIA TECHNICAL FEE, EACH ADDTL 15 MIN: Performed by: SURGERY

## 2020-12-28 PROCEDURE — 250N000009 HC RX 250: Performed by: NURSE ANESTHETIST, CERTIFIED REGISTERED

## 2020-12-28 PROCEDURE — 88305 TISSUE EXAM BY PATHOLOGIST: CPT | Mod: 26 | Performed by: PATHOLOGY

## 2020-12-28 PROCEDURE — 999N001017 HC STATISTIC GLUCOSE BY METER IP

## 2020-12-28 PROCEDURE — 370N000001 HC ANESTHESIA TECHNICAL FEE, 1ST 30 MIN: Performed by: SURGERY

## 2020-12-28 PROCEDURE — 258N000003 HC RX IP 258 OP 636: Performed by: SURGERY

## 2020-12-28 PROCEDURE — 88305 TISSUE EXAM BY PATHOLOGIST: CPT | Mod: TC | Performed by: SURGERY

## 2020-12-28 RX ORDER — PROPOFOL 10 MG/ML
INJECTION, EMULSION INTRAVENOUS CONTINUOUS PRN
Status: DISCONTINUED | OUTPATIENT
Start: 2020-12-28 | End: 2020-12-28

## 2020-12-28 RX ORDER — SODIUM CHLORIDE, SODIUM LACTATE, POTASSIUM CHLORIDE, CALCIUM CHLORIDE 600; 310; 30; 20 MG/100ML; MG/100ML; MG/100ML; MG/100ML
INJECTION, SOLUTION INTRAVENOUS CONTINUOUS
Status: DISCONTINUED | OUTPATIENT
Start: 2020-12-28 | End: 2020-12-28 | Stop reason: HOSPADM

## 2020-12-28 RX ORDER — PROPOFOL 10 MG/ML
INJECTION, EMULSION INTRAVENOUS PRN
Status: DISCONTINUED | OUTPATIENT
Start: 2020-12-28 | End: 2020-12-28

## 2020-12-28 RX ORDER — LIDOCAINE 40 MG/G
CREAM TOPICAL
Status: DISCONTINUED | OUTPATIENT
Start: 2020-12-28 | End: 2020-12-28 | Stop reason: HOSPADM

## 2020-12-28 RX ORDER — LIDOCAINE HYDROCHLORIDE 20 MG/ML
INJECTION, SOLUTION INFILTRATION; PERINEURAL PRN
Status: DISCONTINUED | OUTPATIENT
Start: 2020-12-28 | End: 2020-12-28

## 2020-12-28 RX ADMIN — SODIUM CHLORIDE, POTASSIUM CHLORIDE, SODIUM LACTATE AND CALCIUM CHLORIDE: 600; 310; 30; 20 INJECTION, SOLUTION INTRAVENOUS at 14:17

## 2020-12-28 RX ADMIN — LIDOCAINE HYDROCHLORIDE 1 ML: 10 INJECTION, SOLUTION EPIDURAL; INFILTRATION; INTRACAUDAL; PERINEURAL at 14:17

## 2020-12-28 RX ADMIN — PROPOFOL 30 MG: 10 INJECTION, EMULSION INTRAVENOUS at 14:39

## 2020-12-28 RX ADMIN — PROPOFOL 70 MG: 10 INJECTION, EMULSION INTRAVENOUS at 14:37

## 2020-12-28 RX ADMIN — LIDOCAINE HYDROCHLORIDE 40 MG: 20 INJECTION, SOLUTION INFILTRATION; PERINEURAL at 14:37

## 2020-12-28 RX ADMIN — SODIUM CHLORIDE, POTASSIUM CHLORIDE, SODIUM LACTATE AND CALCIUM CHLORIDE: 600; 310; 30; 20 INJECTION, SOLUTION INTRAVENOUS at 14:32

## 2020-12-28 RX ADMIN — PROPOFOL 150 MCG/KG/MIN: 10 INJECTION, EMULSION INTRAVENOUS at 14:37

## 2020-12-28 NOTE — ANESTHESIA POSTPROCEDURE EVALUATION
Patient: Bertram Foreman    Procedure(s):  COLONOSCOPY, WITH BIOPSY    Diagnosis:Bloody stool [K92.1]  Diarrhea, unspecified type [R19.7]  Diagnosis Additional Information: No value filed.    Anesthesia Type:  MAC    Note:  Anesthesia Post Evaluation    Patient location during evaluation: Phase 2 and Bedside  Patient participation: Able to fully participate in evaluation  Level of consciousness: awake and alert  Pain management: adequate  Airway patency: patent  Cardiovascular status: acceptable  Respiratory status: acceptable  Hydration status: acceptable  PONV: none     Anesthetic complications: None          Last vitals:  Vitals:    12/28/20 1506   BP: (!) 116/104   Pulse: 89   Resp: 20   SpO2: 94%         Electronically Signed By: MADELYN Thomas CRNA  December 28, 2020  3:14 PM

## 2020-12-28 NOTE — INTERVAL H&P NOTE
The History and Physical has been reviewed, the patient has been examined and no changes have occurred in the patient's condition since the H & P was completed.     bloody diarrhea; BMI 47.8; awaiting bariatric sessions?    [facility] Hernandez, DO

## 2020-12-28 NOTE — LETTER
[facility]  [address]   [place] [address]      December 31, 2020    Dear Bertram,  This letter is written to inform you of the results of your recent colonoscopy.  Your examination showed some inflammation and redness at the lower portion of the colon. Your examination was otherwise without abnormality.    SPECIMEN(S):   Colon biopsy, random     FINAL DIAGNOSIS:   Colon, random, biopsy:   - Colonic mucosa with patchy mild congestion and edema, nonspecific.     Given these findings,  I recommend that you follow-up with a gastroenterologist, to discuss additional work-up and potential treatment.     Please remember that this recommendation is made with the understanding that you are not experiencing persistent changes in bowel function, bleeding per rectum, and/or significant abdominal pain. If you experience these symptoms, please contact your primary care provider for a further evaluation.     If you have any questions or concerns about the results of your colonoscopy or the appropriate follow-up, please contact my assistant at [phone]          Sincerely,        [facility],   [facility]  ___

## 2020-12-28 NOTE — ANESTHESIA CARE TRANSFER NOTE
Patient: Bertram Foreman    Procedure(s):  COLONOSCOPY, WITH BIOPSY    Diagnosis: Bloody stool [K92.1]  Diarrhea, unspecified type [R19.7]  Diagnosis Additional Information: No value filed.    Anesthesia Type:   MAC     Note:  Airway :Face Mask  Patient transferred to:Phase II  Handoff Report: Identifed the Patient, Identified the Reponsible Provider, Reviewed the pertinent medical history, Discussed the surgical course, Reviewed Intra-OP anesthesia mangement and issues during anesthesia, Set expectations for post-procedure period and Allowed opportunity for questions and acknowledgement of understanding      Vitals: (Last set prior to Anesthesia Care Transfer)    CRNA VITALS  12/28/2020 1434 - 12/28/2020 1514      12/28/2020             Resp Rate (observed):  (!) 5                Electronically Signed By: MADELYN Thomas CRNA  December 28, 2020  3:14 PM

## 2020-12-28 NOTE — DISCHARGE INSTRUCTIONS
[facility]    Home Care Following Endoscopy          Activity:    You have just undergone an endoscopic procedure usually performed with conscious sedation.  Do not work or operate machinery (including a car) for at least 12 hours.      I encourage you to walk and attempt to pass this air as soon as possible.    Diet:    Return to the diet you were on before your procedure but eat lightly for the first 12-24 hours.    Drink plenty of water.    Resume any regular medications unless otherwise advised by your physician.  Please begin any new medication prescribed as a result of your procedure as directed by your physician.     If you had any biopsy or polyp removed please refrain from aspirin or aspirin products for 2 days.  If on Coumadin please restart as instructed by your physician.   Pain:    You may take Tylenol as needed for pain.  Expected Recovery:    You can expect some mild abdominal fullness and/or discomfort due to the air used to inflate your intestinal tract. It is also normal to have a mild sore throat after upper endoscopy.    Call Your Physician if You Have:    After Colonoscopy:  o Worsening persisting abdominal pain which is worse with activity.  o Fevers (>101 degrees F), chills or shakes.  o Passage of continued blood with bowel movements.     Any questions or concerns about your recovery, please call [phone] or after hours [phone] Nurse Advice Line.    Follow-up Care:    You should receive a call or letter with your results within 1 week. Please call if you have not received a notification of your results.

## 2020-12-29 DIAGNOSIS — E11.65 TYPE 2 DIABETES MELLITUS WITH HYPERGLYCEMIA, WITHOUT LONG-TERM CURRENT USE OF INSULIN (H): ICD-10-CM

## 2020-12-29 DIAGNOSIS — K29.00 ACUTE GASTRITIS WITHOUT HEMORRHAGE, UNSPECIFIED GASTRITIS TYPE: ICD-10-CM

## 2020-12-30 LAB — COPATH REPORT: NORMAL

## 2020-12-31 NOTE — TELEPHONE ENCOUNTER
Prescription approved per [facility] Refill Protocol (Omeprazole).    Ginger Jung, BSN, RN  [facility]

## 2020-12-31 NOTE — TELEPHONE ENCOUNTER
Routing refill request to provider for review/approval because:  Labs out of range:  Creatinine    NESSA MckeonN, RN  [facility]

## 2021-01-04 ENCOUNTER — MYC MEDICAL ADVICE (OUTPATIENT)
Dept: INTERNAL MEDICINE | Facility: CLINIC | Age: 31
End: 2021-01-04

## 2021-01-04 DIAGNOSIS — K52.9 CHRONIC DIARRHEA: Primary | ICD-10-CM

## 2021-01-04 RX ORDER — GLIMEPIRIDE 4 MG/1
TABLET ORAL
Qty: 90 TABLET | Refills: 1 | Status: SHIPPED | OUTPATIENT
Start: 2021-01-04 | End: 2021-07-06

## 2021-01-05 ENCOUNTER — VIRTUAL VISIT (OUTPATIENT)
Dept: SURGERY | Facility: CLINIC | Age: 31
End: 2021-01-05
Payer: COMMERCIAL

## 2021-01-05 ENCOUNTER — TRANSFERRED RECORDS (OUTPATIENT)
Dept: HEALTH INFORMATION MANAGEMENT | Facility: CLINIC | Age: 31
End: 2021-01-05

## 2021-01-05 VITALS — WEIGHT: 315 LBS | BODY MASS INDEX: 39.17 KG/M2 | HEIGHT: 75 IN

## 2021-01-05 DIAGNOSIS — Z53.9 ERRONEOUS ENCOUNTER--DISREGARD: Primary | ICD-10-CM

## 2021-01-05 ASSESSMENT — MIFFLIN-ST. JEOR: SCORE: 2729.61

## 2021-01-05 NOTE — PROGRESS NOTES
No visit.  Patient has a  that goes to all visits with him.  He was instructed to schedule an in person visit.

## 2021-01-13 ENCOUNTER — TELEPHONE (OUTPATIENT)
Dept: FAMILY MEDICINE | Facility: CLINIC | Age: 31
End: 2021-01-13

## 2021-01-13 NOTE — TELEPHONE ENCOUNTER
Patient is calling and stating he is still having issues with diarrhea and would like to talk with Ted Hart. Appointment offered virtual or in clinic. Patient is scheduled for office visit on Tuesday, January 19th at 9:40 am. Rowena Hemphill LPN

## 2021-01-19 ENCOUNTER — OFFICE VISIT (OUTPATIENT)
Dept: SURGERY | Facility: CLINIC | Age: 31
End: 2021-01-19
Payer: COMMERCIAL

## 2021-01-19 ENCOUNTER — OFFICE VISIT (OUTPATIENT)
Dept: FAMILY MEDICINE | Facility: CLINIC | Age: 31
End: 2021-01-19
Payer: COMMERCIAL

## 2021-01-19 VITALS
OXYGEN SATURATION: 93 % | DIASTOLIC BLOOD PRESSURE: 80 MMHG | RESPIRATION RATE: 20 BRPM | HEART RATE: 80 BPM | TEMPERATURE: 94.9 F | WEIGHT: 315 LBS | SYSTOLIC BLOOD PRESSURE: 138 MMHG | BODY MASS INDEX: 47.12 KG/M2

## 2021-01-19 VITALS
DIASTOLIC BLOOD PRESSURE: 65 MMHG | TEMPERATURE: 98.6 F | RESPIRATION RATE: 16 BRPM | WEIGHT: 315 LBS | HEART RATE: 83 BPM | SYSTOLIC BLOOD PRESSURE: 128 MMHG | HEIGHT: 73 IN | BODY MASS INDEX: 41.75 KG/M2 | OXYGEN SATURATION: 95 %

## 2021-01-19 DIAGNOSIS — K92.1 BLOODY STOOL: ICD-10-CM

## 2021-01-19 DIAGNOSIS — E66.01 MORBID OBESITY WITH BODY MASS INDEX OF 45.0-49.9 IN ADULT (H): Primary | ICD-10-CM

## 2021-01-19 DIAGNOSIS — R19.7 DIARRHEA, UNSPECIFIED TYPE: ICD-10-CM

## 2021-01-19 DIAGNOSIS — E11.65 TYPE 2 DIABETES MELLITUS WITH HYPERGLYCEMIA, WITHOUT LONG-TERM CURRENT USE OF INSULIN (H): Primary | ICD-10-CM

## 2021-01-19 DIAGNOSIS — E11.9 TYPE 2 DIABETES MELLITUS WITHOUT COMPLICATION, WITHOUT LONG-TERM CURRENT USE OF INSULIN (H): ICD-10-CM

## 2021-01-19 PROCEDURE — 99215 OFFICE O/P EST HI 40 MIN: CPT | Performed by: PHYSICIAN ASSISTANT

## 2021-01-19 PROCEDURE — 99214 OFFICE O/P EST MOD 30 MIN: CPT | Performed by: PHYSICIAN ASSISTANT

## 2021-01-19 RX ORDER — METFORMIN HCL 500 MG
500 TABLET, EXTENDED RELEASE 24 HR ORAL 2 TIMES DAILY WITH MEALS
Qty: 60 TABLET | Refills: 0 | Status: SHIPPED | OUTPATIENT
Start: 2021-01-19 | End: 2021-02-08

## 2021-01-19 ASSESSMENT — PAIN SCALES - GENERAL: PAINLEVEL: NO PAIN (0)

## 2021-01-19 ASSESSMENT — MIFFLIN-ST. JEOR: SCORE: 2682.43

## 2021-01-19 NOTE — NURSING NOTE
Health Maintenance Due   Topic Date Due     Pneumococcal Vaccine: Pediatrics (0 to 5 Years) and At-Risk Patients (6 to 64 Years) (1 of 1 - PPSV23) 08/15/1996     HIV SCREENING  08/15/2005     HEPATITIS C SCREENING  08/15/2008     MICROALBUMIN  01/16/2021     PREVENTIVE CARE VISIT  01/16/2021     EYE EXAM  01/21/2021     Amina DURON LPN

## 2021-01-19 NOTE — PROGRESS NOTES
Patient was not seen by Rd today and will not be in the surgical weight loss program.  Carmelo Danielle RD, LD  [facility], [place]  [phone]

## 2021-01-19 NOTE — PROGRESS NOTES
Assessment & Plan     Type 2 diabetes mellitus with hyperglycemia, without long-term current use of insulin (H)  Patient will stop immediate release metformin and begin extended release twice daily with meals. He will follow up in March 2021 for next diabetic checkup. Continue checking blood sugars 2-3x/daily.   - metFORMIN (GLUCOPHAGE-XR) 500 MG 24 hr tablet; Take 1 tablet (500 mg) by mouth 2 times daily (with meals)    Bloody stool  Patient was agreeable to meeting with GI per the recommendation of the general surgeon who completed his colonoscopy. He was scheduled for a virtual consult on 01/26/2021.  - GASTROENTEROLOGY ADULT REF CONSULT ONLY; Future    Diarrhea, unspecified type  See above. Patient may continue supportive medications such as loperamide as needed for management of his symptoms. Encouraged him to continue working on reducing instant or microwavable meals and increase fiber in diet.   - GASTROENTEROLOGY ADULT REF CONSULT ONLY; Future        Return in about 2 months (around 3/19/2021) for Diabetes Recheck.    PAWAN Ariza [facility] SHAWNEE Burden is a 30 year old who presents to clinic today for the following health issues     HPI       Diarrhea  Onset/Duration: ongoing  Description:       Consistency of stool: loose       Blood in stool: YES       Number of loose stools past 24 hours: 4  Progression of Symptoms: same  Accompanying signs and symptoms:       Fever: no       Nausea/Vomiting: YES       Abdominal pain: no       Weight loss: no       Episodes of constipation: no  History   Ill contacts: no  Recent use of antibiotics: no  Recent travels: no  Recent medication-new or changes(Rx or OTC): no  Precipitating or alleviating factors: None  Therapies tried and outcome: Imodium AD, PeptoBismol and fiber gumjose rafael Garcia is a 30 year old male who is accompanied by his [facility]  to discuss ongoing diarrhea and bloody stools. Consistent with the  past visit on 12/04/2021, the patient says that his symptoms have been occurring for roughly the past year. The episodes occur weekly or roughly every 4-5 days.  He has been treated with antibiotics and rectal suppositories on suspicion of bacterial overgrowth and internal hemorrhoids respectively. At time of last visit he was advised to improve his diet including reducing the amount of gluten he consumes, his worker informs me that he has cut back to ~5 sodas/week. He did recently complete a colonoscopy which returned with findings of inflammation and redness at the lower portion of the colon and patient was advised to schedule with GI for further evaluation. The patient's worker said that his insurance, [employer], called him to suggest that he wait on this and adjust his metformin instead. I informed them that we can change the medication from immediate to extended, but advised that he still continue with plan to consult with GI.    Review of Systems   Constitutional, HEENT, cardiovascular, pulmonary, gi and gu systems are negative, except as otherwise noted.      Objective    /80 (BP Location: Right arm, Patient Position: Chair, Cuff Size: Adult Large)   Pulse 80   Temp 94.9  F (34.9  C) (Temporal)   Resp 20   Wt (!) 168.7 kg (372 lb)   SpO2 93%   BMI 47.12 kg/m    Body mass index is 47.12 kg/m .  Physical Exam   GENERAL: healthy, alert and no distress  RESP: lungs clear to auscultation - no rales, rhonchi or wheezes  CV: regular rate and rhythm, normal S1 S2, no S3 or S4, no murmur, click or rub, no peripheral edema and peripheral pulses strong  MS: no gross musculoskeletal defects noted, no edema  BACK: no CVA tenderness, no paralumbar tenderness  PSYCH: mentation appears normal, affect normal/bright

## 2021-01-19 NOTE — PROGRESS NOTES
"New Bariatric Surgery Consultation Note      2021    RE: Bertram Foreman  MR#: [id number]  : 1990      Referring provider:       2021   Who referred you? Doctor Jordon       Chief Complaint/Reason for visit: evaluation for possible weight loss surgery    Dear Tremaine Ruvalcaba DO (General),    I had the pleasure of seeing your patient, Bertram Foreman, to evaluate his obesity and consider him for possible weight loss surgery. As you know, Bertram Foreman is 30 year old.  He has a height of 6' .5\", a weight of 369 lbs 9.6 oz, and calculated Body mass index is 49.44 kg/m .    Here today with  Helena. She attends all of his medical appointments. His mother passed away and his father is a  and on the road a lot. She helps him grocery shop and live independently. He lives alone for 2 years. He has a mild intellectual disability and gets services from the [facility]. Prior to that he was in a group home.    Does not know anyone that has had a bariatric surgery.  Was in group home for 6-7 years and lost a substantial amount of weight because they had portion control there and shut down the kitchen by 10 am. Since living on his own 3.5 years and has gained around 100 lbs.  Diagnosed with diabetes . He was seeing a diabetic dietitan monthly up until this last year.  Just established with a new dietitian recently    Helena helps cook one meal per week but patient is responsible for all other meals.  He eats out a lot or does microwaveable meals. Helena only works with patient 20 hours per week.    When asked about making healthy choices patient does not know why he does not. Helena states he wants convenience. No veggies.    Patients medication are put in a bubble pack by the pharmacy.  This was designed because he was having difficulty remembering to take medications.  When asked about buying specific otc vitamins patient states he would need " help to do this.     Patient gets a ride to work.  His transportation is paid by the state.       HISTORY OF PRESENT ILLNESS:  Weight Loss History Reviewed with Patient 1/4/2021   How long have you been overweight? Since early childhood   What is the most that you have ever weighed? 373   What is the most weight you have lost? 2   I have tried the following methods to lose weight -   I have tried the following weight loss medications? (Check all that apply) None   Have you ever had weight loss surgery? No       CO-MORBIDITIES OF OBESITY INCLUDE:     1/4/2021   I have the following health issues associated with obesity: Type II Diabetes, Sleep Apnea, GERD (Reflux)     Has ADAL but has not worn CPAP in years.       PAST MEDICAL HISTORY:  Past Medical History:   Diagnosis Date     Sleep apnea        PAST SURGICAL HISTORY:  Past Surgical History:   Procedure Laterality Date     COLONOSCOPY N/A 12/28/2020    Procedure: COLONOSCOPY, WITH BIOPSY;  Surgeon: Haley Hernandez MD;  Location:  GI       FAMILY HISTORY:   No family history on file.    SOCIAL HISTORY:   Social History Questions Reviewed With Patient 1/4/2021   Which best describes your employment status (select all that apply) I work part-time, I work days   If you work, what is your occupation? 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   Which best describes your marital status: single   Do you have children? No   Who do you have in your support network that can be available to help you for the first 2 weeks after surgery? My Dad, My Sister, My ILS worker.   Who can you count on for support throughout your weight loss surgery journey? My Dad   Can you afford 3 meals a day?  Yes   Can you afford 50-60 dollars a month for vitamins? Yes       HABITS:     1/4/2021   How often do you drink alcohol? Monthly or less   If you do drink alcohol, how many drinks might you have in a day? (one drink = 5 oz. wine, 1 can/bottle of beer, 1 shot liquor) 1 or 2    Do you currently use any of the following Nicotine products? -   Have you ever used any of the following nicotine products? Cigarettes   If you previously used any of these products, what year did you quit? 2018   Have you or are you currently using street drugs or prescription strength medication for which you do not have a prescription for? No   Do you have a history of chemical dependency (alcohol or drug abuse)? No       PSYCHOLOGICAL HISTORY:   Psychological History Reviewed With Patient 1/4/2021   Have you ever attempted suicide? In the last 5 to 10 years.   Have you had thoughts of suicide in the past year? No   Have you ever been hospitalized for mental illness or a suicide attempt? In the last 5 to 10 years.   Do you have a history of chronic pain? Yes   Have you ever been diagnosed with fibromyalgia? No   Are you currently being treated for any of the following? (select all that apply) Depression, Anxiety, I take medication or see a therapist for another mental illness   Are you currently seeing a therapist or counselor?  Yes   Are you currently seeing a psychiatrist? Yes     Last time thought about hurting self was more than 10 years ago.     ROS:     1/4/2021   Skin:  None of the above   HEENT: Headaches   Musculoskeletal: Joint Pain, Back pain   Cardiovascular: Chest pain, Shortness of breath with activity   Pulmonary: Shortness of breath with activity, Snoring, People have told me I stop breathing while asleep, Excessively sleep during the day   Gastrointestinal: Heartburn, Reflux, Diarrhea   Genitourinary: None of the above   Hematological: None of the above   Neurological: Migraine headaches       EATING BEHAVIORS:     1/4/2021   Have you or anyone else thought that you had an eating disorder? No   Do you currently binge eat (eat a large amount of food in a short time)? Yes   Are you an emotional eater? Yes   Do you get up to eat after falling asleep? No       EXERCISE:     1/4/2021   How often do  you exercise? Daily   What is the duration of your exercise (in minutes)? 20 Minutes   What types of exercise do you do? walking, gym membership, swimming   What keeps you from being more active?  Pain, I have recently been sick, I should be more active but I just have not gotten around to it       MEDICATIONS:  Current Outpatient Medications   Medication Sig Dispense Refill     acetaminophen (TYLENOL) 325 MG tablet Take 325-650 mg by mouth as needed for mild pain        atorvastatin (LIPITOR) 20 MG tablet TAKE 1 TABLET BY MOUTH ONCE DAILY EVERY MORNING (AM PLUSPAK) 28 tablet 5     blood glucose (ACCU-CHEK GUIDE) test strip Use to test blood sugar 2 times daily or as directed. 100 strip 6     blood glucose monitoring (ACCU-CHEK FASTCLIX) lancets USE TO TEST TWICE DAILY OR AS DIRECTED 102 each 3     divalproex sodium extended-release (DEPAKOTE ER) 500 MG 24 hr tablet TAKE THREE TABLETS BY MOUTH EVERY NIGHT AT BEDTIME (HS PLUSPAK)       FIBER PO Take 1 chew tab by mouth daily       glimepiride (AMARYL) 4 MG tablet TAKE 1 TABLET BY MOUTH ONCE DAILY EVERY MORNING BEFORE BREAKFAST 90 tablet 1     hydrocortisone, Perianal, (HYDROCORTISONE) 2.5 % cream Place rectally 2 times daily as needed for hemorrhoids (Patient taking differently: Place rectally as needed for hemorrhoids ) 1 Tube 0     metFORMIN (GLUCOPHAGE-XR) 500 MG 24 hr tablet Take 1 tablet (500 mg) by mouth 2 times daily (with meals) 60 tablet 0     Multiple Vitamins-Minerals (MENS MULTIVITAMIN PO)        omeprazole (PRILOSEC) 20 MG DR capsule TAKE 1 CAPSULE BY MOUTH TWICE A DAY (AM & EVENING PLUSPAK) 56 capsule 3     QUEtiapine (SEROQUEL) 300 MG tablet Take 300 mg by mouth At Bedtime   0     TRAZODONE HCL PO Take 100 mg by mouth At Bedtime          ALLERGIES:  Allergies   Allergen Reactions     Ceclor [Cefaclor] Swelling     Erythromycin GI Disturbance       LABS/IMAGING/MEDICAL RECORDS REVIEW: [facility]     PHYSICAL EXAM:  /65 (BP Location: Left arm, Patient  "Position: Sitting, Cuff Size: Adult Large)   Pulse 83   Temp 98.6  F (37  C)   Resp 16   Ht 6' 0.5\" (1.842 m)   Wt (!) 369 lb 9.6 oz (167.6 kg)   SpO2 95%   BMI 49.44 kg/m      GENERAL Pt in NAD.  HEART: No JVD  LUNGS: Breathing unlabored.  MUSC: Gait normal  NEURO: Alert and oriented x3.      After a discussion decided patient is not a candidate for bariatric surgery but would be for the medial weight management.  Since he just established with a diabetic dietitian he decided to continue with her.  Reinforced lifestyle changes with patient.        Sincerely,     Doug Justice PA-C    45 minutes spent on the date of the encounter doing chart review, review of test results, patient visit and documentation     "

## 2021-01-26 ENCOUNTER — VIRTUAL VISIT (OUTPATIENT)
Dept: GASTROENTEROLOGY | Facility: CLINIC | Age: 31
End: 2021-01-26
Payer: COMMERCIAL

## 2021-01-26 ENCOUNTER — TRANSFERRED RECORDS (OUTPATIENT)
Dept: HEALTH INFORMATION MANAGEMENT | Facility: CLINIC | Age: 31
End: 2021-01-26

## 2021-01-26 DIAGNOSIS — K52.9 CHRONIC DIARRHEA: Primary | ICD-10-CM

## 2021-01-26 DIAGNOSIS — K62.89 RECTAL PAIN: ICD-10-CM

## 2021-01-26 DIAGNOSIS — K92.1 BLOODY STOOL: ICD-10-CM

## 2021-01-26 DIAGNOSIS — R19.7 DIARRHEA, UNSPECIFIED TYPE: ICD-10-CM

## 2021-01-26 LAB — RETINOPATHY: NEGATIVE

## 2021-01-26 PROCEDURE — 99203 OFFICE O/P NEW LOW 30 MIN: CPT | Mod: 95 | Performed by: PHYSICIAN ASSISTANT

## 2021-01-26 NOTE — PROGRESS NOTES
"Tirso Foreman is a 30 year old who is being evaluated via a billable video visit.      How would you like to obtain your AVS? MyChart  If the video visit is dropped, the invitation should be resent by: Text to cell phone: [phone]  Will anyone else be joining your video visit? No       Lucille Gruber LPN on 1/26/21 at 2:51 PM      Video Start Time: 3:30 PM    Tahira Burden is a 30 year old with pmhx of morbid obesity and diabetes who presents via video visit today for diarrhea. He is accompanied for today's video visit by Helena (ILS worker)      Consult (Diarrhea; occassional blood in stool; has abdominal pain when needing to have a bowel movement)    He has been having diarrhea off and on for the past year.  He notes the diarrhea typically occurs after eating.  He finds the symptoms occur after eating \"regular\" foods such as pizzas and tacos.  He notes that his diarrhea is improved when he avoids such fatty foods.  He also has some rectal irritation as well as occasional bright red blood per rectum.  He does not have any nighttime stools.  No tenesmus. His evaluation over the past several months includes stool studies, inflammatory markers and celiac screening all of which were unremarkable.  He also recently had a colonoscopy which noted congested and erythematous mucosa in the rectum and rectosigmoid area.  Biopsies showed mild congestion and edema but was otherwise nonspecific.      He has tried Pepto-Bismol in the past which is helped some.  He denies any abdominal cramping.  He is on Metformin for his diabetes and this was recently switched to a time-released capsule but he has not started this yet.              Objective         Colonoscopy 12/28/2020  Impression: - Congested mucosa in the rectum, in the recto-sigmoid colon and in the sigmoid colon. Biopsied.     Patient Name: TIRSO FOREMAN   MR#: [id number]   Specimen #: [id number]   Collected: 12/28/2020   Received: 12/29/2020   Reported: " 12/30/2020 10:19   Ordering Phy(s): [facility]     For improved result formatting, select 'View Enhanced Report Format' under    Linked Documents section.     SPECIMEN(S):   Colon biopsy, random     FINAL DIAGNOSIS:   Colon, random, biopsy:   - Colonic mucosa with patchy mild congestion and edema, nonspecific.     Electronically signed out by:     Yves Elaine M.D., PhD       Vitals:  No vitals were obtained today due to virtual visit.    Physical Exam   GENERAL: Healthy, alert and no distress, morbidly obese  EYES: Eyes grossly normal to inspection.  No discharge or erythema, or obvious scleral/conjunctival abnormalities.  RESP: No audible wheeze, cough, or visible cyanosis.  No visible retractions or increased work of breathing.    SKIN: Visible skin clear. No significant rash, abnormal pigmentation or lesions.  NEURO: Cranial nerves grossly intact.  Mentation and speech appropriate for age.  PSYCH: Mentation appears normal, affect normal/bright, judgement and insight intact, normal speech and appearance well-groomed.    Assessment & Plan     Chronic diarrhea  Bloody stool  Rectal pain     Orders placed this encounter  - NUTRITION REFERRAL  - hydrocortisone (PROCTOCORT) 10 % rectal foam  Dispense: 15 g; Refill: 0      Bertram K Kellen is a 30 year old male who presents via video visit today for chronic diarrhea.  He has had the symptoms off and on over the past year with occasional bright red blood per rectum.  He has had a thorough work-up thus far which includes labs, stool studies and a colonoscopy.  He was noted to have mild congestion and edema in the rectosigmoid area which was nonspecific but remainder of colonoscopy was unremarkable.  After discussion today symptoms of diarrhea seem to be more so related to his diet.  They believe the congested mucosa seen in the rectosigmoid area is a result of his diarrhea.  Recommended initiating fiber supplementation daily as well as adjusting diet.  Will refer to  nutritionist to help make dietary changes.  Will Rx hydrocortisone foam to help with the rectosigmoid irritation.     We will plan to follow-up in several weeks to check progress.       30 minutes spent on the date of the encounter doing chart review, history and exam, documentation and further activities as noted above       Return in about 4 weeks (around 2/23/2021) for using a video visit.    Akbar Lara PA-C  [facility]      Video-Visit Details    Type of service:  Video Visit    Video End Time:3:47 PM    Originating Location (pt. Location): Home    Distant Location (provider location):  [facility]     Platform used for Video Visit: [employer]

## 2021-02-02 ENCOUNTER — VIRTUAL VISIT (OUTPATIENT)
Dept: NUTRITION | Facility: CLINIC | Age: 31
End: 2021-02-02
Attending: PHYSICIAN ASSISTANT
Payer: COMMERCIAL

## 2021-02-02 DIAGNOSIS — E11.9 TYPE 2 DIABETES MELLITUS WITHOUT COMPLICATION, WITHOUT LONG-TERM CURRENT USE OF INSULIN (H): Primary | ICD-10-CM

## 2021-02-02 DIAGNOSIS — E66.01 MORBID OBESITY (H): ICD-10-CM

## 2021-02-02 PROCEDURE — 97802 MEDICAL NUTRITION INDIV IN: CPT | Mod: 95 | Performed by: DIETITIAN, REGISTERED

## 2021-02-02 NOTE — PROGRESS NOTES
Medical Nutrition Therapy  Visit Type: Initial assessment and intervention    Visit Details    How would you like to obtain your AVS? MyChart  If the correspondence for visit is dropped, how would you like your dietitian to reconnect with you:   call back by phone? Yes    Text to cell phone: [phone]   Will anyone else be joining your video visit or telephone call? yes  {If patient encounters technical issues they should call [phone] :15    Type of service:  Telephone (prefers telephone over video)    Start Time: 2:31 PM    End Time:3:10pm    Originating Location (pt. Location): Home    Distant Location (provider location):  [facility] WORKING VIRTUAL FROM HOME     Platform used for Video Visit: Valentin      Referring Provider: Akbar Lara  Formerly [facility]     REASON FOR REFERRAL:   Bertram Foreman is a 30 year old male who is interested in Medical Nutrition Therapy (MNT) and education related to GI issues diarrhea and weight management. Type 2 diabetes. Not struggling with diarrhea any more when started fiber it help been on not even a week. Comes and goes sometimes has it 3-5 days in a role. The Citracal has been helpful and doesn't have loose water stool.   He is accompanied by Helena CARLIN worker goes to all appointments.     NUTRITION ASSESSMENT:   Nutritional Goals 2/2/2021   Nutritional Goal Create healthier eating patterns;Create a plan to lose weight;Work on meal planning/recipes;Overcome emotional eating;Stabilize blood sugars;Eliminate cravings for sugar and refined carbohydrates;Increase energy;Address anxiety and or depression around eating;What to eat for my specific health concerns        Neurological 2/2/2021   Migraine Headaches Past   Tension Headache Current       No flowsheet data found.   No flowsheet data found.   Gastrointestinal 2/2/2021   Constipation Current   Nausea or Vomiting Past;Current   Hemorrhoids Past;Current   Diarrhea Current    Gas/bloating Past;Current   Heartburn/Reflux/GERD Past;Current   Blood in stool Past;Current   Abdominal Pain Current       No flowsheet data found.   Endocrine 2/2/2021   Type 2 diabetes Current   Overweight/obesity Past;Current      Skin 2/2/2021   Acne Past   Dry Skin Past;Current      Cardiopulmonary 2/2/2021   High Cholesterol Current      Musculoskeletal 2/2/2021   Restless Legs Current      Psychological 2/2/2021   Depression/Anxiety Past;Current   Binge eating disorder Past;Current      No flowsheet data found.   No flowsheet data found.     Past Medical History:  Past Medical History:   Diagnosis Date     Sleep apnea        Previous Surgeries:   Past Surgical History:   Procedure Laterality Date     COLONOSCOPY N/A 12/28/2020    Procedure: COLONOSCOPY, WITH BIOPSY;  Surgeon: Haley Hernandez MD;  Location:  GI        Family History:  No family history on file.     Lifestyle History:  Lifestyle 2/2/2021   Do you feel your life is stressful right now?  Yes   What is the cause(s) of stress in your life?  Finances;Work;Emotional problems   Are you currently implementing any strategies to help manage stress? Yes   What are you doing to manage stress?  Breathing exercises;Movement and exercise;Massage;Counseling;TV;Take time for self;Listening to music        Exercise History:  Exercise 2/2/2021   Does your occupation require extended periods of sitting?  No   Does your occupation require extended periods of repetitive movements (ex: walking or lifting)?  Yes   Do you currently participate in any forms of exercise? Yes   Check all the exercises you participate in: Walking;Biking;Swimming   How many times per week do you exercise? 2 times   How long do you usually exercise? 30 min        Sleep History:  Sleep 2/2/2021   How many hours (on average) do you sleep per night? 9-11   What time do you turn off the lights? 10 PM   How long does it take for you to fall asleep? 15-20 mins   What time do you stop using  electronic devices? 10 PM   What time do you wake up? 11 AM   When do you eat your first meal?  11 AM   Do you feel well-rested during the day?  No   Do you take naps?  Yes   Do you have a comfortable bedroom environment (cool, quiet, dark, etc)? Yes   Do you have a sleep routine/ ritual that you do before bed?  Yes   How many hours do you spend per day looking at a screen (TV, computer, tablet and phone)? 3 to 4   Select all factors that apply to your current sleep habits: Difficulty falling asleep;Wake up in the middle of the night;Have nightmares;Take medication for sleep on most night of the week;Snore loudly or have been told you stop breathing;Eat large meals within 3 hours of going to bed;Night sweats;Feel tired/sluggish/fatigued during the day;Grinding teeth        Nutrition History:  Nutrition 2/2/2021   Have you ever had a nutrition consultation? Yes   Do you currently follow a special diet or nutritional program? No   What do you feel are the biggest barriers getting in the way of achieving you nutritional goals? Motivation/Readiness to change;Lack of control over emotions/behaviors;Stress   Do you have any food allergies, sensitivities or intolerances?  No       Digestion 2/2/2021   Do you experience stomach pains/cramping? Daily   Do you experience bloating?  2-3 times per week   Do you experience gas?  2-3 times per week   Do you experience heartburn/acid reflux/indigestion? 2-3 times per week   How often do you have a bowel movement? 3 or more times per day   What is a typical bowel movement like for you? Select all that apply: Loose;Liquid;With blood visible      Food Access:  2/2/2021   Who does the grocery shopping? Self   How often is grocery shopping done? Weekly   Where do you usually receive your groceries from? Select all that apply: [employer];Other   Do you read food labels? No   Who does the cooking? Select all that apply: Self;Assistant   How many meals do you eat out per week?  3 to 5   What  restaurants do you typically choose? [employer] ([employer], [employer], etc.)      Daily Patterns: 2/2/2021   How many days per week do you have breakfast? 3   How many days per week do you have lunch? 6   How many days per week do you have dinner? 7   How many days per week do you have snacks? 5      Protein Intake: 2/2/2021   How many times per day do you typically consume a protein source(s)? 4   What types of protein do you currently eat?  Ground Beef;Steak;Hamburgers;Beef Roast;Pork Chops;Pork Ribs;[place];Deli Ham;Sausage;Tuna;Chicken Breast;Chicken Thighs/Legs;Eggs       Fat Intake:  2/2/2021   How many times per day do you typically consume healthy fat(s)? 1   What types of health fats do you currently eat? Select all that apply:  Santana;Butter;Salad dressings       Fruit Intake:  2/2/2021   How many times per day do you typically consume fruits? 1   What types of fruit do currently eat? Apple;Banana;Cantaloupe;Mandarin oranges;Pineapple       Vegetable Intake:  2/2/2021   How many times per day do you typically consume vegetables? 1   What types of vegetables do you currently eat? Broccoli;Carrots;Celery;Corn;Green onions/scallions;Onions;Peas;Potato (baked, boiled, mashed, French fries);Yam, sweet potato      Grain Intake:  2/2/2021   How many times per day do you typically consume grains? 2   What types of grains do currently eat? Select all that apply:  Breads (non-gluten free);Cereals (non-gluten free);Chips (non-gluten free);Crackers (non-gluten free);Pasta (non-gluten free);Pretzels (non-gluten free)       Dairy Intake:  2/2/2021   How many times per day do you typically consume dairy? 1   What types of dairy do currently eat? Select all that apply:  Milk;Cheese;Yogurt;Ice cream       Non-Dairy Alternative Intake:  2/2/2021   How many times per day do you typically consume non-dairy alternatives? 0   What types of non-dairy alternatives do currently eat? Select all that apply:  Other cheese        Sweets Intake:  2/2/2021   How many times per day do you typically consume sweets? 1      Beverage Intake:  2/2/2021   How many 8 oz cups of water do you typically consume per day?  0 to 1   How many 8 oz cups of caffeine do you typically consume per day?  1 to 2   How many drinks of alcohol do you typically consume per week (1 drink = 5 oz wine, 12 oz beer, 1.5 oz spirits)?   0       Lifestyle Recall:  2/2/2021   What time did you wake up? 8 AM   What time did you go to sleep? 8 PM   What time did you have breakfast? No breakfast   What time did you have a morning snack? 9-10 AM   Where did you have your morning snack? Home   What time did you have lunch? No lunch   What time did you have an afternoon snack? No snack   What time did you have dinner? 3-4 PM   Where did you have dinner?  Restaurant   What time did you have an evening snack? 7-8 PM   Where did you have your evening snack? Home   What time of day did you exercise? No Exercise          Additional concerns: If has breakfast does a replacement shake - Helena will make eggs, barbour or sausage. Does microwave and doesn't do much cooking.   Has the means to do cooking but now just doesn't feel like it since mom passed away.     [email or web address]    MEDICATIONS:  Current Outpatient Medications   Medication Sig Dispense Refill     acetaminophen (TYLENOL) 325 MG tablet Take 325-650 mg by mouth as needed for mild pain        atorvastatin (LIPITOR) 20 MG tablet TAKE 1 TABLET BY MOUTH ONCE DAILY EVERY MORNING (AM PLUSPAK) 28 tablet 5     blood glucose (ACCU-CHEK GUIDE) test strip Use to test blood sugar 2 times daily or as directed. 100 strip 6     blood glucose monitoring (ACCU-CHEK FASTCLIX) lancets USE TO TEST TWICE DAILY OR AS DIRECTED 102 each 3     divalproex sodium extended-release (DEPAKOTE ER) 500 MG 24 hr tablet TAKE THREE TABLETS BY MOUTH EVERY NIGHT AT BEDTIME (HS PLUSPAK)       FIBER PO Take 1 chew tab by mouth daily       glimepiride  (AMARYL) 4 MG tablet TAKE 1 TABLET BY MOUTH ONCE DAILY EVERY MORNING BEFORE BREAKFAST 90 tablet 1     hydrocortisone (PROCTOCORT) 10 % rectal foam Place 1 applicator rectally 2 times daily 15 g 0     hydrocortisone, Perianal, (HYDROCORTISONE) 2.5 % cream Place rectally 2 times daily as needed for hemorrhoids (Patient taking differently: Place rectally as needed for hemorrhoids ) 1 Tube 0     metFORMIN (GLUCOPHAGE-XR) 500 MG 24 hr tablet Take 1 tablet (500 mg) by mouth 2 times daily (with meals) 60 tablet 0     Multiple Vitamins-Minerals (MENS MULTIVITAMIN PO)        omeprazole (PRILOSEC) 20 MG DR capsule TAKE 1 CAPSULE BY MOUTH TWICE A DAY (AM & EVENING PLUSPAK) 56 capsule 3     QUEtiapine (SEROQUEL) 300 MG tablet Take 300 mg by mouth At Bedtime   0     TRAZODONE HCL PO Take 100 mg by mouth At Bedtime          Dietary Supplements 2/2/2021   Individual Vitamin Supplements General multivitamin   Herbal Complimentary products Fiber supplement         ALLERGIES:   Allergies   Allergen Reactions     Ceclor [Cefaclor] Swelling     Erythromycin GI Disturbance        .na  LABS:  Last Basic Metabolic Panel:  Lab Results   Component Value Date     09/08/2020     05/20/2020     01/16/2020      Lab Results   Component Value Date    POTASSIUM 4.0 09/08/2020    POTASSIUM 4.2 05/20/2020    POTASSIUM 4.2 01/16/2020     Lab Results   Component Value Date    CHLORIDE 105 09/08/2020    CHLORIDE 102 05/20/2020    CHLORIDE 105 01/16/2020     Lab Results   Component Value Date    CORY 9.1 09/08/2020    CORY 9.1 05/20/2020    CORY 9.4 01/16/2020     Lab Results   Component Value Date    CO2 27 09/08/2020    CO2 29 05/20/2020    CO2 26 01/16/2020     Lab Results   Component Value Date    BUN 9 09/08/2020    BUN 13 05/20/2020    BUN 13 01/16/2020     Lab Results   Component Value Date    CR 0.47 09/08/2020    CR 0.63 05/20/2020    CR 0.67 01/16/2020     Lab Results   Component Value Date     09/08/2020      05/20/2020     01/16/2020       Last Glucose Profile:   Hemoglobin A1C   Date Value Ref Range Status   09/08/2020 6.8 (H) 0 - 5.6 % Final     Comment:     Normal <5.7% Prediabetes 5.7-6.4%  Diabetes 6.5% or higher - adopted from ADA   consensus guidelines.     05/20/2020 8.0 (H) 0 - 5.6 % Final     Comment:     Normal <5.7% Prediabetes 5.7-6.4%  Diabetes 6.5% or higher - adopted from ADA   consensus guidelines.     01/16/2020 7.2 (H) 0 - 5.6 % Final     Comment:     Normal <5.7% Prediabetes 5.7-6.4%  Diabetes 6.5% or higher - adopted from ADA   consensus guidelines.         Last Lipid Profile:   Cholesterol   Date Value Ref Range Status   09/08/2020 157 <200 mg/dL Final   01/16/2020 230 (H) <200 mg/dL Final     Comment:     Desirable:       <200 mg/dl   07/05/2019 226 (H) <200 mg/dL Final     Comment:     Desirable:       <200 mg/dl     HDL Cholesterol   Date Value Ref Range Status   09/08/2020 40 >39 mg/dL Final   01/16/2020 43 >39 mg/dL Final   07/05/2019 38 (L) >39 mg/dL Final     LDL Cholesterol Calculated   Date Value Ref Range Status   09/08/2020 83 <100 mg/dL Final     Comment:     Desirable:       <100 mg/dl   01/16/2020 151 (H) <100 mg/dL Final     Comment:     Above desirable:  100-129 mg/dl  Borderline High:  130-159 mg/dL  High:             160-189 mg/dL  Very high:       >189 mg/dl     07/05/2019 152 (H) <100 mg/dL Final     Comment:     Above desirable:  100-129 mg/dl  Borderline High:  130-159 mg/dL  High:             160-189 mg/dL  Very high:       >189 mg/dl       Triglycerides   Date Value Ref Range Status   09/08/2020 170 (H) <150 mg/dL Final     Comment:     Borderline high:  150-199 mg/dl  High:             200-499 mg/dl  Very high:       >499 mg/dl  Non Fasting     01/16/2020 180 (H) <150 mg/dL Final     Comment:     Borderline high:  150-199 mg/dl  High:             200-499 mg/dl  Very high:       >499 mg/dl     07/05/2019 182 (H) <150 mg/dL Final     Comment:     Borderline high:   "150-199 mg/dl  High:             200-499 mg/dl  Very high:       >499 mg/dl       No results found for: CHOLHDLRATIO    Most recent CBC:  Recent Labs   Lab Test 09/08/20  1136 07/05/19  1009 01/08/19  1503   WBC 12.0* 12.6* 11.9*   HGB 12.7* 12.5* 12.9*   HCT 40.6 39.5* 40.7    272 280     Most recent hepatic panel:  Recent Labs   Lab Test 09/08/20  1136 01/16/20  1056   ALT 55 49   AST 28 22     Most recent creatinine:  Recent Labs   Lab Test 09/08/20  1136 05/20/20  0956   CR 0.47* 0.63*       No components found for: GFRESETIMATEDLASTLAB(gfrestblack:1@  Lab Results   Component Value Date    ALBUMIN 3.6 09/08/2020       Last Thyroid Profile:   TSH   Date Value Ref Range Status   09/08/2020 3.18 0.40 - 4.00 mU/L Final   07/05/2019 3.40 0.40 - 4.00 mU/L Final   01/08/2019 2.13 0.40 - 4.00 mU/L Final     T4 Free   Date Value Ref Range Status   07/05/2019 0.79 0.76 - 1.46 ng/dL Final       Last Mineral Profile:   No results found for: MIRANDA, IRON, FEB    Autoimmune & Inflammatory   CRP Inflammation   Date Value Ref Range Status   12/10/2020 8.0 0.0 - 8.0 mg/L Final         Last Vitamin Profile:   No results found for: PHD333, ICQH777, OTEX01AMPGL, VITD3, D2VIT, D3VIT, DTOT, [id number], [id number], [id number], [id number], [id number], [id number]    ANTHROPOMETRICS:  Vitals:   BP Readings from Last 1 Encounters:   01/19/21 128/65     Pulse Readings from Last 1 Encounters:   01/19/21 83     Estimated body mass index is 49.44 kg/m  as calculated from the following:    Height as of 1/19/21: 1.842 m (6' 0.5\").    Weight as of 1/19/21: 167.6 kg (369 lb 9.6 oz).    Wt Readings from Last 5 Encounters:   01/19/21 (!) 167.6 kg (369 lb 9.6 oz)   01/19/21 (!) 168.7 kg (372 lb)   01/05/21 (!) 169.2 kg (373 lb)   12/04/20 (!) 169.4 kg (373 lb 6.4 oz)   11/10/20 (!) 169.3 kg (373 lb 5 oz)       Weight Change: would like to lose 100lbs       NUTRITION DIAGNOSIS:   1. Altered nutrition-related laboratory values related to endocrine " dysfunction as evidenced by elevated BMI, HbA1c     2. Altered nutrition-related laboratory values related to cardiac as evidenced by elevated Total chol, elevated TG, LDL, and low HDL.     3. Overweight/Obesity related to food and nutrition related knowledge deficit (excessive CHO intake, Inadequate fiber intake, inappropriate intake of healthy omega 3 fatty acids) as evidenced by nutrition intake record (limited variety of foods), A1c >6%, recent labs.    4. Overweight/obesity related to inability to sustain diet/lifestyle change, as evidenced by many previous attempts at weight loss with weight regain over the past five years.     CARDIOMETABOLIC     Long Term Goals:   Goal: Lipid profile; Decrease TG <150 mg/dL, Decrease LDL <100mg/dL within 6 months   Goal: Lose 20-24lbs in 6 months.   Goal: Decrease Hemoglobin A1c <6 % within 2-6 months   Goal: Increase hemoglobin levels within 6 months     Short Term Goals:    1. Focus on cutting back on carbs and increase protein intake - see handouts and info below   2.   Start having snack 1-2x daily with healthy fats (ex: guac, nuts, hummus) and add a fruit/veggie for fiber- apple or banana with nut/seed butter (almond butter ) or sunflower seed butter  see ideas discussed and provided   3.  Get the daily[employer] wellness journal to set some goals       Test Blood Sugars    Measure your fasting blood sugar daily, first thing in morning before breakfast. Ideally your fasting blood sugar should be between 70-80 mg/dL.     Measure your blood sugar two hours after breakfast and two hours after dinner. Ideally your two-hour sugars should never go over 120mg/dL.     Mediterranean Approach: Eat whole, unprocessed real foods in their unprocessed forms such as fruits, vegetables, whole grains (prefer gluten free), nuts, legumes, extra virgin olive oil, spices, modest amounts of poultry, and fish.      Cardiometabolic Food plan - 1,800 calories per day     Protein 10-12  servings per day - include at each meal to stabilize blood sugars   (Choose 3oz or 21g per meal and aim for 1oz of 7g for snacks)   - Strive for 1-2 servings of fish per week especially of higher omega-3 fatty acid containing fish such as salmon.     Legumes 2 serving per day     Dairy alternatives 2-3 serving per day     Nuts and seeds 3-4 servings per day - great to incorporate as snacks    Fats and oils 4 servings per day     Non starchy vegetables 8-10 servings per day     Starchy vegetable limit 1 serving per day as they tend to impact blood sugar (they are moderate-GI).     Fruits 2 servings per day - best to couple with a little bit of protein or fat to offset a rise in blood sugars (they are low-moderate-GI foods).     Whole grains 2 serving per day - try gluten free whole grains instead      Choose Low Glycemic (GI) foods: Regulate your sugar levels by eating foods that do not spike blood sugars.  Eat low -GI foods so only small fluctuations in blood glucose and insulin levels are produced.      Examples of low-GI foods: nuts, seeds, GF oats, most vegetables especially non-starchy and fruits.     Medium or high-GI foods should be eaten with a protein or fat, both of which blunt the glycemic effect of these foods. This reduces the overall glycemic impact of a meal.   Ex: Most grains and starchy veggies are medium/high GI.     Avoid foods containing refined sugars, artificial sweeteners, and refined grains they are considered high-GI because they lead to sharp increases in blood sugars levels, which increase insulin sensitivity causing increased TG, and low good cholesterol (HDL).   Ex: cakes, cookies, pies, bread, sodas, fruit drinks, presweetened tea, coffee drinks, energy or sport drinks, flavored milk and other processed foods.     Choose foods high in fiber: Aim for at least 5 grams of fiber per serving of food or a total of 25-35 grams fiber per day. Remember, when looking at the label, you can take the  fiber away from the total carbs. Ex:15g of total carbs - 4g of fiber = 11g net carbs     Insoluble fiber acts like a bulky  inner broom,  sweeping out debris from the intestine and creating more motility and movement.      Soluble fiber attracts water and swells, creating a gel that slows digestion.  Also, slows the release of glucose from foods into the blood which stops spikes in blood sugar levels.  Soluble fiber traps toxins in the gut, helping to carry them to excretion and provides healthy bacteria in the digestive tract.     Choose High Quality Fats: Adding anti-inflammatory fats into your diet such as fish (salmon, herring, mackerel, and sardines), omega 3 eggs, elidia seeds, ground flax seeds/milk, hemp seeds/milk, walnuts and some other certain leafy greens will increase omega-3 fats to omeaga-6 fats ratio.     Therapeutic fats both monounsaturated and polyunsaturated to include daily: ground flaxseeds, unsalted mixed nuts, avocados, olives, extra-virgin olive oil.     Emphasize high-quality oils and fats in the diet daily such as avocado oil, coconut oil, flaxseed, olive, sesame. Ex: 1 tsp to 1 tbsp of MCT oil from coconuts can be added into coffee, smoothies, and salad dressings per day.     Avoid trans fats found in processed foods     Drink more water. Hydration is critical, so drink at least six to eight glasses of water a day. Drink more water between meals and less at meals.     Try adding herbal teas (sugar free) or lemon/lime/cucumber/fruit to water for flavor. Avoid artificial sweetener packets to flavor your water.     Cut back on coffee switch to green tea. Avoid adding sugar and milk to coffee instead use dairy alternatives such as almond, flax, coconut milk.       Increase physical activity. Moving our body helps move our bowels and speeds up your metabolism.     Exercise 15-60 minutes daily--whether that looks like burst training, yoga, or vigorous walking.      NUTRITION  RESOURCES:  1. Purchase Eat Fat Get Thin by Kenneth Membreno Book/Cookbook   2. [facility] Cardiometabolic Packet     Functional Nutrition Fundamentals     Comprehensive Guide    Meal plan with recipes           PATIENT'S BEHAVIOR CHANGE GOALS:   See nutrition intervention for patient stated behavior change goals. AVS was printed and given to patient at today's appointment.    MONITOR / EVALUATE:  Registered Dietitian will monitor/evaluate the following:     Beliefs and attitudes related to food    Food and nutrition knowledge / skills    Food / Beverage / Nutrient intake     Pertinent Labs    Progress toward meeting stated nutrition-related goals    Readiness to change nutrition-related behaviors    Weight change    Digestion     COORDINATION OF CARE:  Follow up with gastroenterology      FOLLOW-UP:  Follow-up appointment scheduled on 3/16/21 at 10am video visit      Time spent in minutes: 40 minutes  Encounter: Individual    Angie Levy RD, CLT, LD  Integrative Registered Dietitian

## 2021-02-03 NOTE — PATIENT INSTRUCTIONS
NUTRITION DIAGNOSIS:   1. Altered nutrition-related laboratory values related to endocrine dysfunction as evidenced by elevated BMI, HbA1c     2. Altered nutrition-related laboratory values related to cardiac as evidenced by elevated Total chol, elevated TG, LDL, and low HDL.     3. Overweight/Obesity related to food and nutrition related knowledge deficit (excessive CHO intake, Inadequate fiber intake, inappropriate intake of healthy omega 3 fatty acids) as evidenced by nutrition intake record (limited variety of foods), A1c >6%, recent labs.    4. Overweight/obesity related to inability to sustain diet/lifestyle change, as evidenced by many previous attempts at weight loss with weight regain over the past five years.     CARDIOMETABOLIC     Long Term Goals:   Goal: Lipid profile; Decrease TG <150 mg/dL, Decrease LDL <100mg/dL within 6 months   Goal: Lose 20-24lbs in 6 months.   Goal: Decrease Hemoglobin A1c <6 % within 2-6 months   Goal: Increase hemoglobin levels within 6 months     Short Term Goals:    1. Focus on cutting back on carbs and increase protein intake - see handouts and info below   2.   Start having snack 1-2x daily with healthy fats (ex: guac, nuts, hummus) and add a fruit/veggie for fiber- apple or banana with nut/seed butter (almond butter ) or sunflower seed butter  see ideas discussed and provided   3.  Get the [employer] - green wellness journal to set some goals       Test Blood Sugars    Measure your fasting blood sugar daily, first thing in morning before breakfast. Ideally your fasting blood sugar should be between 70-80 mg/dL.     Measure your blood sugar two hours after breakfast and two hours after dinner. Ideally your two-hour sugars should never go over 120mg/dL.     Mediterranean Approach: Eat whole, unprocessed real foods in their unprocessed forms such as fruits, vegetables, whole grains (prefer gluten free), nuts, legumes, extra virgin olive oil, spices, modest amounts of  poultry, and fish.      Cardiometabolic Food plan - 1,800 calories per day     Protein 10-12 servings per day - include at each meal to stabilize blood sugars   (Choose 3oz or 21g per meal and aim for 1oz of 7g for snacks)   - Strive for 1-2 servings of fish per week especially of higher omega-3 fatty acid containing fish such as salmon.     Legumes 2 serving per day     Dairy alternatives 2-3 serving per day     Nuts and seeds 3-4 servings per day - great to incorporate as snacks    Fats and oils 4 servings per day     Non starchy vegetables 8-10 servings per day     Starchy vegetable limit 1 serving per day as they tend to impact blood sugar (they are moderate-GI).     Fruits 2 servings per day - best to couple with a little bit of protein or fat to offset a rise in blood sugars (they are low-moderate-GI foods).     Whole grains 2 serving per day - try gluten free whole grains instead      Choose Low Glycemic (GI) foods: Regulate your sugar levels by eating foods that do not spike blood sugars.  Eat low -GI foods so only small fluctuations in blood glucose and insulin levels are produced.      Examples of low-GI foods: nuts, seeds, GF oats, most vegetables especially non-starchy and fruits.     Medium or high-GI foods should be eaten with a protein or fat, both of which blunt the glycemic effect of these foods. This reduces the overall glycemic impact of a meal.   Ex: Most grains and starchy veggies are medium/high GI.     Avoid foods containing refined sugars, artificial sweeteners, and refined grains they are considered high-GI because they lead to sharp increases in blood sugars levels, which increase insulin sensitivity causing increased TG, and low good cholesterol (HDL).   Ex: cakes, cookies, pies, bread, sodas, fruit drinks, presweetened tea, coffee drinks, energy or sport drinks, flavored milk and other processed foods.     Choose foods high in fiber: Aim for at least 5 grams of fiber per serving of food  or a total of 25-35 grams fiber per day. Remember, when looking at the label, you can take the fiber away from the total carbs. Ex:15g of total carbs - 4g of fiber = 11g net carbs     Insoluble fiber acts like a bulky  inner broom,  sweeping out debris from the intestine and creating more motility and movement.      Soluble fiber attracts water and swells, creating a gel that slows digestion.  Also, slows the release of glucose from foods into the blood which stops spikes in blood sugar levels.  Soluble fiber traps toxins in the gut, helping to carry them to excretion and provides healthy bacteria in the digestive tract.     Choose High Quality Fats: Adding anti-inflammatory fats into your diet such as fish (salmon, herring, mackerel, and sardines), omega 3 eggs, elidia seeds, ground flax seeds/milk, hemp seeds/milk, walnuts and some other certain leafy greens will increase omega-3 fats to omeaga-6 fats ratio.     Therapeutic fats both monounsaturated and polyunsaturated to include daily: ground flaxseeds, unsalted mixed nuts, avocados, olives, extra-virgin olive oil.     Emphasize high-quality oils and fats in the diet daily such as avocado oil, coconut oil, flaxseed, olive, sesame. Ex: 1 tsp to 1 tbsp of MCT oil from coconuts can be added into coffee, smoothies, and salad dressings per day.     Avoid trans fats found in processed foods     Drink more water. Hydration is critical, so drink at least six to eight glasses of water a day. Drink more water between meals and less at meals.     Try adding herbal teas (sugar free) or lemon/lime/cucumber/fruit to water for flavor. Avoid artificial sweetener packets to flavor your water.     Cut back on coffee switch to green tea. Avoid adding sugar and milk to coffee instead use dairy alternatives such as almond, flax, coconut milk.       Increase physical activity. Moving our body helps move our bowels and speeds up your metabolism.     Exercise 15-60 minutes daily--whether  that looks like burst training, yoga, or vigorous walking.      NUTRITION RESOURCES:  1. Purchase Eat Fat Get Thin by Kenneth Membreno Book/Cookbook   2. [facility] Cardiometabolic Packet     Functional Nutrition Fundamentals     Comprehensive Guide    Meal plan with recipes

## 2021-02-08 DIAGNOSIS — E11.65 TYPE 2 DIABETES MELLITUS WITH HYPERGLYCEMIA, WITHOUT LONG-TERM CURRENT USE OF INSULIN (H): ICD-10-CM

## 2021-02-08 RX ORDER — METFORMIN HCL 500 MG
TABLET, EXTENDED RELEASE 24 HR ORAL
Qty: 60 TABLET | Refills: 0 | Status: SHIPPED | OUTPATIENT
Start: 2021-02-08 | End: 2021-03-16

## 2021-02-08 NOTE — TELEPHONE ENCOUNTER
"  Requested Prescriptions   Pending Prescriptions Disp Refills     metFORMIN (GLUCOPHAGE-XR) 500 MG 24 hr tablet [Pharmacy Med Name: METFORMIN 500MG ER TABLET] 60 tablet 0     Sig: TAKE 1 TABLET (500 MG) BY MOUTH 2 TIMES DAILY WITH MEALS   1/19/2021    Biguanide Agents Passed - 2/8/2021  1:18 AM        Passed - Patient is age 10 or older        Passed - Patient has documented A1c within the specified period of time.     If HgbA1C is 8 or greater, it needs to be on file within the past 3 months.  If less than 8, must be on file within the past 6 months.     Recent Labs   Lab Test 09/08/20  1136   A1C 6.8*           Passed - Patient's CR is NOT>1.4 OR Patient's EGFR is NOT<45 within past 12 mos.     Recent Labs   Lab Test 09/08/20  1136   GFRESTIMATED >90   GFRESTBLACK >90     Recent Labs   Lab Test 09/08/20  1136   CR 0.47*           Passed - Patient does NOT have a diagnosis of CHF.        Passed - Medication is active on med list        Passed - Recent (6 mo) or future (30 days) visit within the authorizing provider's specialty     Patient had office visit in the last 6 months or has a visit in the next 30 days with authorizing provider or within the authorizing provider's specialty.  See \"Patient Info\" tab in inbasket, or \"Choose Columns\" in Meds & Orders section of the refill encounter.             Prescription approved per [facility] Refill Protocol.  Alicia Freeman RN      "

## 2021-02-10 ENCOUNTER — TELEPHONE (OUTPATIENT)
Dept: GASTROENTEROLOGY | Facility: CLINIC | Age: 31
End: 2021-02-10

## 2021-02-10 DIAGNOSIS — K62.89 RECTAL PAIN: Primary | ICD-10-CM

## 2021-02-10 NOTE — TELEPHONE ENCOUNTER
Fax received from pharmacy, Cortifoam is currently unavailable.  Please consider changing to Proctofoam.      Forwarding to provider to review/advise.    Mckenna Rodriguez RN

## 2021-02-11 NOTE — TELEPHONE ENCOUNTER
rx for proctofoam sent to pharmacy.     Akbar Lara PA-C  Gastroenterology  [facility]

## 2021-02-27 ENCOUNTER — HEALTH MAINTENANCE LETTER (OUTPATIENT)
Age: 31
End: 2021-02-27

## 2021-03-16 ENCOUNTER — VIRTUAL VISIT (OUTPATIENT)
Dept: NUTRITION | Facility: CLINIC | Age: 31
End: 2021-03-16
Payer: COMMERCIAL

## 2021-03-16 DIAGNOSIS — E11.9 TYPE 2 DIABETES MELLITUS WITHOUT COMPLICATION, WITHOUT LONG-TERM CURRENT USE OF INSULIN (H): Primary | ICD-10-CM

## 2021-03-16 DIAGNOSIS — E11.65 TYPE 2 DIABETES MELLITUS WITH HYPERGLYCEMIA, WITHOUT LONG-TERM CURRENT USE OF INSULIN (H): ICD-10-CM

## 2021-03-16 DIAGNOSIS — E66.01 MORBID OBESITY WITH BODY MASS INDEX OF 45.0-49.9 IN ADULT (H): ICD-10-CM

## 2021-03-16 PROCEDURE — 97803 MED NUTRITION INDIV SUBSEQ: CPT | Mod: 95 | Performed by: DIETITIAN, REGISTERED

## 2021-03-16 RX ORDER — METFORMIN HCL 500 MG
TABLET, EXTENDED RELEASE 24 HR ORAL
Qty: 60 TABLET | Refills: 0 | Status: SHIPPED | OUTPATIENT
Start: 2021-03-16 | End: 2021-04-15

## 2021-03-16 NOTE — PROGRESS NOTES
Medical Nutrition Therapy  Visit Type: Reassessment and intervention    Visit Details    How would you like to obtain your AVS? MyChart  If the correspondence for visit is dropped, how would you like your dietitian to reconnect with you:   call back by phone? Yes    Text to cell phone: [phone]   Will anyone else be joining your video visit or telephone call? yes  {If patient encounters technical issues they should call [phone] :69    Type of service:  Telephone (waited on video visit and called pt he prefers telephone over video)    Start Time: 10:03 PM    End Time: 10:25PM    Originating Location (pt. Location): Home    Distant Location (provider location):  [facility] WORKING VIRTUAL FROM HOME           Referring Provider: Akbar Lara  [facility]     REASON FOR REFERRAL:   Bertram Foreman is a 30 year old male who is interested in Medical Nutrition Therapy (MNT) and education related to GI issues diarrhea and weight management. Type 2 diabetes. Not struggling with diarrhea any more when started fiber it help been on not even a week. Comes and goes sometimes has it 3-5 days in a role. The Citracal has been helpful and doesn't have loose water stool.   He is accompanied by [facility] worker goes to all appointments.     Changes since previous consult Yes         Behavior Status:Improvement shown  Barriers Include:Motivation/Ready to change , Lack of social support, Lack of cooking skills , Lack of control over emotions/behaviors and Lack of nutrition knowlege       Has been taking the Citracal and seems to be helping with the diarrhea. His BMs are now more soft and formed and every other day and not diarrhea every day. Has been trying to work on portion sizes but still struggling with cutting back on carbs and increasing protein intake. He has been drinking pop since a kid and hard to kick the habit. He has been doing egg bakes with broccoli. He has been  having snacks but still struggling with picking healthy options. He continues to go to chips/crackers. He did try almonds once as a snack. He is willing to do it again. Open to trying more nuts and seeds instead of chips. He likes sunflower seeds. He hasn't had sunflower seed butter before and open to it. He likes ants on a log so open to try as snack. He loves quac and open to trying with veggies. He has never tried it with veggies.     NUTRITION ASSESSMENT:   Nutritional Goals 2/2/2021   Nutritional Goal Create healthier eating patterns;Create a plan to lose weight;Work on meal planning/recipes;Overcome emotional eating;Stabilize blood sugars;Eliminate cravings for sugar and refined carbohydrates;Increase energy;Address anxiety and or depression around eating;What to eat for my specific health concerns        Neurological 2/2/2021   Migraine Headaches Past   Tension Headache Current       No flowsheet data found.   No flowsheet data found.   Gastrointestinal 2/2/2021   Constipation Current   Nausea or Vomiting Past;Current   Hemorrhoids Past;Current   Diarrhea Current   Gas/bloating Past;Current   Heartburn/Reflux/GERD Past;Current   Blood in stool Past;Current   Abdominal Pain Current       No flowsheet data found.   Endocrine 2/2/2021   Type 2 diabetes Current   Overweight/obesity Past;Current      Skin 2/2/2021   Acne Past   Dry Skin Past;Current      Cardiopulmonary 2/2/2021   High Cholesterol Current      Musculoskeletal 2/2/2021   Restless Legs Current      Psychological 2/2/2021   Depression/Anxiety Past;Current   Binge eating disorder Past;Current      No flowsheet data found.   No flowsheet data found.     Past Medical History:  Past Medical History:   Diagnosis Date     Sleep apnea        Previous Surgeries:   Past Surgical History:   Procedure Laterality Date     COLONOSCOPY N/A 12/28/2020    Procedure: COLONOSCOPY, WITH BIOPSY;  Surgeon: Haley Hernandez MD;  Location:  GI        Family History:  No  family history on file.     Lifestyle History:  Lifestyle 2/2/2021   Do you feel your life is stressful right now?  Yes   What is the cause(s) of stress in your life?  Finances;Work;Emotional problems   Are you currently implementing any strategies to help manage stress? Yes   What are you doing to manage stress?  Breathing exercises;Movement and exercise;Massage;Counseling;TV;Take time for self;Listening to music        Exercise History:  Exercise 2/2/2021   Does your occupation require extended periods of sitting?  No   Does your occupation require extended periods of repetitive movements (ex: walking or lifting)?  Yes   Do you currently participate in any forms of exercise? Yes   Check all the exercises you participate in: Walking;Biking;Swimming   How many times per week do you exercise? 2 times   How long do you usually exercise? 30 min        Sleep History:  Sleep 2/2/2021   How many hours (on average) do you sleep per night? 9-11   What time do you turn off the lights? 10 PM   How long does it take for you to fall asleep? 15-20 mins   What time do you stop using electronic devices? 10 PM   What time do you wake up? 11 AM   When do you eat your first meal?  11 AM   Do you feel well-rested during the day?  No   Do you take naps?  Yes   Do you have a comfortable bedroom environment (cool, quiet, dark, etc)? Yes   Do you have a sleep routine/ ritual that you do before bed?  Yes   How many hours do you spend per day looking at a screen (TV, computer, tablet and phone)? 3 to 4   Select all factors that apply to your current sleep habits: Difficulty falling asleep;Wake up in the middle of the night;Have nightmares;Take medication for sleep on most night of the week;Snore loudly or have been told you stop breathing;Eat large meals within 3 hours of going to bed;Night sweats;Feel tired/sluggish/fatigued during the day;Grinding teeth        Nutrition History:  Nutrition 2/2/2021   Have you ever had a nutrition  consultation? Yes   Do you currently follow a special diet or nutritional program? No   What do you feel are the biggest barriers getting in the way of achieving you nutritional goals? Motivation/Readiness to change;Lack of control over emotions/behaviors;Stress   Do you have any food allergies, sensitivities or intolerances?  No       Digestion 2/2/2021   Do you experience stomach pains/cramping? Daily   Do you experience bloating?  2-3 times per week   Do you experience gas?  2-3 times per week   Do you experience heartburn/acid reflux/indigestion? 2-3 times per week   How often do you have a bowel movement? 3 or more times per day   What is a typical bowel movement like for you? Select all that apply: Loose;Liquid;With blood visible      Food Access:  2/2/2021   Who does the grocery shopping? Self   How often is grocery shopping done? Weekly   Where do you usually receive your groceries from? Select all that apply: [employer];Other   Do you read food labels? No   Who does the cooking? Select all that apply: Self;Assistant   How many meals do you eat out per week?  3 to 5   What restaurants do you typically choose? [employer] ([employer], [employer], etc.)      Daily Patterns: 2/2/2021   How many days per week do you have breakfast? 3   How many days per week do you have lunch? 6   How many days per week do you have dinner? 7   How many days per week do you have snacks? 5      Protein Intake: 2/2/2021   How many times per day do you typically consume a protein source(s)? 4   What types of protein do you currently eat?  Ground Beef;Steak;Hamburgers;Beef Roast;Pork Chops;Pork Ribs;[place];Deli Ham;Sausage;Tuna;Chicken Breast;Chicken Thighs/Legs;Eggs       Fat Intake:  2/2/2021   How many times per day do you typically consume healthy fat(s)? 1   What types of health fats do you currently eat? Select all that apply:  Santana;Butter;Salad dressings       Fruit Intake:  2/2/2021   How many times per day do you  typically consume fruits? 1   What types of fruit do currently eat? Apple;Banana;Cantaloupe;Mandarin oranges;Pineapple       Vegetable Intake:  2/2/2021   How many times per day do you typically consume vegetables? 1   What types of vegetables do you currently eat? Broccoli;Carrots;Celery;Corn;Green onions/scallions;Onions;Peas;Potato (baked, boiled, mashed, French fries);Yam, sweet potato      Grain Intake:  2/2/2021   How many times per day do you typically consume grains? 2   What types of grains do currently eat? Select all that apply:  Breads (non-gluten free);Cereals (non-gluten free);Chips (non-gluten free);Crackers (non-gluten free);Pasta (non-gluten free);Pretzels (non-gluten free)       Dairy Intake:  2/2/2021   How many times per day do you typically consume dairy? 1   What types of dairy do currently eat? Select all that apply:  Milk;Cheese;Yogurt;Ice cream       Non-Dairy Alternative Intake:  2/2/2021   How many times per day do you typically consume non-dairy alternatives? 0   What types of non-dairy alternatives do currently eat? Select all that apply:  Other cheese       Sweets Intake:  2/2/2021   How many times per day do you typically consume sweets? 1      Beverage Intake:  2/2/2021   How many 8 oz cups of water do you typically consume per day?  0 to 1   How many 8 oz cups of caffeine do you typically consume per day?  1 to 2   How many drinks of alcohol do you typically consume per week (1 drink = 5 oz wine, 12 oz beer, 1.5 oz spirits)?   0       Lifestyle Recall:  2/2/2021   What time did you wake up? 8 AM   What time did you go to sleep? 8 PM   What time did you have breakfast? No breakfast   What time did you have a morning snack? 9-10 AM   Where did you have your morning snack? Home   What time did you have lunch? No lunch   What time did you have an afternoon snack? No snack   What time did you have dinner? 3-4 PM   Where did you have dinner?  Restaurant   What time did you have an evening  snack? 7-8 PM   Where did you have your evening snack? Home   What time of day did you exercise? No Exercise          Additional concerns: If has breakfast does a replacement shake - [facility] will make eggs, barbour or sausage. Does microwave and doesn't do much cooking.   Has the means to do cooking but now just doesn't feel like it since mom passed away.     [email or web address]    MEDICATIONS:  Current Outpatient Medications   Medication Sig Dispense Refill     acetaminophen (TYLENOL) 325 MG tablet Take 325-650 mg by mouth as needed for mild pain        atorvastatin (LIPITOR) 20 MG tablet TAKE 1 TABLET BY MOUTH ONCE DAILY EVERY MORNING (AM PLUSPAK) 28 tablet 5     blood glucose (ACCU-CHEK GUIDE) test strip Use to test blood sugar 2 times daily or as directed. 100 strip 6     blood glucose monitoring (ACCU-CHEK FASTCLIX) lancets USE TO TEST TWICE DAILY OR AS DIRECTED 102 each 3     divalproex sodium extended-release (DEPAKOTE ER) 500 MG 24 hr tablet TAKE THREE TABLETS BY MOUTH EVERY NIGHT AT BEDTIME (HS PLUSPAK)       FIBER PO Take 1 chew tab by mouth daily       glimepiride (AMARYL) 4 MG tablet TAKE 1 TABLET BY MOUTH ONCE DAILY EVERY MORNING BEFORE BREAKFAST 90 tablet 1     hydrocortisone, Perianal, (HYDROCORTISONE) 2.5 % cream Place rectally 2 times daily as needed for hemorrhoids (Patient taking differently: Place rectally as needed for hemorrhoids ) 1 Tube 0     metFORMIN (GLUCOPHAGE-XR) 500 MG 24 hr tablet TAKE 1 TABLET (500 MG) BY MOUTH 2 TIMES DAILY WITH MEALS 60 tablet 0     Multiple Vitamins-Minerals (MENS MULTIVITAMIN PO)        omeprazole (PRILOSEC) 20 MG DR capsule TAKE 1 CAPSULE BY MOUTH TWICE A DAY (AM & EVENING PLUSPAK) 56 capsule 3     pramoxine-HC (PRAMOSONE) 1-2.5 % external cream Place rectally 3 times daily 28.4 g 0     QUEtiapine (SEROQUEL) 300 MG tablet Take 300 mg by mouth At Bedtime   0     TRAZODONE HCL PO Take 100 mg by mouth At Bedtime          Dietary Supplements 2/2/2021    Individual Vitamin Supplements General multivitamin   Herbal Complimentary products Fiber supplement         ALLERGIES:   Allergies   Allergen Reactions     Ceclor [Cefaclor] Swelling     Erythromycin GI Disturbance        .na  LABS:  Last Basic Metabolic Panel:  Lab Results   Component Value Date     09/08/2020     05/20/2020     01/16/2020      Lab Results   Component Value Date    POTASSIUM 4.0 09/08/2020    POTASSIUM 4.2 05/20/2020    POTASSIUM 4.2 01/16/2020     Lab Results   Component Value Date    CHLORIDE 105 09/08/2020    CHLORIDE 102 05/20/2020    CHLORIDE 105 01/16/2020     Lab Results   Component Value Date    CORY 9.1 09/08/2020    CORY 9.1 05/20/2020    CORY 9.4 01/16/2020     Lab Results   Component Value Date    CO2 27 09/08/2020    CO2 29 05/20/2020    CO2 26 01/16/2020     Lab Results   Component Value Date    BUN 9 09/08/2020    BUN 13 05/20/2020    BUN 13 01/16/2020     Lab Results   Component Value Date    CR 0.47 09/08/2020    CR 0.63 05/20/2020    CR 0.67 01/16/2020     Lab Results   Component Value Date     09/08/2020     05/20/2020     01/16/2020       Last Glucose Profile:   Hemoglobin A1C   Date Value Ref Range Status   09/08/2020 6.8 (H) 0 - 5.6 % Final     Comment:     Normal <5.7% Prediabetes 5.7-6.4%  Diabetes 6.5% or higher - adopted from ADA   consensus guidelines.     05/20/2020 8.0 (H) 0 - 5.6 % Final     Comment:     Normal <5.7% Prediabetes 5.7-6.4%  Diabetes 6.5% or higher - adopted from ADA   consensus guidelines.     01/16/2020 7.2 (H) 0 - 5.6 % Final     Comment:     Normal <5.7% Prediabetes 5.7-6.4%  Diabetes 6.5% or higher - adopted from ADA   consensus guidelines.         Last Lipid Profile:   Cholesterol   Date Value Ref Range Status   09/08/2020 157 <200 mg/dL Final   01/16/2020 230 (H) <200 mg/dL Final     Comment:     Desirable:       <200 mg/dl   07/05/2019 226 (H) <200 mg/dL Final     Comment:     Desirable:       <200 mg/dl      HDL Cholesterol   Date Value Ref Range Status   09/08/2020 40 >39 mg/dL Final   01/16/2020 43 >39 mg/dL Final   07/05/2019 38 (L) >39 mg/dL Final     LDL Cholesterol Calculated   Date Value Ref Range Status   09/08/2020 83 <100 mg/dL Final     Comment:     Desirable:       <100 mg/dl   01/16/2020 151 (H) <100 mg/dL Final     Comment:     Above desirable:  100-129 mg/dl  Borderline High:  130-159 mg/dL  High:             160-189 mg/dL  Very high:       >189 mg/dl     07/05/2019 152 (H) <100 mg/dL Final     Comment:     Above desirable:  100-129 mg/dl  Borderline High:  130-159 mg/dL  High:             160-189 mg/dL  Very high:       >189 mg/dl       Triglycerides   Date Value Ref Range Status   09/08/2020 170 (H) <150 mg/dL Final     Comment:     Borderline high:  150-199 mg/dl  High:             200-499 mg/dl  Very high:       >499 mg/dl  Non Fasting     01/16/2020 180 (H) <150 mg/dL Final     Comment:     Borderline high:  150-199 mg/dl  High:             200-499 mg/dl  Very high:       >499 mg/dl     07/05/2019 182 (H) <150 mg/dL Final     Comment:     Borderline high:  150-199 mg/dl  High:             200-499 mg/dl  Very high:       >499 mg/dl       No results found for: CHOLHDLRATIO    Most recent CBC:  Recent Labs   Lab Test 09/08/20  1136 07/05/19  1009 01/08/19  1503   WBC 12.0* 12.6* 11.9*   HGB 12.7* 12.5* 12.9*   HCT 40.6 39.5* 40.7    272 280     Most recent hepatic panel:  Recent Labs   Lab Test 09/08/20  1136 01/16/20  1056   ALT 55 49   AST 28 22     Most recent creatinine:  Recent Labs   Lab Test 09/08/20  1136 05/20/20  0956   CR 0.47* 0.63*       No components found for: GFRESETIMATEDLASTLAB(gfrestblack:1@  Lab Results   Component Value Date    ALBUMIN 3.6 09/08/2020       Last Thyroid Profile:   TSH   Date Value Ref Range Status   09/08/2020 3.18 0.40 - 4.00 mU/L Final   07/05/2019 3.40 0.40 - 4.00 mU/L Final   01/08/2019 2.13 0.40 - 4.00 mU/L Final     T4 Free   Date Value Ref Range  "Status   07/05/2019 0.79 0.76 - 1.46 ng/dL Final       Last Mineral Profile:   No results found for: MIRANDA, IRON, FEB    Autoimmune & Inflammatory   CRP Inflammation   Date Value Ref Range Status   12/10/2020 8.0 0.0 - 8.0 mg/L Final         Last Vitamin Profile:   No results found for: YCX377, XIOW554, [id number], VITD3, D2VIT, D3VIT, DTOT, [id number], [id number], [id number], [id number], [id number], [id number]    ANTHROPOMETRICS:  Vitals:   BP Readings from Last 1 Encounters:   01/19/21 128/65     Pulse Readings from Last 1 Encounters:   01/19/21 83     Estimated body mass index is 49.44 kg/m  as calculated from the following:    Height as of 1/19/21: 1.842 m (6' 0.5\").    Weight as of 1/19/21: 167.6 kg (369 lb 9.6 oz).    Wt Readings from Last 5 Encounters:   01/19/21 (!) 167.6 kg (369 lb 9.6 oz)   01/19/21 (!) 168.7 kg (372 lb)   01/05/21 (!) 169.2 kg (373 lb)   12/04/20 (!) 169.4 kg (373 lb 6.4 oz)   11/10/20 (!) 169.3 kg (373 lb 5 oz)       Weight Change: would like to lose 100lbs       NUTRITION DIAGNOSIS:   1. Altered nutrition-related laboratory values related to endocrine dysfunction as evidenced by elevated BMI, HbA1c     2. Altered nutrition-related laboratory values related to cardiac as evidenced by elevated Total chol, elevated TG, LDL, and low HDL.     3. Overweight/Obesity related to food and nutrition related knowledge deficit (excessive CHO intake, Inadequate fiber intake, inappropriate intake of healthy omega 3 fatty acids) as evidenced by nutrition intake record (limited variety of foods), A1c >6%, recent labs.    4. Overweight/obesity related to inability to sustain diet/lifestyle change, as evidenced by many previous attempts at weight loss with weight regain over the past five years.     NUTRITION INTERVENTION   CARDIOMETABOLIC     Long Term Goals:   Goal: Lipid profile; Decrease TG <150 mg/dL, Decrease LDL <100mg/dL within 6 months   Goal: Lose 20-24lbs in 6 months.   Goal: Decrease Hemoglobin " A1c <6 % within 2-6 months   Goal: Increase hemoglobin levels within 6 months     Short Term Goals:    1. Focus on cutting back on carbs and increase protein intake - see handouts and info below - for example try to cut back on bag of chips and or 1 can of pop per day.  2.   Start having snack 1-2x daily with healthy fats (ex: guac, nuts, hummus) and add a fruit/veggie for fiber- apple or banana with nut/seed butter (almond butter ) or sunflower seeds (sunflower seed butter with celery ) see ideas discussed and provided   3.  Get the [employer]greatness[employer] wellness journal to set some goals       Test Blood Sugars    Measure your fasting blood sugar daily, first thing in morning before breakfast. Ideally your fasting blood sugar should be between 70-80 mg/dL.     Measure your blood sugar two hours after breakfast and two hours after dinner. Ideally your two-hour sugars should never go over 120mg/dL.     Mediterranean Approach: Eat whole, unprocessed real foods in their unprocessed forms such as fruits, vegetables, whole grains (prefer gluten free), nuts, legumes, extra virgin olive oil, spices, modest amounts of poultry, and fish.      Cardiometabolic Food plan - 1,800 calories per day     Protein 10-12 servings per day - include at each meal to stabilize blood sugars   (Choose 3oz or 21g per meal and aim for 1oz of 7g for snacks)   - Strive for 1-2 servings of fish per week especially of higher omega-3 fatty acid containing fish such as salmon.     Legumes 2 serving per day     Dairy alternatives 2-3 serving per day     Nuts and seeds 3-4 servings per day - great to incorporate as snacks    Fats and oils 4 servings per day     Non starchy vegetables 8-10 servings per day     Starchy vegetable limit 1 serving per day as they tend to impact blood sugar (they are moderate-GI).     Fruits 2 servings per day - best to couple with a little bit of protein or fat to offset a rise in blood sugars (they are  low-moderate-GI foods).     Whole grains 2 serving per day - try gluten free whole grains instead      Choose Low Glycemic (GI) foods: Regulate your sugar levels by eating foods that do not spike blood sugars.  Eat low -GI foods so only small fluctuations in blood glucose and insulin levels are produced.      Examples of low-GI foods: nuts, seeds, GF oats, most vegetables especially non-starchy and fruits.     Medium or high-GI foods should be eaten with a protein or fat, both of which blunt the glycemic effect of these foods. This reduces the overall glycemic impact of a meal.   Ex: Most grains and starchy veggies are medium/high GI.     Avoid foods containing refined sugars, artificial sweeteners, and refined grains they are considered high-GI because they lead to sharp increases in blood sugars levels, which increase insulin sensitivity causing increased TG, and low good cholesterol (HDL).   Ex: cakes, cookies, pies, bread, sodas, fruit drinks, presweetened tea, coffee drinks, energy or sport drinks, flavored milk and other processed foods.     Choose foods high in fiber: Aim for at least 5 grams of fiber per serving of food or a total of 25-35 grams fiber per day. Remember, when looking at the label, you can take the fiber away from the total carbs. Ex:15g of total carbs - 4g of fiber = 11g net carbs     Insoluble fiber acts like a bulky  inner broom,  sweeping out debris from the intestine and creating more motility and movement.      Soluble fiber attracts water and swells, creating a gel that slows digestion.  Also, slows the release of glucose from foods into the blood which stops spikes in blood sugar levels.  Soluble fiber traps toxins in the gut, helping to carry them to excretion and provides healthy bacteria in the digestive tract.     Choose High Quality Fats: Adding anti-inflammatory fats into your diet such as fish (salmon, herring, mackerel, and sardines), omega 3 eggs, elidia seeds, ground flax  seeds/milk, hemp seeds/milk, walnuts and some other certain leafy greens will increase omega-3 fats to omeaga-6 fats ratio.     Therapeutic fats both monounsaturated and polyunsaturated to include daily: ground flaxseeds, unsalted mixed nuts, avocados, olives, extra-virgin olive oil.     Emphasize high-quality oils and fats in the diet daily such as avocado oil, coconut oil, flaxseed, olive, sesame. Ex: 1 tsp to 1 tbsp of MCT oil from coconuts can be added into coffee, smoothies, and salad dressings per day.     Avoid trans fats found in processed foods     Drink more water. Hydration is critical, so drink at least six to eight glasses of water a day. Drink more water between meals and less at meals.     Try adding herbal teas (sugar free) or lemon/lime/cucumber/fruit to water for flavor. Avoid artificial sweetener packets to flavor your water.     Cut back on coffee switch to green tea. Avoid adding sugar and milk to coffee instead use dairy alternatives such as almond, flax, coconut milk.       Increase physical activity. Moving our body helps move our bowels and speeds up your metabolism.     Exercise 15-60 minutes daily--whether that looks like burst training, yoga, or vigorous walking.      NUTRITION RESOURCES:  1. Purchase Eat Fat Get Thin by Kenneth Membreno Book/Cookbook   2. [facility] Cardiometabolic Packet     Functional Nutrition Fundamentals     Comprehensive Guide    Meal plan with recipes           PATIENT'S BEHAVIOR CHANGE GOALS:   See nutrition intervention for patient stated behavior change goals. AVS was printed and given to patient at today's appointment.    MONITOR / EVALUATE:  Registered Dietitian will monitor/evaluate the following:     Beliefs and attitudes related to food    Food and nutrition knowledge / skills    Food / Beverage / Nutrient intake     Pertinent Labs    Progress toward meeting stated nutrition-related goals    Readiness to change nutrition-related behaviors    Weight change    Digestion      COORDINATION OF CARE:  Follow up with gastroenterology      FOLLOW-UP:  Follow-up appointment scheduled on 4/13/21 at 11am phone visit      Time spent in minutes: 21 minutes  Encounter: Individual    Angie Levy RD, CLT, LD  Integrative Registered Dietitian

## 2021-03-16 NOTE — PATIENT INSTRUCTIONS
CARDIOMETABOLIC     Long Term Goals:   Goal: Lipid profile; Decrease TG <150 mg/dL, Decrease LDL <100mg/dL within 6 months   Goal: Lose 20-24lbs in 6 months.   Goal: Decrease Hemoglobin A1c <6 % within 2-6 months   Goal: Increase hemoglobin levels within 6 months     Short Term Goals:    1. Focus on cutting back on carbs and increase protein intake - see handouts and info below - for example try to cut back on bag of chips and or 1 can of pop per day.  2.   Start having snack 1-2x daily with healthy fats (ex: guac, nuts, hummus) and add a fruit/veggie for fiber- apple or banana with nut/seed butter (almond butter ) or sunflower seeds (sunflower seed butter with celery ) see ideas discussed and provided   3.  Get the [employer] wellness journal to set some goals       Test Blood Sugars    Measure your fasting blood sugar daily, first thing in morning before breakfast. Ideally your fasting blood sugar should be between 70-80 mg/dL.     Measure your blood sugar two hours after breakfast and two hours after dinner. Ideally your two-hour sugars should never go over 120mg/dL.     Mediterranean Approach: Eat whole, unprocessed real foods in their unprocessed forms such as fruits, vegetables, whole grains (prefer gluten free), nuts, legumes, extra virgin olive oil, spices, modest amounts of poultry, and fish.      Cardiometabolic Food plan - 1,800 calories per day     Protein 10-12 servings per day - include at each meal to stabilize blood sugars   (Choose 3oz or 21g per meal and aim for 1oz of 7g for snacks)   - Strive for 1-2 servings of fish per week especially of higher omega-3 fatty acid containing fish such as salmon.     Legumes 2 serving per day     Dairy alternatives 2-3 serving per day     Nuts and seeds 3-4 servings per day - great to incorporate as snacks    Fats and oils 4 servings per day     Non starchy vegetables 8-10 servings per day     Starchy vegetable limit 1 serving per day as they  tend to impact blood sugar (they are moderate-GI).     Fruits 2 servings per day - best to couple with a little bit of protein or fat to offset a rise in blood sugars (they are low-moderate-GI foods).     Whole grains 2 serving per day - try gluten free whole grains instead      Choose Low Glycemic (GI) foods: Regulate your sugar levels by eating foods that do not spike blood sugars.  Eat low -GI foods so only small fluctuations in blood glucose and insulin levels are produced.      Examples of low-GI foods: nuts, seeds, GF oats, most vegetables especially non-starchy and fruits.     Medium or high-GI foods should be eaten with a protein or fat, both of which blunt the glycemic effect of these foods. This reduces the overall glycemic impact of a meal.   Ex: Most grains and starchy veggies are medium/high GI.     Avoid foods containing refined sugars, artificial sweeteners, and refined grains they are considered high-GI because they lead to sharp increases in blood sugars levels, which increase insulin sensitivity causing increased TG, and low good cholesterol (HDL).   Ex: cakes, cookies, pies, bread, sodas, fruit drinks, presweetened tea, coffee drinks, energy or sport drinks, flavored milk and other processed foods.     Choose foods high in fiber: Aim for at least 5 grams of fiber per serving of food or a total of 25-35 grams fiber per day. Remember, when looking at the label, you can take the fiber away from the total carbs. Ex:15g of total carbs - 4g of fiber = 11g net carbs     Insoluble fiber acts like a bulky  inner broom,  sweeping out debris from the intestine and creating more motility and movement.      Soluble fiber attracts water and swells, creating a gel that slows digestion.  Also, slows the release of glucose from foods into the blood which stops spikes in blood sugar levels.  Soluble fiber traps toxins in the gut, helping to carry them to excretion and provides healthy bacteria in the digestive  tract.     Choose High Quality Fats: Adding anti-inflammatory fats into your diet such as fish (salmon, herring, mackerel, and sardines), omega 3 eggs, elidia seeds, ground flax seeds/milk, hemp seeds/milk, walnuts and some other certain leafy greens will increase omega-3 fats to omeaga-6 fats ratio.     Therapeutic fats both monounsaturated and polyunsaturated to include daily: ground flaxseeds, unsalted mixed nuts, avocados, olives, extra-virgin olive oil.     Emphasize high-quality oils and fats in the diet daily such as avocado oil, coconut oil, flaxseed, olive, sesame. Ex: 1 tsp to 1 tbsp of MCT oil from coconuts can be added into coffee, smoothies, and salad dressings per day.     Avoid trans fats found in processed foods     Drink more water. Hydration is critical, so drink at least six to eight glasses of water a day. Drink more water between meals and less at meals.     Try adding herbal teas (sugar free) or lemon/lime/cucumber/fruit to water for flavor. Avoid artificial sweetener packets to flavor your water.     Cut back on coffee switch to green tea. Avoid adding sugar and milk to coffee instead use dairy alternatives such as almond, flax, coconut milk.       Increase physical activity. Moving our body helps move our bowels and speeds up your metabolism.     Exercise 15-60 minutes daily--whether that looks like burst training, yoga, or vigorous walking.      NUTRITION RESOURCES:  1. Purchase Eat Fat Get Thin by Kenneth Membreno Book/Cookbook   2. [facility] Cardiometabolic Packet     Functional Nutrition Fundamentals     Comprehensive Guide    Meal plan with recipes

## 2021-03-16 NOTE — TELEPHONE ENCOUNTER
"Routing refill request to provider for review/approval because:  Labs not current:  A1C  T'd up 1 month for provider review.    Requested Prescriptions   Pending Prescriptions Disp Refills     metFORMIN (GLUCOPHAGE-XR) 500 MG 24 hr tablet [Pharmacy Med Name: METFORMIN 500MG ER TABLET] 60 tablet 0     Sig: TAKE 1 TABLET (500 MG) BY MOUTH 2 TIMES DAILY WITH MEALS   Last Written Prescription Date:  60  Last Fill Quantity: 0,  # refills: 2/8/2021   Last office visit: 1/19/2021 with prescribing provider:     Future Office Visit:        Biguanide Agents Failed - 3/16/2021  9:29 AM        Failed - Patient has documented A1c within the specified period of time.     If HgbA1C is 8 or greater, it needs to be on file within the past 3 months.  If less than 8, must be on file within the past 6 months.     Recent Labs   Lab Test 09/08/20  1136   A1C 6.8*           Passed - Patient is age 10 or older        Passed - Patient's CR is NOT>1.4 OR Patient's EGFR is NOT<45 within past 12 mos.     Recent Labs   Lab Test 09/08/20  1136   GFRESTIMATED >90   GFRESTBLACK >90     Recent Labs   Lab Test 09/08/20  1136   CR 0.47*           Passed - Patient does NOT have a diagnosis of CHF.        Passed - Medication is active on med list        Passed - Recent (6 mo) or future (30 days) visit within the authorizing provider's specialty     Patient had office visit in the last 6 months or has a visit in the next 30 days with authorizing provider or within the authorizing provider's specialty.  See \"Patient Info\" tab in inbasket, or \"Choose Columns\" in Meds & Orders section of the refill encounter.             Alicia Freeman RN      "

## 2021-03-17 DIAGNOSIS — E11.9 TYPE 2 DIABETES MELLITUS WITHOUT COMPLICATION, WITHOUT LONG-TERM CURRENT USE OF INSULIN (H): ICD-10-CM

## 2021-03-18 RX ORDER — BLOOD SUGAR DIAGNOSTIC
STRIP MISCELLANEOUS
Qty: 500 STRIP | Refills: 1 | Status: SHIPPED | OUTPATIENT
Start: 2021-03-18 | End: 2021-04-08

## 2021-03-18 NOTE — TELEPHONE ENCOUNTER
Prescription approved per [facility] Refill Protocol.    Pinky Calderon RN

## 2021-03-19 ENCOUNTER — TELEPHONE (OUTPATIENT)
Dept: GASTROENTEROLOGY | Facility: CLINIC | Age: 31
End: 2021-03-19

## 2021-03-19 DIAGNOSIS — K64.4 EXTERNAL HEMORRHOIDS: ICD-10-CM

## 2021-03-19 NOTE — TELEPHONE ENCOUNTER
PRIOR AUTHORIZATION DENIED    Medication: pramoxine-HC (PRAMOSONE) 1-2.5 % external cream--DENIED    Denial Date: 3/19/2021    Denial Rational: Per insurance rep, medication is excluded.  Unable to do a PA.

## 2021-03-19 NOTE — TELEPHONE ENCOUNTER
Prior Authorization Retail Medication Request    Medication/Dose: Hydrocortisone Ace- Pramoxine 2.5- 1% Cream  ICD code (if different than what is on RX):    Previously Tried and Failed:    Rationale:      Insurance Name:    Insurance ID:        Pharmacy Information (if different than what is on RX)  Name:    Phone:      Lucille Gruber LPN

## 2021-03-22 RX ORDER — HYDROCORTISONE 25 MG/G
CREAM TOPICAL 2 TIMES DAILY PRN
Qty: 28 G | Refills: 0 | Status: SHIPPED | OUTPATIENT
Start: 2021-03-22 | End: 2022-10-05

## 2021-03-22 NOTE — TELEPHONE ENCOUNTER
LPN spoke with patient and notified him that the original Rx was denied by his insurance and that an alternative Rx for Hydrocortisone cream was sent to his pharmacy. Patient instructed to contact his pharmacy to find out when this will be ready for . Patient verbalized understanding and had no further questions.    Lucille Gruber LPN

## 2021-03-30 ENCOUNTER — OFFICE VISIT (OUTPATIENT)
Dept: FAMILY MEDICINE | Facility: CLINIC | Age: 31
End: 2021-03-30
Payer: COMMERCIAL

## 2021-03-30 VITALS
RESPIRATION RATE: 20 BRPM | HEART RATE: 76 BPM | BODY MASS INDEX: 49.3 KG/M2 | DIASTOLIC BLOOD PRESSURE: 80 MMHG | TEMPERATURE: 95.6 F | WEIGHT: 315 LBS | SYSTOLIC BLOOD PRESSURE: 124 MMHG

## 2021-03-30 DIAGNOSIS — E66.01 MORBID OBESITY (H): ICD-10-CM

## 2021-03-30 DIAGNOSIS — R19.7 DIARRHEA, UNSPECIFIED TYPE: Primary | ICD-10-CM

## 2021-03-30 DIAGNOSIS — E11.65 TYPE 2 DIABETES MELLITUS WITH HYPERGLYCEMIA, WITHOUT LONG-TERM CURRENT USE OF INSULIN (H): ICD-10-CM

## 2021-03-30 LAB
CREAT UR-MCNC: 313 MG/DL
HBA1C MFR BLD: 7.5 % (ref 0–5.6)
MICROALBUMIN UR-MCNC: 124 MG/L
MICROALBUMIN/CREAT UR: 39.62 MG/G CR (ref 0–17)

## 2021-03-30 PROCEDURE — 36415 COLL VENOUS BLD VENIPUNCTURE: CPT | Performed by: PHYSICIAN ASSISTANT

## 2021-03-30 PROCEDURE — 99214 OFFICE O/P EST MOD 30 MIN: CPT | Performed by: PHYSICIAN ASSISTANT

## 2021-03-30 PROCEDURE — 83036 HEMOGLOBIN GLYCOSYLATED A1C: CPT | Performed by: PHYSICIAN ASSISTANT

## 2021-03-30 PROCEDURE — 82043 UR ALBUMIN QUANTITATIVE: CPT | Performed by: PHYSICIAN ASSISTANT

## 2021-03-30 RX ORDER — IBUPROFEN 200 MG
600-800 TABLET ORAL EVERY 6 HOURS PRN
COMMUNITY
End: 2023-04-27

## 2021-03-30 ASSESSMENT — PAIN SCALES - GENERAL: PAINLEVEL: NO PAIN (0)

## 2021-03-30 NOTE — PROGRESS NOTES
Assessment & Plan     Diarrhea, unspecified type  Morbid obesity  Patient has been working with both gastroenterology and nutritionist to improve his diarrhea and to lose weight. His  informs me that he has switched from citrucel to metamucil with improvement both in the frequency and consistency of his bowel movements. He says that he does forget to take it at times and will notice increased symptoms following this. He is currently using the fiber supplements twice daily. Has follow up with nutritionist later next month. Re-iterated the importance of cutting back on processed, prepackaged foods and including more raw fruits/vegetables.     Type 2 diabetes mellitus with hyperglycemia, without long-term current use of insulin (H)  Last A1c was checked in September 2020, patient is overdue for recheck. Will have him do this today. He is monitoring his feet daily and using special orthotics to reduce aches/pains. I suspect that if he is able to lose weight this will also help to reduce the discomfort in the feet.   - Albumin Random Urine Quantitative with Creat Ratio  - Hemoglobin A1c    Return in about 6 months (around 9/30/2021) for Return for scheduled annual checkup with PCP.    PAWAN Ariza [facility] SHAWNEE Burden is a 30 year old who presents for the following health issues     HPI     Diarrhea  Onset/Duration: recheck  Description:       Consistency of stool: watery and pasty       Blood in stool: YES       Number of loose stools past 24 hours: 3-4  Progression of Symptoms: same  Accompanying signs and symptoms:       Fever: no       Nausea/Vomiting: no       Abdominal pain: no       Weight loss: no       Episodes of constipation: no  History   Ill contacts: no  Recent use of antibiotics: no  Recent travels: no  Recent medication-new or changes(Rx or OTC): no  Precipitating or alleviating factors: None  Therapies tried and outcome:  Citracel/Metamucil    Patient is a 30 year old male who presents with his  today for follow up on diarrhea. They informed me that when they made the appointment the patient was experiencing increased symptoms and some blood on wiping. He has since resumed twice daily use of supplemental fiber and his symptoms have resolved. I reviewed the recent GI and nutritionist visits with him who both emphasized the importance of consistency with the recommended dietary changes. The patient is working on this as well as increasing exercise. He has been able to return to work as he is no longer experiencing fecal urgency. No other health concerns at this time.     Review of Systems   Constitutional, HEENT, cardiovascular, pulmonary, gi and gu systems are negative, except as otherwise noted.      Objective    /80 (BP Location: Right arm, Patient Position: Chair, Cuff Size: Adult Large)   Pulse 76   Temp 95.6  F (35.3  C) (Temporal)   Resp 20   Wt (!) 167.2 kg (368 lb 9.6 oz)   BMI 49.30 kg/m    Body mass index is 49.3 kg/m .  Physical Exam   GENERAL: healthy, alert and no distress  EYES: Eyes grossly normal to inspection, PERRL and conjunctivae and sclerae normal  HENT: ear canals and TM's normal, nose and mouth without ulcers or lesions  NECK: no adenopathy, no asymmetry, masses, or scars and thyroid normal to palpation  RESP: lungs clear to auscultation - no rales, rhonchi or wheezes  CV: regular rate and rhythm, normal S1 S2, no S3 or S4, no murmur, click or rub, no peripheral edema and peripheral pulses strong  ABDOMEN: soft, nontender, no hepatosplenomegaly, no masses and bowel sounds normal  MS: no gross musculoskeletal defects noted, no edema  NEURO: Normal strength and tone, mentation intact and speech normal  PSYCH: mentation appears normal, affect normal/bright    No results found for this or any previous visit (from the past 24 hour(s)).

## 2021-04-06 ENCOUNTER — TELEPHONE (OUTPATIENT)
Dept: SLEEP MEDICINE | Facility: CLINIC | Age: 31
End: 2021-04-06

## 2021-04-06 DIAGNOSIS — F51.04 PSYCHOPHYSIOLOGICAL INSOMNIA: ICD-10-CM

## 2021-04-06 DIAGNOSIS — R06.83 SNORING: Primary | ICD-10-CM

## 2021-04-06 DIAGNOSIS — R06.81 APNEA: ICD-10-CM

## 2021-04-06 DIAGNOSIS — E11.9 TYPE 2 DIABETES MELLITUS WITHOUT COMPLICATION, WITHOUT LONG-TERM CURRENT USE OF INSULIN (H): ICD-10-CM

## 2021-04-06 RX ORDER — ZOLPIDEM TARTRATE 5 MG/1
TABLET ORAL
Qty: 1 TABLET | Refills: 0 | Status: SHIPPED | OUTPATIENT
Start: 2021-04-06 | End: 2021-11-30

## 2021-04-08 RX ORDER — BLOOD SUGAR DIAGNOSTIC
STRIP MISCELLANEOUS
Qty: 100 STRIP | Refills: 1 | Status: SHIPPED | OUTPATIENT
Start: 2021-04-08 | End: 2024-04-09

## 2021-04-08 RX ORDER — LANCETS 33 GAUGE
EACH MISCELLANEOUS
Qty: 100 EACH | Refills: 1 | Status: SHIPPED | OUTPATIENT
Start: 2021-04-08 | End: 2022-08-03

## 2021-04-08 RX ORDER — BLOOD-GLUCOSE METER
EACH MISCELLANEOUS
Qty: 1 KIT | Refills: 0 | Status: SHIPPED | OUTPATIENT
Start: 2021-04-08

## 2021-04-08 NOTE — TELEPHONE ENCOUNTER
Routing refill request to provider for review/approval because:  Patient needs to switch testing supplies. Will route to provider    PRASANTH Mckeon, RN  [facility]

## 2021-04-13 ENCOUNTER — VIRTUAL VISIT (OUTPATIENT)
Dept: NUTRITION | Facility: CLINIC | Age: 31
End: 2021-04-13
Payer: COMMERCIAL

## 2021-04-13 DIAGNOSIS — E66.01 MORBID OBESITY (H): ICD-10-CM

## 2021-04-13 DIAGNOSIS — E11.9 TYPE 2 DIABETES MELLITUS WITHOUT COMPLICATION, WITHOUT LONG-TERM CURRENT USE OF INSULIN (H): Primary | ICD-10-CM

## 2021-04-13 DIAGNOSIS — K52.9 CHRONIC DIARRHEA: ICD-10-CM

## 2021-04-13 DIAGNOSIS — E11.65 TYPE 2 DIABETES MELLITUS WITH HYPERGLYCEMIA, WITHOUT LONG-TERM CURRENT USE OF INSULIN (H): ICD-10-CM

## 2021-04-13 PROCEDURE — 98968 PH1 ASSMT&MGMT NQHP 21-30: CPT | Mod: 95 | Performed by: DIETITIAN, REGISTERED

## 2021-04-13 NOTE — PROGRESS NOTES
Medical Nutrition Therapy  Visit Type: reassessment and intervention    Visit Details    How would you like to obtain your AVS? MyChart  If the correspondence for visit is dropped, how would you like your dietitian to reconnect with you:   call back by phone? Yes    Text to cell phone: [phone]   Will anyone else be joining your video visit or telephone call? yes  {If patient encounters technical issues they should call [phone] :70    Type of service:  Telephone (waited on video visit and called pt he prefers telephone over video)    Start Time: 11:02 am  End Time: 11:38am    Originating Location (pt. Location): Home    Distant Location (provider location):  [facility] WORKING VIRTUAL FROM HOME       Referring Provider: Akbar Lara  [facility]     REASON FOR REFERRAL:   Bertram Foreman is a 30 year old male who is interested in Medical Nutrition Therapy (MNT) and education related to GI issues diarrhea and weight management. Type 2 diabetes. Not struggling with diarrhea any more when started fiber it help been on not even a week. Comes and goes sometimes has it 3-5 days in a role. The Citracal has been helpful and doesn't have loose water stool.   He is accompanied by [facility] worker goes to all appointments.     Changes since previous consult Yes         Behavior Status:Improvement shown  Barriers Include:Motivation/Ready to change , Lack of social support, Lack of cooking skills , Lack of control over emotions/behaviors and Lack of nutrition knowlege     Bm's still soft and formed and more like 1-2x per week having diarrhea. Still struggling with cutting back on portion sizes to help decrease overall carbs. He has been trying to do more healthy fats instead. Bought avocados (made homemade guac), got nuts, asparagus, had a salad. He has been doing better and working on making these habits more consistent. He noticed that adding in more water, protein and  healthy fats has given him more energy.     He likes the vitamin enhanced water and trying to do this instead of pop. He likes the variety of flavors. When has been doing the water daily and only doing pop at home anymore and used to do 7-8 cans per day and not buying any cans of pop now. He has only been having 1 pop small glass when out to eat about 1 day per week.     He did weigh himself and has been maintaining the weight vs climbing. He has been going to the [employer] once per week to swim. He wants to go more. He wants to get outside and exercise more too. He has a bike and hopes to see more improvement once he gets to ride it more. He had been continually gaining weight so maintaining is really good progress. Wants to keep working on the goals we set at our last consult with emphasis on activity for weight loss.    He hasn't been checking blood sugars for a few months now and suppose to be checking twice daily.      He needs new diabetes device so going to be following up with the referring doctor. Last A1c 2 weeks ago was 7.5 which is up from 6.8 about 7months ago. Glucose by meter showed 122 mg/dL 3 months ago     Notes 3/16/21  Has been taking the Citracal and seems to be helping with the diarrhea. His BMs are now more soft and formed and every other day and not diarrhea every day. Has been trying to work on portion sizes but still struggling with cutting back on carbs and increasing protein intake. He has been drinking pop since a kid and hard to kick the habit. He has been doing egg bakes with broccoli. He has been having snacks but still struggling with picking healthy options. He continues to go to chips/crackers. He did try almonds once as a snack. He is willing to do it again. Open to trying more nuts and seeds instead of chips. He likes sunflower seeds. He hasn't had sunflower seed butter before and open to it. He likes ants on a log so open to try as snack. He loves quac and open to trying  with veggies. He has never tried it with veggies.     NUTRITION ASSESSMENT:   Nutritional Goals 2/2/2021   Nutritional Goal Create healthier eating patterns;Create a plan to lose weight;Work on meal planning/recipes;Overcome emotional eating;Stabilize blood sugars;Eliminate cravings for sugar and refined carbohydrates;Increase energy;Address anxiety and or depression around eating;What to eat for my specific health concerns        Neurological 2/2/2021   Migraine Headaches Past   Tension Headache Current       No flowsheet data found.   No flowsheet data found.   Gastrointestinal 2/2/2021   Constipation Current   Nausea or Vomiting Past;Current   Hemorrhoids Past;Current   Diarrhea Current   Gas/bloating Past;Current   Heartburn/Reflux/GERD Past;Current   Blood in stool Past;Current   Abdominal Pain Current       No flowsheet data found.   Endocrine 2/2/2021   Type 2 diabetes Current   Overweight/obesity Past;Current      Skin 2/2/2021   Acne Past   Dry Skin Past;Current      Cardiopulmonary 2/2/2021   High Cholesterol Current      Musculoskeletal 2/2/2021   Restless Legs Current      Psychological 2/2/2021   Depression/Anxiety Past;Current   Binge eating disorder Past;Current      No flowsheet data found.   No flowsheet data found.     Past Medical History:  Past Medical History:   Diagnosis Date     Sleep apnea        Previous Surgeries:   Past Surgical History:   Procedure Laterality Date     COLONOSCOPY N/A 12/28/2020    Procedure: COLONOSCOPY, WITH BIOPSY;  Surgeon: Haley eHrnandez MD;  Location:  GI        Family History:  No family history on file.     Lifestyle History:  Lifestyle 2/2/2021   Do you feel your life is stressful right now?  Yes   What is the cause(s) of stress in your life?  Finances;Work;Emotional problems   Are you currently implementing any strategies to help manage stress? Yes   What are you doing to manage stress?  Breathing exercises;Movement and exercise;Massage;Counseling;TV;Take time  for self;Listening to music        Exercise History:  Exercise 2/2/2021   Does your occupation require extended periods of sitting?  No   Does your occupation require extended periods of repetitive movements (ex: walking or lifting)?  Yes   Do you currently participate in any forms of exercise? Yes   Check all the exercises you participate in: Walking;Biking;Swimming   How many times per week do you exercise? 2 times   How long do you usually exercise? 30 min        Sleep History:  Sleep 2/2/2021   How many hours (on average) do you sleep per night? 9-11   What time do you turn off the lights? 10 PM   How long does it take for you to fall asleep? 15-20 mins   What time do you stop using electronic devices? 10 PM   What time do you wake up? 11 AM   When do you eat your first meal?  11 AM   Do you feel well-rested during the day?  No   Do you take naps?  Yes   Do you have a comfortable bedroom environment (cool, quiet, dark, etc)? Yes   Do you have a sleep routine/ ritual that you do before bed?  Yes   How many hours do you spend per day looking at a screen (TV, computer, tablet and phone)? 3 to 4   Select all factors that apply to your current sleep habits: Difficulty falling asleep;Wake up in the middle of the night;Have nightmares;Take medication for sleep on most night of the week;Snore loudly or have been told you stop breathing;Eat large meals within 3 hours of going to bed;Night sweats;Feel tired/sluggish/fatigued during the day;Grinding teeth        Nutrition History:  Nutrition 2/2/2021   Have you ever had a nutrition consultation? Yes   Do you currently follow a special diet or nutritional program? No   What do you feel are the biggest barriers getting in the way of achieving you nutritional goals? Motivation/Readiness to change;Lack of control over emotions/behaviors;Stress   Do you have any food allergies, sensitivities or intolerances?  No       Digestion 2/2/2021   Do you experience stomach  pains/cramping? Daily   Do you experience bloating?  2-3 times per week   Do you experience gas?  2-3 times per week   Do you experience heartburn/acid reflux/indigestion? 2-3 times per week   How often do you have a bowel movement? 3 or more times per day   What is a typical bowel movement like for you? Select all that apply: Loose;Liquid;With blood visible      Food Access:  2/2/2021   Who does the grocery shopping? Self   How often is grocery shopping done? Weekly   Where do you usually receive your groceries from? Select all that apply: [employer];Other   Do you read food labels? No   Who does the cooking? Select all that apply: Self;Assistant   How many meals do you eat out per week?  3 to 5   What restaurants do you typically choose? [employer] ([employer], [employer], etc.)      Daily Patterns: 2/2/2021   How many days per week do you have breakfast? 3   How many days per week do you have lunch? 6   How many days per week do you have dinner? 7   How many days per week do you have snacks? 5      Protein Intake: 2/2/2021   How many times per day do you typically consume a protein source(s)? 4   What types of protein do you currently eat?  Ground Beef;Steak;Hamburgers;Beef Roast;Pork Chops;Pork Ribs;Hartley/Jordanian Hartley;Deli Ham;Sausage;Tuna;Chicken Breast;Chicken Thighs/Legs;Eggs       Fat Intake:  2/2/2021   How many times per day do you typically consume healthy fat(s)? 1   What types of health fats do you currently eat? Select all that apply:  Santana;Butter;Salad dressings       Fruit Intake:  2/2/2021   How many times per day do you typically consume fruits? 1   What types of fruit do currently eat? Apple;Banana;Cantaloupe;Mandarin oranges;Pineapple       Vegetable Intake:  2/2/2021   How many times per day do you typically consume vegetables? 1   What types of vegetables do you currently eat? Broccoli;Carrots;Celery;Corn;Green onions/scallions;Onions;Peas;Potato (baked, boiled, mashed, French fries);Yam, sweet  potato      Grain Intake:  2/2/2021   How many times per day do you typically consume grains? 2   What types of grains do currently eat? Select all that apply:  Breads (non-gluten free);Cereals (non-gluten free);Chips (non-gluten free);Crackers (non-gluten free);Pasta (non-gluten free);Pretzels (non-gluten free)       Dairy Intake:  2/2/2021   How many times per day do you typically consume dairy? 1   What types of dairy do currently eat? Select all that apply:  Milk;Cheese;Yogurt;Ice cream       Non-Dairy Alternative Intake:  2/2/2021   How many times per day do you typically consume non-dairy alternatives? 0   What types of non-dairy alternatives do currently eat? Select all that apply:  Other cheese       Sweets Intake:  2/2/2021   How many times per day do you typically consume sweets? 1      Beverage Intake:  2/2/2021   How many 8 oz cups of water do you typically consume per day?  0 to 1   How many 8 oz cups of caffeine do you typically consume per day?  1 to 2   How many drinks of alcohol do you typically consume per week (1 drink = 5 oz wine, 12 oz beer, 1.5 oz spirits)?   0       Lifestyle Recall:  2/2/2021   What time did you wake up? 8 AM   What time did you go to sleep? 8 PM   What time did you have breakfast? No breakfast   What time did you have a morning snack? 9-10 AM   Where did you have your morning snack? Home   What time did you have lunch? No lunch   What time did you have an afternoon snack? No snack   What time did you have dinner? 3-4 PM   Where did you have dinner?  Restaurant   What time did you have an evening snack? 7-8 PM   Where did you have your evening snack? Home   What time of day did you exercise? No Exercise          Additional concerns: If has breakfast does a replacement shake - [facility] will make eggs, barbour or sausage. Does microwave and doesn't do much cooking.   Has the means to do cooking but now just doesn't feel like it since mom passed away.      [email or web address]    MEDICATIONS:  Current Outpatient Medications   Medication Sig Dispense Refill     blood glucose calibration (ONETOUCH VERIO) solution USE AS DIRECTED 1 each 0     Blood Glucose Monitoring Suppl (ONETOUCH VERIO FLEX SYSTEM) w/Device KIT USE TO TEST TWICE DAILY 1 kit 0     Lancets (ONETOUCH DELICA PLUS [id number]) MISC TEST TWICE DAILY 100 each 1     ONETOUCH VERIO IQ test strip TEST TWICE DAILY 100 strip 1     acetaminophen (TYLENOL) 325 MG tablet Take 325-650 mg by mouth as needed for mild pain        atorvastatin (LIPITOR) 20 MG tablet TAKE 1 TABLET BY MOUTH ONCE DAILY EVERY MORNING (AM PLUSPAK) 28 tablet 5     divalproex sodium extended-release (DEPAKOTE ER) 500 MG 24 hr tablet TAKE THREE TABLETS BY MOUTH EVERY NIGHT AT BEDTIME (HS PLUSPAK)       FIBER PO Take 1 chew tab by mouth daily       glimepiride (AMARYL) 4 MG tablet TAKE 1 TABLET BY MOUTH ONCE DAILY EVERY MORNING BEFORE BREAKFAST 90 tablet 1     hydrocortisone, Perianal, (HYDROCORTISONE) 2.5 % cream Place rectally 2 times daily as needed for hemorrhoids 28 g 0     ibuprofen (ADVIL/MOTRIN) 200 MG tablet As needed       metFORMIN (GLUCOPHAGE-XR) 500 MG 24 hr tablet TAKE 1 TABLET (500 MG) BY MOUTH 2 TIMES DAILY WITH MEALS 60 tablet 0     Multiple Vitamins-Minerals (MENS MULTIVITAMIN PO)        omeprazole (PRILOSEC) 20 MG DR capsule TAKE 1 CAPSULE BY MOUTH TWICE A DAY (AM & EVENING PLUSPAK) 56 capsule 3     QUEtiapine (SEROQUEL) 300 MG tablet Take 300 mg by mouth At Bedtime   0     TRAZODONE HCL PO Take 100 mg by mouth At Bedtime        zolpidem (AMBIEN) 5 MG tablet Take tablet by mouth 15 minutes prior to sleep, for Sleep Study 1 tablet 0       Dietary Supplements 2/2/2021   Individual Vitamin Supplements General multivitamin   Herbal Complimentary products Fiber supplement         ALLERGIES:   Allergies   Allergen Reactions     Ceclor [Cefaclor] Swelling     Erythromycin GI Disturbance        .na  LABS:  Last Basic  Metabolic Panel:  Lab Results   Component Value Date     09/08/2020     05/20/2020     01/16/2020      Lab Results   Component Value Date    POTASSIUM 4.0 09/08/2020    POTASSIUM 4.2 05/20/2020    POTASSIUM 4.2 01/16/2020     Lab Results   Component Value Date    CHLORIDE 105 09/08/2020    CHLORIDE 102 05/20/2020    CHLORIDE 105 01/16/2020     Lab Results   Component Value Date    CORY 9.1 09/08/2020    CORY 9.1 05/20/2020    CORY 9.4 01/16/2020     Lab Results   Component Value Date    CO2 27 09/08/2020    CO2 29 05/20/2020    CO2 26 01/16/2020     Lab Results   Component Value Date    BUN 9 09/08/2020    BUN 13 05/20/2020    BUN 13 01/16/2020     Lab Results   Component Value Date    CR 0.47 09/08/2020    CR 0.63 05/20/2020    CR 0.67 01/16/2020     Lab Results   Component Value Date     09/08/2020     05/20/2020     01/16/2020       Last Glucose Profile:   Hemoglobin A1C   Date Value Ref Range Status   03/30/2021 7.5 (H) 0 - 5.6 % Final     Comment:     Normal <5.7% Prediabetes 5.7-6.4%  Diabetes 6.5% or higher - adopted from ADA   consensus guidelines.     09/08/2020 6.8 (H) 0 - 5.6 % Final     Comment:     Normal <5.7% Prediabetes 5.7-6.4%  Diabetes 6.5% or higher - adopted from ADA   consensus guidelines.     05/20/2020 8.0 (H) 0 - 5.6 % Final     Comment:     Normal <5.7% Prediabetes 5.7-6.4%  Diabetes 6.5% or higher - adopted from ADA   consensus guidelines.         Last Lipid Profile:   Cholesterol   Date Value Ref Range Status   09/08/2020 157 <200 mg/dL Final   01/16/2020 230 (H) <200 mg/dL Final     Comment:     Desirable:       <200 mg/dl   07/05/2019 226 (H) <200 mg/dL Final     Comment:     Desirable:       <200 mg/dl     HDL Cholesterol   Date Value Ref Range Status   09/08/2020 40 >39 mg/dL Final   01/16/2020 43 >39 mg/dL Final   07/05/2019 38 (L) >39 mg/dL Final     LDL Cholesterol Calculated   Date Value Ref Range Status   09/08/2020 83 <100 mg/dL Final      Comment:     Desirable:       <100 mg/dl   01/16/2020 151 (H) <100 mg/dL Final     Comment:     Above desirable:  100-129 mg/dl  Borderline High:  130-159 mg/dL  High:             160-189 mg/dL  Very high:       >189 mg/dl     07/05/2019 152 (H) <100 mg/dL Final     Comment:     Above desirable:  100-129 mg/dl  Borderline High:  130-159 mg/dL  High:             160-189 mg/dL  Very high:       >189 mg/dl       Triglycerides   Date Value Ref Range Status   09/08/2020 170 (H) <150 mg/dL Final     Comment:     Borderline high:  150-199 mg/dl  High:             200-499 mg/dl  Very high:       >499 mg/dl  Non Fasting     01/16/2020 180 (H) <150 mg/dL Final     Comment:     Borderline high:  150-199 mg/dl  High:             200-499 mg/dl  Very high:       >499 mg/dl     07/05/2019 182 (H) <150 mg/dL Final     Comment:     Borderline high:  150-199 mg/dl  High:             200-499 mg/dl  Very high:       >499 mg/dl       No results found for: CHOLHDLRATIO    Most recent CBC:  Recent Labs   Lab Test 09/08/20  1136 07/05/19  1009 01/08/19  1503   WBC 12.0* 12.6* 11.9*   HGB 12.7* 12.5* 12.9*   HCT 40.6 39.5* 40.7    272 280     Most recent hepatic panel:  Recent Labs   Lab Test 09/08/20  1136 01/16/20  1056   ALT 55 49   AST 28 22     Most recent creatinine:  Recent Labs   Lab Test 09/08/20  1136 05/20/20  0956   CR 0.47* 0.63*       No components found for: GFRESETIMATEDLASTLAB(gfrestblack:1@  Lab Results   Component Value Date    ALBUMIN 3.6 09/08/2020       Last Thyroid Profile:   TSH   Date Value Ref Range Status   09/08/2020 3.18 0.40 - 4.00 mU/L Final   07/05/2019 3.40 0.40 - 4.00 mU/L Final   01/08/2019 2.13 0.40 - 4.00 mU/L Final     T4 Free   Date Value Ref Range Status   07/05/2019 0.79 0.76 - 1.46 ng/dL Final       Last Mineral Profile:   No results found for: MIRANDA, IRON, FEB    Autoimmune & Inflammatory   CRP Inflammation   Date Value Ref Range Status   12/10/2020 8.0 0.0 - 8.0 mg/L Final         Last  "Vitamin Profile:   No results found for: IEP655, UMVF623, [id number], VITD3, D2VIT, D3VIT, DTOT, [id number], [id number], [id number], [id number], [id number], [id number]    ANTHROPOMETRICS:  Vitals:   BP Readings from Last 1 Encounters:   03/30/21 124/80     Pulse Readings from Last 1 Encounters:   03/30/21 76     Estimated body mass index is 49.3 kg/m  as calculated from the following:    Height as of 1/19/21: 1.842 m (6' 0.5\").    Weight as of 3/30/21: 167.2 kg (368 lb 9.6 oz).    Wt Readings from Last 5 Encounters:   03/30/21 (!) 167.2 kg (368 lb 9.6 oz)   01/19/21 (!) 167.6 kg (369 lb 9.6 oz)   01/19/21 (!) 168.7 kg (372 lb)   01/05/21 (!) 169.2 kg (373 lb)   12/04/20 (!) 169.4 kg (373 lb 6.4 oz)       Weight Change: would like to lose 100lbs       NUTRITION DIAGNOSIS:   1. Altered nutrition-related laboratory values related to endocrine dysfunction as evidenced by elevated BMI, HbA1c     2. Altered nutrition-related laboratory values related to cardiac as evidenced by elevated Total chol, elevated TG, LDL, and low HDL.     3. Overweight/Obesity related to food and nutrition related knowledge deficit (excessive CHO intake, Inadequate fiber intake, inappropriate intake of healthy omega 3 fatty acids) as evidenced by nutrition intake record (limited variety of foods), A1c >6%, recent labs.    4. Overweight/obesity related to inability to sustain diet/lifestyle change, as evidenced by many previous attempts at weight loss with weight regain over the past five years.     NUTRITION INTERVENTION   CARDIOMETABOLIC     Long Term Goals:   Goal: Lipid profile; Decrease TG <150 mg/dL, Decrease LDL <100mg/dL within 6 months   Goal: Lose 20-24lbs in 6 months.   Goal: Decrease Hemoglobin A1c <6 % within 2-6 months   Goal: Increase hemoglobin levels within 6 months     Short Term Goals:    1. Focus on cutting back on carbs and increase protein intake - see handouts and info below - for example try to cut back on bag of chips " and or 1 can of pop per day.  2.   Continue having snack 1-2x daily with healthy fats (ex: guac, nuts, hummus) and add a fruit/veggie for fiber- apple or banana with nut/seed butter (almond butter ) or sunflower seeds (sunflower seed butter with celery ) see ideas discussed and provided   3.  Keep cutting back on pop and increasing water   4. Increase activity at least 15-60 mins. Start with walking 15 mins daily. With [facility] doing activity at least 2 days with [facility]. Set alarm and put in calendar for reminder to get up and move.   5. Test blood sugars to see how meal impact them.       Test Blood Sugars    Measure your fasting blood sugar daily, first thing in morning before breakfast. Ideally your fasting blood sugar should be between 70-80 mg/dL.     Measure your blood sugar two hours after breakfast and two hours after dinner. Ideally your two-hour sugars should never go over 120mg/dL.     Mediterranean Approach: Eat whole, unprocessed real foods in their unprocessed forms such as fruits, vegetables, whole grains (prefer gluten free), nuts, legumes, extra virgin olive oil, spices, modest amounts of poultry, and fish.      Cardiometabolic Food plan - 1,800 calories per day     Protein 10-12 servings per day - include at each meal to stabilize blood sugars   (Choose 3oz or 21g per meal and aim for 1oz of 7g for snacks)   - Strive for 1-2 servings of fish per week especially of higher omega-3 fatty acid containing fish such as salmon.     Legumes 2 serving per day     Dairy alternatives 2-3 serving per day     Nuts and seeds 3-4 servings per day - great to incorporate as snacks    Fats and oils 4 servings per day     Non starchy vegetables 8-10 servings per day     Starchy vegetable limit 1 serving per day as they tend to impact blood sugar (they are moderate-GI).     Fruits 2 servings per day - best to couple with a little bit of protein or fat to offset a rise in blood sugars (they are low-moderate-GI foods).      Whole grains 2 serving per day - try gluten free whole grains instead      Choose Low Glycemic (GI) foods: Regulate your sugar levels by eating foods that do not spike blood sugars.  Eat low -GI foods so only small fluctuations in blood glucose and insulin levels are produced.      Examples of low-GI foods: nuts, seeds, GF oats, most vegetables especially non-starchy and fruits.     Medium or high-GI foods should be eaten with a protein or fat, both of which blunt the glycemic effect of these foods. This reduces the overall glycemic impact of a meal.   Ex: Most grains and starchy veggies are medium/high GI.     Avoid foods containing refined sugars, artificial sweeteners, and refined grains they are considered high-GI because they lead to sharp increases in blood sugars levels, which increase insulin sensitivity causing increased TG, and low good cholesterol (HDL).   Ex: cakes, cookies, pies, bread, sodas, fruit drinks, presweetened tea, coffee drinks, energy or sport drinks, flavored milk and other processed foods.     Choose foods high in fiber: Aim for at least 5 grams of fiber per serving of food or a total of 25-35 grams fiber per day. Remember, when looking at the label, you can take the fiber away from the total carbs. Ex:15g of total carbs - 4g of fiber = 11g net carbs     Insoluble fiber acts like a bulky  inner broom,  sweeping out debris from the intestine and creating more motility and movement.      Soluble fiber attracts water and swells, creating a gel that slows digestion.  Also, slows the release of glucose from foods into the blood which stops spikes in blood sugar levels.  Soluble fiber traps toxins in the gut, helping to carry them to excretion and provides healthy bacteria in the digestive tract.     Choose High Quality Fats: Adding anti-inflammatory fats into your diet such as fish (salmon, herring, mackerel, and sardines), omega 3 eggs, elidia seeds, ground flax seeds/milk, hemp seeds/milk,  walnuts and some other certain leafy greens will increase omega-3 fats to omeaga-6 fats ratio.     Therapeutic fats both monounsaturated and polyunsaturated to include daily: ground flaxseeds, unsalted mixed nuts, avocados, olives, extra-virgin olive oil.     Emphasize high-quality oils and fats in the diet daily such as avocado oil, coconut oil, flaxseed, olive, sesame. Ex: 1 tsp to 1 tbsp of MCT oil from coconuts can be added into coffee, smoothies, and salad dressings per day.     Avoid trans fats found in processed foods     Drink more water. Hydration is critical, so drink at least six to eight glasses of water a day. Drink more water between meals and less at meals.     Try adding herbal teas (sugar free) or lemon/lime/cucumber/fruit to water for flavor. Avoid artificial sweetener packets to flavor your water.     Cut back on coffee switch to green tea. Avoid adding sugar and milk to coffee instead use dairy alternatives such as almond, flax, coconut milk.       Increase physical activity. Moving our body helps move our bowels and speeds up your metabolism.     Exercise 15-60 minutes daily--whether that looks like burst training, yoga, or vigorous walking.      NUTRITION RESOURCES:  1. Purchase Eat Fat Get Thin by Kenneth Membreno Book/Cookbook   2. IFM Cardiometabolic Packet     Functional Nutrition Fundamentals     Comprehensive Guide    Meal plan with recipes           PATIENT'S BEHAVIOR CHANGE GOALS:   See nutrition intervention for patient stated behavior change goals. AVS was printed and given to patient at today's appointment.    MONITOR / EVALUATE:  Registered Dietitian will monitor/evaluate the following:     Beliefs and attitudes related to food    Food and nutrition knowledge / skills    Food / Beverage / Nutrient intake     Pertinent Labs    Progress toward meeting stated nutrition-related goals    Readiness to change nutrition-related behaviors    Weight change    Digestion     COORDINATION OF  CARE:  Follow up with gastroenterology      FOLLOW-UP:  Follow-up appointment scheduled on 7/13/21 at 10am phone visit      Time spent in minutes: 30 minutes  Encounter: Individual    Angie Levy RD, CLT, LD  Integrative Registered Dietitian

## 2021-04-14 NOTE — PATIENT INSTRUCTIONS
NUTRITION INTERVENTION   CARDIOMETABOLIC     Long Term Goals:   Goal: Lipid profile; Decrease TG <150 mg/dL, Decrease LDL <100mg/dL within 6 months   Goal: Lose 20-24lbs in 6 months.   Goal: Decrease Hemoglobin A1c <6 % within 2-6 months   Goal: Increase hemoglobin levels within 6 months     Short Term Goals:    1. Focus on cutting back on carbs and increase protein intake - see handouts and info below - for example try to cut back on bag of chips and or 1 can of pop per day.  2.   Continue having snack 1-2x daily with healthy fats (ex: guac, nuts, hummus) and add a fruit/veggie for fiber- apple or banana with nut/seed butter (almond butter ) or sunflower seeds (sunflower seed butter with celery ) see ideas discussed and provided   3.  Keep cutting back on pop and increasing water   4. Increase activity at least 15-60 mins. Start with walking 15 mins daily. With Helena doing activity at least 2 days with Helena. Set alarm and put in calendar for reminder to get up and move.   5. Test blood sugars to see how meal impact them.       Test Blood Sugars    Measure your fasting blood sugar daily, first thing in morning before breakfast. Ideally your fasting blood sugar should be between 70-80 mg/dL.     Measure your blood sugar two hours after breakfast and two hours after dinner. Ideally your two-hour sugars should never go over 120mg/dL.     Mediterranean Approach: Eat whole, unprocessed real foods in their unprocessed forms such as fruits, vegetables, whole grains (prefer gluten free), nuts, legumes, extra virgin olive oil, spices, modest amounts of poultry, and fish.      Cardiometabolic Food plan - 1,800 calories per day     Protein 10-12 servings per day - include at each meal to stabilize blood sugars   (Choose 3oz or 21g per meal and aim for 1oz of 7g for snacks)   - Strive for 1-2 servings of fish per week especially of higher omega-3 fatty acid containing fish such as salmon.     Legumes 2 serving per day      Dairy alternatives 2-3 serving per day     Nuts and seeds 3-4 servings per day - great to incorporate as snacks    Fats and oils 4 servings per day     Non starchy vegetables 8-10 servings per day     Starchy vegetable limit 1 serving per day as they tend to impact blood sugar (they are moderate-GI).     Fruits 2 servings per day - best to couple with a little bit of protein or fat to offset a rise in blood sugars (they are low-moderate-GI foods).     Whole grains 2 serving per day - try gluten free whole grains instead      Choose Low Glycemic (GI) foods: Regulate your sugar levels by eating foods that do not spike blood sugars.  Eat low -GI foods so only small fluctuations in blood glucose and insulin levels are produced.      Examples of low-GI foods: nuts, seeds, GF oats, most vegetables especially non-starchy and fruits.     Medium or high-GI foods should be eaten with a protein or fat, both of which blunt the glycemic effect of these foods. This reduces the overall glycemic impact of a meal.   Ex: Most grains and starchy veggies are medium/high GI.     Avoid foods containing refined sugars, artificial sweeteners, and refined grains they are considered high-GI because they lead to sharp increases in blood sugars levels, which increase insulin sensitivity causing increased TG, and low good cholesterol (HDL).   Ex: cakes, cookies, pies, bread, sodas, fruit drinks, presweetened tea, coffee drinks, energy or sport drinks, flavored milk and other processed foods.     Choose foods high in fiber: Aim for at least 5 grams of fiber per serving of food or a total of 25-35 grams fiber per day. Remember, when looking at the label, you can take the fiber away from the total carbs. Ex:15g of total carbs - 4g of fiber = 11g net carbs     Insoluble fiber acts like a bulky  inner broom,  sweeping out debris from the intestine and creating more motility and movement.      Soluble fiber attracts water and swells, creating a  gel that slows digestion.  Also, slows the release of glucose from foods into the blood which stops spikes in blood sugar levels.  Soluble fiber traps toxins in the gut, helping to carry them to excretion and provides healthy bacteria in the digestive tract.     Choose High Quality Fats: Adding anti-inflammatory fats into your diet such as fish (salmon, herring, mackerel, and sardines), omega 3 eggs, elidia seeds, ground flax seeds/milk, hemp seeds/milk, walnuts and some other certain leafy greens will increase omega-3 fats to omeaga-6 fats ratio.     Therapeutic fats both monounsaturated and polyunsaturated to include daily: ground flaxseeds, unsalted mixed nuts, avocados, olives, extra-virgin olive oil.     Emphasize high-quality oils and fats in the diet daily such as avocado oil, coconut oil, flaxseed, olive, sesame. Ex: 1 tsp to 1 tbsp of MCT oil from coconuts can be added into coffee, smoothies, and salad dressings per day.     Avoid trans fats found in processed foods     Drink more water. Hydration is critical, so drink at least six to eight glasses of water a day. Drink more water between meals and less at meals.     Try adding herbal teas (sugar free) or lemon/lime/cucumber/fruit to water for flavor. Avoid artificial sweetener packets to flavor your water.     Cut back on coffee switch to green tea. Avoid adding sugar and milk to coffee instead use dairy alternatives such as almond, flax, coconut milk.       Increase physical activity. Moving our body helps move our bowels and speeds up your metabolism.     Exercise 15-60 minutes daily--whether that looks like burst training, yoga, or vigorous walking.      NUTRITION RESOURCES:  1. Purchase Eat Fat Get Thin by Kenneth Membreno Book/Cookbook   2. IFM Cardiometabolic Packet     Functional Nutrition Fundamentals     Comprehensive Guide    Meal plan with recipes

## 2021-04-15 RX ORDER — METFORMIN HCL 500 MG
TABLET, EXTENDED RELEASE 24 HR ORAL
Qty: 180 TABLET | Refills: 0 | Status: SHIPPED | OUTPATIENT
Start: 2021-04-15 | End: 2021-05-18

## 2021-04-15 NOTE — TELEPHONE ENCOUNTER
Prescription approved per [facility] Refill Protocol.    NESSA MckeonN, RN  [facility]

## 2021-05-11 ENCOUNTER — VIRTUAL VISIT (OUTPATIENT)
Dept: EDUCATION SERVICES | Facility: CLINIC | Age: 31
End: 2021-05-11
Payer: COMMERCIAL

## 2021-05-11 DIAGNOSIS — E11.9 TYPE 2 DIABETES MELLITUS WITHOUT COMPLICATION, WITHOUT LONG-TERM CURRENT USE OF INSULIN (H): Primary | ICD-10-CM

## 2021-05-11 DIAGNOSIS — K29.00 ACUTE GASTRITIS WITHOUT HEMORRHAGE, UNSPECIFIED GASTRITIS TYPE: ICD-10-CM

## 2021-05-11 PROCEDURE — G0108 DIAB MANAGE TRN  PER INDIV: HCPCS

## 2021-05-11 NOTE — PROGRESS NOTES
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   Forgot to take medication on Sunday morning this past week.   If no time to eat in morning will grab a breakfast shake.   Meter for One Touch meter and supplies are done.   Blood sugars fluctuate because hard time following diabetic diet - eats a lot of carbs, lot of premade food, chips and crackers.   Has cut down on pop but struggling with eating habits.   Bertram - grocery shopping  Just got blueberries,     Diabetes Self-Management Education & Support  Type of Service: Telephone Visit    How would patient like to obtain AVS? Delilah      Presents for: Individual review    SUBJECTIVE/OBJECTIVE:  Presents for: Individual review  Accompanied by: Self, Other  Diabetes education in the past 24mo: Yes  Focus of Visit: Monitoring  Diabetes type: Type 2  Date of diagnosis: 2020  Disease course: Stable  Diabetes management related comments/concerns: may need new meter  Other concerns:: Cognitive impairment  Cultural Influences/Ethnic Background:  Choose not to answer      Diabetes Symptoms & Complications:  Fatigue: Yes  Visual change: No  Slow healing wounds: No  Autonomic neuropathy: No  CVA: No  Heart disease: No  Nephropathy: No  Peripheral neuropathy: No  Foot ulcerations: No  Peripheral Vascular Disease: No  Retinopathy: No  Sexual dysfunction: No    Patient Problem List and Family Medical History reviewed for relevant medical history, current medical status, and diabetes risk factors.    Vitals:  There were no vitals taken for this visit.  Estimated body mass index is 49.3 kg/m  as calculated from the following:    Height as of 1/19/21: 1.842 m (6' 0.5\").    Weight as of 3/30/21: 167.2 kg (368 lb 9.6 oz).   Last 3 BP:   BP Readings from Last 3 Encounters:   03/30/21 124/80   01/19/21 128/65   01/19/21 138/80       History   Smoking Status     Former Smoker     Packs/day: 0.30     Years: 0.50     Types: " Cigarettes     Quit date: 3/1/2014   Smokeless Tobacco     Never Used       Labs:  Lab Results   Component Value Date    A1C 7.5 03/30/2021     Lab Results   Component Value Date     09/08/2020     Lab Results   Component Value Date    LDL 83 09/08/2020     HDL Cholesterol   Date Value Ref Range Status   09/08/2020 40 >39 mg/dL Final   ]  GFR Estimate   Date Value Ref Range Status   09/08/2020 >90 >60 mL/min/[1.73_m2] Final     Comment:     Non  GFR Calc  Starting 12/18/2018, serum creatinine based estimated GFR (eGFR) will be   calculated using the Chronic Kidney Disease Epidemiology Collaboration   (CKD-EPI) equation.       GFR Estimate If Black   Date Value Ref Range Status   09/08/2020 >90 >60 mL/min/[1.73_m2] Final     Comment:      GFR Calc  Starting 12/18/2018, serum creatinine based estimated GFR (eGFR) will be   calculated using the Chronic Kidney Disease Epidemiology Collaboration   (CKD-EPI) equation.       Lab Results   Component Value Date    CR 0.47 09/08/2020     No results found for: MICROALBUMIN    Healthy Eating:  Healthy Eating Assessed Today: Yes  Cultural/Christianity diet restrictions?: No  Breakfast: protein shake yesterday  Dinner: potato and ham casserole dish  Other: drinks gatorade zero, tea, water, pop or capuccino mixed with hot cocoa and caramel occasionally; last alcohol was 4th of July  Beverages: Water, Tea, Milk, Diet soda  Has patient met with a dietitian in the past?: Yes    Being Active:  Being Active Assessed Today: Yes  Exercise:: Yes  How intense was your typical exercise? : Light (like stretching or slow walking)  Barrier to exercise: None    Monitoring:  Monitoring Assessed Today: Yes  Blood Glucose Meter: Accu-chek  Times checking blood sugar at home (number): 2  Times checking blood sugar at home (per): Week  Blood glucose trend: Increasing    Last BG check on 5/4 was 189    Taking Medications:  Diabetes Medication(s)     Biguanides        metFORMIN (GLUCOPHAGE-XR) 500 MG 24 hr tablet    TAKE 1 TABLET (500 MG) BY MOUTH 2 TIMES DAILY WITH MEALS    Sulfonylureas       glimepiride (AMARYL) 4 MG tablet    TAKE 1 TABLET BY MOUTH ONCE DAILY EVERY MORNING BEFORE BREAKFAST          Taking Medication Assessed Today: Yes  Current Treatments: Oral Medication (taken by mouth)  Problems taking diabetes medications regularly?: Yes    Problem Solving:  Problem Solving Assessed Today: No  Is the patient at risk for hypoglycemia?: Yes  Hypoglycemia Frequency: Never  Medical ID: No  Does patient have DKA prevention plan?: No  Does patient have severe weather/disaster plan for diabetes management?: No  Does patient have sick day plan for diabetes management?: No              Reducing Risks:  Reducing Risks Assessed Today: Yes  CAD Risks: Diabetes Mellitus, Dyslipidemia, Family history, Male sex, Obesity  Has dilated eye exam at least once a year?: Yes  Sees dentist every 6 months?: Yes  Feet checked by healthcare provider in the last year?: Yes    Healthy Coping:  Healthy Coping Assessed Today: Yes  Emotional response to diabetes: Not ready to learn  Informal Support system:: Family, Friends, Other  Stage of change: CONTEMPLATION (Considering change and yet undecided)  Support resources: Offerings in Clinic Communities  Patient Activation Measure Survey Score:  YESY Score (Last Two) 4/29/2019   YESY Raw Score 33   Activation Score 65.8   YESY Level 3       Diabetes knowledge and skills assessment:   Patient is knowledgeable in diabetes management concepts related to: Monitoring and Taking Medication    Patient needs further education on the following diabetes management concepts: Healthy Eating, Monitoring, Reducing Risks and Healthy Coping    Based on learning assessment above, most appropriate setting for further diabetes education would be: Individual setting.      INTERVENTIONS:    Education provided today on:  AADE Self-Care Behaviors:  Healthy Eating: eating less  sweets and snacks and more fruit and vegetables  Monitoring: frequency of monitoring and reporting numbers next visit  Reducing Risks: A1C - goals, relating to blood glucose levels, how often to check  Healthy Coping: benefits of making appropriate lifestyle changes    Opportunities for ongoing education and support in diabetes-self management were discussed.    Pt verbalized understanding of concepts discussed and recommendations provided today.       Education Materials Provided:  No new materials provided today      ASSESSMENT:  Patient was accompanied by his [facility] staff Helena for most of visit today. She states he knows what he needs to do but needs to make his mind up and then work on the changes. He does have meter and supplies ordered at pharmacy for One Touch Verio meter. A1c has increased. 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 Main barrier to better A1c is eating habits.         Patient's most recent   Lab Results   Component Value Date    A1C 7.5 03/30/2021    is meeting goal of <8.0    PLAN  See Patient Instructions for co-developed, patient-stated behavior change goals.  Will follow up in 1 month to see if any changes in eating habits.     Chiara Hair RDN, BOBY, [facility]    Time Spent: 40 minutes  Encounter Type: Individual    Any diabetes medication dose changes were made via the CDE Protocol and Collaborative Practice Agreement with the patient's primary care provider. A copy of this encounter was shared with the provider.

## 2021-05-11 NOTE — TELEPHONE ENCOUNTER
Prescription approved per [facility] Refill Protocol.    NESSA MckeonN, RN  [facility]

## 2021-05-18 ENCOUNTER — OFFICE VISIT (OUTPATIENT)
Dept: INTERNAL MEDICINE | Facility: CLINIC | Age: 31
End: 2021-05-18
Payer: COMMERCIAL

## 2021-05-18 ENCOUNTER — HOSPITAL ENCOUNTER (OUTPATIENT)
Dept: GENERAL RADIOLOGY | Facility: CLINIC | Age: 31
Discharge: HOME | End: 2021-05-18
Attending: INTERNAL MEDICINE | Admitting: INTERNAL MEDICINE
Payer: COMMERCIAL

## 2021-05-18 VITALS
RESPIRATION RATE: 18 BRPM | TEMPERATURE: 96 F | BODY MASS INDEX: 40.43 KG/M2 | WEIGHT: 315 LBS | OXYGEN SATURATION: 92 % | SYSTOLIC BLOOD PRESSURE: 128 MMHG | HEIGHT: 74 IN | DIASTOLIC BLOOD PRESSURE: 66 MMHG | HEART RATE: 96 BPM

## 2021-05-18 DIAGNOSIS — E11.65 TYPE 2 DIABETES MELLITUS WITH HYPERGLYCEMIA, WITHOUT LONG-TERM CURRENT USE OF INSULIN (H): Primary | ICD-10-CM

## 2021-05-18 DIAGNOSIS — M79.672 LEFT FOOT PAIN: ICD-10-CM

## 2021-05-18 DIAGNOSIS — E66.01 MORBID OBESITY (H): ICD-10-CM

## 2021-05-18 DIAGNOSIS — K59.1 FUNCTIONAL DIARRHEA: ICD-10-CM

## 2021-05-18 DIAGNOSIS — F70 MILD INTELLECTUAL DISABILITIES: ICD-10-CM

## 2021-05-18 DIAGNOSIS — L98.9 SKIN LESION: ICD-10-CM

## 2021-05-18 PROCEDURE — 99214 OFFICE O/P EST MOD 30 MIN: CPT | Performed by: INTERNAL MEDICINE

## 2021-05-18 PROCEDURE — 73660 X-RAY EXAM OF TOE(S): CPT | Mod: LT

## 2021-05-18 ASSESSMENT — PAIN SCALES - GENERAL: PAINLEVEL: EXTREME PAIN (9)

## 2021-05-18 ASSESSMENT — MIFFLIN-ST. JEOR: SCORE: 2689.92

## 2021-05-18 NOTE — PROGRESS NOTES
Tahira Burden is a 30 year old who presents for the following health issues     HPI     Chief Complaint   Patient presents with     Mole     wants mole on left arm removed     Foot Problems     left big toe pain, no sores. Discuss diabetic foot care        EMR reviewed including:             Complaint, History of Chief Complaint, Corresponding Review of Systems, and Complaint Specific Physical Examination.    #1   Has a nevi on his left antecubital area and a skin lesion under his left breast that he would like to have removed.    Patient would also like to establish with dermatology for future skin evaluations.  Notes that the nevi on his left arm is getting larger.       Exam:  Well-circumscribed, slightly raised, pigmented nevi.  Measures approximately 0.5 cm in diameter.  This is in the antecubital area on the left.  Flat, circumscribed, minimally pigmented lesion on left chest.  No signs secondary inflammation or infection noted.        #2   Chronic diarrhea.  Has been seen by GI.  Referred to nutrition specialist.  Continues to take Metformin.  Denies any melena or hematochezia.  Notes 3 or 4 bowel movements per day.       Exam:   GI: Abdomen is soft, without rebound, guarding or tenderness. Bowel sounds are appropriate. No renal bruits are heard.  Abdomen is rotund.      #3   Left foot pain.  Great toe at the MP joint.  Rates it at 10/10 in maximum intensity  Is somewhat bruised.  Relates that he may have dropped something on it.       Exam:  No significant deformity, angulation or crepitance noted.  Mild bruising present.        #4   Ongoing diabetes.  Blood sugars not tested at home.  Suffers from limited compliance with respect to his diet  Compliant with Metformin.  Denies polyuria or polydipsia.  Denies symptoms of hypoglycemia.      Vital Signs:   /66 (BP Location: Right arm, Patient Position: Sitting, Cuff Size: Adult Large)   Pulse 96   Temp 96  F (35.6  C) (Temporal)   Resp 18    "Ht 1.88 m (6' 2\")   Wt (!) 166 kg (366 lb)   SpO2 92%   BMI 46.99 kg/m               Decision Making    Problem and Complexity     1. Type 2 diabetes mellitus with hyperglycemia, without long-term current use of insulin (H)  Patient like to set up with podiatry for diabetic foot care.  Discussed ongoing dietary restriction of carbohydrates and sweets.  - Orthopedic & Spine  Referral; Future    2. Left foot pain  Podiatry evaluation.  Will obtain x-ray in anticipation  - Orthopedic & Spine  Referral; Future  - XR Toe Left G/E 2 Views    3. Morbid obesity (H)  Recommend weight loss through responsible caloric restriction    4. Mild intellectual disabilities  Stable.  Has good understanding and many requests and opinions    5. Skin lesion  We will set up with dermatology for establish care and evaluation of lesions  - DERMATOLOGY ADULT REFERRAL; Future    6. Functional diarrhea  Discontinue Metformin temporarily on a trial basis.  Blood sugars should tolerate this as they are not that high.          ------------------------------------------------------------------------------------------------------------------------------  Data    4=1/3 5=2/3    1  >3  Specialty (external) notes reviewed:   Nutritional/GI notes appreciated.  Individual tests ordered (by provider) reviewed:   Recent lab work reviewed.  Individual tests ordered (by provider):   X-ray ordered  Independent Historians Interview:   Caregiver present and gives much of the history.  2  Review of outside (other providers) Tests:   None  3   Verbal Discussion with Specialists:    None    ----------------------------------------------------------------------------------------------------------------------------------  Risk   Prescription drug management:   Ongoing                                High risk for toxicity:   Decision regarding surgery:    None   Social determinants of health:   Lives in group home.  Mental " limitations.   Decision regarding hospitalization:     None   Decision to withhold therapy:   None                                   FOLLOW UP   I have asked the patient to make an appointment for followup with me in 2 weeks            I have carefully explained the diagnosis and treatment options to the patient.  The patient has displayed an understanding of the above, and all subsequent questions were answered.      DO DONNELL Jin    Portions of this note were produced using [employer]  Although every attempt at real-time proof reading has been made, occasional grammar/syntax errors may have been missed.

## 2021-05-21 ENCOUNTER — THERAPY VISIT (OUTPATIENT)
Dept: SLEEP MEDICINE | Facility: CLINIC | Age: 31
End: 2021-05-21
Payer: COMMERCIAL

## 2021-05-21 DIAGNOSIS — R06.81 APNEA: ICD-10-CM

## 2021-05-21 DIAGNOSIS — R06.83 SNORING: ICD-10-CM

## 2021-05-21 PROCEDURE — 95810 POLYSOM 6/> YRS 4/> PARAM: CPT | Performed by: OTOLARYNGOLOGY

## 2021-05-28 LAB — SLPCOMP: NORMAL

## 2021-06-08 ENCOUNTER — OFFICE VISIT (OUTPATIENT)
Dept: INTERNAL MEDICINE | Facility: CLINIC | Age: 31
End: 2021-06-08
Payer: COMMERCIAL

## 2021-06-08 ENCOUNTER — OFFICE VISIT (OUTPATIENT)
Dept: SLEEP MEDICINE | Facility: CLINIC | Age: 31
End: 2021-06-08
Payer: COMMERCIAL

## 2021-06-08 VITALS
DIASTOLIC BLOOD PRESSURE: 84 MMHG | RESPIRATION RATE: 18 BRPM | HEART RATE: 94 BPM | OXYGEN SATURATION: 98 % | TEMPERATURE: 96 F | WEIGHT: 315 LBS | SYSTOLIC BLOOD PRESSURE: 136 MMHG | BODY MASS INDEX: 47.02 KG/M2

## 2021-06-08 VITALS
DIASTOLIC BLOOD PRESSURE: 70 MMHG | TEMPERATURE: 96.7 F | BODY MASS INDEX: 40.43 KG/M2 | SYSTOLIC BLOOD PRESSURE: 124 MMHG | RESPIRATION RATE: 22 BRPM | WEIGHT: 315 LBS | HEIGHT: 74 IN

## 2021-06-08 DIAGNOSIS — R09.02 HYPOXEMIA: ICD-10-CM

## 2021-06-08 DIAGNOSIS — E11.9 TYPE 2 DIABETES MELLITUS WITHOUT COMPLICATION, WITHOUT LONG-TERM CURRENT USE OF INSULIN (H): Primary | ICD-10-CM

## 2021-06-08 DIAGNOSIS — E78.5 HYPERLIPIDEMIA LDL GOAL <130: ICD-10-CM

## 2021-06-08 DIAGNOSIS — G47.39 COMPLEX SLEEP APNEA SYNDROME: Primary | ICD-10-CM

## 2021-06-08 PROCEDURE — 99213 OFFICE O/P EST LOW 20 MIN: CPT | Performed by: OTOLARYNGOLOGY

## 2021-06-08 PROCEDURE — 99213 OFFICE O/P EST LOW 20 MIN: CPT | Performed by: INTERNAL MEDICINE

## 2021-06-08 RX ORDER — ATORVASTATIN CALCIUM 20 MG/1
TABLET, FILM COATED ORAL
Qty: 28 TABLET | Refills: 5 | Status: SHIPPED | OUTPATIENT
Start: 2021-06-08 | End: 2021-11-15

## 2021-06-08 ASSESSMENT — PAIN SCALES - GENERAL: PAINLEVEL: NO PAIN (0)

## 2021-06-08 ASSESSMENT — MIFFLIN-ST. JEOR: SCORE: 2694.45

## 2021-06-08 NOTE — TELEPHONE ENCOUNTER
Prescription approved per [facility] Refill Protocol.    NESSA MckeonN, RN  [facility]

## 2021-06-08 NOTE — PROGRESS NOTES
Does Bertram have a CPAP/Bipap?  No     (If yes please fill out below.  If no please delete links)        Type of mask: NA          Siler Sleep Scale: 13        Sleep Study Follow-Up Visit:    Date on this visit: 6/8/2021    Bertram Foreman comes in today for follow-up of his sleep study done on 5/21/2021 at the [facility] for possible sleep apnea.    Sleep latency 44 minutes with Ambien.  REM achieved.   REM latency 130 minutes.  Sleep efficiency 73%. Total sleep time 376 minutes.    Sleep architecture:  Stage 1, 14.5% (5%), stage 2, 51.1% (45-55%), stage 3, 17.2% (15-20%), stage REM, 17.3% (20-25%).  AHI was 101.6, with severe desaturations. .  REM .5, consistent with severe REM ADAL.    Periodic Limb Movement Index 0/hour.       None These findings were reviewed with patient.     Past medical/surgical history, family history, social history, medications and allergies were reviewed.      Problem List:  Patient Active Problem List    Diagnosis Date Noted     Diabetes mellitus, type 2 (H) 02/27/2019     Priority: Medium     Intellectual disability 02/04/2019     Priority: Medium     Recurrent major depressive disorder, in partial remission (H) 02/04/2019     Priority: Medium     Elevated glucose level 02/04/2019     Priority: Medium     Morbid obesity (H) 01/08/2019     Priority: Medium     Morbid obesity with body mass index of 45.0-49.9 in adult (H) 04/04/2017     Priority: Medium     Mantoux: positive, treated 05/01/2011     Priority: Medium     Overview:   5/2011, went through (more than) 9 months of isoniazid.  NOT TO HAVE MANTOUX IN FUTURE       Mild intellectual disabilities 07/15/2010     Priority: Medium        Impression/Plan:    Patient with a very severe complex sleep apnea.  Even though her prior sleep study apparently showed AHI 15 certainly this is significantly worse.  We discussed with him the need for proper titration which would be in the lab with ASV type  of device to address both central and obstructive events.  Also considering he complains of some shortness of breath during the day I would also like to evaluate his pulmonary status and potential daily hypoxemia.  Therefore pulmonology consult will be initiated.  I will order a titration study with ABG on room air prior to study.  Study should be done with TCM in spite of the fact that he did not manifest hypoventilation during this study.  He will follow up with me in about 1 week after titration study.     Twenty-five minutes spent with patient, all of which were spent face-to-face counseling, consulting, coordinating plan of care.      Liborio Hidalgo MD      CC: Tremaine Ruvalcaba

## 2021-06-08 NOTE — PROGRESS NOTES
Tahira Burden is a 30 year old who presents for the following health issues     HPI     Chief Complaint   Patient presents with     Recheck Medication     follow up stopping Metformin        EMR reviewed including:             Complaint, History of Chief Complaint, Corresponding Review of Systems, and Complaint Specific Physical Examination.    #1   Follow-up on diabetes  Was on Metformin and had diarrhea.  Stopped Metformin and diarrhea went away  Has not been checking blood sugars.  Caregiver was out of town.  Denies polyuria or polydipsia.       Exam:   Constitutional: The patient appears to be in no acute distress. The patient appears to be adequately hydrated. No acute respiratory or hemodynamic distress is noted at this time.   LUNGS: clear bilaterally, airflow is brisk, no intercostal retraction or stridor is noted. No coughing is noted during visit.   HEART:  regular without rubs, clicks, gallops, or murmurs. PMI is nondisplaced. Upstrokes are brisk. S1,S2 are heard.   GI: Abdomen is soft, without rebound, guarding or tenderness. Bowel sounds are appropriate. No renal bruits are heard.        Vital Signs:   /84 (BP Location: Right arm, Patient Position: Sitting, Cuff Size: Adult Large)   Pulse 94   Temp 96  F (35.6  C) (Temporal)   Resp 18   Wt (!) 166.1 kg (366 lb 3.2 oz)   SpO2 98%   BMI 47.02 kg/m               Decision Making    Problem and Complexity   1. Type 2 diabetes mellitus without complication, without long-term current use of insulin (H)  Return in 2 or 3 weeks with blood sugar log.  Restart the Metformin and see if diarrhea recurs.      ------------------------------------------------------------------------------------------------------------------------------  Data    4=1/3 5=2/3    1  >3  Specialty (external) notes reviewed:   None  Individual tests ordered (by provider) reviewed:   Previous lab work reviewed with patient and caregiver  Individual tests ordered (by  provider):   None  Independent Historians Interview:   Caregiver present and gives 50% history  2  Review of outside (other providers) Tests:   No  3   Verbal Discussion with Specialists:    None    ----------------------------------------------------------------------------------------------------------------------------------  Risk   Prescription drug management:   Yes                                High risk for toxicity:   Decision regarding surgery:    None   Social determinants of health:   Limited mental capabilities   Decision regarding hospitalization:     None   Decision to withhold therapy:   None                                 FOLLOW UP   I have asked the patient to make an appointment for followup with me 2 to 3 weeks            I have carefully explained the diagnosis and treatment options to the patient.  The patient has displayed an understanding of the above, and all subsequent questions were answered.      DO DONNELL Jin    Portions of this note were produced using [employer] (formerly [employer])  Although every attempt at real-time proof reading has been made, occasional grammar/syntax errors may have been missed.

## 2021-06-15 ENCOUNTER — TELEPHONE (OUTPATIENT)
Dept: INTERNAL MEDICINE | Facility: CLINIC | Age: 31
End: 2021-06-15

## 2021-06-15 NOTE — TELEPHONE ENCOUNTER
Called pt. No answer. Left message for CB    [employer] message sent as well   [facility] diabetic forms received in clinic that were mailed by patient have been complete and mailed to [facility].  Marcia Hogan MA     6/15/2021

## 2021-06-16 ENCOUNTER — VIRTUAL VISIT (OUTPATIENT)
Dept: EDUCATION SERVICES | Facility: CLINIC | Age: 31
End: 2021-06-16
Payer: COMMERCIAL

## 2021-06-16 DIAGNOSIS — E11.9 TYPE 2 DIABETES MELLITUS WITHOUT COMPLICATION, WITHOUT LONG-TERM CURRENT USE OF INSULIN (H): Primary | ICD-10-CM

## 2021-06-16 PROCEDURE — 98968 PH1 ASSMT&MGMT NQHP 21-30: CPT

## 2021-06-16 NOTE — PROGRESS NOTES
"Had eggs, toast and sausage for breakfast one day.       Education: why some numbers higher:    - out to eat was 242    Diabetes Self-Management Education & Support  Type of Service: Telephone Visit    How would patient like to obtain AVS? Delilah      Presents for: Follow-up    SUBJECTIVE/OBJECTIVE:  Presents for: Follow-up  Accompanied by: Self, Other  Diabetes education in the past 24mo: Yes  Focus of Visit: Monitoring, Taking Medication  Diabetes type: Type 2  Date of diagnosis: 2020  Disease course: Worsening  Other concerns:: Cognitive impairment  Cultural Influences/Ethnic Background:  Choose not to answer  Accompanied by Helena,     Diabetes Symptoms & Complications:  Fatigue: Yes  Visual change: No  Slow healing wounds: No  Complications assessed today?: No  Autonomic neuropathy: No  CVA: No  Heart disease: No  Nephropathy: No  Peripheral neuropathy: No  Foot ulcerations: No  Peripheral Vascular Disease: No  Retinopathy: No  Sexual dysfunction: No    Patient Problem List and Family Medical History reviewed for relevant medical history, current medical status, and diabetes risk factors.    Vitals:  There were no vitals taken for this visit.  Estimated body mass index is 47.02 kg/m  as calculated from the following:    Height as of 6/8/21: 1.88 m (6' 2\").    Weight as of 6/8/21: 166.1 kg (366 lb 3.2 oz).   Last 3 BP:   BP Readings from Last 3 Encounters:   06/08/21 136/84   06/08/21 124/70   05/18/21 128/66       History   Smoking Status     Former Smoker     Packs/day: 1.00     Years: 0.00     Types: Cigarettes     Start date: 1/1/2014     Quit date: 3/1/2014   Smokeless Tobacco     Never Used       Labs:  Lab Results   Component Value Date    A1C 7.5 03/30/2021     Lab Results   Component Value Date     09/08/2020     Lab Results   Component Value Date    LDL 83 09/08/2020     HDL Cholesterol   Date Value Ref Range Status   09/08/2020 40 >39 mg/dL Final   ]  GFR Estimate   Date Value Ref " Range Status   09/08/2020 >90 >60 mL/min/[1.73_m2] Final     Comment:     Non  GFR Calc  Starting 12/18/2018, serum creatinine based estimated GFR (eGFR) will be   calculated using the Chronic Kidney Disease Epidemiology Collaboration   (CKD-EPI) equation.       GFR Estimate If Black   Date Value Ref Range Status   09/08/2020 >90 >60 mL/min/[1.73_m2] Final     Comment:      GFR Calc  Starting 12/18/2018, serum creatinine based estimated GFR (eGFR) will be   calculated using the Chronic Kidney Disease Epidemiology Collaboration   (CKD-EPI) equation.       Lab Results   Component Value Date    CR 0.47 09/08/2020     No results found for: MICROALBUMIN    Healthy Eating:  Healthy Eating Assessed Today: No  Cultural/Gnosticist diet restrictions?: No  Breakfast: protein shake yesterday  Dinner: potato and ham casserole dish  Other: drinks gatorade zero, tea, water, pop or capuccino mixed with hot cocoa and caramel occasionally; last alcohol was 4th of July  Beverages: Water, Tea, Milk, Diet soda  Has patient met with a dietitian in the past?: Yes    Being Active:  Being Active Assessed Today: No  Exercise:: Yes  How intense was your typical exercise? : Light (like stretching or slow walking)  Barrier to exercise: None    Monitoring:  Monitoring Assessed Today: Yes  Did patient bring glucose meter to appointment? : Yes  Blood Glucose Meter: Accu-chek  Times checking blood sugar at home (number): 5+  Times checking blood sugar at home (per): Week  Blood glucose trend: Fluctuating    Recent numbers patient provided:  Date morning  dinner dinner    Before Before After   6/6   242   6/7   176   6/8  140    6/9 166  256   6/10      6/11 149           Taking Medications:  PATIENT ALSO TAKING METFORMIN 500 MG BID  Diabetes Medication(s)     Sulfonylureas       glimepiride (AMARYL) 4 MG tablet    TAKE 1 TABLET BY MOUTH ONCE DAILY EVERY MORNING BEFORE BREAKFAST          Taking Medication Assessed  Today: Yes  Current Treatments: Oral Medication (taken by mouth)  Problems taking diabetes medications regularly?: Yes  Diabetes medication side effects?: Yes    Problem Solving:  Problem Solving Assessed Today: Yes  Is the patient at risk for hypoglycemia?: Yes  Hypoglycemia Frequency: Rarely  Medical ID: No  Does patient have DKA prevention plan?: No  Does patient have severe weather/disaster plan for diabetes management?: No  Does patient have sick day plan for diabetes management?: No      Reducing Risks:  Reducing Risks Assessed Today: No  CAD Risks: Diabetes Mellitus, Dyslipidemia, Family history, Male sex, Obesity  Has dilated eye exam at least once a year?: Yes  Sees dentist every 6 months?: Yes  Feet checked by healthcare provider in the last year?: Yes    Healthy Coping:  Healthy Coping Assessed Today: Yes  Emotional response to diabetes: Not ready to learn  Informal Support system:: Family, Friends, Other  Stage of change: CONTEMPLATION (Considering change and yet undecided)  Support resources: Offerings in Clinic Communities  Patient Activation Measure Survey Score:  YESY Score (Last Two) 4/29/2019   YESY Raw Score 33   Activation Score 65.8   YESY Level 3       Diabetes knowledge and skills assessment:   Patient is knowledgeable in diabetes management concepts related to: Monitoring and Taking Medication    Patient needs further education on the following diabetes management concepts: Monitoring    Based on learning assessment above, most appropriate setting for further diabetes education would be: Individual setting.      INTERVENTIONS:    Education provided today on:  AADE Self-Care Behaviors:  Monitoring: log and interpret results and frequency of monitoring    Opportunities for ongoing education and support in diabetes-self management were discussed.    Pt verbalized understanding of concepts discussed and recommendations provided today.       Education Materials Provided:  No new materials provided  today      ASSESSMENT:  Patient has been checking BG more often. Has been taking metformin again per PCP instruction and has only had 2 diarrhea episodes, which he blames on what he ate. He is a little resistant to recommendation of checking BG every day before eating but hopefully will be able to do that for this next week until he sees Dr. Ruvalcaba again.      Patient's most recent   Lab Results   Component Value Date    A1C 7.5 03/30/2021    is meeting goal of <8.0    PLAN  See Patient Instructions for co-developed, patient-stated behavior change goals.      Chiara Hair RDN, BOBY, [facility]    Time Spent: 24 minutes  Encounter Type: Individual    Any diabetes medication dose changes were made via the CDE Protocol and Collaborative Practice Agreement with the patient's primary care provider. A copy of this encounter was shared with the provider.

## 2021-06-24 ENCOUNTER — OFFICE VISIT (OUTPATIENT)
Dept: PODIATRY | Facility: CLINIC | Age: 31
End: 2021-06-24
Payer: COMMERCIAL

## 2021-06-24 VITALS
DIASTOLIC BLOOD PRESSURE: 78 MMHG | WEIGHT: 315 LBS | BODY MASS INDEX: 40.43 KG/M2 | SYSTOLIC BLOOD PRESSURE: 146 MMHG | HEIGHT: 74 IN

## 2021-06-24 DIAGNOSIS — M79.672 LEFT FOOT PAIN: ICD-10-CM

## 2021-06-24 DIAGNOSIS — E11.65 TYPE 2 DIABETES MELLITUS WITH HYPERGLYCEMIA, WITHOUT LONG-TERM CURRENT USE OF INSULIN (H): ICD-10-CM

## 2021-06-24 PROCEDURE — 11750 EXCISION NAIL&NAIL MATRIX: CPT | Mod: T5 | Performed by: PODIATRIST

## 2021-06-24 PROCEDURE — 99213 OFFICE O/P EST LOW 20 MIN: CPT | Mod: 25 | Performed by: PODIATRIST

## 2021-06-24 ASSESSMENT — PAIN SCALES - GENERAL: PAINLEVEL: NO PAIN (0)

## 2021-06-24 ASSESSMENT — MIFFLIN-ST. JEOR: SCORE: 2689.92

## 2021-06-24 NOTE — PROGRESS NOTES
HPI:  Bertram Foreman is a 30 year old male who is seen in consultation at the request of Tremaine Ruvalcaba DO    Pt presents for eval of:   (Onset, Location, L/R, Character, Treatments, Injury if yes)    XR Left toe 5/18/2021 - was having issues with left great toe joint, this is resolved.    DM type 2     Ongoing lateral Right great toenail. Issues trimming his toenails. Presents today with Helena, worker with Adults w/disabilities.  Painful thickened elongated toenail that has been painful and draining since he has been 8 years old off-and-on but no previous procedures.    Works as at [employer].    Weight management plan: Patient was referred to their PCP to discuss a diet and exercise plan.     Bertram to follow up with Primary Care provider regarding elevated blood pressure.    Review of Systems:  Patient denies fever, chills, rash, wound, stiffness, limping, numbness, weakness, heart burn, blood in stool, chest pain with activity, calf pain when walking, shortness of breath with activity, chronic cough, easy bleeding/bruising, swelling of ankles, excessive thirst, fatigue, depression, anxiety.  Pt admits to the symptoms noted in history above.     PAST MEDICAL HISTORY:   Past Medical History:   Diagnosis Date     Depressive disorder All my life almost.     Diabetes (H) sometime in 2019     Sleep apnea         PAST SURGICAL HISTORY:   Past Surgical History:   Procedure Laterality Date     APPENDECTOMY  8/15/14     COLONOSCOPY N/A 12/28/2020    Procedure: COLONOSCOPY, WITH BIOPSY;  Surgeon: Haley Hernandez MD;  Location:  GI        MEDICATIONS:   Current Outpatient Medications:      acetaminophen (TYLENOL) 325 MG tablet, Take 325-650 mg by mouth as needed for mild pain , Disp: , Rfl:      atorvastatin (LIPITOR) 20 MG tablet, TAKE 1 TABLET BY MOUTH ONCE DAILY EVERY MORNING (AM PLUSPAK), Disp: 28 tablet, Rfl: 5     blood glucose calibration (ONETOUCH VERIO) solution, USE AS DIRECTED, Disp: 1 each, Rfl:  0     Blood Glucose Monitoring Suppl (ONETOUCH VERIO FLEX SYSTEM) w/Device KIT, USE TO TEST TWICE DAILY, Disp: 1 kit, Rfl: 0     divalproex sodium extended-release (DEPAKOTE ER) 500 MG 24 hr tablet, TAKE THREE TABLETS BY MOUTH EVERY NIGHT AT BEDTIME (HS PLUSPAK), Disp: , Rfl:      FIBER PO, Take 1 chew tab by mouth daily, Disp: , Rfl:      glimepiride (AMARYL) 4 MG tablet, TAKE 1 TABLET BY MOUTH ONCE DAILY EVERY MORNING BEFORE BREAKFAST, Disp: 90 tablet, Rfl: 1     hydrocortisone, Perianal, (HYDROCORTISONE) 2.5 % cream, Place rectally 2 times daily as needed for hemorrhoids, Disp: 28 g, Rfl: 0     ibuprofen (ADVIL/MOTRIN) 200 MG tablet, As needed, Disp: , Rfl:      Lancets (ONETOUCH DELICA PLUS [id number]) MISC, TEST TWICE DAILY, Disp: 100 each, Rfl: 1     Multiple Vitamins-Minerals (MENS MULTIVITAMIN PO), , Disp: , Rfl:      omeprazole (PRILOSEC) 20 MG DR capsule, TAKE 1 CAPSULE BY MOUTH TWICE A DAY (AM & EVENING PLUSPAK), Disp: 56 capsule, Rfl: 3     ONETOUCH VERIO IQ test strip, TEST TWICE DAILY, Disp: 100 strip, Rfl: 1     QUEtiapine (SEROQUEL) 300 MG tablet, Take 300 mg by mouth At Bedtime , Disp: , Rfl: 0     TRAZODONE HCL PO, Take 100 mg by mouth At Bedtime , Disp: , Rfl:      zolpidem (AMBIEN) 5 MG tablet, Take tablet by mouth 15 minutes prior to sleep, for Sleep Study, Disp: 1 tablet, Rfl: 0     ALLERGIES:    Allergies   Allergen Reactions     Ceclor [Cefaclor] Swelling     Erythromycin GI Disturbance        SOCIAL HISTORY:   Social History     Socioeconomic History     Marital status: Single     Spouse name: Not on file     Number of children: Not on file     Years of education: Not on file     Highest education level: Not on file   Occupational History     Not on file   Social Needs     Financial resource strain: Not on file     Food insecurity     Worry: Not on file     Inability: Not on file     Transportation needs     Medical: Not on file     Non-medical: Not on file   Tobacco Use     Smoking status:  "Former Smoker     Packs/day: 1.00     Years: 0.00     Pack years: 0.00     Types: Cigarettes     Start date: 2014     Quit date: 3/1/2014     Years since quittin.3     Smokeless tobacco: Never Used   Substance and Sexual Activity     Alcohol use: Yes     Alcohol/week: 0.0 standard drinks     Comment: occ     Drug use: No     Sexual activity: Never   Lifestyle     Physical activity     Days per week: Not on file     Minutes per session: Not on file     Stress: Not on file   Relationships     Social connections     Talks on phone: Not on file     Gets together: Not on file     Attends Catholic service: Not on file     Active member of club or organization: Not on file     Attends meetings of clubs or organizations: Not on file     Relationship status: Not on file     Intimate partner violence     Fear of current or ex partner: Not on file     Emotionally abused: Not on file     Physically abused: Not on file     Forced sexual activity: Not on file   Other Topics Concern     Parent/sibling w/ CABG, MI or angioplasty before 65F 55M? No   Social History Narrative     Not on file        FAMILY HISTORY:   Family History   Problem Relation Age of Onset     Mental Illness Brother      Diabetes No family hx of      Depression No family hx of      Anxiety Disorder No family hx of      Mental Illness No family hx of         EXAM:Vitals: BP (!) 146/78 (BP Location: Left arm, Patient Position: Sitting, Cuff Size: Adult Regular)   Ht 1.88 m (6' 2\")   Wt (!) 166 kg (366 lb)   BMI 46.99 kg/m    BMI= Body mass index is 46.99 kg/m .    General appearance: Patient is alert and fully cooperative with history & exam.  No sign of distress is noted during the visit.     Psychiatric: Affect is pleasant & appropriate.  Patient appears motivated to improve health.     Respiratory: Breathing is regular & unlabored while sitting.     HEENT: Hearing is intact to spoken word.  Speech is clear.  No gross evidence of visual impairment " that would impact ambulation.     Vascular: DP & PT pulses are intact & regular, CFT immediate, positive digital hair growth bilaterally.  No significant edema or varicosities noted and skin temperature is normal to both lower extremities.     Neurologic: Lower extremity sensation is intact to light touch.  No evidence of weakness or contracture in the lower extremities.  No evidence of neuropathy.  Protective threshold intact +10/10 applications of a 5.0 7 monofilament bilateral.    Dermatologic: Adequate texture, turgor and tone about the integument.  No discoloration or thickening of the toenail however the right lateral hallux nail border(s) are ingrown with localized erythema, discomfort and purulent drainage.     Musculoskeletal: Patient is ambulatory without assistive device or brace.  No gross ankle deformity noted.  No foot or ankle joint effusion is noted.    Hemoglobin A1C (%)   Date Value   03/30/2021 7.5 (H)   09/08/2020 6.8 (H)   05/20/2020 8.0 (H)   01/16/2020 7.2 (H)   10/11/2019 6.9 (H)   02/04/2019 6.8 (H)     Creatinine (mg/dL)   Date Value   09/08/2020 0.47 (L)   05/20/2020 0.63 (L)   01/16/2020 0.67   08/21/2019 0.59 (L)   07/05/2019 0.56 (L)   01/08/2019 0.72          ASSESSMENT:       ICD-10-CM    1. Type 2 diabetes mellitus with hyperglycemia, without long-term current use of insulin (H)  E11.65 Orthopedic & Spine  Referral     REMOVAL NAIL/NAIL BED, PARTIAL OR COMPLETE   2. Left foot pain  M79.672 Orthopedic & Spine  Referral     REMOVAL NAIL/NAIL BED, PARTIAL OR COMPLETE       Plan:   6/24/2021  We reviewed medical history and EPIC chart.  After obtaining informed consent to permanently remove the right lateral hallux nail(s), I utilized 3 cc of lidocaine plain to achieve local anesthesia per digit.  The toenails were then prepped with Betadine in usual fashion.  A quarter inch Penrose drain was then utilized for hemostasis.  100% of the toenail border was avulsed  utilizing a nail elevator, english anvil, 6100 blade and hemostat.  The proximal root portion of the nail was confirmed to be removed atraumatically.  Three applications of 89% phenol were applied to the surgical site for 30 seconds each followed by a curette after each application.  Surgical site was then flushed with alcohol and dressed with bacitracin and a nonadherent compression dressing.  Tourniquet was removed after approximately 3 minutes followed by immediate hyperemia to the distal aspect of the hallux.  Written postoperative instructions were dispensed and patient instructed to follow up in 1-2 weeks with any questions, pain,drainage, delayed healing or concerns.  I answered all questions to patients satisfaction.     If patient calls in the next 1-3 weeks with continued redness, pain or drainage I would recommend beginning oral Keflex 500 mg, 4 times a day ×10 days, after confirming there is no allergy.      Lopez Latif DPM

## 2021-06-24 NOTE — LETTER
6/24/2021         RE: Bertram Foreman  [address]  [facility] [address]        Dear Colleague,    Thank you for referring your patient, Bertram Foreman, to the [facility]. Please see a copy of my visit note below.    HPI:  Bertram Foreman is a 30 year old male who is seen in consultation at the request of Tremaine Ruvalcaba DO    Pt presents for eval of:   (Onset, Location, L/R, Character, Treatments, Injury if yes)    XR Left toe 5/18/2021 - was having issues with left great toe joint, this is resolved.    DM type 2     Ongoing lateral Right great toenail. Issues trimming his toenails. Presents today with [place], worker with Adults w/disabilities.  Painful thickened elongated toenail that has been painful and draining since he has been 8 years old off-and-on but no previous procedures.    Works as at [employer].    Weight management plan: Patient was referred to their PCP to discuss a diet and exercise plan.     Bertram to follow up with Primary Care provider regarding elevated blood pressure.    Review of Systems:  Patient denies fever, chills, rash, wound, stiffness, limping, numbness, weakness, heart burn, blood in stool, chest pain with activity, calf pain when walking, shortness of breath with activity, chronic cough, easy bleeding/bruising, swelling of ankles, excessive thirst, fatigue, depression, anxiety.  Pt admits to the symptoms noted in history above.     PAST MEDICAL HISTORY:   Past Medical History:   Diagnosis Date     Depressive disorder All my life almost.     Diabetes (H) sometime in 2019     Sleep apnea         PAST SURGICAL HISTORY:   Past Surgical History:   Procedure Laterality Date     APPENDECTOMY  8/15/14     COLONOSCOPY N/A 12/28/2020    Procedure: COLONOSCOPY, WITH BIOPSY;  Surgeon: Hlaey Hernandez MD;  Location:  GI        MEDICATIONS:   Current Outpatient Medications:      acetaminophen (TYLENOL) 325 MG tablet, Take 325-650 mg by mouth as  needed for mild pain , Disp: , Rfl:      atorvastatin (LIPITOR) 20 MG tablet, TAKE 1 TABLET BY MOUTH ONCE DAILY EVERY MORNING (AM PLUSPAK), Disp: 28 tablet, Rfl: 5     blood glucose calibration (ONETOUCH VERIO) solution, USE AS DIRECTED, Disp: 1 each, Rfl: 0     Blood Glucose Monitoring Suppl (ONETOUCH VERIO FLEX SYSTEM) w/Device KIT, USE TO TEST TWICE DAILY, Disp: 1 kit, Rfl: 0     divalproex sodium extended-release (DEPAKOTE ER) 500 MG 24 hr tablet, TAKE THREE TABLETS BY MOUTH EVERY NIGHT AT BEDTIME (HS PLUSPAK), Disp: , Rfl:      FIBER PO, Take 1 chew tab by mouth daily, Disp: , Rfl:      glimepiride (AMARYL) 4 MG tablet, TAKE 1 TABLET BY MOUTH ONCE DAILY EVERY MORNING BEFORE BREAKFAST, Disp: 90 tablet, Rfl: 1     hydrocortisone, Perianal, (HYDROCORTISONE) 2.5 % cream, Place rectally 2 times daily as needed for hemorrhoids, Disp: 28 g, Rfl: 0     ibuprofen (ADVIL/MOTRIN) 200 MG tablet, As needed, Disp: , Rfl:      Lancets (ONETOUCH DELICA PLUS MUTEQU46W) MISC, TEST TWICE DAILY, Disp: 100 each, Rfl: 1     Multiple Vitamins-Minerals (MENS MULTIVITAMIN PO), , Disp: , Rfl:      omeprazole (PRILOSEC) 20 MG DR capsule, TAKE 1 CAPSULE BY MOUTH TWICE A DAY (AM & EVENING PLUSPAK), Disp: 56 capsule, Rfl: 3     ONETOUCH VERIO IQ test strip, TEST TWICE DAILY, Disp: 100 strip, Rfl: 1     QUEtiapine (SEROQUEL) 300 MG tablet, Take 300 mg by mouth At Bedtime , Disp: , Rfl: 0     TRAZODONE HCL PO, Take 100 mg by mouth At Bedtime , Disp: , Rfl:      zolpidem (AMBIEN) 5 MG tablet, Take tablet by mouth 15 minutes prior to sleep, for Sleep Study, Disp: 1 tablet, Rfl: 0     ALLERGIES:    Allergies   Allergen Reactions     Ceclor [Cefaclor] Swelling     Erythromycin GI Disturbance        SOCIAL HISTORY:   Social History     Socioeconomic History     Marital status: Single     Spouse name: Not on file     Number of children: Not on file     Years of education: Not on file     Highest education level: Not on file   Occupational History  "    Not on file   Social Needs     Financial resource strain: Not on file     Food insecurity     Worry: Not on file     Inability: Not on file     Transportation needs     Medical: Not on file     Non-medical: Not on file   Tobacco Use     Smoking status: Former Smoker     Packs/day: 1.00     Years: 0.00     Pack years: 0.00     Types: Cigarettes     Start date: 2014     Quit date: 3/1/2014     Years since quittin.3     Smokeless tobacco: Never Used   Substance and Sexual Activity     Alcohol use: Yes     Alcohol/week: 0.0 standard drinks     Comment: occ     Drug use: No     Sexual activity: Never   Lifestyle     Physical activity     Days per week: Not on file     Minutes per session: Not on file     Stress: Not on file   Relationships     Social connections     Talks on phone: Not on file     Gets together: Not on file     Attends [employer] service: Not on file     Active member of club or organization: Not on file     Attends meetings of clubs or organizations: Not on file     Relationship status: Not on file     Intimate partner violence     Fear of current or ex partner: Not on file     Emotionally abused: Not on file     Physically abused: Not on file     Forced sexual activity: Not on file   Other Topics Concern     Parent/sibling w/ CABG, MI or angioplasty before 65F 55M? No   Social History Narrative     Not on file        FAMILY HISTORY:   Family History   Problem Relation Age of Onset     Mental Illness Brother      Diabetes No family hx of      Depression No family hx of      Anxiety Disorder No family hx of      Mental Illness No family hx of         EXAM:Vitals: BP (!) 146/78 (BP Location: Left arm, Patient Position: Sitting, Cuff Size: Adult Regular)   Ht 1.88 m (6' 2\")   Wt (!) 166 kg (366 lb)   BMI 46.99 kg/m    BMI= Body mass index is 46.99 kg/m .    General appearance: Patient is alert and fully cooperative with history & exam.  No sign of distress is noted during the visit.   "   Psychiatric: Affect is pleasant & appropriate.  Patient appears motivated to improve health.     Respiratory: Breathing is regular & unlabored while sitting.     HEENT: Hearing is intact to spoken word.  Speech is clear.  No gross evidence of visual impairment that would impact ambulation.     Vascular: DP & PT pulses are intact & regular, CFT immediate, positive digital hair growth bilaterally.  No significant edema or varicosities noted and skin temperature is normal to both lower extremities.     Neurologic: Lower extremity sensation is intact to light touch.  No evidence of weakness or contracture in the lower extremities.  No evidence of neuropathy.  Protective threshold intact +10/10 applications of a 5.0 7 monofilament bilateral.    Dermatologic: Adequate texture, turgor and tone about the integument.  No discoloration or thickening of the toenail however the right lateral hallux nail border(s) are ingrown with localized erythema, discomfort and purulent drainage.     Musculoskeletal: Patient is ambulatory without assistive device or brace.  No gross ankle deformity noted.  No foot or ankle joint effusion is noted.    Hemoglobin A1C (%)   Date Value   03/30/2021 7.5 (H)   09/08/2020 6.8 (H)   05/20/2020 8.0 (H)   01/16/2020 7.2 (H)   10/11/2019 6.9 (H)   02/04/2019 6.8 (H)     Creatinine (mg/dL)   Date Value   09/08/2020 0.47 (L)   05/20/2020 0.63 (L)   01/16/2020 0.67   08/21/2019 0.59 (L)   07/05/2019 0.56 (L)   01/08/2019 0.72          ASSESSMENT:       ICD-10-CM    1. Type 2 diabetes mellitus with hyperglycemia, without long-term current use of insulin (H)  E11.65 Orthopedic & Spine  Referral     REMOVAL NAIL/NAIL BED, PARTIAL OR COMPLETE   2. Left foot pain  M79.672 Orthopedic & Spine  Referral     REMOVAL NAIL/NAIL BED, PARTIAL OR COMPLETE       Plan:   6/24/2021  We reviewed medical history and EPIC chart.  After obtaining informed consent to permanently remove the right lateral  hallux nail(s), I utilized 3 cc of lidocaine plain to achieve local anesthesia per digit.  The toenails were then prepped with Betadine in usual fashion.  A quarter inch Penrose drain was then utilized for hemostasis.  100% of the toenail border was avulsed utilizing a nail elevator, english anvil, 6100 blade and hemostat.  The proximal root portion of the nail was confirmed to be removed atraumatically.  Three applications of 89% phenol were applied to the surgical site for 30 seconds each followed by a curette after each application.  Surgical site was then flushed with alcohol and dressed with bacitracin and a nonadherent compression dressing.  Tourniquet was removed after approximately 3 minutes followed by immediate hyperemia to the distal aspect of the hallux.  Written postoperative instructions were dispensed and patient instructed to follow up in 1-2 weeks with any questions, pain,drainage, delayed healing or concerns.  I answered all questions to patients satisfaction.     If patient calls in the next 1-3 weeks with continued redness, pain or drainage I would recommend beginning oral Keflex 500 mg, 4 times a day ×10 days, after confirming there is no allergy.      Lopez Latif DPM            Again, thank you for allowing me to participate in the care of your patient.        Sincerely,        Lopez Latif DPM

## 2021-06-24 NOTE — PATIENT INSTRUCTIONS
"DIABETES AND YOUR FEET    What effect does diabetes have on the feet?  Diabetes can result in several problems in the feet including contractures of the tendons leading to deformities and reduced function of the bones, skin ulcers or open sores on pressure points or prominent deformities, reduced sensation, reduced blood flow and thus reduced oxygen and immune cells to the tiny vessels in our feet. This all leads to higher risk of hospitalization, infections, and amputations.     What is neuropathy?  Neuropathy is a term used to describe a loss of nerve function.  Patients with diabetes are at risk of developing neuropathy if their sugars continue to run high and are above the normal value of 140.  The elevated blood sugar in the body enters the nerves causing it to swell and impair nerve function.  The higher the blood sugar and the longer it is elevated, the more damage is done to nerves.  This damage is permanent and irreversible.  These damaged sensory nerves can then cause reduced feeling or cause pain.  Damaged motor nerves can reduce blood flow and white blood cells into into your foot, skin and bones reducing your ability to heal a small problem. And neuropathy can cause tendons to become unbalanced and contribute to the formation of deformity and contractures in our feet. Often times, neuropathy can be prevented by controlling your blood sugar.  Your risk of developing neuropathy goes up dramatically as your hemoglobin A1C raises above 7.5.      How do I know if I have neuropathy?  When a person develops neuropathy, they usually begin to feel numbness or tingling in their feet and sometimes in their legs.  Other symptoms may include painful burning or hot feet, tingling, electrical sensations or feeling like insects or ants are crawling on your feet or legs.  If blood sugar remains above 140  for long periods of time, neuropathy can also occur in the hands.  When a person loses their \"protective threshold\" " or ability to detect a 5.07 Panther Kelvin monofilament is when they have elevated risk for developing foot deformity, contractures, foot infections, amputations, Charcot arthropathy, or other complications. Keep your hemoglobin A1C below 7.5 to reduce this risk.    What is vascular disease?  Peripheral vascular disease is a term used to describe a loss or decrease in circulation (blood flow).  There is a problem in getting blood, immune cells, and oxygen to areas that need it.  Similar to neuropathy, sugars can build up in the walls of the arteries (blood vessels) and cause them to become swollen, thickened and hardened.  This decreases the amount of blood that can go to an area that needs it.  Though this is common in the legs of diabetic patients, it can also affect other arteries (blood vessels) in the body such as in the heart, kidney, eyes, and the blood flow into bones.  It is often seen first in the small vessels of her body notably our feet and toes.    How do I know if I have vascular disease?  In the legs, vascular disease usually results in cramping.  Patients who develop leg cramps after walking the same distance every time (i.e. One block, half a mile, ect.) need to let their doctors know so that their circulation may be checked.  Cramps causing severe pain in the feet and/or legs while sleeping and the cramps go away when you stand or hang your legs off the side of the bed, may also be a sign of poor blood circulation.  Occasional cramping in cold weather or on rare occasions with activity may not be due to poor circulation, but you should inform your doctor.    How can these problems be prevented?  The key to prevention is good blood sugar control all day every day.  Inadequate blood sugar control is the most common way patients experience these problems. Reducing, controlling and measuring your daily consumption of sugar or carbohydrates is essential to understanding and managing diabetes.   Physical activity (exercise) is a very good way to help decrease your blood sugars.  Exercise can lower your blood sugar, blood pressure, and cholesterol.  It also reduces your risk for heart disease and stroke, relieves stress, and strengthens your heart, muscles and bones. Physical activity also increases your balance and reduces development of contractures and foot deformities over time. In addition, regular activity helps insulin work better, improves your blood circulation, and keeps your joints flexible.  If you're trying to lose weight, a combination of exercise and wise food choices can help you reach your target weight and maintain it.  Activity and exercise alone can not make up for poor diet choices, eating too much, or eating too many sugars or carbohydrates.  Ask your doctor for help when you are not meeting your blood sugar goals. Changes or increases in medication are powerful tools in reducing your blood sugar.    Know your blood sugar and hemoglobin A1C trend.  Upon first diagnosis or during acute illness, checking your blood sugar 4 times a day can help you understand how your diet, activity, and lifestyle affect your blood sugar.  Monitoring your hemoglobin A1C can help you understand how well you are managing blood sugar over the long run.  Your hemoglobin A1C tells you what your blood sugar averages all day, every day, over the past 90 days.       To experience the lowest risk of complications associated with diabetes such as neuropathy, loss of blood flow, bone or joint infection, charcot arthropathy, or amputation, the American Diabetes Association recommends a target hemoglobin A1C of less than 7.0%, while the American Association of Clinical Endocrinologists' recommendation is 6.5% or less.  Both organizations advise that the goals be individualized based on patient factors such as other health conditions, history of hypoglycemia, education, and life expectancy.  A patients risk of  experiencing complications associated with diabetes is only slightly elevated with a hemoglobin A1C above 6.0.  However, this risk goes up exponentially when the hemoglobin A1C is above 7.5.  The longer the hemoglobin A1C is elevated, the more risk that patient will experience in their lifetime. The damage that occurs to nerves, blood vessels, tendons, bones and body organs, while their hemoglobin A1C is elevated is mostly irreversible and worsens with each additional time period of elevated hemoglobin A1C.     You must understand and manage your disease.  Your health insurance or medical team cannot manage this disease for you.  When you take responsibility for understanding and managing your disease, you can expect to experience fewer problems associated with diabetes in your lifetime.  You will  Also experience a higher quality of life and health and reduced cost of health care.    Diabetic Foot Care Recommendations  The following are recommendations for avoiding serious foot problems or injury    DO'S  1. Be aware of your hemoglobin A1C and continue to follow up with your medical team for adjustments in your lifestyle and medication until your reach your A1C goal.  Keep this below 7.5 to reduce your risk of developing complications associated with diabetes.    2.  Wash your feet with lukewarm water and a mild soap and then dry them thoroughly, especially between the toes.  Gently floss your towel or washcloth between each toe at every bath.  Soaking your feet in water cannot clean dead skin, debris, and bacteria from your feet and is not necessary.   3. Examine your feet daily looking for cuts, corns, blisters, cracks, ect..., especially after wearing new shoes or increased or changed activities.  Make sure to look between your toes.  If you cannot see the bottom of your feet, set a mirror on the floor and hold your foot over it, or ask a family member to examine your feet for you daily.  Contact your doctor  immediately if new problems are noted or if sores are not healing.  4.  Immediately apply moisturizer cream such as Cetaphil to the tops and bottoms of your feet, avoiding areas between the toes.  Apply sunscreen or cover your feet if they will be exposed to extended sunlight.  5.Use clean comfortable shoes.  Socks should not have thick seams or cut off the circulation around the leg.  Break in new shoes slowly and rotate with older shoes until broken in.  Check the inside of your shoes with your hand to look for areas of irritation or objects that may have fallen into your shoes.    6. Keep slippers by the side of your bed for use during the night.  7. Shoes should be fitted by a professional and should not cause areas of irritation.  Check your feet regularly when wearing a new pair of shoes and replace them as needed.  8.  Talk to your doctor about proper exercise.  Exercise and stretching stimulate blood flow to your feet and maintain proper glucose levels.  Use it or lose it!  9.  Monitor your blood glucose level and your hemoglobin A1C.  Notify your doctor immediately if your blood sugar is abnormally high or low.  10.  Cut your nails straight across, but then gently round any sharp edges with a nail file.  If you have neuropathy, peripheral vascular disease or cannot see that well to trim your own toenails, see a medical professional for care.    DONT'S  1.  Do not soak your feet if you have an open sore or your provider has informed you that you have neuropathy or loss of protective threshold.  Use only lukewarm water and always check the temperature with your hand as hot water can easily burn your feet.    2.  Never use a hot water bottle or heating pad on your feet.  Also do not apply hot or cold compresses to your feet.  With decreased sensation, you could burn or freeze your feet.  Do not rest your feet near a heat source such as a heater or heat register.    3.  Do not apply any of these to your  "feet:    - over the counter medicine for corns or warts    -  Harsh chemicals like boric acid    -  Do not self-treat corns, cuts, blisters or infections.  Always consult your doctor.   4.  Do not wear sandals, slippers or walk barefoot, especially on harsh surfaces.  5.  If you smoke, stop!!!        Nail Debridement    A high quality instrument makes trimming toenails MUCH easier.  Search [employer] for any 5\" nail nipper manufactured by reliable brands such as Miltex, Integra or Jarit as these quality instruments will help manage difficult nails more effectively and comfortably. We use Miltex -SS.  A physician is not necessary to trim nails even if you are taking blood thinners or are diabetic.  Your family or care givers may help manage your toenails.      Trim or sand the nails once weekly.  Do not wait until they are long and painful or trimming will become too difficult and painful and will increase your risk of complications or infection.  A course file or 120 grit sandpaper on a sanding block can be helpful.  For very thick nails many people prefer battery operated dean such as an Amope', Personal Pedi and Emjoi for regular use or heavy painful callouses or thick toenails.    Trim or skive any portion of nail that is thick, loose, crumbling, or not well attached. Do not tear the nail away, but rather cut them with a nail nipperor sand or sand them down.  You may follow up with your Podiatric Physician if you have pain, bleeding, infection, questions or other concerns.      You may also contact the following Registered Nurses for further help with nail debridement and minor hygiene concnerns.  They may come to your home or meet them at their clinic to trim your toenails and soak your feet, as well as monitor for any complications that would require evaluation by a Physician.      [facility]  Lilo Morse RN  Phone & text [phone]    [facility]  Destini Schmitt, " RN  Office [phone]    [facility]  Michelle Cortez, SERINA  [phone]   Call or text for appointment  Some home visits and has a clinic at:  [address] [place] [id number]    [facility]  [place], [place], [facility]  [address] [place] Arriaga  [phone]    [facility]  [phone]  [place], [place], [place]    [facility]  [phone]  [email or web address] [facility]    For up to date list and to find foot care nurses in other communities visit American [employer] Association website:  afcna.org.     Calluses, Corns, IPKs, Porokeratosis    When there is excessive friction or pressure on the skin, the body responds by making the skin thicker.  While this may protect the deeper structures, the thickened skin can take up more space and thus increase pressure over a bony prominence or become an open sore or skin ulcer as this skin becomes less flexible.    Flat, diffuse thickening are simple calluses and they are usually caused by friction.  Often these are the result of rubbing on a shoe or going barefoot.    Calluses with a central core between the toes are called corns.  These often result from prominent joints on adjacent toes rubbing together.  Theses are often a symptom of bone malalignment and will usually recur unless the underlying bones are addressed.    Many of these lesions can be kept comfortable with routine maintenance. This consists of filing them with a Ped Egg, callus file, or 120 grit sandpaper on a block, every day during your bath or shower.  Most people prefer battery operated dean such as an Amope', Personal Pedi and Emjoi for regular use or heavy painful callouses.  Heavy creams or ointments can be applied 1-2 times every day to keep them soft. Toe spacers can be used for corns, gel pads can be used for other lesions on the bottom of the foot. If there is a deformity noted, such as a prominent bone, often this can  be addressed to minimize recurrence. However, sometimes the pressure and lesion simply migrates to another spot after surgery, so it is not a guaranteed cure.     If you have severe callouses and cracking, you may apply heavy ointments that you scoop up such as Cetaphil cream, Eucerin, Aquaphor or Vaseline.  Be sure to obtain cream or ointment in these brands and not lotion (lotion is water based and not durable enough for feet). For more aggressive help apply heavy creams or ointment under occlusive dressings such as Saran Wrap or Jelly Feet while sleeping.   Jelly Feet can be obtained at [email or web address].     To be successful with managing hyperkeratotic skin, you must manage hygiene daily.  Apply the cream once or twice EVERY day.  At your bath or shower time is the easiest time to work on this when skin is most soft.  There is no medical or surgical treatment that will absolutely eliminate many of these symptoms.      [employer] is a reliable source for all sorts of foot pads, cushions, or interdigital spacers and foot appliances. Go to www.[employer] or request a catalog at 2-[employer].        Please call with any additional questions.       INGROWN TOENAIL POSTOPERATIVE INSTRUCTIONS   (Nail avulsion or chemical matrixectomy)   1.  Go directly home and elevate the affected foot on one or two pillows for the remainder of the day & evening. Your toe may stay numb for 2-8 hours.   2.  Take Tylenol, ibuprofen or another anti-inflammatory as needed for pain. Most people require no pain medication.  3.  Use oral antibiotic if that was prescribed at your doctor visit. Take the entire prescription even if your symptoms have improved.   4.  Keep dressing dry and intact the day of the procedure. The morning after the procedure, remove entire dressing and soak or wash the affected area in lukewarm water for 5-10 minutes.  You may add Epsom salt to soothe the area and help it become drier. Do this twice a day or more  until the surgical site remains dry without drainage.  This may take 1-2 weeks if a small part of your nail was removed or 4-8 weeks if the entire nail was removed. You may count showering or bathing as one soak.  After each soak, pat the area dry with a clean towel or gauze and then allow to air dry for a few minutes. Then cover with a cloth or fabric bandaid.  Avoid ointments as they keep the area to wet. Encourage this wound to become dry with a scab.   5.  You may walk and pursue everyday activities as tolerated with either an open toe shoe or cut-out shoe as needed. You may wear regular roomy shoes if no pain is noted.  No swimming in the lake, river, pool or hot tub until the wound has become dry.   7.  Watch for any signs or symptoms of infection such as: red streaks going up the foot/leg, swelling, pus or foul odor. For patients that have had a permanent phenol procedure, the toe will drain longer and may look red or sore for a half an inch around the wound similar to infection because it is a chemical burn.  Please call with questions.  8.  You may call my office in 1-2 weeks if the surgical site is not becoming drier or if other complications occur.   9.  There is 5-10% chance of complications such as infection or formation of another nail or a thick scared nail.

## 2021-06-28 NOTE — PROGRESS NOTES
Does Bertram have home oxygen?  No     [facility]  Pulmonary Medicine  Visit Clinic Note  July 28, 2021         ASSESSMENT & PLAN       Shortness of Breath: He has shortness of breath with exertion, but no other significant respiratory symptoms.  His hypoxemia at night is almost certainly related to his sleep disorder.  I suspect his shortness of breath is related to obesity and deconditioning.  He has clear lungs on exam, and his oxygen saturations in the clinic today is 94%.  He does not have a substantial smoking history, and no other noted lung disease in the past.  The likelihood of having lung disease is low, however I cannot fully rule it out.  I would like to get full pulmonary function testing and a 2 view chest x-ray.  I will call him after I get those results and decide if any further follow-up is needed.  I told him that he needs to schedule his follow-up visit with the sleep clinic to get a mask titration performed.      Christopher Echevarria MD          Today's visit note:     Chief Complaint: Bertram Foreman is a 30 year old year old male who is being seen for Consult      HISTORY OF PRESENT ILLNESS:    He was referred today to the pulmonary clinic for evaluation of shortness of breath from the sleep clinic.  He was recently diagnosed with severe sleep apnea by an overnight polysomnogram.  He was noted to have severe hypoxemia during sleep, with saturations dropping to as low as 48%.  He has yet to follow-up with a mask titration study.    He denies any significant cough, wheeze or chest tightness.  He does get short of breath with exertion.  He is in the clinic today with a staff worker that helps him out 4 days a week due to his mild intellectual disability.  She states that he does get short of breath walking around the grocery store or across the parking lot.  Sometimes he has to sit down to catch his breath.  This does not happen any other time outside of exertional  activities.    Bertram talks mostly about his sleep and his fatigue.  He says that people of witnessed him stopping breathing while he is sleeping.  He never feels fully rested in the morning.  This is even after 10 to 12 hours of sleep.    He denies any significant allergies, or postnasal drip.  He does have acid reflux symptoms.     Mother passed away.  Dad works. Has a staff worker that helps with a lot of errands.     Works for [employer]).               Past Medical and Surgical History:     Past Medical History:   Diagnosis Date     Depressive disorder All my life almost.     Diabetes (H) sometime in 2019     Sleep apnea      Past Surgical History:   Procedure Laterality Date     APPENDECTOMY  8/15/14     COLONOSCOPY N/A 2020    Procedure: COLONOSCOPY, WITH BIOPSY;  Surgeon: Haley Hernandez MD;  Location:  GI           Family History:     Family History   Problem Relation Age of Onset     Mental Illness Brother      Diabetes No family hx of      Depression No family hx of      Anxiety Disorder No family hx of      Mental Illness No family hx of               Social History:     Social History     Socioeconomic History     Marital status: Single     Spouse name: Not on file     Number of children: Not on file     Years of education: Not on file     Highest education level: Not on file   Occupational History     Not on file   Tobacco Use     Smoking status: Former Smoker     Packs/day: 1.00     Years: 0.00     Pack years: 0.00     Types: Cigarettes     Start date: 2014     Quit date: 3/1/2014     Years since quittin.4     Smokeless tobacco: Never Used   Substance and Sexual Activity     Alcohol use: Yes     Alcohol/week: 0.0 standard drinks     Comment: occ     Drug use: No     Sexual activity: Never   Other Topics Concern     Parent/sibling w/ CABG, MI or angioplasty before 65F 55M? No   Social History Narrative     Not on file     Social Determinants of Health     Financial  Resource Strain:      Difficulty of Paying Living Expenses:    Food Insecurity:      Worried About Running Out of Food in the Last Year:      Ran Out of Food in the Last Year:    Transportation Needs:      Lack of Transportation (Medical):      Lack of Transportation (Non-Medical):    Physical Activity:      Days of Exercise per Week:      Minutes of Exercise per Session:    Stress:      Feeling of Stress :    Social Connections:      Frequency of Communication with Friends and Family:      Frequency of Social Gatherings with Friends and Family:      Attends [employer] Services:      Active Member of Clubs or Organizations:      Attends Club or Organization Meetings:      Marital Status:    Intimate Partner Violence:      Fear of Current or Ex-Partner:      Emotionally Abused:      Physically Abused:      Sexually Abused:             Medications:     Current Outpatient Medications   Medication     acetaminophen (TYLENOL) 325 MG tablet     atorvastatin (LIPITOR) 20 MG tablet     blood glucose calibration (ONETOUCH VERIO) solution     Blood Glucose Monitoring Suppl (ONETOUCH VERIO FLEX SYSTEM) w/Device KIT     divalproex sodium extended-release (DEPAKOTE ER) 500 MG 24 hr tablet     FIBER PO     glimepiride (AMARYL) 4 MG tablet     hydrocortisone, Perianal, (HYDROCORTISONE) 2.5 % cream     ibuprofen (ADVIL/MOTRIN) 200 MG tablet     Lancets (ONETOUCH DELICA PLUS [id number]) MISC     Multiple Vitamins-Minerals (MENS MULTIVITAMIN PO)     omeprazole (PRILOSEC) 20 MG DR capsule     ONETOUCH VERIO IQ test strip     pioglitazone (ACTOS) 30 MG tablet     QUEtiapine (SEROQUEL) 300 MG tablet     TRAZODONE HCL PO     zolpidem (AMBIEN) 5 MG tablet     metFORMIN (GLUCOPHAGE-XR) 500 MG 24 hr tablet     No current facility-administered medications for this visit.     He currently works at a day time [employer].  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  He lives in an apartment by himself, and has a  "staff worker coming to help him with some activities, errands and his finances.  His mother is dead, and his father works a full-time job as a .         Review of Systems:       A complete review of systems was otherwise negative except as noted in the HPI.      PHYSICAL EXAM:  /84 (BP Location: Right arm, Patient Position: Sitting, Cuff Size: Adult Large)   Pulse 99   Temp (!) 95.1  F (35.1  C) (Temporal)   Ht 1.88 m (6' 2\")   Wt (!) 166.9 kg (368 lb)   SpO2 94%   BMI 47.25 kg/m       General: Pleasant, no apparent distress  Eyes: Anicteric  Nose: Nasal mucosa with no edema or hyperemia.  No polyps  Ears: Hearing grossly normal  Mouth: Oral mucosa is moist, without any lesions. No oropharyngeal exudate.  Neck: In a flexed position.  Lymphatics: No cervical or supraclavicular nodes  Respiratory: Good air movement. No crackles. No rhonchi. No wheezes  Cardiac: RRR, normal S1, S2. No murmurs.   Abdomen: Obese, soft, NT/ND  Musculoskeletal: No clubbing. No cyanosis. No edema.  Skin: No rash on limited exam  Neuro: Normal mentation. Normal speech.  Psych:Normal affect           Data:   All laboratory and imaging data reviewed.      PFT:   None available    Sleep Study 5/21/21:                                        "

## 2021-07-01 ENCOUNTER — OFFICE VISIT (OUTPATIENT)
Dept: INTERNAL MEDICINE | Facility: CLINIC | Age: 31
End: 2021-07-01
Payer: COMMERCIAL

## 2021-07-01 VITALS
BODY MASS INDEX: 47.17 KG/M2 | HEART RATE: 102 BPM | WEIGHT: 315 LBS | OXYGEN SATURATION: 98 % | SYSTOLIC BLOOD PRESSURE: 134 MMHG | DIASTOLIC BLOOD PRESSURE: 82 MMHG | TEMPERATURE: 96.4 F | RESPIRATION RATE: 20 BRPM

## 2021-07-01 DIAGNOSIS — E11.65 TYPE 2 DIABETES MELLITUS WITH HYPERGLYCEMIA, WITHOUT LONG-TERM CURRENT USE OF INSULIN (H): Primary | ICD-10-CM

## 2021-07-01 DIAGNOSIS — E66.01 MORBID OBESITY (H): ICD-10-CM

## 2021-07-01 PROCEDURE — 99214 OFFICE O/P EST MOD 30 MIN: CPT | Mod: 24 | Performed by: INTERNAL MEDICINE

## 2021-07-01 RX ORDER — PIOGLITAZONEHYDROCHLORIDE 30 MG/1
30 TABLET ORAL DAILY
Qty: 30 TABLET | Refills: 0 | Status: SHIPPED | OUTPATIENT
Start: 2021-07-01 | End: 2021-07-22

## 2021-07-01 ASSESSMENT — PAIN SCALES - GENERAL: PAINLEVEL: NO PAIN (0)

## 2021-07-01 NOTE — PROGRESS NOTES
Tahira Burden is a 30 year old who presents for the following health issues     HPI     Diabetes Follow-up    How often are you checking your blood sugar? Two times daily  Blood sugar testing frequency justification:  Uncontrolled diabetes  What time of day are you checking your blood sugars (select all that apply)?  Before and after meals  Have you had any blood sugars above 200?  No  Have you had any blood sugars below 70?  No    What symptoms do you notice when your blood sugar is low?  None    What concerns do you have today about your diabetes? None     Do you have any of these symptoms? (Select all that apply)  No numbness or tingling in feet.  No redness, sores or blisters on feet.  No complaints of excessive thirst.  No reports of blurry vision.  No significant changes to weight.- had ingrown toenail removed by Dr. Latif, no concerns       BP Readings from Last 2 Encounters:   07/01/21 134/82   06/24/21 (!) 146/78     Hemoglobin A1C (%)   Date Value   03/30/2021 7.5 (H)   09/08/2020 6.8 (H)     LDL Cholesterol Calculated (mg/dL)   Date Value   09/08/2020 83   01/16/2020 151 (H)        EMR reviewed including:             Complaint, History of Chief Complaint, Corresponding Review of Systems, and Complaint Specific Physical Examination.    #1   Follow-up of type 2 diabetes.  Had previously discontinued Metformin due to diarrhea.   Diarrhea reoccurred upon reinitiation of Metformin.   Patient does not want Metformin.  Poor dietary compliance.  Last A1c was 7.5 in March.  Continues glimepiride.       Exam:   LUNGS: clear bilaterally, airflow is brisk, no intercostal retraction or stridor is noted. No coughing is noted during visit.   HEART:  regular without rubs, clicks, gallops, or murmurs. PMI is nondisplaced. Upstrokes are brisk. S1,S2 are heard.   GI: Abdomen is soft, without rebound, guarding or tenderness. Bowel sounds are appropriate. No renal bruits are heard.   NEURO: Pt is alert and  appropriate. No neurologic lateralization is noted. Cranial nerves 2-12 are intact. Peripheral sensory and motor function are grossly normal.         Vital Signs:   /82 (BP Location: Right arm, Patient Position: Sitting, Cuff Size: Adult Large)   Pulse 102   Temp 96.4  F (35.8  C) (Temporal)   Resp 20   Wt (!) 166.7 kg (367 lb 6.4 oz)   SpO2 98%   BMI 47.17 kg/m               Decision Making    Problem and Complexity     1. Type 2 diabetes mellitus with hyperglycemia, without long-term current use of insulin (H)  Patient does not want injectable medication.  We will try Actos initially.  If ineffective, would consider GLP or SGLT medications  - pioglitazone (ACTOS) 30 MG tablet; Take 1 tablet (30 mg) by mouth daily  Dispense: 30 tablet; Refill: 0    2. Morbid obesity (H)  Recommend weight loss through caloric restriction.          ------------------------------------------------------------------------------------------------------------------------------  Data    4=1/3 5=2/3    1  >3  Specialty (external) notes reviewed: Diabetic education notes reviewed.  Podiatry notes reviewed  Individual tests ordered (by provider) reviewed:   None  Individual tests ordered (by provider):   None  Independent Historians Interview:   Healthcare provider is present and gives majority of history.  2  Review of outside (other providers) Tests:   None  3   Verbal Discussion with Specialists:    None    ----------------------------------------------------------------------------------------------------------------------------------  Risk   Prescription drug management:   Yes                                High risk for toxicity:   Decision regarding surgery:    None   Social determinants of health:   No   Decision regarding hospitalization:     None   Decision to withhold therapy:   None                               FOLLOW UP   I have asked the patient to make an appointment for followup with me in 1 month with blood  sugar log            I have carefully explained the diagnosis and treatment options to the patient.  The patient has displayed an understanding of the above, and all subsequent questions were answered.      DO DONNELL Jin    Portions of this note were produced using [employer] ([employer])  Although every attempt at real-time proof reading has been made, occasional grammar/syntax errors may have been missed.

## 2021-07-05 DIAGNOSIS — E11.65 TYPE 2 DIABETES MELLITUS WITH HYPERGLYCEMIA, WITHOUT LONG-TERM CURRENT USE OF INSULIN (H): ICD-10-CM

## 2021-07-06 RX ORDER — METFORMIN HCL 500 MG
TABLET, EXTENDED RELEASE 24 HR ORAL
Qty: 180 TABLET | Refills: 0 | Status: SHIPPED | OUTPATIENT
Start: 2021-07-06 | End: 2021-08-03

## 2021-07-06 RX ORDER — GLIMEPIRIDE 4 MG/1
TABLET ORAL
Qty: 90 TABLET | Refills: 1 | Status: SHIPPED | OUTPATIENT
Start: 2021-07-06 | End: 2021-12-08

## 2021-07-06 NOTE — TELEPHONE ENCOUNTER
Metformin  Routing refill request to provider for review/approval because:  Drug not active on patient's medication list    The original prescription was discontinued on 5/18/2021 by Tremaine Ruvalcaba DO. Renewing this prescription may not be appropriate.     Pinky Calderon RN

## 2021-07-13 ENCOUNTER — VIRTUAL VISIT (OUTPATIENT)
Dept: NUTRITION | Facility: CLINIC | Age: 31
End: 2021-07-13
Payer: COMMERCIAL

## 2021-07-13 DIAGNOSIS — K52.9 CHRONIC DIARRHEA: ICD-10-CM

## 2021-07-13 DIAGNOSIS — E11.9 TYPE 2 DIABETES MELLITUS WITHOUT COMPLICATION, WITHOUT LONG-TERM CURRENT USE OF INSULIN (H): Primary | ICD-10-CM

## 2021-07-13 DIAGNOSIS — E66.01 MORBID OBESITY (H): ICD-10-CM

## 2021-07-13 PROCEDURE — 98968 PH1 ASSMT&MGMT NQHP 21-30: CPT | Mod: 95 | Performed by: DIETITIAN, REGISTERED

## 2021-07-13 NOTE — PROGRESS NOTES
.  Medical Nutrition Therapy  Visit Type: reassessment and intervention    Visit Details    How would you like to obtain your AVS? Bernyhart  If the correspondence for visit is dropped, how would you like your dietitian to reconnect with you:   call back by phone? Yes    Text to cell phone: [phone]   Will anyone else be joining your video visit or telephone call? yes  {If patient encounters technical issues they should call [phone]    Type of service:  Telephone (waited on video visit and called pt he prefers telephone over video)    Start Time: 10:15 am  End Time: 10:55 am     Originating Location (pt. Location): Home    Distant Location (provider location):  [facility] WORKING VIRTUAL FROM HOME       Referring Provider: Akbar Lara  Formerly [facility]     REASON FOR REFERRAL:   Bertram Foreman is a 30 year old male who is interested in Medical Nutrition Therapy (MNT) and education related to GI issues diarrhea and weight management. Type 2 diabetes. Not struggling with diarrhea any more when started fiber it help been on not even a week. Comes and goes sometimes has it 3-5 days in a role. The Citracal has been helpful and doesn't have loose water stool.   He is accompanied by [facility] worker goes to all appointments.       Switched to different diabetes medication because causing digestive issues. He has been having diarrhea and sometimes feels he needs to vomit. It has only been a weeks since the med switch and had some slight improvement. His BMs are still about 1-2x per week of diarrhea. Sometime once and sometimes more. Doesn't really track what he is eating. Tried a few things the hummus or guac but not continuing to implement. He has been trying to do more salads. Still struggling with snacking on processed carbs. He likes what he likes and doesn't want to change. ILS worker is only with him 2 days per week and other days ILS not with him eats out has a  lot of pizza and carbs. He has some good food in the fridge but chooses not to eat it. Doctors talked about making healthy choices and family as well as ILS worker is supporting him.  He has been maintaining his weight but the weight is interfering with his life and not getting out and doing as many fun things. He doesn't want to continue nutrition counseling because he doesn't want to make healthy lifestyle changes. We discussed trying some other activities (cooking or exercise) to help make healthy lifestyle changes. He has been making some healthy meals with [facility]. There are some special Olympics that they are looking at.     Changes since previous consult Yes         Behavior Status:Improvement shown  Barriers Include:Motivation/Ready to change , Lack of social support, Lack of cooking skills , Lack of control over emotions/behaviors and Lack of nutrition knowlege     Bm's still soft and formed and more like 1-2x per week having diarrhea. Still struggling with cutting back on portion sizes to help decrease overall carbs. He has been trying to do more healthy fats instead. Bought avocados (made homemade guac), got nuts, asparagus, had a salad. He has been doing better and working on making these habits more consistent. He noticed that adding in more water, protein and healthy fats has given him more energy.     He likes the vitamin enhanced water and trying to do this instead of pop. He likes the variety of flavors. When has been doing the water daily and only doing pop at home anymore and used to do 7-8 cans per day and not buying any cans of pop now. He has only been having 1 pop small glass when out to eat about 1 day per week.     He did weigh himself and has been maintaining the weight vs climbing. He has been going to the [employer] once per week to swim. He wants to go more. He wants to get outside and exercise more too. He has a bike and hopes to see more improvement once he gets to ride it more. He  had been continually gaining weight so maintaining is really good progress. Wants to keep working on the goals we set at our last consult with emphasis on activity for weight loss.    He hasn't been checking blood sugars for a few months now and suppose to be checking twice daily.      He needs new diabetes device so going to be following up with the referring doctor. Last A1c 2 weeks ago was 7.5 which is up from 6.8 about 7months ago. Glucose by meter showed 122 mg/dL 3 months ago     Notes 3/16/21  Has been taking the Citracal and seems to be helping with the diarrhea. His BMs are now more soft and formed and every other day and not diarrhea every day. Has been trying to work on portion sizes but still struggling with cutting back on carbs and increasing protein intake. He has been drinking pop since a kid and hard to kick the habit. He has been doing egg bakes with broccoli. He has been having snacks but still struggling with picking healthy options. He continues to go to chips/crackers. He did try almonds once as a snack. He is willing to do it again. Open to trying more nuts and seeds instead of chips. He likes sunflower seeds. He hasn't had sunflower seed butter before and open to it. He likes ants on a log so open to try as snack. He loves quac and open to trying with veggies. He has never tried it with veggies.     NUTRITION ASSESSMENT:   Nutritional Goals 2/2/2021   Nutritional Goal Create healthier eating patterns;Create a plan to lose weight;Work on meal planning/recipes;Overcome emotional eating;Stabilize blood sugars;Eliminate cravings for sugar and refined carbohydrates;Increase energy;Address anxiety and or depression around eating;What to eat for my specific health concerns        Neurological 2/2/2021   Migraine Headaches Past   Tension Headache Current       No flowsheet data found.   No flowsheet data found.   Gastrointestinal 2/2/2021   Constipation Current   Nausea or Vomiting Past;Current    Hemorrhoids Past;Current   Diarrhea Current   Gas/bloating Past;Current   Heartburn/Reflux/GERD Past;Current   Blood in stool Past;Current   Abdominal Pain Current       No flowsheet data found.   Endocrine 2/2/2021   Type 2 diabetes Current   Overweight/obesity Past;Current      Skin 2/2/2021   Acne Past   Dry Skin Past;Current      Cardiopulmonary 2/2/2021   High Cholesterol Current      Musculoskeletal 2/2/2021   Restless Legs Current      Psychological 2/2/2021   Depression/Anxiety Past;Current   Binge eating disorder Past;Current      No flowsheet data found.   No flowsheet data found.     Past Medical History:  Past Medical History:   Diagnosis Date     Depressive disorder All my life almost.     Diabetes (H) sometime in 2019     Sleep apnea        Previous Surgeries:   Past Surgical History:   Procedure Laterality Date     APPENDECTOMY  8/15/14     COLONOSCOPY N/A 12/28/2020    Procedure: COLONOSCOPY, WITH BIOPSY;  Surgeon: Haley Hernandez MD;  Location:  GI        Family History:  Family History   Problem Relation Age of Onset     Mental Illness Brother      Diabetes No family hx of      Depression No family hx of      Anxiety Disorder No family hx of      Mental Illness No family hx of         Lifestyle History:  Lifestyle 2/2/2021   Do you feel your life is stressful right now?  Yes   What is the cause(s) of stress in your life?  Finances;Work;Emotional problems   Are you currently implementing any strategies to help manage stress? Yes   What are you doing to manage stress?  Breathing exercises;Movement and exercise;Massage;Counseling;TV;Take time for self;Listening to music        Exercise History:  Exercise 2/2/2021   Does your occupation require extended periods of sitting?  No   Does your occupation require extended periods of repetitive movements (ex: walking or lifting)?  Yes   Do you currently participate in any forms of exercise? Yes   Check all the exercises you participate in:  Walking;Biking;Swimming   How many times per week do you exercise? 2 times   How long do you usually exercise? 30 min        Sleep History:  Sleep 2/2/2021   How many hours (on average) do you sleep per night? 9-11   What time do you turn off the lights? 10 PM   How long does it take for you to fall asleep? 15-20 mins   What time do you stop using electronic devices? 10 PM   What time do you wake up? 11 AM   When do you eat your first meal?  11 AM   Do you feel well-rested during the day?  No   Do you take naps?  Yes   Do you have a comfortable bedroom environment (cool, quiet, dark, etc)? Yes   Do you have a sleep routine/ ritual that you do before bed?  Yes   How many hours do you spend per day looking at a screen (TV, computer, tablet and phone)? 3 to 4   Select all factors that apply to your current sleep habits: Difficulty falling asleep;Wake up in the middle of the night;Have nightmares;Take medication for sleep on most night of the week;Snore loudly or have been told you stop breathing;Eat large meals within 3 hours of going to bed;Night sweats;Feel tired/sluggish/fatigued during the day;Grinding teeth        Nutrition History:  Nutrition 2/2/2021   Have you ever had a nutrition consultation? Yes   Do you currently follow a special diet or nutritional program? No   What do you feel are the biggest barriers getting in the way of achieving you nutritional goals? Motivation/Readiness to change;Lack of control over emotions/behaviors;Stress   Do you have any food allergies, sensitivities or intolerances?  No       Digestion 2/2/2021   Do you experience stomach pains/cramping? Daily   Do you experience bloating?  2-3 times per week   Do you experience gas?  2-3 times per week   Do you experience heartburn/acid reflux/indigestion? 2-3 times per week   How often do you have a bowel movement? 3 or more times per day   What is a typical bowel movement like for you? Select all that apply: Loose;Liquid;With blood  visible      Food Access:  2/2/2021   Who does the grocery shopping? Self   How often is grocery shopping done? Weekly   Where do you usually receive your groceries from? Select all that apply: [employer];Other   Do you read food labels? No   Who does the cooking? Select all that apply: Self;Assistant   How many meals do you eat out per week?  3 to 5   What restaurants do you typically choose? [employer] ([employer], [employer], etc.)      Daily Patterns: 2/2/2021   How many days per week do you have breakfast? 3   How many days per week do you have lunch? 6   How many days per week do you have dinner? 7   How many days per week do you have snacks? 5      Protein Intake: 2/2/2021   How many times per day do you typically consume a protein source(s)? 4   What types of protein do you currently eat?  Ground Beef;Steak;Hamburgers;Beef Roast;Pork Chops;Pork Ribs;Hartley/Yemeni Hartley;Deli Ham;Sausage;Tuna;Chicken Breast;Chicken Thighs/Legs;Eggs       Fat Intake:  2/2/2021   How many times per day do you typically consume healthy fat(s)? 1   What types of health fats do you currently eat? Select all that apply:  Santana;Butter;Salad dressings       Fruit Intake:  2/2/2021   How many times per day do you typically consume fruits? 1   What types of fruit do currently eat? Apple;Banana;Cantaloupe;Mandarin oranges;Pineapple       Vegetable Intake:  2/2/2021   How many times per day do you typically consume vegetables? 1   What types of vegetables do you currently eat? Broccoli;Carrots;Celery;Corn;Green onions/scallions;Onions;Peas;Potato (baked, boiled, mashed, French fries);Yam, sweet potato      Grain Intake:  2/2/2021   How many times per day do you typically consume grains? 2   What types of grains do currently eat? Select all that apply:  Breads (non-gluten free);Cereals (non-gluten free);Chips (non-gluten free);Crackers (non-gluten free);Pasta (non-gluten free);Pretzels (non-gluten free)       Dairy Intake:  2/2/2021   How many  times per day do you typically consume dairy? 1   What types of dairy do currently eat? Select all that apply:  Milk;Cheese;Yogurt;Ice cream       Non-Dairy Alternative Intake:  2/2/2021   How many times per day do you typically consume non-dairy alternatives? 0   What types of non-dairy alternatives do currently eat? Select all that apply:  Other cheese       Sweets Intake:  2/2/2021   How many times per day do you typically consume sweets? 1      Beverage Intake:  2/2/2021   How many 8 oz cups of water do you typically consume per day?  0 to 1   How many 8 oz cups of caffeine do you typically consume per day?  1 to 2   How many drinks of alcohol do you typically consume per week (1 drink = 5 oz wine, 12 oz beer, 1.5 oz spirits)?   0       Lifestyle Recall:  2/2/2021   What time did you wake up? 8 AM   What time did you go to sleep? 8 PM   What time did you have breakfast? No breakfast   What time did you have a morning snack? 9-10 AM   Where did you have your morning snack? Home   What time did you have lunch? No lunch   What time did you have an afternoon snack? No snack   What time did you have dinner? 3-4 PM   Where did you have dinner?  Restaurant   What time did you have an evening snack? 7-8 PM   Where did you have your evening snack? Home   What time of day did you exercise? No Exercise          Additional concerns: If has breakfast does a replacement shake - [facility] will make eggs, barbour or sausage. Does microwave and doesn't do much cooking.   Has the means to do cooking but now just doesn't feel like it since mom passed away.     [email or web address]    MEDICATIONS:  Current Outpatient Medications   Medication Sig Dispense Refill     acetaminophen (TYLENOL) 325 MG tablet Take 325-650 mg by mouth as needed for mild pain        atorvastatin (LIPITOR) 20 MG tablet TAKE 1 TABLET BY MOUTH ONCE DAILY EVERY MORNING (AM PLUSPAK) 28 tablet 5     blood glucose calibration (ONETOUCH VERIO) solution USE AS  DIRECTED 1 each 0     Blood Glucose Monitoring Suppl (ONETOUCH VERIO FLEX SYSTEM) w/Device KIT USE TO TEST TWICE DAILY 1 kit 0     divalproex sodium extended-release (DEPAKOTE ER) 500 MG 24 hr tablet TAKE THREE TABLETS BY MOUTH EVERY NIGHT AT BEDTIME (HS PLUSPAK)       FIBER PO Take 1 chew tab by mouth daily       glimepiride (AMARYL) 4 MG tablet TAKE 1 TABLET BY MOUTH ONCE DAILY EVERY MORNING BEFORE BREAKFAST 90 tablet 1     hydrocortisone, Perianal, (HYDROCORTISONE) 2.5 % cream Place rectally 2 times daily as needed for hemorrhoids 28 g 0     ibuprofen (ADVIL/MOTRIN) 200 MG tablet As needed       Lancets (ONETOUCH DELICA PLUS [id number]) MISC TEST TWICE DAILY 100 each 1     metFORMIN (GLUCOPHAGE-XR) 500 MG 24 hr tablet TAKE 1 TABLET (500 MG) BY MOUTH 2 TIMES DAILY WITH MEALS 180 tablet 0     Multiple Vitamins-Minerals (MENS MULTIVITAMIN PO)        omeprazole (PRILOSEC) 20 MG DR capsule TAKE 1 CAPSULE BY MOUTH TWICE A DAY (AM & EVENING PLUSPAK) 56 capsule 3     ONETOUCH VERIO IQ test strip TEST TWICE DAILY 100 strip 1     pioglitazone (ACTOS) 30 MG tablet Take 1 tablet (30 mg) by mouth daily 30 tablet 0     QUEtiapine (SEROQUEL) 300 MG tablet Take 300 mg by mouth At Bedtime   0     TRAZODONE HCL PO Take 100 mg by mouth At Bedtime        zolpidem (AMBIEN) 5 MG tablet Take tablet by mouth 15 minutes prior to sleep, for Sleep Study 1 tablet 0       Dietary Supplements 2/2/2021   Individual Vitamin Supplements General multivitamin   Herbal Complimentary products Fiber supplement         ALLERGIES:   Allergies   Allergen Reactions     Ceclor [Cefaclor] Swelling     Erythromycin GI Disturbance        .na  LABS:  Last Basic Metabolic Panel:  Lab Results   Component Value Date     09/08/2020     05/20/2020     01/16/2020      Lab Results   Component Value Date    POTASSIUM 4.0 09/08/2020    POTASSIUM 4.2 05/20/2020    POTASSIUM 4.2 01/16/2020     Lab Results   Component Value Date    CHLORIDE 105  09/08/2020    CHLORIDE 102 05/20/2020    CHLORIDE 105 01/16/2020     Lab Results   Component Value Date    CORY 9.1 09/08/2020    CORY 9.1 05/20/2020    CORY 9.4 01/16/2020     Lab Results   Component Value Date    CO2 27 09/08/2020    CO2 29 05/20/2020    CO2 26 01/16/2020     Lab Results   Component Value Date    BUN 9 09/08/2020    BUN 13 05/20/2020    BUN 13 01/16/2020     Lab Results   Component Value Date    CR 0.47 09/08/2020    CR 0.63 05/20/2020    CR 0.67 01/16/2020     Lab Results   Component Value Date     09/08/2020     05/20/2020     01/16/2020       Last Glucose Profile:   Hemoglobin A1C   Date Value Ref Range Status   03/30/2021 7.5 (H) 0 - 5.6 % Final     Comment:     Normal <5.7% Prediabetes 5.7-6.4%  Diabetes 6.5% or higher - adopted from ADA   consensus guidelines.     09/08/2020 6.8 (H) 0 - 5.6 % Final     Comment:     Normal <5.7% Prediabetes 5.7-6.4%  Diabetes 6.5% or higher - adopted from ADA   consensus guidelines.     05/20/2020 8.0 (H) 0 - 5.6 % Final     Comment:     Normal <5.7% Prediabetes 5.7-6.4%  Diabetes 6.5% or higher - adopted from ADA   consensus guidelines.         Last Lipid Profile:   Cholesterol   Date Value Ref Range Status   09/08/2020 157 <200 mg/dL Final   01/16/2020 230 (H) <200 mg/dL Final     Comment:     Desirable:       <200 mg/dl   07/05/2019 226 (H) <200 mg/dL Final     Comment:     Desirable:       <200 mg/dl     HDL Cholesterol   Date Value Ref Range Status   09/08/2020 40 >39 mg/dL Final   01/16/2020 43 >39 mg/dL Final   07/05/2019 38 (L) >39 mg/dL Final     LDL Cholesterol Calculated   Date Value Ref Range Status   09/08/2020 83 <100 mg/dL Final     Comment:     Desirable:       <100 mg/dl   01/16/2020 151 (H) <100 mg/dL Final     Comment:     Above desirable:  100-129 mg/dl  Borderline High:  130-159 mg/dL  High:             160-189 mg/dL  Very high:       >189 mg/dl     07/05/2019 152 (H) <100 mg/dL Final     Comment:     Above desirable:   100-129 mg/dl  Borderline High:  130-159 mg/dL  High:             160-189 mg/dL  Very high:       >189 mg/dl       Triglycerides   Date Value Ref Range Status   09/08/2020 170 (H) <150 mg/dL Final     Comment:     Borderline high:  150-199 mg/dl  High:             200-499 mg/dl  Very high:       >499 mg/dl  Non Fasting     01/16/2020 180 (H) <150 mg/dL Final     Comment:     Borderline high:  150-199 mg/dl  High:             200-499 mg/dl  Very high:       >499 mg/dl     07/05/2019 182 (H) <150 mg/dL Final     Comment:     Borderline high:  150-199 mg/dl  High:             200-499 mg/dl  Very high:       >499 mg/dl       No results found for: CHOLHDLRATIO    Most recent CBC:  Recent Labs   Lab Test 09/08/20  1136 07/05/19  1009 01/08/19  1503   WBC 12.0* 12.6* 11.9*   HGB 12.7* 12.5* 12.9*   HCT 40.6 39.5* 40.7    272 280     Most recent hepatic panel:  Recent Labs   Lab Test 09/08/20  1136 01/16/20  1056   ALT 55 49   AST 28 22     Most recent creatinine:  Recent Labs   Lab Test 09/08/20  1136 05/20/20  0956   CR 0.47* 0.63*       No components found for: GFRESETIMATEDLASTLAB(gfrestblack:1@  Lab Results   Component Value Date    ALBUMIN 3.6 09/08/2020       Last Thyroid Profile:   TSH   Date Value Ref Range Status   09/08/2020 3.18 0.40 - 4.00 mU/L Final   07/05/2019 3.40 0.40 - 4.00 mU/L Final   01/08/2019 2.13 0.40 - 4.00 mU/L Final     T4 Free   Date Value Ref Range Status   07/05/2019 0.79 0.76 - 1.46 ng/dL Final       Last Mineral Profile:   No results found for: MIRANDA, IRON, FEB    Autoimmune & Inflammatory   CRP Inflammation   Date Value Ref Range Status   12/10/2020 8.0 0.0 - 8.0 mg/L Final         Last Vitamin Profile:   No results found for: WEX663, ZFST914, [id number], VITD3, D2VIT, D3VIT, DTOT, [id number], [id number], [id number], [id number], [id number], [id number]    ANTHROPOMETRICS:  Vitals:   BP Readings from Last 1 Encounters:   07/01/21 134/82     Pulse Readings from Last 1 Encounters:  "  07/01/21 102     Estimated body mass index is 47.17 kg/m  as calculated from the following:    Height as of 6/24/21: 1.88 m (6' 2\").    Weight as of 7/1/21: 166.7 kg (367 lb 6.4 oz).    Wt Readings from Last 5 Encounters:   07/01/21 (!) 166.7 kg (367 lb 6.4 oz)   06/24/21 (!) 166 kg (366 lb)   06/08/21 (!) 166.1 kg (366 lb 3.2 oz)   06/08/21 (!) 166.5 kg (367 lb)   05/18/21 (!) 166 kg (366 lb)       Weight Change: would like to lose 100lbs       NUTRITION DIAGNOSIS:   1. Altered nutrition-related laboratory values related to endocrine dysfunction as evidenced by elevated BMI, HbA1c     2. Altered nutrition-related laboratory values related to cardiac as evidenced by elevated Total chol, elevated TG, LDL, and low HDL.     3. Overweight/Obesity related to food and nutrition related knowledge deficit (excessive CHO intake, Inadequate fiber intake, inappropriate intake of healthy omega 3 fatty acids) as evidenced by nutrition intake record (limited variety of foods), A1c >6%, recent labs.    4. Overweight/obesity related to inability to sustain diet/lifestyle change, as evidenced by many previous attempts at weight loss with weight regain over the past five years.     NUTRITION INTERVENTION   CARDIOMETABOLIC     Long Term Goals:   Goal: Lipid profile; Decrease TG <150 mg/dL, Decrease LDL <100mg/dL within 6 months   Goal: Lose 20-24lbs in 6 months.   Goal: Decrease Hemoglobin A1c <6 % within 2-6 months   Goal: Increase hemoglobin levels within 6 months     Short Term Goals:    1. Small Improvement Shown - needs to keep working on it. Focus on cutting back on carbs and increase protein intake - see handouts and info below - for example try to cut back on bag of chips and or 1 can of pop per day.  2.   Small Improvement Shown- Continue having snack 1-2x daily with healthy fats (ex: guac, nuts, hummus) and add a fruit/veggie for fiber- apple or banana with nut/seed butter (almond butter ) or sunflower seeds (sunflower " seed butter with celery ) see ideas discussed and provided   3.  Some improvement- Keep cutting back on pop and increasing water   4. No Improvement- Increase activity at least 15-60 mins. Look into special Olympics and see what activities resonate. Pack some healthy snacks in the cooler to take with for the day.     Start with walking 15 mins daily. With [facility] doing activity at least 2 days with [facility]. Set alarm and put in calendar for reminder to get up and move.   5. Test blood sugars to see how meal impact them.       Test Blood Sugars    Measure your fasting blood sugar daily, first thing in morning before breakfast. Ideally your fasting blood sugar should be between 70-80 mg/dL.     Measure your blood sugar two hours after breakfast and two hours after dinner. Ideally your two-hour sugars should never go over 120mg/dL.     Mediterranean Approach: Eat whole, unprocessed real foods in their unprocessed forms such as fruits, vegetables, whole grains (prefer gluten free), nuts, legumes, extra virgin olive oil, spices, modest amounts of poultry, and fish.      Cardiometabolic Food plan - 1,800 calories per day     Protein 10-12 servings per day - include at each meal to stabilize blood sugars   (Choose 3oz or 21g per meal and aim for 1oz of 7g for snacks)   - Strive for 1-2 servings of fish per week especially of higher omega-3 fatty acid containing fish such as salmon.     Legumes 2 serving per day     Dairy alternatives 2-3 serving per day     Nuts and seeds 3-4 servings per day - great to incorporate as snacks    Fats and oils 4 servings per day     Non starchy vegetables 8-10 servings per day     Starchy vegetable limit 1 serving per day as they tend to impact blood sugar (they are moderate-GI).     Fruits 2 servings per day - best to couple with a little bit of protein or fat to offset a rise in blood sugars (they are low-moderate-GI foods).     Whole grains 2 serving per day - try gluten free whole grains  instead      Choose Low Glycemic (GI) foods: Regulate your sugar levels by eating foods that do not spike blood sugars.  Eat low -GI foods so only small fluctuations in blood glucose and insulin levels are produced.      Examples of low-GI foods: nuts, seeds, GF oats, most vegetables especially non-starchy and fruits.     Medium or high-GI foods should be eaten with a protein or fat, both of which blunt the glycemic effect of these foods. This reduces the overall glycemic impact of a meal.   Ex: Most grains and starchy veggies are medium/high GI.     Avoid foods containing refined sugars, artificial sweeteners, and refined grains they are considered high-GI because they lead to sharp increases in blood sugars levels, which increase insulin sensitivity causing increased TG, and low good cholesterol (HDL).   Ex: cakes, cookies, pies, bread, sodas, fruit drinks, presweetened tea, coffee drinks, energy or sport drinks, flavored milk and other processed foods.     Choose foods high in fiber: Aim for at least 5 grams of fiber per serving of food or a total of 25-35 grams fiber per day. Remember, when looking at the label, you can take the fiber away from the total carbs. Ex:15g of total carbs - 4g of fiber = 11g net carbs     Insoluble fiber acts like a bulky  inner broom,  sweeping out debris from the intestine and creating more motility and movement.      Soluble fiber attracts water and swells, creating a gel that slows digestion.  Also, slows the release of glucose from foods into the blood which stops spikes in blood sugar levels.  Soluble fiber traps toxins in the gut, helping to carry them to excretion and provides healthy bacteria in the digestive tract.     Choose High Quality Fats: Adding anti-inflammatory fats into your diet such as fish (salmon, herring, mackerel, and sardines), omega 3 eggs, elidia seeds, ground flax seeds/milk, hemp seeds/milk, walnuts and some other certain leafy greens will increase omega-3  fats to omeaga-6 fats ratio.     Therapeutic fats both monounsaturated and polyunsaturated to include daily: ground flaxseeds, unsalted mixed nuts, avocados, olives, extra-virgin olive oil.     Emphasize high-quality oils and fats in the diet daily such as avocado oil, coconut oil, flaxseed, olive, sesame. Ex: 1 tsp to 1 tbsp of MCT oil from coconuts can be added into coffee, smoothies, and salad dressings per day.     Avoid trans fats found in processed foods     Drink more water. Hydration is critical, so drink at least six to eight glasses of water a day. Drink more water between meals and less at meals.     Try adding herbal teas (sugar free) or lemon/lime/cucumber/fruit to water for flavor. Avoid artificial sweetener packets to flavor your water.     Cut back on coffee switch to green tea. Avoid adding sugar and milk to coffee instead use dairy alternatives such as almond, flax, coconut milk.       Increase physical activity. Moving our body helps move our bowels and speeds up your metabolism.     Exercise 15-60 minutes daily--whether that looks like burst training, yoga, or vigorous walking.      NUTRITION RESOURCES:  1. Purchase Eat Fat Get Thin by Kenneth Membreno Book/Cookbook   2. IF[employer] Cardiometabolic Packet     Functional Nutrition Fundamentals     Comprehensive Guide    Meal plan with recipes           PATIENT'S BEHAVIOR CHANGE GOALS:   See nutrition intervention for patient stated behavior change goals. AVS was printed and given to patient at today's appointment.    MONITOR / EVALUATE:  Registered Dietitian will monitor/evaluate the following:     Beliefs and attitudes related to food    Food and nutrition knowledge / skills    Food / Beverage / Nutrient intake     Pertinent Labs    Progress toward meeting stated nutrition-related goals    Readiness to change nutrition-related behaviors    Weight change    Digestion     COORDINATION OF CARE:  Follow up with gastroenterology      FOLLOW-UP:  Follow-up  appointment scheduled on 8/24/21 at 10am phone visit      Time spent in minutes: 40 minutes  Encounter: Individual    Angie Levy RD, CLT, LD  Integrative Registered Dietitian

## 2021-07-20 DIAGNOSIS — E11.65 TYPE 2 DIABETES MELLITUS WITH HYPERGLYCEMIA, WITHOUT LONG-TERM CURRENT USE OF INSULIN (H): ICD-10-CM

## 2021-07-21 ENCOUNTER — VIRTUAL VISIT (OUTPATIENT)
Dept: EDUCATION SERVICES | Facility: CLINIC | Age: 31
End: 2021-07-21
Payer: COMMERCIAL

## 2021-07-21 DIAGNOSIS — E11.9 TYPE 2 DIABETES MELLITUS WITHOUT COMPLICATION, WITHOUT LONG-TERM CURRENT USE OF INSULIN (H): Primary | ICD-10-CM

## 2021-07-21 PROCEDURE — 98967 PH1 ASSMT&MGMT NQHP 11-20: CPT | Mod: 95

## 2021-07-21 NOTE — PROGRESS NOTES
"Diabetes Self-Management Education & Support  Type of Service: Telephone Visit    How would patient like to obtain AVS? Delilah      Presents for: Follow-up    SUBJECTIVE/OBJECTIVE:  Presents for: Follow-up  Accompanied by: Self, Other  Diabetes education in the past 24mo: Yes  Focus of Visit: Monitoring, Taking Medication  Diabetes type: Type 2  Date of diagnosis: 2019  Disease course: Stable  Diabetes management related comments/concerns: none - off metformin and on generic Actos now  Other concerns:: Cognitive impairment  Cultural Influences/Ethnic Background:  Choose not to answer      Diabetes Symptoms & Complications:  Fatigue: Yes  Visual change: No  Slow healing wounds: No  Other: Yes (nausea and diarrhea off and on)  Symptom course: Stable  Weight trend: Stable  Complications assessed today?: No  Autonomic neuropathy: No  CVA: No  Heart disease: No  Nephropathy: No  Peripheral neuropathy: No  Foot ulcerations: No  Peripheral Vascular Disease: No  Retinopathy: No  Sexual dysfunction: No    Patient Problem List and Family Medical History reviewed for relevant medical history, current medical status, and diabetes risk factors.    Vitals:  There were no vitals taken for this visit.  Estimated body mass index is 47.17 kg/m  as calculated from the following:    Height as of 6/24/21: 1.88 m (6' 2\").    Weight as of 7/1/21: 166.7 kg (367 lb 6.4 oz).   Last 3 BP:   BP Readings from Last 3 Encounters:   07/01/21 134/82   06/24/21 (!) 146/78   06/08/21 136/84       History   Smoking Status     Former Smoker     Packs/day: 1.00     Years: 0.00     Types: Cigarettes     Start date: 1/1/2014     Quit date: 3/1/2014   Smokeless Tobacco     Never Used       Labs:  Lab Results   Component Value Date    A1C 7.5 03/30/2021     Lab Results   Component Value Date     09/08/2020     Lab Results   Component Value Date    LDL 83 09/08/2020     HDL Cholesterol   Date Value Ref Range Status   09/08/2020 40 >39 mg/dL Final "   ]  GFR Estimate   Date Value Ref Range Status   09/08/2020 >90 >60 mL/min/[1.73_m2] Final     Comment:     Non  GFR Calc  Starting 12/18/2018, serum creatinine based estimated GFR (eGFR) will be   calculated using the Chronic Kidney Disease Epidemiology Collaboration   (CKD-EPI) equation.       GFR Estimate If Black   Date Value Ref Range Status   09/08/2020 >90 >60 mL/min/[1.73_m2] Final     Comment:      GFR Calc  Starting 12/18/2018, serum creatinine based estimated GFR (eGFR) will be   calculated using the Chronic Kidney Disease Epidemiology Collaboration   (CKD-EPI) equation.       Lab Results   Component Value Date    CR 0.47 09/08/2020     No results found for: MICROALBUMIN    Healthy Eating:  Healthy Eating Assessed Today: No  Cultural/Scientologist diet restrictions?: No  Meal planning/habits: None  Breakfast: protein shake yesterday  Dinner: potato and ham casserole dish  Other: drinks gatorade zero, tea, water, pop or capuccino mixed with hot cocoa and caramel occasionally; last alcohol was 4th of July  Beverages: Water, Tea, Milk, Diet soda  Has patient met with a dietitian in the past?: Yes    Being Active:  Being Active Assessed Today: No  Exercise:: Yes  How intense was your typical exercise? : Light (like stretching or slow walking)  Barrier to exercise: None    Monitoring:  Monitoring Assessed Today: Yes  Did patient bring glucose meter to appointment? : Yes  Blood Glucose Meter: Accu-chek  Times checking blood sugar at home (number): 5+  Times checking blood sugar at home (per): Week  Blood glucose trend: Fluctuating    Recent BG reported by patient: 193, 106, 199, 174, 197, 196    Taking Medications:  Diabetes Medication(s)     Biguanides       metFORMIN (GLUCOPHAGE-XR) 500 MG 24 hr tablet    TAKE 1 TABLET (500 MG) BY MOUTH 2 TIMES DAILY WITH MEALS    Sulfonylureas       glimepiride (AMARYL) 4 MG tablet    TAKE 1 TABLET BY MOUTH ONCE DAILY EVERY MORNING BEFORE  BREAKFAST    Insulin Sensitizing Agents       pioglitazone (ACTOS) 30 MG tablet    Take 1 tablet (30 mg) by mouth daily          Taking Medication Assessed Today: Yes  Current Treatments: Oral Medication (taken by mouth)  Problems taking diabetes medications regularly?: Yes  Diabetes medication side effects?: Yes    Problem Solving:  Problem Solving Assessed Today: Yes  Is the patient at risk for hypoglycemia?: Yes  Hypoglycemia Frequency: Rarely  Medical ID: No  Does patient have DKA prevention plan?: No  Does patient have severe weather/disaster plan for diabetes management?: No  Does patient have sick day plan for diabetes management?: No              Reducing Risks:  Reducing Risks Assessed Today: No  CAD Risks: Diabetes Mellitus, Dyslipidemia, Family history, Male sex, Obesity  Has dilated eye exam at least once a year?: Yes  Sees dentist every 6 months?: Yes  Feet checked by healthcare provider in the last year?: Yes    Healthy Coping:  Healthy Coping Assessed Today: Yes  Emotional response to diabetes: Not ready to learn  Informal Support system:: Family, Friends, Other  Stage of change: CONTEMPLATION (Considering change and yet undecided)  Support resources: Offerings in Clinic Communities  Patient Activation Measure Survey Score:  YESY Score (Last Two) 4/29/2019   YESY Raw Score 33   Activation Score 65.8   YESY Level 3       Diabetes knowledge and skills assessment:   Patient is knowledgeable in diabetes management concepts related to: Monitoring    Patient needs further education on the following diabetes management concepts: Healthy Eating    Based on learning assessment above, most appropriate setting for further diabetes education would be: Individual setting.      INTERVENTIONS:    Education provided today on:  AADE Self-Care Behaviors:  Being Active: relationship to blood glucose  Reducing Risks: follow up    Opportunities for ongoing education and support in diabetes-self management were  discussed.    Pt verbalized understanding of concepts discussed and recommendations provided today.       Education Materials Provided:  No new materials provided today      ASSESSMENT:  He is off metformin right now and taking glimepiride and pioglitazone. Per his  the diarrhea and stomach upset may just be from too much processed and fast food. He has plan to follow up with Dr. Ruvalcaba in 2 weeks.         Patient's most recent   Lab Results   Component Value Date    A1C 7.5 03/30/2021    is meeting goal of <8.0    PLAN  No new goals. Diabetes education follow up as needed.     Chiara Hair RDN, BOBY, [facility]    Time Spent: 15 minutes  Encounter Type: Individual    Any diabetes medication dose changes were made via the CDE Protocol and Collaborative Practice Agreement with the patient's primary care provider. A copy of this encounter was shared with the provider.

## 2021-07-22 RX ORDER — PIOGLITAZONEHYDROCHLORIDE 30 MG/1
30 TABLET ORAL DAILY
Qty: 30 TABLET | Refills: 2 | Status: SHIPPED | OUTPATIENT
Start: 2021-07-22 | End: 2021-09-30

## 2021-07-28 ENCOUNTER — OFFICE VISIT (OUTPATIENT)
Dept: PULMONOLOGY | Facility: CLINIC | Age: 31
End: 2021-07-28
Payer: COMMERCIAL

## 2021-07-28 VITALS
TEMPERATURE: 95.1 F | SYSTOLIC BLOOD PRESSURE: 132 MMHG | HEART RATE: 99 BPM | HEIGHT: 74 IN | OXYGEN SATURATION: 94 % | DIASTOLIC BLOOD PRESSURE: 84 MMHG | BODY MASS INDEX: 40.43 KG/M2 | WEIGHT: 315 LBS

## 2021-07-28 DIAGNOSIS — E66.01 MORBID OBESITY WITH BODY MASS INDEX OF 45.0-49.9 IN ADULT (H): ICD-10-CM

## 2021-07-28 DIAGNOSIS — R06.02 SOB (SHORTNESS OF BREATH): Primary | ICD-10-CM

## 2021-07-28 PROCEDURE — 99204 OFFICE O/P NEW MOD 45 MIN: CPT

## 2021-07-28 ASSESSMENT — MIFFLIN-ST. JEOR: SCORE: 2698.99

## 2021-08-03 ENCOUNTER — OFFICE VISIT (OUTPATIENT)
Dept: INTERNAL MEDICINE | Facility: CLINIC | Age: 31
End: 2021-08-03
Payer: COMMERCIAL

## 2021-08-03 VITALS
TEMPERATURE: 96.9 F | BODY MASS INDEX: 40.43 KG/M2 | HEIGHT: 74 IN | SYSTOLIC BLOOD PRESSURE: 126 MMHG | OXYGEN SATURATION: 95 % | WEIGHT: 315 LBS | DIASTOLIC BLOOD PRESSURE: 84 MMHG | HEART RATE: 108 BPM | RESPIRATION RATE: 22 BRPM

## 2021-08-03 DIAGNOSIS — F70 MILD INTELLECTUAL DISABILITIES: Primary | ICD-10-CM

## 2021-08-03 DIAGNOSIS — E11.65 TYPE 2 DIABETES MELLITUS WITH HYPERGLYCEMIA, WITHOUT LONG-TERM CURRENT USE OF INSULIN (H): ICD-10-CM

## 2021-08-03 DIAGNOSIS — Z00.00 MEDICARE ANNUAL WELLNESS VISIT, SUBSEQUENT: ICD-10-CM

## 2021-08-03 DIAGNOSIS — E66.01 MORBID OBESITY (H): ICD-10-CM

## 2021-08-03 PROBLEM — R73.09 ELEVATED GLUCOSE LEVEL: Status: RESOLVED | Noted: 2019-02-04 | Resolved: 2021-08-03

## 2021-08-03 PROCEDURE — 99395 PREV VISIT EST AGE 18-39: CPT | Performed by: INTERNAL MEDICINE

## 2021-08-03 ASSESSMENT — PATIENT HEALTH QUESTIONNAIRE - PHQ9: SUM OF ALL RESPONSES TO PHQ QUESTIONS 1-9: 15

## 2021-08-03 ASSESSMENT — MIFFLIN-ST. JEOR: SCORE: 2698.99

## 2021-08-03 ASSESSMENT — PAIN SCALES - GENERAL: PAINLEVEL: MILD PAIN (3)

## 2021-08-03 NOTE — PROGRESS NOTES
SUBJECTIVE:   CC: Bertram Foreman is an 30 year old male who presents for preventative health visit.     Patient has been advised of split billing requirements and indicates understanding: Yes  Healthy Habits:    Getting at least 3 servings of Calcium per day:  NO    Bi-annual eye exam:  Yes    Dental care twice a year:  Yes    Sleep apnea or symptoms of sleep apnea:  Sleep apnea    Diet:  Regular (no restrictions)    Frequency of exercise:  6-7 days/week    Duration of exercise:  Other    Taking medications regularly:  Yes    Barriers to taking medications:  None    Medication side effects:  None    PHQ-2 Total Score:    Additional concerns today:  No         EMR reviewed including:             Complaint, History of Chief Complaint, Corresponding Review of Systems, and Complaint Specific Physical Examination.    #1     Follow-up on recent medication changes.  Metformin discontinued and diarrhea nearly resolved.  Actos was started and blood sugars improved.  Patient working aggressively on dietary restriction.  No substantial weight change noted.  Denies hypoglycemic episodes.  Home blood sugar log shows most values around 150 or lower.      Patient needs a [employer] physical form filled out.  Is going to participate in [employer].  Does not anticipate any contact or trauma.    Today's PHQ-2 Score:   PHQ-2 (  [employer]) 8/3/2021   Q1: Little interest or pleasure in doing things 1   Q2: Feeling down, depressed or hopeless 1   PHQ-2 Score 2       Abuse: Current or Past(Physical, Sexual or Emotional)- No  Do you feel safe in your environment? Yes      Social History     Tobacco Use     Smoking status: Former Smoker     Packs/day: 1.00     Years: 0.00     Pack years: 0.00     Types: Cigarettes     Start date: 2014     Quit date: 3/1/2014     Years since quittin.4     Smokeless tobacco: Never Used   Substance Use Topics     Alcohol use: Yes     Alcohol/week: 0.0 standard drinks     Comment: occ     If you  drink alcohol do you typically have >3 drinks per day or >7 drinks per week? No    No flowsheet data found.    Last PSA: No results found for: PSA    Reviewed orders with patient. Reviewed health maintenance and updated orders accordingly - Yes  Labs reviewed in EPIC  BP Readings from Last 3 Encounters:   21 126/84   21 132/84   21 134/82    Wt Readings from Last 3 Encounters:   21 (!) 166.9 kg (368 lb)   21 (!) 166.9 kg (368 lb)   21 (!) 166.7 kg (367 lb 6.4 oz)                  Patient Active Problem List   Diagnosis     Morbid obesity (H)     Intellectual disability     Recurrent major depressive disorder, in partial remission (H)     Diabetes mellitus, type 2 (H)     Mantoux: positive, treated     Morbid obesity with body mass index of 45.0-49.9 in adult (H)     Mild intellectual disabilities     Past Surgical History:   Procedure Laterality Date     APPENDECTOMY  8/15/14     COLONOSCOPY N/A 2020    Procedure: COLONOSCOPY, WITH BIOPSY;  Surgeon: Haley Hernandez MD;  Location:  GI       Social History     Tobacco Use     Smoking status: Former Smoker     Packs/day: 1.00     Years: 0.00     Pack years: 0.00     Types: Cigarettes     Start date: 2014     Quit date: 3/1/2014     Years since quittin.4     Smokeless tobacco: Never Used   Substance Use Topics     Alcohol use: Yes     Alcohol/week: 0.0 standard drinks     Comment: occ     Family History   Problem Relation Age of Onset     Mental Illness Brother      Diabetes No family hx of      Depression No family hx of      Anxiety Disorder No family hx of      Mental Illness No family hx of          Current Outpatient Medications   Medication Sig Dispense Refill     acetaminophen (TYLENOL) 325 MG tablet Take 325-650 mg by mouth as needed for mild pain        atorvastatin (LIPITOR) 20 MG tablet TAKE 1 TABLET BY MOUTH ONCE DAILY EVERY MORNING (AM PLUSPAK) 28 tablet 5     blood glucose calibration (ONETOUCH VERIO)  solution USE AS DIRECTED 1 each 0     Blood Glucose Monitoring Suppl (ONETOUCH VERIO FLEX SYSTEM) w/Device KIT USE TO TEST TWICE DAILY 1 kit 0     divalproex sodium extended-release (DEPAKOTE ER) 500 MG 24 hr tablet TAKE THREE TABLETS BY MOUTH EVERY NIGHT AT BEDTIME (HS PLUSPAK)       FIBER PO Take 1 chew tab by mouth daily       glimepiride (AMARYL) 4 MG tablet TAKE 1 TABLET BY MOUTH ONCE DAILY EVERY MORNING BEFORE BREAKFAST 90 tablet 1     hydrocortisone, Perianal, (HYDROCORTISONE) 2.5 % cream Place rectally 2 times daily as needed for hemorrhoids 28 g 0     ibuprofen (ADVIL/MOTRIN) 200 MG tablet As needed       Lancets (ONETOUCH DELICA PLUS [id number]) MISC TEST TWICE DAILY 100 each 1     Multiple Vitamins-Minerals (MENS MULTIVITAMIN PO)        omeprazole (PRILOSEC) 20 MG DR capsule TAKE 1 CAPSULE BY MOUTH TWICE A DAY (AM & EVENING PLUSPAK) 56 capsule 3     ONETOUCH VERIO IQ test strip TEST TWICE DAILY 100 strip 1     pioglitazone (ACTOS) 30 MG tablet TAKE 1 TABLET (30 MG) BY MOUTH DAILY 30 tablet 2     QUEtiapine (SEROQUEL) 300 MG tablet Take 300 mg by mouth At Bedtime   0     TRAZODONE HCL PO Take 100 mg by mouth At Bedtime        zolpidem (AMBIEN) 5 MG tablet Take tablet by mouth 15 minutes prior to sleep, for Sleep Study 1 tablet 0     Allergies   Allergen Reactions     Ceclor [Cefaclor] Swelling     Erythromycin GI Disturbance       Reviewed and updated as needed this visit by clinical staff  Tobacco  Allergies  Meds   Med Hx  Surg Hx  Fam Hx  Soc Hx        Reviewed and updated as needed this visit by Provider                Past Medical History:   Diagnosis Date     Depressive disorder All my life almost.     Diabetes (H) sometime in 2019     Sleep apnea       Past Surgical History:   Procedure Laterality Date     APPENDECTOMY  8/15/14     COLONOSCOPY N/A 12/28/2020    Procedure: COLONOSCOPY, WITH BIOPSY;  Surgeon: Haley Hernandez MD;  Location:  GI       Review of Systems  CONSTITUTIONAL: NEGATIVE  "for fever, chills, change in weight  INTEGUMENTARY/SKIN: NEGATIVE for worrisome rashes, moles or lesions  EYES: NEGATIVE for vision changes or irritation  ENT: NEGATIVE for ear, mouth and throat problems  RESP: NEGATIVE for significant cough or SOB  CV: NEGATIVE for chest pain, palpitations or peripheral edema  GI: NEGATIVE for nausea, abdominal pain, heartburn, or change in bowel habits   male: negative for dysuria, hematuria, decreased urinary stream, erectile dysfunction, urethral discharge  MUSCULOSKELETAL: NEGATIVE for significant arthralgias or myalgia  NEURO: NEGATIVE for weakness, dizziness or paresthesias  ENDOCRINE: NEGATIVE for temperature intolerance, skin/hair changes  PSYCHIATRIC: NEGATIVE for changes in mood or affect    OBJECTIVE:   /84 (BP Location: Right arm, Patient Position: Sitting, Cuff Size: Adult Large)   Pulse 108   Temp 96.9  F (36.1  C) (Temporal)   Resp 22   Ht 1.88 m (6' 2\")   Wt (!) 166.9 kg (368 lb)   SpO2 95%   BMI 47.25 kg/m      Physical Exam  GENERAL: healthy, alert, no distress and obese  EYES: Eyes grossly normal to inspection, PERRL and conjunctivae and sclerae normal  HENT: ear canals and TM's normal, nose and mouth without ulcers or lesions  NECK: no adenopathy, no asymmetry, masses, or scars and thyroid normal to palpation  RESP: lungs clear to auscultation - no rales, rhonchi or wheezes  CV: regular rate and rhythm, normal S1 S2, no S3 or S4, no murmur, click or rub, no peripheral edema and peripheral pulses strong  ABDOMEN: soft, nontender, no hepatosplenomegaly, no masses and bowel sounds normal  MS: no gross musculoskeletal defects noted, no edema  SKIN: no suspicious lesions or rashes  NEURO: Normal strength and tone, mentation intact and speech normal  PSYCH: mentation appears unchanged, affect normal/bright.  Patient is conversational and pleasant.  Has good insight into his surroundings.  Does have continual simple and childlike response to questions. " "        Diagnostic Test Results:  Labs reviewed in Epic    ASSESSMENT/PLAN:       ICD-10-CM    1. Mild intellectual disabilities  F70    2. Medicare annual wellness visit, subsequent  Z00.00    3. Morbid obesity (H)  E66.01    4. Type 2 diabetes mellitus with hyperglycemia, without long-term current use of insulin (H)  E11.65        Patient has been advised of split billing requirements and indicates understanding: Yes  COUNSELING:   Reviewed preventive health counseling, as reflected in patient instructions       Regular exercise       Healthy diet/nutrition       Vision screening    Estimated body mass index is 47.25 kg/m  as calculated from the following:    Height as of this encounter: 1.88 m (6' 2\").    Weight as of this encounter: 166.9 kg (368 lb).     Weight management plan: Discussed healthy diet and exercise guidelines    He reports that he quit smoking about 7 years ago. His smoking use included cigarettes. He started smoking about 7 years ago. He smoked 1.00 pack per day for 0.00 years. He has never used smokeless tobacco.      Counseling Resources:  ATP IV Guidelines  Pooled Cohorts Equation Calculator  FRAX Risk Assessment  ICSI Preventive Guidelines  Dietary Guidelines for Americans, 2010  USDA's MyPlate  ASA Prophylaxis  Lung CA Screening    Tremaine Ruvalcaba DO  [facility]  "

## 2021-08-16 ENCOUNTER — HOSPITAL ENCOUNTER (OUTPATIENT)
Dept: RESPIRATORY THERAPY | Facility: CLINIC | Age: 31
Discharge: HOME | End: 2021-08-16
Attending: OTOLARYNGOLOGY | Admitting: OTOLARYNGOLOGY
Payer: COMMERCIAL

## 2021-08-16 DIAGNOSIS — R06.02 SOB (SHORTNESS OF BREATH): ICD-10-CM

## 2021-08-16 PROCEDURE — 94726 PLETHYSMOGRAPHY LUNG VOLUMES: CPT

## 2021-08-16 PROCEDURE — 94060 EVALUATION OF WHEEZING: CPT

## 2021-08-16 PROCEDURE — 250N000009 HC RX 250

## 2021-08-16 PROCEDURE — 94729 DIFFUSING CAPACITY: CPT

## 2021-08-16 RX ORDER — ALBUTEROL SULFATE 0.83 MG/ML
2.5 SOLUTION RESPIRATORY (INHALATION) ONCE
Status: COMPLETED | OUTPATIENT
Start: 2021-08-16 | End: 2021-08-16

## 2021-08-16 RX ADMIN — ALBUTEROL SULFATE 2.5 MG: 2.5 SOLUTION RESPIRATORY (INHALATION) at 14:48

## 2021-08-16 NOTE — PROGRESS NOTES
Pre-Bronch                                Post Bronch       Actual Pred LLN %Pred   Actual %Pred %Chng    ---- SPIROMETRY ----                        FVC (L) 3.79 6.21 4.99 61     3.81 61 +0      FEV1 (L) 3.27 5.05 4.02 64     3.51 69 +7      FEV1/FVC (%) 86 82 71       92   +6      FEV1/SVC (%) 88 79                    FEF 25-75% (L/sec) 4.35 4.97 3.06 87     5.21 104 +19      FEF Max (L/sec) 5.91 11.19 8.59 52     7.37 65 +24      FIF Max (L/sec) 2.94 9.54 6.87 30     3.23 33 +9      MVV (L/min)   190 143                  MIP (cmH2O)                        MEP (cmH2O)                        ---- LUNG VOLUMES ----                        SVC (L) 3.72 6.36 5.38 58                IC (L) 2.99 5.31   56                ERV (L) 0.74 1.05   70                FRC (N2) (L)   3.59 2.39                  RV (N2) (L)   1.91 1.09                  TLC (N2) (L)   7.94 6.54                  Clearance Index (L)                        FRC(Pleth) (L) 2.35 3.59 2.39 65                RV (Pleth) (L) 1.61 1.91 1.09 84                TLC (Pleth) (L) 5.34 7.94 6.54 67                RV/TLC (Pleth) (%) 30 26 15                  ---- DIFFUSION ----                        DLCOunc (ml/min/mmHg) 25.29 36.67 27.32 68                DLCOcor (ml/min/mmHg)   36.67 27.32                  DL/VA (ml/min/mmHg/L) 5.51 4.81                    VA (L) 4.59 7.63 6.26 60

## 2021-08-16 NOTE — DISCHARGE INSTRUCTIONS
Thank you for completing pulmonary function testing today.  All results will be scanned into your epic results for your doctor to review.  Please resume taking all your current prescribed medications and diet as directed by your provider.   If you have not heard from your provider about your testing within two weeks and do not have a follow-up appointment scheduled with them please contact your provider about any questions you have concerning your testing.   Thank you  [facility] Pulmonary Function [facility]

## 2021-08-17 ENCOUNTER — HOSPITAL ENCOUNTER (OUTPATIENT)
Dept: GENERAL RADIOLOGY | Facility: CLINIC | Age: 31
Discharge: HOME | End: 2021-08-17
Payer: COMMERCIAL

## 2021-08-17 DIAGNOSIS — R06.02 SOB (SHORTNESS OF BREATH): ICD-10-CM

## 2021-08-17 PROCEDURE — 71046 X-RAY EXAM CHEST 2 VIEWS: CPT

## 2021-08-19 LAB
DLCOUNC-%PRED-PRE: 68 %
DLCOUNC-PRE: 25.29 ML/MIN/MMHG
DLCOUNC-PRED: 36.67 ML/MIN/MMHG
ERV-%PRED-PRE: 70 %
ERV-PRE: 0.74 L
ERV-PRED: 1.05 L
EXPTIME-PRE: 6.44 SEC
FEF2575-%PRED-POST: 104 %
FEF2575-%PRED-PRE: 87 %
FEF2575-POST: 5.21 L/SEC
FEF2575-PRE: 4.35 L/SEC
FEF2575-PRED: 4.97 L/SEC
FEFMAX-%PRED-PRE: 52 %
FEFMAX-PRE: 5.91 L/SEC
FEFMAX-PRED: 11.19 L/SEC
FEV1-%PRED-PRE: 64 %
FEV1-PRE: 3.27 L
FEV1FEV6-PRE: 89 %
FEV1FEV6-PRED: 83 %
FEV1FVC-PRE: 86 %
FEV1FVC-PRED: 82 %
FEV1SVC-PRE: 88 %
FEV1SVC-PRED: 79 %
FIFMAX-PRE: 2.94 L/SEC
FRCPLETH-%PRED-PRE: 65 %
FRCPLETH-PRE: 2.35 L
FRCPLETH-PRED: 3.59 L
FVC-%PRED-PRE: 61 %
FVC-PRE: 3.79 L
FVC-PRED: 6.21 L
GAW-%PRED-PRE: 108 %
GAW-PRE: 1.12 L/S/CMH2O
GAW-PRED: 1.03 L/S/CMH2O
IC-%PRED-PRE: 56 %
IC-PRE: 2.99 L
IC-PRED: 5.31 L
RVPLETH-%PRED-PRE: 84 %
RVPLETH-PRE: 1.61 L
RVPLETH-PRED: 1.91 L
SGAW-%PRED-PRE: 485 %
SGAW-PRE: 0.4 1/CMH2O*S
SGAW-PRED: 0.08 1/CMH2O*S
SRAW-%PRED-PRE: 52 %
SRAW-PRE: 2.48 CMH2O*S
SRAW-PRED: 4.76 CMH2O*S
TLCPLETH-%PRED-PRE: 67 %
TLCPLETH-PRE: 5.34 L
TLCPLETH-PRED: 7.94 L
VA-%PRED-PRE: 60 %
VA-PRE: 4.59 L
VC-%PRED-PRE: 58 %
VC-PRE: 3.72 L
VC-PRED: 6.36 L

## 2021-08-20 ENCOUNTER — TELEPHONE (OUTPATIENT)
Dept: PULMONOLOGY | Facility: CLINIC | Age: 31
End: 2021-08-20

## 2021-08-20 NOTE — TELEPHONE ENCOUNTER
I called Bertram to discuss his results.      His CXR is normal    His PFTs are restricted, likely due to his weight.      He needs to continue following up in the sleep clinic.  I asked if I could speak with the person who was in the clinic with him (she helps with a lot of his organization). He is not allowed to give out her number so I was not able to connect.      No follow up in the lung clinic needed.     Christopher Echevarria MD

## 2021-08-24 ENCOUNTER — VIRTUAL VISIT (OUTPATIENT)
Dept: NUTRITION | Facility: CLINIC | Age: 31
End: 2021-08-24
Payer: COMMERCIAL

## 2021-08-24 DIAGNOSIS — E66.01 MORBID OBESITY (H): ICD-10-CM

## 2021-08-24 DIAGNOSIS — E11.65 TYPE 2 DIABETES MELLITUS WITH HYPERGLYCEMIA, WITHOUT LONG-TERM CURRENT USE OF INSULIN (H): Primary | ICD-10-CM

## 2021-08-24 PROCEDURE — 97803 MED NUTRITION INDIV SUBSEQ: CPT | Mod: 95 | Performed by: DIETITIAN, REGISTERED

## 2021-08-24 NOTE — PROGRESS NOTES
.  Medical Nutrition Therapy  Visit Type: reassessment and intervention    Visit Details    How would you like to obtain your AVS? MyChart  If the correspondence for visit is dropped, how would you like your dietitian to reconnect with you:   call back by phone? Yes    Text to cell phone: [phone]   Will anyone else be joining your video visit or telephone call? yes  {If patient encounters technical issues they should call [phone] :46    Type of service:  Telephone (waited on video visit and called pt he prefers telephone over video)    Start Time: 10:03 am  End Time: 10:55 am     Originating Location (pt. Location): Home    Distant Location (provider location):  [facility] WORKING VIRTUAL FROM HOME       Referring Provider: Akbar Lara  [facility]     REASON FOR REFERRAL:   Bertram Foreman is a 30 year old male who is interested in Medical Nutrition Therapy (MNT) and education related to GI issues diarrhea and weight management. Type 2 diabetes. Not struggling with diarrhea any more when started fiber it help been on not even a week. Comes and goes sometimes has it 3-5 days in a role. The Citracal has been helpful and doesn't have loose water stool.   He is accompanied by [facility] worker goes to all appointments.       Behavior Status:Improvement shown  Barriers Include:Motivation/Ready to change , Lack of control over emotions/behaviors and Lack of nutrition knowlege     Joined a [employer] special olymics to help get more activity in the day. It starts in Sept every Friday night. He is still struggling with cutting back on sugar/carbs especially from pop. He was suppose to have a birthday party but now has a lot of extra food that isn't very healthy. He need to finish it off and trying to share with others. He made homemade salsa from the garden and zucchini boats. He did beef, bell peppers, cheese, onions on top. He loved them. He felt more full. He is  trying to check his blood sugars. Last night his blood sugars were 271. He had another day where his blood sugars were in the high 300. This morning he had mountain dew around 7am - 322. He doesn't do breakfast right. His diarrhea is better.     He didn't eat much yesterday first meal lunch 12pm was 4 slices of pizza, 1 can.     For supper biscuits and gravy with water flavored. He likes the flavored water and trying to have that or tea instead of pop. He bought some Gatorade with protein in it.     He does nuts for snack maybe once per week and doesn't want to eat them more often he doesn't enjoy the nuts he has right now because too spicy. Discussed getting different nuts/seeds and nut butter.     He lives on his own and gone down from 12 pack per day and growing veggies at [employer]. He has been cutting back on eating out. When not with support staff [facility] he doesn't really make the changes. His doctors and myself have been talking about making these changes and how important they are but Bertram doesn't really want to implement health changes.     Would like to focus on getting in more veggies like carrots but no broccoli unless cooked for now. Keep reducing intake of pop.     Notes from 7/13/21  Switched to different diabetes medication because causing digestive issues. He has been having diarrhea and sometimes feels he needs to vomit. It has only been a weeks since the med switch and had some slight improvement. His BMs are still about 1-2x per week of diarrhea. Sometime once and sometimes more. Doesn't really track what he is eating. Tried a few things the hummus or guac but not continuing to implement. He has been trying to do more salads. Still struggling with snacking on processed carbs. He likes what he likes and doesn't want to change. ILS worker is only with him 2 days per week and other days ILS not with him eats out has a lot of pizza and carbs. He has some good food in the fridge but chooses not  to eat it. Doctors talked about making healthy choices and family as well as ILS worker is supporting him.  He has been maintaining his weight but the weight is interfering with his life and not getting out and doing as many fun things. He doesn't want to continue nutrition counseling because he doesn't want to make healthy lifestyle changes. We discussed trying some other activities (cooking or exercise) to help make healthy lifestyle changes. He has been making some healthy meals with [facility]. There are some special Olympics that they are looking at.     Changes since previous consult Yes         Behavior Status:Improvement shown  Barriers Include:Motivation/Ready to change , Lack of social support, Lack of cooking skills , Lack of control over emotions/behaviors and Lack of nutrition knowlege     Bm's still soft and formed and more like 1-2x per week having diarrhea. Still struggling with cutting back on portion sizes to help decrease overall carbs. He has been trying to do more healthy fats instead. Bought avocados (made homemade guac), got nuts, asparagus, had a salad. He has been doing better and working on making these habits more consistent. He noticed that adding in more water, protein and healthy fats has given him more energy.     He likes the vitamin enhanced water and trying to do this instead of pop. He likes the variety of flavors. When has been doing the water daily and only doing pop at home anymore and used to do 7-8 cans per day and not buying any cans of pop now. He has only been having 1 pop small glass when out to eat about 1 day per week.     He did weigh himself and has been maintaining the weight vs climbing. He has been going to the [employer] once per week to swim. He wants to go more. He wants to get outside and exercise more too. He has a bike and hopes to see more improvement once he gets to ride it more. He had been continually gaining weight so maintaining is really good progress.  Wants to keep working on the goals we set at our last consult with emphasis on activity for weight loss.    He hasn't been checking blood sugars for a few months now and suppose to be checking twice daily.      He needs new diabetes device so going to be following up with the referring doctor. Last A1c 2 weeks ago was 7.5 which is up from 6.8 about 7months ago. Glucose by meter showed 122 mg/dL 3 months ago     Notes 3/16/21  Has been taking the Citracal and seems to be helping with the diarrhea. His BMs are now more soft and formed and every other day and not diarrhea every day. Has been trying to work on portion sizes but still struggling with cutting back on carbs and increasing protein intake. He has been drinking pop since a kid and hard to kick the habit. He has been doing egg bakes with broccoli. He has been having snacks but still struggling with picking healthy options. He continues to go to chips/crackers. He did try almonds once as a snack. He is willing to do it again. Open to trying more nuts and seeds instead of chips. He likes sunflower seeds. He hasn't had sunflower seed butter before and open to it. He likes ants on a log so open to try as snack. He loves quac and open to trying with veggies. He has never tried it with veggies.     NUTRITION ASSESSMENT:   Nutritional Goals 2/2/2021   Nutritional Goal Create healthier eating patterns;Create a plan to lose weight;Work on meal planning/recipes;Overcome emotional eating;Stabilize blood sugars;Eliminate cravings for sugar and refined carbohydrates;Increase energy;Address anxiety and or depression around eating;What to eat for my specific health concerns        Neurological 2/2/2021   Migraine Headaches Past   Tension Headache Current       No flowsheet data found.   No flowsheet data found.   Gastrointestinal 2/2/2021   Constipation Current   Nausea or Vomiting Past;Current   Hemorrhoids Past;Current   Diarrhea Current   Gas/bloating Past;Current    Heartburn/Reflux/GERD Past;Current   Blood in stool Past;Current   Abdominal Pain Current       No flowsheet data found.   Endocrine 2/2/2021   Type 2 diabetes Current   Overweight/obesity Past;Current      Skin 2/2/2021   Acne Past   Dry Skin Past;Current      Cardiopulmonary 2/2/2021   High Cholesterol Current      Musculoskeletal 2/2/2021   Restless Legs Current      Psychological 2/2/2021   Depression/Anxiety Past;Current   Binge eating disorder Past;Current      No flowsheet data found.   No flowsheet data found.     Past Medical History:  Past Medical History:   Diagnosis Date     Depressive disorder All my life almost.     Diabetes (H) sometime in 2019     Sleep apnea        Previous Surgeries:   Past Surgical History:   Procedure Laterality Date     APPENDECTOMY  8/15/14     COLONOSCOPY N/A 12/28/2020    Procedure: COLONOSCOPY, WITH BIOPSY;  Surgeon: Haley Hernandez MD;  Location:  GI        Family History:  Family History   Problem Relation Age of Onset     Mental Illness Brother      Diabetes No family hx of      Depression No family hx of      Anxiety Disorder No family hx of      Mental Illness No family hx of         Lifestyle History:  Lifestyle 2/2/2021   Do you feel your life is stressful right now?  Yes   What is the cause(s) of stress in your life?  Finances;Work;Emotional problems   Are you currently implementing any strategies to help manage stress? Yes   What are you doing to manage stress?  Breathing exercises;Movement and exercise;Massage;Counseling;TV;Take time for self;Listening to music        Exercise History:  Exercise 2/2/2021   Does your occupation require extended periods of sitting?  No   Does your occupation require extended periods of repetitive movements (ex: walking or lifting)?  Yes   Do you currently participate in any forms of exercise? Yes   Check all the exercises you participate in: Walking;Biking;Swimming   How many times per week do you exercise? 2 times   How long do  you usually exercise? 30 min        Sleep History:  Sleep 2/2/2021   How many hours (on average) do you sleep per night? 9-11   What time do you turn off the lights? 10 PM   How long does it take for you to fall asleep? 15-20 mins   What time do you stop using electronic devices? 10 PM   What time do you wake up? 11 AM   When do you eat your first meal?  11 AM   Do you feel well-rested during the day?  No   Do you take naps?  Yes   Do you have a comfortable bedroom environment (cool, quiet, dark, etc)? Yes   Do you have a sleep routine/ ritual that you do before bed?  Yes   How many hours do you spend per day looking at a screen (TV, computer, tablet and phone)? 3 to 4   Select all factors that apply to your current sleep habits: Difficulty falling asleep;Wake up in the middle of the night;Have nightmares;Take medication for sleep on most night of the week;Snore loudly or have been told you stop breathing;Eat large meals within 3 hours of going to bed;Night sweats;Feel tired/sluggish/fatigued during the day;Grinding teeth        Nutrition History:  Nutrition 2/2/2021   Have you ever had a nutrition consultation? Yes   Do you currently follow a special diet or nutritional program? No   What do you feel are the biggest barriers getting in the way of achieving you nutritional goals? Motivation/Readiness to change;Lack of control over emotions/behaviors;Stress   Do you have any food allergies, sensitivities or intolerances?  No       Digestion 2/2/2021   Do you experience stomach pains/cramping? Daily   Do you experience bloating?  2-3 times per week   Do you experience gas?  2-3 times per week   Do you experience heartburn/acid reflux/indigestion? 2-3 times per week   How often do you have a bowel movement? 3 or more times per day   What is a typical bowel movement like for you? Select all that apply: Loose;Liquid;With blood visible      Food Access:  2/2/2021   Who does the grocery shopping? Self   How often is  grocery shopping done? Weekly   Where do you usually receive your groceries from? Select all that apply: [employer];Other   Do you read food labels? No   Who does the cooking? Select all that apply: Self;Assistant   How many meals do you eat out per week?  3 to 5   What restaurants do you typically choose? [employer] ([employer], [employer], etc.)      Daily Patterns: 2/2/2021   How many days per week do you have breakfast? 3   How many days per week do you have lunch? 6   How many days per week do you have dinner? 7   How many days per week do you have snacks? 5      Protein Intake: 2/2/2021   How many times per day do you typically consume a protein source(s)? 4   What types of protein do you currently eat?  Ground Beef;Steak;Hamburgers;Beef Roast;Pork Chops;Pork Ribs;[place];Deli Ham;Sausage;Tuna;Chicken Breast;Chicken Thighs/Legs;Eggs       Fat Intake:  2/2/2021   How many times per day do you typically consume healthy fat(s)? 1   What types of health fats do you currently eat? Select all that apply:  Santana;Butter;Salad dressings       Fruit Intake:  2/2/2021   How many times per day do you typically consume fruits? 1   What types of fruit do currently eat? Apple;Banana;Cantaloupe;Mandarin oranges;Pineapple       Vegetable Intake:  2/2/2021   How many times per day do you typically consume vegetables? 1   What types of vegetables do you currently eat? Broccoli;Carrots;Celery;Corn;Green onions/scallions;Onions;Peas;Potato (baked, boiled, mashed, French fries);Yam, sweet potato      Grain Intake:  2/2/2021   How many times per day do you typically consume grains? 2   What types of grains do currently eat? Select all that apply:  Breads (non-gluten free);Cereals (non-gluten free);Chips (non-gluten free);Crackers (non-gluten free);Pasta (non-gluten free);Pretzels (non-gluten free)       Dairy Intake:  2/2/2021   How many times per day do you typically consume dairy? 1   What types of dairy do currently eat?  Select all that apply:  Milk;Cheese;Yogurt;Ice cream       Non-Dairy Alternative Intake:  2/2/2021   How many times per day do you typically consume non-dairy alternatives? 0   What types of non-dairy alternatives do currently eat? Select all that apply:  Other cheese       Sweets Intake:  2/2/2021   How many times per day do you typically consume sweets? 1      Beverage Intake:  2/2/2021   How many 8 oz cups of water do you typically consume per day?  0 to 1   How many 8 oz cups of caffeine do you typically consume per day?  1 to 2   How many drinks of alcohol do you typically consume per week (1 drink = 5 oz wine, 12 oz beer, 1.5 oz spirits)?   0       Lifestyle Recall:  2/2/2021   What time did you wake up? 8 AM   What time did you go to sleep? 8 PM   What time did you have breakfast? No breakfast   What time did you have a morning snack? 9-10 AM   Where did you have your morning snack? Home   What time did you have lunch? No lunch   What time did you have an afternoon snack? No snack   What time did you have dinner? 3-4 PM   Where did you have dinner?  Restaurant   What time did you have an evening snack? 7-8 PM   Where did you have your evening snack? Home   What time of day did you exercise? No Exercise          Additional concerns: If has breakfast does a replacement shake - [facility] will make eggs, barbour or sausage. Does microwave and doesn't do much cooking.   Has the means to do cooking but now just doesn't feel like it since mom passed away.     [email or web address]    MEDICATIONS:  Current Outpatient Medications   Medication Sig Dispense Refill     acetaminophen (TYLENOL) 325 MG tablet Take 325-650 mg by mouth as needed for mild pain        atorvastatin (LIPITOR) 20 MG tablet TAKE 1 TABLET BY MOUTH ONCE DAILY EVERY MORNING (AM PLUSPAK) 28 tablet 5     blood glucose calibration (ONETOUCH VERIO) solution USE AS DIRECTED 1 each 0     Blood Glucose Monitoring Suppl (ONETOUCH VERIO FLEX SYSTEM)  w/Device KIT USE TO TEST TWICE DAILY 1 kit 0     divalproex sodium extended-release (DEPAKOTE ER) 500 MG 24 hr tablet TAKE THREE TABLETS BY MOUTH EVERY NIGHT AT BEDTIME (HS PLUSPAK)       FIBER PO Take 1 chew tab by mouth daily       glimepiride (AMARYL) 4 MG tablet TAKE 1 TABLET BY MOUTH ONCE DAILY EVERY MORNING BEFORE BREAKFAST 90 tablet 1     hydrocortisone, Perianal, (HYDROCORTISONE) 2.5 % cream Place rectally 2 times daily as needed for hemorrhoids 28 g 0     ibuprofen (ADVIL/MOTRIN) 200 MG tablet As needed       Lancets (ONETOUCH DELICA PLUS [id number]) MISC TEST TWICE DAILY 100 each 1     Multiple Vitamins-Minerals (MENS MULTIVITAMIN PO)        omeprazole (PRILOSEC) 20 MG DR capsule TAKE 1 CAPSULE BY MOUTH TWICE A DAY (AM & EVENING PLUSPAK) 56 capsule 3     ONETOUCH VERIO IQ test strip TEST TWICE DAILY 100 strip 1     pioglitazone (ACTOS) 30 MG tablet TAKE 1 TABLET (30 MG) BY MOUTH DAILY 30 tablet 2     QUEtiapine (SEROQUEL) 300 MG tablet Take 300 mg by mouth At Bedtime   0     TRAZODONE HCL PO Take 100 mg by mouth At Bedtime        zolpidem (AMBIEN) 5 MG tablet Take tablet by mouth 15 minutes prior to sleep, for Sleep Study 1 tablet 0       Dietary Supplements 2/2/2021   Individual Vitamin Supplements General multivitamin   Herbal Complimentary products Fiber supplement         ALLERGIES:   Allergies   Allergen Reactions     Ceclor [Cefaclor] Swelling     Erythromycin GI Disturbance        .na  LABS:  Last Basic Metabolic Panel:  Lab Results   Component Value Date     09/08/2020     05/20/2020     01/16/2020      Lab Results   Component Value Date    POTASSIUM 4.0 09/08/2020    POTASSIUM 4.2 05/20/2020    POTASSIUM 4.2 01/16/2020     Lab Results   Component Value Date    CHLORIDE 105 09/08/2020    CHLORIDE 102 05/20/2020    CHLORIDE 105 01/16/2020     Lab Results   Component Value Date    CORY 9.1 09/08/2020    CORY 9.1 05/20/2020    CORY 9.4 01/16/2020     Lab Results   Component Value Date     CO2 27 09/08/2020    CO2 29 05/20/2020    CO2 26 01/16/2020     Lab Results   Component Value Date    BUN 9 09/08/2020    BUN 13 05/20/2020    BUN 13 01/16/2020     Lab Results   Component Value Date    CR 0.47 09/08/2020    CR 0.63 05/20/2020    CR 0.67 01/16/2020     Lab Results   Component Value Date     09/08/2020     05/20/2020     01/16/2020       Last Glucose Profile:   Hemoglobin A1C   Date Value Ref Range Status   03/30/2021 7.5 (H) 0 - 5.6 % Final     Comment:     Normal <5.7% Prediabetes 5.7-6.4%  Diabetes 6.5% or higher - adopted from ADA   consensus guidelines.     09/08/2020 6.8 (H) 0 - 5.6 % Final     Comment:     Normal <5.7% Prediabetes 5.7-6.4%  Diabetes 6.5% or higher - adopted from ADA   consensus guidelines.     05/20/2020 8.0 (H) 0 - 5.6 % Final     Comment:     Normal <5.7% Prediabetes 5.7-6.4%  Diabetes 6.5% or higher - adopted from ADA   consensus guidelines.         Last Lipid Profile:   Cholesterol   Date Value Ref Range Status   09/08/2020 157 <200 mg/dL Final   01/16/2020 230 (H) <200 mg/dL Final     Comment:     Desirable:       <200 mg/dl   07/05/2019 226 (H) <200 mg/dL Final     Comment:     Desirable:       <200 mg/dl     HDL Cholesterol   Date Value Ref Range Status   09/08/2020 40 >39 mg/dL Final   01/16/2020 43 >39 mg/dL Final   07/05/2019 38 (L) >39 mg/dL Final     LDL Cholesterol Calculated   Date Value Ref Range Status   09/08/2020 83 <100 mg/dL Final     Comment:     Desirable:       <100 mg/dl   01/16/2020 151 (H) <100 mg/dL Final     Comment:     Above desirable:  100-129 mg/dl  Borderline High:  130-159 mg/dL  High:             160-189 mg/dL  Very high:       >189 mg/dl     07/05/2019 152 (H) <100 mg/dL Final     Comment:     Above desirable:  100-129 mg/dl  Borderline High:  130-159 mg/dL  High:             160-189 mg/dL  Very high:       >189 mg/dl       Triglycerides   Date Value Ref Range Status   09/08/2020 170 (H) <150 mg/dL Final     Comment:      "Borderline high:  150-199 mg/dl  High:             200-499 mg/dl  Very high:       >499 mg/dl  Non Fasting     01/16/2020 180 (H) <150 mg/dL Final     Comment:     Borderline high:  150-199 mg/dl  High:             200-499 mg/dl  Very high:       >499 mg/dl     07/05/2019 182 (H) <150 mg/dL Final     Comment:     Borderline high:  150-199 mg/dl  High:             200-499 mg/dl  Very high:       >499 mg/dl       No results found for: CHOLHDLRATIO    Most recent CBC:  Recent Labs   Lab Test 09/08/20  1136 07/05/19  1009 01/08/19  1503   WBC 12.0* 12.6* 11.9*   HGB 12.7* 12.5* 12.9*   HCT 40.6 39.5* 40.7    272 280     Most recent hepatic panel:  Recent Labs   Lab Test 09/08/20  1136 01/16/20  1056   ALT 55 49   AST 28 22     Most recent creatinine:  Recent Labs   Lab Test 09/08/20  1136 05/20/20  0956   CR 0.47* 0.63*       No components found for: GFRESETIMATEDLASTLAB(gfrestblack:1@  Lab Results   Component Value Date    ALBUMIN 3.6 09/08/2020       Last Thyroid Profile:   TSH   Date Value Ref Range Status   09/08/2020 3.18 0.40 - 4.00 mU/L Final   07/05/2019 3.40 0.40 - 4.00 mU/L Final   01/08/2019 2.13 0.40 - 4.00 mU/L Final     T4 Free   Date Value Ref Range Status   07/05/2019 0.79 0.76 - 1.46 ng/dL Final       Last Mineral Profile:   No results found for: MIRANDA, IRON, FEB    Autoimmune & Inflammatory   CRP Inflammation   Date Value Ref Range Status   12/10/2020 8.0 0.0 - 8.0 mg/L Final         Last Vitamin Profile:   No results found for: WMX373, EIVY591, [id number], VITD3, D2VIT, D3VIT, DTOT, [id number], [id number], [id number], [id number], [id number], [id number]    ANTHROPOMETRICS:  Vitals:   BP Readings from Last 1 Encounters:   08/03/21 126/84     Pulse Readings from Last 1 Encounters:   08/03/21 108     Estimated body mass index is 47.25 kg/m  as calculated from the following:    Height as of 8/3/21: 1.88 m (6' 2\").    Weight as of 8/3/21: 166.9 kg (368 lb).    Wt Readings from Last 5 Encounters: "   08/03/21 (!) 166.9 kg (368 lb)   07/28/21 (!) 166.9 kg (368 lb)   07/01/21 (!) 166.7 kg (367 lb 6.4 oz)   06/24/21 (!) 166 kg (366 lb)   06/08/21 (!) 166.1 kg (366 lb 3.2 oz)       Weight Change: would like to lose 100lbs       NUTRITION DIAGNOSIS:   1. Altered nutrition-related laboratory values related to endocrine dysfunction as evidenced by elevated BMI, HbA1c     2. Altered nutrition-related laboratory values related to cardiac as evidenced by elevated Total chol, elevated TG, LDL, and low HDL.     3. Overweight/Obesity related to food and nutrition related knowledge deficit (excessive CHO intake, Inadequate fiber intake, inappropriate intake of healthy omega 3 fatty acids) as evidenced by nutrition intake record (limited variety of foods), A1c >6%, recent labs.    4. Overweight/obesity related to inability to sustain diet/lifestyle change, as evidenced by many previous attempts at weight loss with weight regain over the past five years.     NUTRITION INTERVENTION   CARDIOMETABOLIC     Long Term Goals:   Goal: Lipid profile; Decrease TG <150 mg/dL, Decrease LDL <100mg/dL within 6 months   Goal: Lose 20-24lbs in 6 months.   Goal: Decrease Hemoglobin A1c <6 % within 2-6 months   Goal: Increase hemoglobin levels within 6 months     Short Term Goals:    1. Small Improvement Shown - needs to keep working on it. Focus on cutting back on carbs and increase protein intake - see handouts and info below - for example try to cut back on bag of chips and or 1 can of pop per day.  2.   Small Improvement Shown- Continue having snack 1-2x daily with healthy fats (ex: guac, nuts, hummus) and add a fruit/veggie for fiber- apple or banana with nut/seed butter (almond butter ) or sunflower seeds (sunflower seed butter with celery ) see ideas discussed and provided   3.  Some improvement- Keep cutting back on pop and increasing water   4. Improvement Shown- Increase activity at least 15-60 mins. Look into special Olympics and  see what activities resonate. Pack some healthy snacks in the cooler to take with for the day.     Start with walking 15 mins daily. With [facility] doing activity at least 2 days with [facility]. Set alarm and put in calendar for reminder to get up and move.   5. Test blood sugars to see how meal impact them.       Test Blood Sugars    Measure your fasting blood sugar daily, first thing in morning before breakfast. Ideally your fasting blood sugar should be between 70-80 mg/dL.     Measure your blood sugar two hours after breakfast and two hours after dinner. Ideally your two-hour sugars should never go over 120mg/dL.     Mediterranean Approach: Eat whole, unprocessed real foods in their unprocessed forms such as fruits, vegetables, whole grains (prefer gluten free), nuts, legumes, extra virgin olive oil, spices, modest amounts of poultry, and fish.      Cardiometabolic Food plan - 1,800 calories per day     Protein 10-12 servings per day - include at each meal to stabilize blood sugars   (Choose 3oz or 21g per meal and aim for 1oz of 7g for snacks)   - Strive for 1-2 servings of fish per week especially of higher omega-3 fatty acid containing fish such as salmon.     Legumes 2 serving per day     Dairy alternatives 2-3 serving per day     Nuts and seeds 3-4 servings per day - great to incorporate as snacks    Fats and oils 4 servings per day     Non starchy vegetables 8-10 servings per day     Starchy vegetable limit 1 serving per day as they tend to impact blood sugar (they are moderate-GI).     Fruits 2 servings per day - best to couple with a little bit of protein or fat to offset a rise in blood sugars (they are low-moderate-GI foods).     Whole grains 2 serving per day - try gluten free whole grains instead      Choose Low Glycemic (GI) foods: Regulate your sugar levels by eating foods that do not spike blood sugars.  Eat low -GI foods so only small fluctuations in blood glucose and insulin levels are produced.       Examples of low-GI foods: nuts, seeds, GF oats, most vegetables especially non-starchy and fruits.     Medium or high-GI foods should be eaten with a protein or fat, both of which blunt the glycemic effect of these foods. This reduces the overall glycemic impact of a meal.   Ex: Most grains and starchy veggies are medium/high GI.     Avoid foods containing refined sugars, artificial sweeteners, and refined grains they are considered high-GI because they lead to sharp increases in blood sugars levels, which increase insulin sensitivity causing increased TG, and low good cholesterol (HDL).   Ex: cakes, cookies, pies, bread, sodas, fruit drinks, presweetened tea, coffee drinks, energy or sport drinks, flavored milk and other processed foods.     Choose foods high in fiber: Aim for at least 5 grams of fiber per serving of food or a total of 25-35 grams fiber per day. Remember, when looking at the label, you can take the fiber away from the total carbs. Ex:15g of total carbs - 4g of fiber = 11g net carbs     Insoluble fiber acts like a bulky  inner broom,  sweeping out debris from the intestine and creating more motility and movement.      Soluble fiber attracts water and swells, creating a gel that slows digestion.  Also, slows the release of glucose from foods into the blood which stops spikes in blood sugar levels.  Soluble fiber traps toxins in the gut, helping to carry them to excretion and provides healthy bacteria in the digestive tract.     Choose High Quality Fats: Adding anti-inflammatory fats into your diet such as fish (salmon, herring, mackerel, and sardines), omega 3 eggs, elidia seeds, ground flax seeds/milk, hemp seeds/milk, walnuts and some other certain leafy greens will increase omega-3 fats to omeaga-6 fats ratio.     Therapeutic fats both monounsaturated and polyunsaturated to include daily: ground flaxseeds, unsalted mixed nuts, avocados, olives, extra-virgin olive oil.     Emphasize high-quality  oils and fats in the diet daily such as avocado oil, coconut oil, flaxseed, olive, sesame. Ex: 1 tsp to 1 tbsp of MCT oil from coconuts can be added into coffee, smoothies, and salad dressings per day.     Avoid trans fats found in processed foods     Drink more water. Hydration is critical, so drink at least six to eight glasses of water a day. Drink more water between meals and less at meals.     Try adding herbal teas (sugar free) or lemon/lime/cucumber/fruit to water for flavor. Avoid artificial sweetener packets to flavor your water.     Cut back on coffee switch to green tea. Avoid adding sugar and milk to coffee instead use dairy alternatives such as almond, flax, coconut milk.       Increase physical activity. Moving our body helps move our bowels and speeds up your metabolism.     Exercise 15-60 minutes daily--whether that looks like burst training, yoga, or vigorous walking.      NUTRITION RESOURCES:  1. Purchase Eat Fat Get Thin by Kenneth Temi Book/Cookbook   2. IFM Cardiometabolic Packet     Functional Nutrition Fundamentals     Comprehensive Guide    Meal plan with recipes           PATIENT'S BEHAVIOR CHANGE GOALS:   See nutrition intervention for patient stated behavior change goals. AVS was printed and given to patient at today's appointment.    MONITOR / EVALUATE:  Registered Dietitian will monitor/evaluate the following:     Beliefs and attitudes related to food    Food and nutrition knowledge / skills    Food / Beverage / Nutrient intake     Pertinent Labs    Progress toward meeting stated nutrition-related goals    Readiness to change nutrition-related behaviors    Weight change    Digestion     COORDINATION OF CARE:  Follow up with gastroenterology      FOLLOW-UP:  Follow-up appointment scheduled on 2/8/22 at 10am phone visit      Time spent in minutes: 52 minutes  Encounter: Individual    Angie Levy, RD, CLT, LD  Integrative Registered Dietitian     Changes since previous consult Yes

## 2021-08-25 NOTE — PATIENT INSTRUCTIONS
NUTRITION INTERVENTION   CARDIOMETABOLIC     Long Term Goals:   Goal: Lipid profile; Decrease TG <150 mg/dL, Decrease LDL <100mg/dL within 6 months   Goal: Lose 20-24lbs in 6 months.   Goal: Decrease Hemoglobin A1c <6 % within 2-6 months   Goal: Increase hemoglobin levels within 6 months     Short Term Goals:    1. Small Improvement Shown - needs to keep working on it. Focus on cutting back on carbs and increase protein intake - see handouts and info below - for example try to cut back on bag of chips and or 1 can of pop per day.  2.   Small Improvement Shown- Continue having snack 1-2x daily with healthy fats (ex: guac, nuts, hummus) and add a fruit/veggie for fiber- apple or banana with nut/seed butter (almond butter ) or sunflower seeds (sunflower seed butter with celery ) see ideas discussed and provided   3.  Some improvement- Keep cutting back on pop and increasing water   4. Improvement Shown- Increase activity at least 15-60 mins. Look into special Olympics and see what activities resonate. Pack some healthy snacks in the cooler to take with for the day.     Start with walking 15 mins daily. With [place] doing activity at least 2 days with [place]. Set alarm and put in calendar for reminder to get up and move.   5. Test blood sugars to see how meal impact them.       Test Blood Sugars    Measure your fasting blood sugar daily, first thing in morning before breakfast. Ideally your fasting blood sugar should be between 70-80 mg/dL.     Measure your blood sugar two hours after breakfast and two hours after dinner. Ideally your two-hour sugars should never go over 120mg/dL.     Mediterranean Approach: Eat whole, unprocessed real foods in their unprocessed forms such as fruits, vegetables, whole grains (prefer gluten free), nuts, legumes, extra virgin olive oil, spices, modest amounts of poultry, and fish.      Cardiometabolic Food plan - 1,800 calories per day     Protein 10-12 servings per day - include at each  meal to stabilize blood sugars   (Choose 3oz or 21g per meal and aim for 1oz of 7g for snacks)   - Strive for 1-2 servings of fish per week especially of higher omega-3 fatty acid containing fish such as salmon.     Legumes 2 serving per day     Dairy alternatives 2-3 serving per day     Nuts and seeds 3-4 servings per day - great to incorporate as snacks    Fats and oils 4 servings per day     Non starchy vegetables 8-10 servings per day     Starchy vegetable limit 1 serving per day as they tend to impact blood sugar (they are moderate-GI).     Fruits 2 servings per day - best to couple with a little bit of protein or fat to offset a rise in blood sugars (they are low-moderate-GI foods).     Whole grains 2 serving per day - try gluten free whole grains instead      Choose Low Glycemic (GI) foods: Regulate your sugar levels by eating foods that do not spike blood sugars.  Eat low -GI foods so only small fluctuations in blood glucose and insulin levels are produced.      Examples of low-GI foods: nuts, seeds, GF oats, most vegetables especially non-starchy and fruits.     Medium or high-GI foods should be eaten with a protein or fat, both of which blunt the glycemic effect of these foods. This reduces the overall glycemic impact of a meal.   Ex: Most grains and starchy veggies are medium/high GI.     Avoid foods containing refined sugars, artificial sweeteners, and refined grains they are considered high-GI because they lead to sharp increases in blood sugars levels, which increase insulin sensitivity causing increased TG, and low good cholesterol (HDL).   Ex: cakes, cookies, pies, bread, sodas, fruit drinks, presweetened tea, coffee drinks, energy or sport drinks, flavored milk and other processed foods.     Choose foods high in fiber: Aim for at least 5 grams of fiber per serving of food or a total of 25-35 grams fiber per day. Remember, when looking at the label, you can take the fiber away from the total carbs.  Ex:15g of total carbs - 4g of fiber = 11g net carbs     Insoluble fiber acts like a bulky  inner broom,  sweeping out debris from the intestine and creating more motility and movement.      Soluble fiber attracts water and swells, creating a gel that slows digestion.  Also, slows the release of glucose from foods into the blood which stops spikes in blood sugar levels.  Soluble fiber traps toxins in the gut, helping to carry them to excretion and provides healthy bacteria in the digestive tract.     Choose High Quality Fats: Adding anti-inflammatory fats into your diet such as fish (salmon, herring, mackerel, and sardines), omega 3 eggs, elidia seeds, ground flax seeds/milk, hemp seeds/milk, walnuts and some other certain leafy greens will increase omega-3 fats to omeaga-6 fats ratio.     Therapeutic fats both monounsaturated and polyunsaturated to include daily: ground flaxseeds, unsalted mixed nuts, avocados, olives, extra-virgin olive oil.     Emphasize high-quality oils and fats in the diet daily such as avocado oil, coconut oil, flaxseed, olive, sesame. Ex: 1 tsp to 1 tbsp of MCT oil from coconuts can be added into coffee, smoothies, and salad dressings per day.     Avoid trans fats found in processed foods     Drink more water. Hydration is critical, so drink at least six to eight glasses of water a day. Drink more water between meals and less at meals.     Try adding herbal teas (sugar free) or lemon/lime/cucumber/fruit to water for flavor. Avoid artificial sweetener packets to flavor your water.     Cut back on coffee switch to green tea. Avoid adding sugar and milk to coffee instead use dairy alternatives such as almond, flax, coconut milk.       Increase physical activity. Moving our body helps move our bowels and speeds up your metabolism.     Exercise 15-60 minutes daily--whether that looks like burst training, yoga, or vigorous walking.      NUTRITION RESOURCES:  1. Purchase Eat Fat Get Thin by Kenneth Membreno  Book/Cookbook   2. IFM Cardiometabolic Packet     Functional Nutrition Fundamentals     Comprehensive Guide    Meal plan with recipes

## 2021-09-27 DIAGNOSIS — E11.65 TYPE 2 DIABETES MELLITUS WITH HYPERGLYCEMIA, WITHOUT LONG-TERM CURRENT USE OF INSULIN (H): ICD-10-CM

## 2021-09-27 NOTE — PROGRESS NOTES
Does Bertram have home oxygen?  No     [place] Sleep Scale: 16    Obstructive Sleep Apnea - PAP Follow-Up Visit:    Chief Complaint   Patient presents with     CPAP Follow Up       Bertram Foreman comes in today for follow-up of their very severe complex sleep apnea total AHI of one 1.6 and central AHI of 48.7.  Very significant hypoxemia were noted as well with 191.2 minutes at or below 88% SaO2 patient was also seen by pulmonology and had a pulmonary function work-up as well as chest x-ray without significant noted pathology.  He had mild restrictive features and PFTs.  Patient was supposed to have had the titration study for more advanced ventilating device such as adaptive servo ventilation to address central component of his sleep apnea.  However it has not been done.               Past medical/surgical history, family history, social history, medications and allergies were reviewed.      Problem List:  Patient Active Problem List    Diagnosis Date Noted     Diabetes mellitus, type 2 (H) 02/27/2019     Priority: Medium     Intellectual disability 02/04/2019     Priority: Medium     Recurrent major depressive disorder, in partial remission (H) 02/04/2019     Priority: Medium     Morbid obesity (H) 01/08/2019     Priority: Medium     Morbid obesity with body mass index of 45.0-49.9 in adult (H) 04/04/2017     Priority: Medium     Mantoux: positive, treated 05/01/2011     Priority: Medium     Overview:   5/2011, went through (more than) 9 months of isoniazid.  NOT TO HAVE MANTOUX IN FUTURE       Mild intellectual disabilities 07/15/2010     Priority: Medium        There were no vitals taken for this visit.    Impression/Plan:  Today we discussed this issues the patient need to address them as soon as possible considering the results of his recent sleep study.  I will reach out to our main laboratory and see what other recommendations may be suggested  if the titration study cannot be performed.  20 minutes spent  with patient, all of which were spent face-to-face counseling, consulting, coordinating plan of care.      CC:  Tremaine Ruvalcaba Oleg Froymovich, MD

## 2021-09-28 ENCOUNTER — OFFICE VISIT (OUTPATIENT)
Dept: SLEEP MEDICINE | Facility: CLINIC | Age: 31
End: 2021-09-28
Payer: COMMERCIAL

## 2021-09-28 VITALS
WEIGHT: 315 LBS | BODY MASS INDEX: 40.43 KG/M2 | OXYGEN SATURATION: 96 % | DIASTOLIC BLOOD PRESSURE: 70 MMHG | HEART RATE: 83 BPM | SYSTOLIC BLOOD PRESSURE: 128 MMHG | HEIGHT: 74 IN

## 2021-09-28 DIAGNOSIS — G47.33 OSA (OBSTRUCTIVE SLEEP APNEA): Primary | ICD-10-CM

## 2021-09-28 PROCEDURE — 99213 OFFICE O/P EST LOW 20 MIN: CPT | Performed by: OTOLARYNGOLOGY

## 2021-09-28 ASSESSMENT — MIFFLIN-ST. JEOR: SCORE: 2705.33

## 2021-09-29 DIAGNOSIS — I82.0: Primary | ICD-10-CM

## 2021-09-29 DIAGNOSIS — G47.33 OSA (OBSTRUCTIVE SLEEP APNEA): ICD-10-CM

## 2021-09-29 NOTE — PROGRESS NOTES
Patient notified of DME order and transferred to radiology to schedule imaging.    Kari DELGADILLO CMA

## 2021-09-30 RX ORDER — PIOGLITAZONEHYDROCHLORIDE 30 MG/1
30 TABLET ORAL DAILY
Qty: 30 TABLET | Refills: 1 | Status: SHIPPED | OUTPATIENT
Start: 2021-09-30 | End: 2021-11-04

## 2021-09-30 NOTE — TELEPHONE ENCOUNTER
Routing refill request to provider for review/approval because:  Labs out of range:  Creatinine  Labs not current:  AST/ALT, A1C, Creatinine    PRASANTH Mckeon, RN  [facility]

## 2021-10-02 ENCOUNTER — HEALTH MAINTENANCE LETTER (OUTPATIENT)
Age: 31
End: 2021-10-02

## 2021-10-07 ENCOUNTER — HOSPITAL ENCOUNTER (OUTPATIENT)
Dept: MRI IMAGING | Facility: CLINIC | Age: 31
Discharge: HOME | End: 2021-10-07
Attending: OTOLARYNGOLOGY | Admitting: OTOLARYNGOLOGY
Payer: COMMERCIAL

## 2021-10-07 DIAGNOSIS — I82.0: ICD-10-CM

## 2021-10-07 PROCEDURE — 70553 MRI BRAIN STEM W/O & W/DYE: CPT

## 2021-10-07 PROCEDURE — A9585 GADOBUTROL INJECTION: HCPCS | Performed by: OTOLARYNGOLOGY

## 2021-10-07 PROCEDURE — 255N000002 HC RX 255 OP 636: Performed by: OTOLARYNGOLOGY

## 2021-10-07 RX ORDER — GADOBUTROL 604.72 MG/ML
16 INJECTION INTRAVENOUS ONCE
Status: COMPLETED | OUTPATIENT
Start: 2021-10-07 | End: 2021-10-07

## 2021-10-07 RX ADMIN — GADOBUTROL 16 ML: 604.72 INJECTION INTRAVENOUS at 13:15

## 2021-10-14 ENCOUNTER — OFFICE VISIT (OUTPATIENT)
Dept: INTERNAL MEDICINE | Facility: CLINIC | Age: 31
End: 2021-10-14
Payer: COMMERCIAL

## 2021-10-14 VITALS
WEIGHT: 315 LBS | OXYGEN SATURATION: 96 % | BODY MASS INDEX: 47.09 KG/M2 | DIASTOLIC BLOOD PRESSURE: 78 MMHG | HEART RATE: 104 BPM | SYSTOLIC BLOOD PRESSURE: 136 MMHG | TEMPERATURE: 86.7 F | RESPIRATION RATE: 18 BRPM

## 2021-10-14 DIAGNOSIS — Z23 INFLUENZA VACCINE NEEDED: ICD-10-CM

## 2021-10-14 DIAGNOSIS — E66.01 MORBID OBESITY (H): ICD-10-CM

## 2021-10-14 DIAGNOSIS — E11.65 TYPE 2 DIABETES MELLITUS WITH HYPERGLYCEMIA, WITHOUT LONG-TERM CURRENT USE OF INSULIN (H): Primary | ICD-10-CM

## 2021-10-14 DIAGNOSIS — F70 MILD INTELLECTUAL DISABILITIES: ICD-10-CM

## 2021-10-14 LAB
ANION GAP SERPL CALCULATED.3IONS-SCNC: 5 MMOL/L (ref 3–14)
BUN SERPL-MCNC: 14 MG/DL (ref 7–30)
CALCIUM SERPL-MCNC: 9.1 MG/DL (ref 8.5–10.1)
CHLORIDE BLD-SCNC: 102 MMOL/L (ref 94–109)
CHOLEST SERPL-MCNC: 184 MG/DL
CO2 SERPL-SCNC: 29 MMOL/L (ref 20–32)
CREAT SERPL-MCNC: 0.73 MG/DL (ref 0.66–1.25)
FASTING STATUS PATIENT QL REPORTED: YES
GFR SERPL CREATININE-BSD FRML MDRD: >90 ML/MIN/1.73M2
GLUCOSE BLD-MCNC: 230 MG/DL (ref 70–99)
HBA1C MFR BLD: 8.3 % (ref 0–5.6)
HDLC SERPL-MCNC: 41 MG/DL
LDLC SERPL CALC-MCNC: 104 MG/DL
NONHDLC SERPL-MCNC: 143 MG/DL
POTASSIUM BLD-SCNC: 3.9 MMOL/L (ref 3.4–5.3)
SODIUM SERPL-SCNC: 136 MMOL/L (ref 133–144)
TRIGL SERPL-MCNC: 197 MG/DL

## 2021-10-14 PROCEDURE — 80048 BASIC METABOLIC PNL TOTAL CA: CPT | Performed by: INTERNAL MEDICINE

## 2021-10-14 PROCEDURE — 83036 HEMOGLOBIN GLYCOSYLATED A1C: CPT | Performed by: INTERNAL MEDICINE

## 2021-10-14 PROCEDURE — 36415 COLL VENOUS BLD VENIPUNCTURE: CPT | Performed by: INTERNAL MEDICINE

## 2021-10-14 PROCEDURE — 90686 IIV4 VACC NO PRSV 0.5 ML IM: CPT | Performed by: INTERNAL MEDICINE

## 2021-10-14 PROCEDURE — 90471 IMMUNIZATION ADMIN: CPT | Performed by: INTERNAL MEDICINE

## 2021-10-14 PROCEDURE — 99214 OFFICE O/P EST MOD 30 MIN: CPT | Mod: 25 | Performed by: INTERNAL MEDICINE

## 2021-10-14 PROCEDURE — 99207 PR FOOT EXAM NO CHARGE: CPT | Performed by: INTERNAL MEDICINE

## 2021-10-14 PROCEDURE — 80061 LIPID PANEL: CPT | Performed by: INTERNAL MEDICINE

## 2021-10-14 ASSESSMENT — PAIN SCALES - GENERAL: PAINLEVEL: NO PAIN (0)

## 2021-10-14 NOTE — PROGRESS NOTES
Chief Complaint   Patient presents with     RECHECK     labs needed        EMR reviewed including:             Complaint, History of Chief Complaint, Corresponding Review of Systems, and Complaint Specific Physical Examination.    #1   Continued diarrhea.  Relates once daily 2 or 3 days weekly.  Is associated with eating at the local pizza place  Eats at the local pizza place quite frequently  Does not wish any further therapy, diagnosis or work-up.    #2   Type 2 diabetes  Not checking blood sugars  Reportedly taking medications compliantly including glimepiride, Actos.  Not on Metformin due to diarrhea  Denies polyuria or polydipsia.  Denies hypoglycemic episodes.  Noncompliant with diet.        Exam:   SKIN:  warm and dry. No erythema, or rashes are noted. No specific lesions of concern are noted.    NEURO: Pt is alert and appropriate. No neurologic lateralization is noted. Cranial nerves 2-12 are intact. Peripheral sensory and motor function are grossly normal.    HEART:  regular without rubs, clicks, gallops, or murmurs. PMI is nondisplaced. Upstrokes are brisk. S1,S2 are heard.   LUNGS: clear bilaterally, airflow is brisk, no intercostal retraction or stridor is noted. No coughing is noted during visit.   Feet: no evidence of skin breakdown or ulceration is noted.  Sensation is intact to monofilament and vibration.  Pulses are strong, capillary refill is brisk.    Vital Signs:   /78   Pulse 104   Temp (!) 86.7  F (30.4  C) (Temporal)   Resp 18   Wt (!) 166.4 kg (366 lb 12.8 oz)   SpO2 96%   BMI 47.09 kg/m               Decision Making    Problem and Complexity     1. Type 2 diabetes mellitus with hyperglycemia, without long-term current use of insulin (H)  Check A1c, renal function.  - Hemoglobin A1c; Future  - Basic metabolic panel  (Ca, Cl, CO2, Creat, Gluc, K, Na, BUN); Future  - FOOT EXAM    2. Mild intellectual disabilities  Currently maintaining 2 jobs.  Still requires oversight of a  "\"PCA\".    3. Morbid obesity (H)  Recommend continue weight loss by dietary restriction and exercise    4. Influenza vaccine needed  Given                                    FOLLOW UP   I have asked the patient to make an appointment for followup with me in 4 months or as predicated by lab results lipid          I have carefully explained the diagnosis and treatment options to the patient.  The patient has displayed an understanding of the above, and all subsequent questions were answered.      DO DONNELL Jin    Portions of this note were produced using [employer]  Although every attempt at real-time proof reading has been made, occasional grammar/syntax errors may have been missed.    "

## 2021-10-26 DIAGNOSIS — G47.33 OSA (OBSTRUCTIVE SLEEP APNEA): Primary | ICD-10-CM

## 2021-11-03 ENCOUNTER — MYC MEDICAL ADVICE (OUTPATIENT)
Dept: INTERNAL MEDICINE | Facility: CLINIC | Age: 31
End: 2021-11-03
Payer: COMMERCIAL

## 2021-11-03 DIAGNOSIS — F70 MILD INTELLECTUAL DISABILITIES: Primary | ICD-10-CM

## 2021-11-04 DIAGNOSIS — E11.65 TYPE 2 DIABETES MELLITUS WITH HYPERGLYCEMIA, WITHOUT LONG-TERM CURRENT USE OF INSULIN (H): ICD-10-CM

## 2021-11-04 RX ORDER — PIOGLITAZONEHYDROCHLORIDE 30 MG/1
TABLET ORAL
Qty: 30 TABLET | Refills: 0 | Status: SHIPPED | OUTPATIENT
Start: 2021-11-04 | End: 2021-12-08

## 2021-11-04 NOTE — TELEPHONE ENCOUNTER
Pending Prescriptions:                       Disp   Refills    pioglitazone (ACTOS) 30 MG tablet [Pharmac*30 tab*0        Sig: TAKE 1 TABLET BY MOUTH EVERY DAY    Routing refill request to provider for review/approval because:  Labs not current:    Lab Results   Component Value Date    ALT 55 09/08/2020      AST   Date Value Ref Range Status   09/08/2020 28 0 - 45 U/L Final

## 2021-11-12 DIAGNOSIS — K29.00 ACUTE GASTRITIS WITHOUT HEMORRHAGE, UNSPECIFIED GASTRITIS TYPE: ICD-10-CM

## 2021-11-12 DIAGNOSIS — E78.5 HYPERLIPIDEMIA LDL GOAL <130: ICD-10-CM

## 2021-11-15 RX ORDER — ATORVASTATIN CALCIUM 20 MG/1
TABLET, FILM COATED ORAL
Qty: 28 TABLET | Refills: 5 | Status: SHIPPED | OUTPATIENT
Start: 2021-11-15 | End: 2022-04-15

## 2021-11-15 NOTE — TELEPHONE ENCOUNTER
prilosec  Prescription approved per [facility] Refill Protocol.    lipitor  Prescription approved per [facility] Refill Protocol.    Alicia Freeman RN

## 2021-11-16 ENCOUNTER — DOCUMENTATION ONLY (OUTPATIENT)
Dept: SLEEP MEDICINE | Facility: CLINIC | Age: 31
End: 2021-11-16
Payer: COMMERCIAL

## 2021-11-16 DIAGNOSIS — G47.33 OSA (OBSTRUCTIVE SLEEP APNEA): Primary | ICD-10-CM

## 2021-11-16 NOTE — PROGRESS NOTES
Patient was offered choice of vendor and chose [facility].  Patient Bertram Foreman was set up at [place] on November 16, 2021. Patient received a Resmed Airsense 11 Pressures were set at 6-12 cm H2O.   Patient s ramp is 5 cm H2O for Auto and FLEX/EPR is EPR.  Patient received a Resmed Mask name: AIRFIT F20  Full Face mask size Medium, heated tubing and heated humidifier.  Patient does need to meet compliance. Patient has a follow up on 1/11/22 with Dr. Hidalgo.    Neela David

## 2021-11-19 ENCOUNTER — DOCUMENTATION ONLY (OUTPATIENT)
Dept: SLEEP MEDICINE | Facility: CLINIC | Age: 31
End: 2021-11-19
Payer: COMMERCIAL

## 2021-11-19 NOTE — PROGRESS NOTES
3 day Sleep therapy management telephone visit    Diagnostic AHI: 101.6    PSG    Confirmed with patient at time of call- Yes Patient is still interested in STM service       Subjective measures:  Patient has not yet been able to use the machine because he does not have the right size mask.  He has reached out to [facility]  And is waiting for a call back         Objective data     Order Settings for PAP  CPAP min 6    CPAP max 12     Assessment: No usage but has account in [employer]      Action plan: Patient to have 14 day STM visit. Patient has a follow up visit scheduled:   yes within 31-90 days of set up    Replacement device: No  STM ordered by provider: Yes     Total time spent on accessing and  interpreting remote patient PAP therapy data  0 minutes    Total time spent counseling, coaching  and reviewing PAP therapy data with patient  4 minutes    [address] no

## 2021-11-30 ENCOUNTER — OFFICE VISIT (OUTPATIENT)
Dept: DERMATOLOGY | Facility: CLINIC | Age: 31
End: 2021-11-30
Payer: COMMERCIAL

## 2021-11-30 DIAGNOSIS — D22.9 MULTIPLE BENIGN NEVI: Primary | ICD-10-CM

## 2021-11-30 DIAGNOSIS — D23.9 DERMATOFIBROMA: ICD-10-CM

## 2021-11-30 DIAGNOSIS — D18.01 CHERRY ANGIOMA: ICD-10-CM

## 2021-11-30 PROCEDURE — 99203 OFFICE O/P NEW LOW 30 MIN: CPT | Performed by: DERMATOLOGY

## 2021-11-30 NOTE — LETTER
11/30/2021         RE: Bertram Foreman  [address]  [place] [address]        Dear Colleague,    Thank you for referring your patient, Bertram Foreman, to the [facility]. Please see a copy of my visit note below.    [facility] Dermatology Note  Encounter Date: Nov 30, 2021  Office Visit     Dermatology Problem List:  None    Social history: Has ILS worker that helps with appts and day to day life.  ____________________________________________    Assessment & Plan:     # Multiple clinically benign nevi: Chronic, stable  - No further intervention required. Patient to report changes.   - Patient reassured of the benign nature of these lesions.    # Purple papule on the left chest c/w scar tissue from resolved boil. Reviewed with patient that no this appears to have resolved and no further intervention is needed at this time.    # Benign findings, including: cherry angioma, dermatofibroma. Minor, self limited.   No further intervention needed.    Procedures Performed:   None.    Follow-up: prn for new or changing lesions    Staff and Scribe:     Scribe Disclosure:   I, Khari Smith, am serving as a scribe to document services personally performed by this physician, Dr. Elizabeth Flower, based on data collection and the provider's statements to me.     Provider Disclosure:   The documentation recorded by the scribe accurately reflects the services I personally performed and the decisions made by me.    Elizabeth Flower MD    Department of Dermatology  [facility]: Phone: [phone], Fax:[phone]  [facility]: Phone: [phone], Fax: [phone]    ____________________________________________    CC: Derm Problem (spot check-left arm and chest. No personal or family hx of SC)    HPI:  Mr. Bertram Foreman is a(n) 31 year old  "male who presents today as a new patient for a spot check. He is accompanied by his ILS worker.    He is new to our department. He has not seen a dermatologist prior. He has no history of skin cancer, atypical moles, or precancerous lesions to his knowledge.    Referred by PCP for skin lesion on 5/18/21. Note reviewed. States:  \"Has a nevi on his left antecubital area and a skin lesion under his left breast that he would like to have removed.    Patient would also like to establish with dermatology for future skin evaluations.  Notes that the nevi on his left arm is getting larger.\"    Today, the patient notes concerns about a spot on the left arm and the chest. The spot on the left chest had been swollen, but has not improved. At one point, it was pink and tender. He did \"pop it\" at one point.    Patient is otherwise feeling well, without additional skin concerns.     Labs Reviewed:  N/A    Physical Exam:  Vitals: There were no vitals taken for this visit.  SKIN: Focused examination of left arm, chest and back was performed.  - Tarango Type II  - There are dome shaped bright red papules on the trunk.   - There is a firm tan/flesh colored papule that dimples with lateral pressure on the right medial upper back.  - Multiple regular brown pigmented macules and papules are identified on the trunk and extremities.   - Purple papule on the left chest c/w scar tissue from resolved boil  - No other lesions of concern on areas examined.       Medications:  Current Outpatient Medications   Medication     acetaminophen (TYLENOL) 325 MG tablet     atorvastatin (LIPITOR) 20 MG tablet     blood glucose calibration (ONETOUCH VERIO) solution     Blood Glucose Monitoring Suppl (ONETOUCH VERIO FLEX SYSTEM) w/Device KIT     divalproex sodium extended-release (DEPAKOTE ER) 500 MG 24 hr tablet     FIBER PO     glimepiride (AMARYL) 4 MG tablet     hydrocortisone, Perianal, (HYDROCORTISONE) 2.5 % cream     ibuprofen (ADVIL/MOTRIN) " 200 MG tablet     Lancets (ONETOUCH DELICA PLUS [id number]) MISC     Multiple Vitamins-Minerals (MENS MULTIVITAMIN PO)     omeprazole (PRILOSEC) 20 MG DR capsule     ONETOUCH VERIO IQ test strip     pioglitazone (ACTOS) 30 MG tablet     QUEtiapine (SEROQUEL) 300 MG tablet     TRAZODONE HCL PO     zolpidem (AMBIEN) 5 MG tablet     No current facility-administered medications for this visit.      Past Medical History:   Patient Active Problem List   Diagnosis     Morbid obesity (H)     Intellectual disability     Recurrent major depressive disorder, in partial remission (H)     Diabetes mellitus, type 2 (H)     Mantoux: positive, treated     Morbid obesity with body mass index of 45.0-49.9 in adult (H)     Mild intellectual disabilities     Past Medical History:   Diagnosis Date     Depressive disorder All my life almost.     Diabetes (H) sometime in 2019     Sleep apnea        CC Tremaine Ruvalcaba, DO  [address] [place] [address] on close of this encounter.          Again, thank you for allowing me to participate in the care of your patient.        Sincerely,        Elizabeth Flower MD

## 2021-11-30 NOTE — NURSING NOTE
Bertram Foreman's goals for this visit include:   Chief Complaint   Patient presents with     Derm Problem     spot check-left arm and chest. No personal or family hx of SC       He requests these members of his care team be copied on today's visit information:     PCP: Tremaine Ruvalcaba    Referring Provider:  Tremaine Ruvalcaba,   [address] [place] [address]    There were no vitals taken for this visit.    Do you need any medication refills at today's visit? Leidy King on 11/30/2021 at 10:37 AM

## 2021-11-30 NOTE — PROGRESS NOTES
[facility] Dermatology Note  Encounter Date: Nov 30, 2021  Office Visit     Dermatology Problem List:  None    Social history: Has ILS worker that helps with appts and day to day life.  ____________________________________________    Assessment & Plan:     # Multiple clinically benign nevi: Chronic, stable  - No further intervention required. Patient to report changes.   - Patient reassured of the benign nature of these lesions.    # Purple papule on the left chest c/w scar tissue from resolved boil. Reviewed with patient that no this appears to have resolved and no further intervention is needed at this time.    # Benign findings, including: cherry angioma, dermatofibroma. Minor, self limited.   No further intervention needed.    Procedures Performed:   None.    Follow-up: prn for new or changing lesions    Staff and Scribe:     Scribe Disclosure:   I, Khari Smith, am serving as a scribe to document services personally performed by this physician, Dr. Elizabeth Flower, based on data collection and the provider's statements to me.     Provider Disclosure:   The documentation recorded by the scribe accurately reflects the services I personally performed and the decisions made by me.    Elizabeth Flower MD    Department of Dermatology  [facility]: Phone: [phone], Fax:[phone]  [facility]: Phone: [phone], Fax: [phone]    ____________________________________________    CC: Derm Problem (spot check-left arm and chest. No personal or family hx of SC)    HPI:  Mr. Bertram Foreman is a(n) 31 year old male who presents today as a new patient for a spot check. He is accompanied by his ILS worker.    He is new to our department. He has not seen a dermatologist prior. He has no history of skin cancer, atypical moles, or precancerous lesions to his  "knowledge.    Referred by PCP for skin lesion on 5/18/21. Note reviewed. States:  \"Has a nevi on his left antecubital area and a skin lesion under his left breast that he would like to have removed.    Patient would also like to establish with dermatology for future skin evaluations.  Notes that the nevi on his left arm is getting larger.\"    Today, the patient notes concerns about a spot on the left arm and the chest. The spot on the left chest had been swollen, but has not improved. At one point, it was pink and tender. He did \"pop it\" at one point.    Patient is otherwise feeling well, without additional skin concerns.     Labs Reviewed:  N/A    Physical Exam:  Vitals: There were no vitals taken for this visit.  SKIN: Focused examination of left arm, chest and back was performed.  - Tarango Type II  - There are dome shaped bright red papules on the trunk.   - There is a firm tan/flesh colored papule that dimples with lateral pressure on the right medial upper back.  - Multiple regular brown pigmented macules and papules are identified on the trunk and extremities.   - Purple papule on the left chest c/w scar tissue from resolved boil  - No other lesions of concern on areas examined.       Medications:  Current Outpatient Medications   Medication     acetaminophen (TYLENOL) 325 MG tablet     atorvastatin (LIPITOR) 20 MG tablet     blood glucose calibration (ONETOUCH VERIO) solution     Blood Glucose Monitoring Suppl (ONETOUCH VERIO FLEX SYSTEM) w/Device KIT     divalproex sodium extended-release (DEPAKOTE ER) 500 MG 24 hr tablet     FIBER PO     glimepiride (AMARYL) 4 MG tablet     hydrocortisone, Perianal, (HYDROCORTISONE) 2.5 % cream     ibuprofen (ADVIL/MOTRIN) 200 MG tablet     Lancets (ONETOUCH DELICA PLUS [id number]) MISC     Multiple Vitamins-Minerals (MENS MULTIVITAMIN PO)     omeprazole (PRILOSEC) 20 MG DR capsule     ONETOUCH VERIO IQ test strip     pioglitazone (ACTOS) 30 MG tablet     QUEtiapine " (SEROQUEL) 300 MG tablet     TRAZODONE HCL PO     zolpidem (AMBIEN) 5 MG tablet     No current facility-administered medications for this visit.      Past Medical History:   Patient Active Problem List   Diagnosis     Morbid obesity (H)     Intellectual disability     Recurrent major depressive disorder, in partial remission (H)     Diabetes mellitus, type 2 (H)     Mantoux: positive, treated     Morbid obesity with body mass index of 45.0-49.9 in adult (H)     Mild intellectual disabilities     Past Medical History:   Diagnosis Date     Depressive disorder All my life almost.     Diabetes (H) sometime in 2019     Sleep apnea        CC Tremainerain Ruvalcaba, DO  [address] [place] [address] on close of this encounter.

## 2021-12-01 ENCOUNTER — DOCUMENTATION ONLY (OUTPATIENT)
Dept: SLEEP MEDICINE | Facility: CLINIC | Age: 31
End: 2021-12-01
Payer: COMMERCIAL

## 2021-12-01 NOTE — PROGRESS NOTES
14  DAY STM VISIT    Diagnostic AHI: 101.6  PSG    Subjective measures:   Patient hasn't been able to use his CPAP he is going to start tonight.     Assessment: Pt not meeting objective benchmarks for compliance       Action plan: pt to have 30 day STM visit.      Device type: Auto-CPAP    PAP settings: CPAP min 6.0 cm  H20       CPAP max 12.0 cm  H20       RESMED EPR level Setting: ONE    RESMED Soft response setting:  OFF    Mask type:  Full Face Mask    Objective measures: 14 day rolling measures       Objective measure goal  Compliance   Goal >70%  Leak   Goal < 24 lpm  AHI  Goal < 5  Usage  Goal >240        Total time spent on accessing and interpreting remote patient PAP therapy data  10 minutes    Total time spent counseling, coaching  and reviewing PAP therapy data with patient  2 minutes    [id number]  [id number]  no (3 day STM)

## 2021-12-02 ENCOUNTER — HOSPITAL ENCOUNTER (EMERGENCY)
Facility: CLINIC | Age: 31
Discharge: HOME | End: 2021-12-02
Attending: EMERGENCY MEDICINE | Admitting: EMERGENCY MEDICINE
Payer: COMMERCIAL

## 2021-12-02 VITALS
BODY MASS INDEX: 40.43 KG/M2 | WEIGHT: 315 LBS | HEART RATE: 70 BPM | DIASTOLIC BLOOD PRESSURE: 97 MMHG | RESPIRATION RATE: 20 BRPM | SYSTOLIC BLOOD PRESSURE: 170 MMHG | OXYGEN SATURATION: 98 % | HEIGHT: 74 IN

## 2021-12-02 DIAGNOSIS — S61.411A LACERATION OF RIGHT HAND WITHOUT FOREIGN BODY, INITIAL ENCOUNTER: ICD-10-CM

## 2021-12-02 PROCEDURE — 12001 RPR S/N/AX/GEN/TRNK 2.5CM/<: CPT | Performed by: EMERGENCY MEDICINE

## 2021-12-02 PROCEDURE — 99283 EMERGENCY DEPT VISIT LOW MDM: CPT | Performed by: EMERGENCY MEDICINE

## 2021-12-02 PROCEDURE — 99283 EMERGENCY DEPT VISIT LOW MDM: CPT | Mod: 25 | Performed by: EMERGENCY MEDICINE

## 2021-12-02 ASSESSMENT — MIFFLIN-ST. JEOR: SCORE: 2707.59

## 2021-12-02 NOTE — ED PROVIDER NOTES
History   No chief complaint on file.    HPI  Bertram Foreman is a 31 year old male who presents to the emergency department secondary to a right hand laceration that occurred just prior to arrival.  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  He is uncertain as to the last tetanus vaccination date.  Bleeding is controlled.  He has no lack of range of motion of his fingers or hand.  He saw the blood and could not get to stop initially so called 911 and came by ambulance.  No numbness or tingling.  It was not thoroughly washed out yet.    Last tetanus vaccine was October 2012.    Allergies:  Allergies   Allergen Reactions     Ceclor [Cefaclor] Swelling     Erythromycin GI Disturbance       Problem List:    Patient Active Problem List    Diagnosis Date Noted     Diabetes mellitus, type 2 (H) 02/27/2019     Priority: Medium     Intellectual disability 02/04/2019     Priority: Medium     Recurrent major depressive disorder, in partial remission (H) 02/04/2019     Priority: Medium     Morbid obesity (H) 01/08/2019     Priority: Medium     Morbid obesity with body mass index of 45.0-49.9 in adult (H) 04/04/2017     Priority: Medium     Mantoux: positive, treated 05/01/2011     Priority: Medium     Overview:   5/2011, went through (more than) 9 months of isoniazid.  NOT TO HAVE MANTOUX IN FUTURE       Mild intellectual disabilities 07/15/2010     Priority: Medium        Past Medical History:    Past Medical History:   Diagnosis Date     Depressive disorder All my life almost.     Diabetes (H) sometime in 2019     Sleep apnea        Past Surgical History:    Past Surgical History:   Procedure Laterality Date     APPENDECTOMY  8/15/14     COLONOSCOPY N/A 12/28/2020    Procedure: COLONOSCOPY, WITH BIOPSY;  Surgeon: Haley Hernandez MD;  Location:  GI       Family History:    Family History   Problem Relation Age of Onset     Mental Illness  "Brother      Diabetes No family hx of      Depression No family hx of      Anxiety Disorder No family hx of      Mental Illness No family hx of        Social History:  Marital Status:  Single [1]  Social History     Tobacco Use     Smoking status: Former Smoker     Packs/day: 1.00     Years: 0.00     Pack years: 0.00     Types: Cigarettes     Start date: 2014     Quit date: 3/1/2014     Years since quittin.7     Smokeless tobacco: Never Used   Vaping Use     Vaping Use: Never used   Substance Use Topics     Alcohol use: Yes     Alcohol/week: 0.0 standard drinks     Comment: rare     Drug use: No        Medications:    acetaminophen (TYLENOL) 325 MG tablet  atorvastatin (LIPITOR) 20 MG tablet  blood glucose calibration (ONETOUCH VERIO) solution  Blood Glucose Monitoring Suppl (ONETOUCH VERIO FLEX SYSTEM) w/Device KIT  divalproex sodium extended-release (DEPAKOTE ER) 500 MG 24 hr tablet  FIBER PO  glimepiride (AMARYL) 4 MG tablet  hydrocortisone, Perianal, (HYDROCORTISONE) 2.5 % cream  ibuprofen (ADVIL/MOTRIN) 200 MG tablet  Lancets (ONETOUCH DELICA PLUS [id number]) MISC  Multiple Vitamins-Minerals (MENS MULTIVITAMIN PO)  omeprazole (PRILOSEC) 20 MG DR capsule  ONETOUCH VERIO IQ test strip  pioglitazone (ACTOS) 30 MG tablet  QUEtiapine (SEROQUEL) 300 MG tablet  TRAZODONE HCL PO          Review of Systems   All other systems reviewed and are negative.      Physical Exam   BP: (!) 170/97  Pulse: 70  Resp: 20  Height: 188 cm (6' 2\")  Weight: (!) 168.3 kg (371 lb)  SpO2: 98 %      Physical Exam  Vitals and nursing note reviewed.   Constitutional:       General: He is not in acute distress.     Appearance: He is well-developed. He is not diaphoretic.   HENT:      Head: Normocephalic and atraumatic.   Eyes:      General: No scleral icterus.  Musculoskeletal:         General: Normal range of motion.      Cervical back: Normal range of motion and neck supple.      Right lower leg: No edema.      Left lower leg: No " edema.   Skin:     General: Skin is warm and dry.      Findings: No rash.      Comments: Right palmar laceration 2 cm in length and full-thickness.  Full range of motion of the hand and fingers.   Neurological:      General: No focal deficit present.      Mental Status: He is alert and oriented to person, place, and time.   Psychiatric:         Mood and Affect: Mood normal.         ED Course            [facility] Procedure Note        Laceration Repair:    Performed by: Kvng Bess MD  Authorized by: Kvng Bess MD  Consent given by: Patient who states understanding of the procedure being performed after discussing the risks, benefits and alternatives.    Preparation: Patient was prepped and draped in usual sterile fashion.  Irrigation solution: saline    Body area:right palm  Laceration length: 2cm  Contamination: The wound is not contaminated.  Foreign bodies:none  Tendon involvement: none  Anesthesia: Local  Local anesthetic: Bupivacaine 0.5%, with epinephrine  Anesthetic total: 3ml    Debridement: none  Skin closure: Closed with 6 x 4.0 Prolene  Technique: interrupted  Approximation: close  Approximation difficulty: simple    Patient tolerance: Patient tolerated the procedure well with no immediate complications.       Procedures                .  No results found for this or any previous visit (from the past 24 hour(s)).    Medications - No data to display    Assessments & Plan (with Medical Decision Making)  Right palm laceration, repaired as above.  Patient tolerated procedure well.  Return to ER precautions and fall precautions discussed.  Local wound care discussed.  Antibiotic ointment and bandage recommended.  Work note given.     I have reviewed the nursing notes.    I have reviewed the findings, diagnosis, plan and need for follow up with the patient.      Discharge Medication List as of 12/2/2021 11:15 AM          Final diagnoses:   Laceration of right hand without foreign body,  initial encounter       12/2/2021   [facility] EMERGENCY DEPT     Kvng Bess MD  12/02/21 [id number]

## 2021-12-02 NOTE — LETTER
December 2, 2021      To Whom It May Concern:      Bertram Foreman was seen in our Emergency Department today, 12/02/21.  I expect his condition to improve over the next 7 days.  He may return to work but must keep his hand clean and dry until stitches are taken out.    Sincerely,        Kvng Bess MD

## 2021-12-02 NOTE — ED TRIAGE NOTES
WORK COMP: [employer]. Patient brought to ED via EMS with laceration to R) hand from a piece of sharp plastic at work. He reports trying to break down some plastic at work. Soledad Beauchamp RN

## 2021-12-02 NOTE — DISCHARGE INSTRUCTIONS
Keep wound clean and dry.  Apply antibiotic ointment and fresh bandage every other day. Follow up in the clinic in 7 for suture removal and wound check.

## 2021-12-06 DIAGNOSIS — E11.65 TYPE 2 DIABETES MELLITUS WITH HYPERGLYCEMIA, WITHOUT LONG-TERM CURRENT USE OF INSULIN (H): ICD-10-CM

## 2021-12-08 RX ORDER — PIOGLITAZONEHYDROCHLORIDE 30 MG/1
TABLET ORAL
Qty: 30 TABLET | Refills: 0 | Status: SHIPPED | OUTPATIENT
Start: 2021-12-08 | End: 2022-01-04

## 2021-12-08 RX ORDER — GLIMEPIRIDE 4 MG/1
TABLET ORAL
Qty: 90 TABLET | Refills: 0 | Status: SHIPPED | OUTPATIENT
Start: 2021-12-08 | End: 2022-02-14

## 2021-12-08 ASSESSMENT — ENCOUNTER SYMPTOMS
PARESTHESIAS: 0
PALPITATIONS: 0
WEAKNESS: 0
SORE THROAT: 0
HEMATOCHEZIA: 1
MYALGIAS: 0
FEVER: 0
CONSTIPATION: 0
COUGH: 0
HEARTBURN: 1
NERVOUS/ANXIOUS: 0
DYSURIA: 0
HEMATURIA: 0
DIARRHEA: 1
NAUSEA: 0
SHORTNESS OF BREATH: 1
FREQUENCY: 1
EYE PAIN: 0
JOINT SWELLING: 0
DIZZINESS: 0
CHILLS: 1
ABDOMINAL PAIN: 0
HEADACHES: 1
ARTHRALGIAS: 1

## 2021-12-08 NOTE — TELEPHONE ENCOUNTER
Routing refill request to provider for review/approval because:  Labs not current:  ALT, AST      Tina AdlerRN

## 2021-12-09 ENCOUNTER — OFFICE VISIT (OUTPATIENT)
Dept: INTERNAL MEDICINE | Facility: CLINIC | Age: 31
End: 2021-12-09
Payer: COMMERCIAL

## 2021-12-09 VITALS
SYSTOLIC BLOOD PRESSURE: 114 MMHG | WEIGHT: 315 LBS | OXYGEN SATURATION: 96 % | RESPIRATION RATE: 16 BRPM | DIASTOLIC BLOOD PRESSURE: 86 MMHG | HEART RATE: 80 BPM | BODY MASS INDEX: 40.43 KG/M2 | HEIGHT: 74 IN | TEMPERATURE: 98.2 F

## 2021-12-09 DIAGNOSIS — F70 MILD INTELLECTUAL DISABILITIES: ICD-10-CM

## 2021-12-09 DIAGNOSIS — E66.01 MORBID OBESITY WITH BODY MASS INDEX OF 45.0-49.9 IN ADULT (H): ICD-10-CM

## 2021-12-09 DIAGNOSIS — Z11.4 SCREENING FOR HIV WITHOUT PRESENCE OF RISK FACTORS: ICD-10-CM

## 2021-12-09 DIAGNOSIS — Z11.59 ENCOUNTER FOR HEPATITIS C SCREENING TEST FOR LOW RISK PATIENT: ICD-10-CM

## 2021-12-09 DIAGNOSIS — R07.9 INTERMITTENT CHEST PAIN: ICD-10-CM

## 2021-12-09 DIAGNOSIS — Z00.00 ROUTINE GENERAL MEDICAL EXAMINATION AT A HEALTH CARE FACILITY: Primary | ICD-10-CM

## 2021-12-09 DIAGNOSIS — E78.5 HYPERLIPIDEMIA LDL GOAL <130: ICD-10-CM

## 2021-12-09 DIAGNOSIS — E11.65 TYPE 2 DIABETES MELLITUS WITH HYPERGLYCEMIA, WITHOUT LONG-TERM CURRENT USE OF INSULIN (H): ICD-10-CM

## 2021-12-09 LAB
ALBUMIN SERPL-MCNC: 3.5 G/DL (ref 3.4–5)
ALP SERPL-CCNC: 73 U/L (ref 40–150)
ALT SERPL W P-5'-P-CCNC: 83 U/L (ref 0–70)
AST SERPL W P-5'-P-CCNC: 32 U/L (ref 0–45)
BILIRUB DIRECT SERPL-MCNC: <0.1 MG/DL (ref 0–0.2)
BILIRUB SERPL-MCNC: 0.3 MG/DL (ref 0.2–1.3)
ERYTHROCYTE [DISTWIDTH] IN BLOOD BY AUTOMATED COUNT: 13.8 % (ref 10–15)
HCT VFR BLD AUTO: 38.1 % (ref 40–53)
HCV AB SERPL QL IA: NONREACTIVE
HGB BLD-MCNC: 12.2 G/DL (ref 13.3–17.7)
HIV 1+2 AB+HIV1 P24 AG SERPL QL IA: NONREACTIVE
MCH RBC QN AUTO: 27.4 PG (ref 26.5–33)
MCHC RBC AUTO-ENTMCNC: 32 G/DL (ref 31.5–36.5)
MCV RBC AUTO: 86 FL (ref 78–100)
PLATELET # BLD AUTO: 269 10E3/UL (ref 150–450)
PROT SERPL-MCNC: 8.1 G/DL (ref 6.8–8.8)
RBC # BLD AUTO: 4.45 10E6/UL (ref 4.4–5.9)
WBC # BLD AUTO: 9 10E3/UL (ref 4–11)

## 2021-12-09 PROCEDURE — 85027 COMPLETE CBC AUTOMATED: CPT | Performed by: INTERNAL MEDICINE

## 2021-12-09 PROCEDURE — 80076 HEPATIC FUNCTION PANEL: CPT | Performed by: INTERNAL MEDICINE

## 2021-12-09 PROCEDURE — 99395 PREV VISIT EST AGE 18-39: CPT | Performed by: INTERNAL MEDICINE

## 2021-12-09 PROCEDURE — 36415 COLL VENOUS BLD VENIPUNCTURE: CPT | Performed by: INTERNAL MEDICINE

## 2021-12-09 PROCEDURE — 87389 HIV-1 AG W/HIV-1&-2 AB AG IA: CPT | Performed by: INTERNAL MEDICINE

## 2021-12-09 PROCEDURE — 86803 HEPATITIS C AB TEST: CPT | Performed by: INTERNAL MEDICINE

## 2021-12-09 PROCEDURE — 99214 OFFICE O/P EST MOD 30 MIN: CPT | Mod: 25 | Performed by: INTERNAL MEDICINE

## 2021-12-09 ASSESSMENT — ENCOUNTER SYMPTOMS
SLEEP DISTURBANCE: 0
MYALGIAS: 0
PARESTHESIAS: 0
DYSURIA: 0
EYE PAIN: 0
ARTHRALGIAS: 1
FEVER: 0
CHEST TIGHTNESS: 1
NERVOUS/ANXIOUS: 0
HEMATURIA: 0
ABDOMINAL PAIN: 0
FATIGUE: 0
PALPITATIONS: 0
DIZZINESS: 0
SORE THROAT: 0
COUGH: 0
WEAKNESS: 0
JOINT SWELLING: 0
HEARTBURN: 1
NAUSEA: 0
DIARRHEA: 0
CONSTIPATION: 0

## 2021-12-09 ASSESSMENT — MIFFLIN-ST. JEOR: SCORE: 2709.86

## 2021-12-09 ASSESSMENT — PAIN SCALES - GENERAL: PAINLEVEL: NO PAIN (0)

## 2021-12-09 NOTE — LETTER
[address] [address]  Phone: [phone]    December 9, 2021        Bertram DAVIS Kellen  [address] [place] [address] [address]  [facility] [address]          To whom it may concern:    RE: Bertram Dimashunter    The above individual may return to his normal work activity without restriction.  Given his frequent if you want to go into the other computer in the other room contact with  and solvents, he should wear rubber gloves while using these materials.    Please contact me for questions or concerns.      Sincerely,        Tremaine Ruvalcaba, DO
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
## 2021-12-13 DIAGNOSIS — E11.65 TYPE 2 DIABETES MELLITUS WITH HYPERGLYCEMIA, WITHOUT LONG-TERM CURRENT USE OF INSULIN (H): ICD-10-CM

## 2021-12-14 ENCOUNTER — NURSE TRIAGE (OUTPATIENT)
Dept: NURSING | Facility: CLINIC | Age: 31
End: 2021-12-14
Payer: COMMERCIAL

## 2021-12-15 ENCOUNTER — LAB (OUTPATIENT)
Dept: LAB | Facility: CLINIC | Age: 31
End: 2021-12-15
Payer: COMMERCIAL

## 2021-12-15 DIAGNOSIS — E11.65 TYPE 2 DIABETES MELLITUS WITH HYPERGLYCEMIA, WITHOUT LONG-TERM CURRENT USE OF INSULIN (H): ICD-10-CM

## 2021-12-15 LAB
ALBUMIN UR-MCNC: NEGATIVE MG/DL
APPEARANCE UR: CLEAR
BILIRUB UR QL STRIP: NEGATIVE
COLOR UR AUTO: YELLOW
CREAT UR-MCNC: 204 MG/DL
GLUCOSE UR STRIP-MCNC: NEGATIVE MG/DL
HGB UR QL STRIP: NEGATIVE
KETONES UR STRIP-MCNC: 20 MG/DL
LEUKOCYTE ESTERASE UR QL STRIP: NEGATIVE
MICROALBUMIN UR-MCNC: 16 MG/L
MICROALBUMIN/CREAT UR: 7.84 MG/G CR (ref 0–17)
NITRATE UR QL: NEGATIVE
PH UR STRIP: 6 [PH] (ref 5–7)
SP GR UR STRIP: 1.02 (ref 1–1.03)
UROBILINOGEN UR STRIP-MCNC: NORMAL MG/DL

## 2021-12-15 PROCEDURE — 82043 UR ALBUMIN QUANTITATIVE: CPT

## 2021-12-15 PROCEDURE — 81003 URINALYSIS AUTO W/O SCOPE: CPT

## 2021-12-15 RX ORDER — PIOGLITAZONEHYDROCHLORIDE 30 MG/1
TABLET ORAL
Qty: 30 TABLET | Refills: 0 | OUTPATIENT
Start: 2021-12-15

## 2021-12-15 NOTE — TELEPHONE ENCOUNTER
Clinic Action Needed: Yes. Please call patient at .    Reason for Call: Patient calling reporting he was advised to pick a specimen container to collect urine and he was unable to pick it up from the clinic.     Patient states he has urinated in an empty water bottle and asking if he can use that as an specimen to collect urine and drop it off to clinic tomorrow.     Routed to: [facility] PRIMARY CARE [[id number]]    Loyd Cameron RN  [facility]

## 2021-12-16 ENCOUNTER — MYC MEDICAL ADVICE (OUTPATIENT)
Dept: INTERNAL MEDICINE | Facility: CLINIC | Age: 31
End: 2021-12-16
Payer: COMMERCIAL

## 2021-12-16 DIAGNOSIS — F79 INTELLECTUAL DISABILITY: ICD-10-CM

## 2021-12-16 DIAGNOSIS — F70 MILD INTELLECTUAL DISABILITIES: Primary | ICD-10-CM

## 2021-12-16 NOTE — TELEPHONE ENCOUNTER
I have placed a referral for neuropsychology evaluation at the [facility] in [place].  Please print this and send it wherever he needs to go.    Thank you.    Jordon

## 2021-12-17 ENCOUNTER — DOCUMENTATION ONLY (OUTPATIENT)
Dept: SLEEP MEDICINE | Facility: CLINIC | Age: 31
End: 2021-12-17
Payer: COMMERCIAL

## 2021-12-17 NOTE — PROGRESS NOTES
30 DAY [facility] VISIT    Diagnostic AHI: 101.6  PSG     Message left for patient to return call     Assessment: Pt not meeting objective benchmarks for compliance.     Action plan: waiting for patient to return call.  and pt to have 6 month STM visit  Patient has scheduled a follow up visit with Dr. Hidalgo on 1/11/2022.   Device type: Auto-CPAP  PAP settings: CPAP min 6.0 cm  H20     CPAP max 12.0 cm  H20     RESMED EPR level Setting: ONE    RESMED Soft response setting:  OFF  Mask type:  Full Face Mask  Objective measures: 14 day rolling measures            Objective measure goal  Compliance   Goal >70%  Leak   Goal < 24 lpm  AHI  Goal < 5  Usage  Goal >240        Total time spent on accessing and interpreting remote patient PAP therapy data  10 minutes    Total time spent counseling, coaching  and reviewing PAP therapy data with patient  1 minutes     [id number] this call  [id number] no  at 3 or 14 day [facility]

## 2021-12-17 NOTE — TELEPHONE ENCOUNTER
Appointment request form completed and faxed with recent clinic notes to [facility] , they will contact patient to set up appointment. Hayley,

## 2021-12-21 ENCOUNTER — HOSPITAL ENCOUNTER (OUTPATIENT)
Dept: CARDIOLOGY | Facility: CLINIC | Age: 31
End: 2021-12-21
Attending: INTERNAL MEDICINE
Payer: COMMERCIAL

## 2021-12-21 DIAGNOSIS — R07.9 INTERMITTENT CHEST PAIN: ICD-10-CM

## 2021-12-21 DIAGNOSIS — R07.9 INTERMITTENT CHEST PAIN: Primary | ICD-10-CM

## 2021-12-21 NOTE — PROGRESS NOTES
Attempted CST today but patient unable to exercise on treadmill due to knee discomfort. Huddled with primary care provider and arranged for a nuclear stress test next week.   Mira Davies RN

## 2021-12-28 ENCOUNTER — HOSPITAL ENCOUNTER (OUTPATIENT)
Dept: NUCLEAR MEDICINE | Facility: CLINIC | Age: 31
Setting detail: NUCLEAR MEDICINE
End: 2021-12-28
Attending: INTERNAL MEDICINE
Payer: COMMERCIAL

## 2021-12-28 ENCOUNTER — HOSPITAL ENCOUNTER (OUTPATIENT)
Dept: CARDIOLOGY | Facility: CLINIC | Age: 31
End: 2021-12-28
Attending: INTERNAL MEDICINE
Payer: COMMERCIAL

## 2021-12-28 DIAGNOSIS — R07.9 INTERMITTENT CHEST PAIN: ICD-10-CM

## 2021-12-28 PROCEDURE — 93018 CV STRESS TEST I&R ONLY: CPT | Performed by: INTERNAL MEDICINE

## 2021-12-28 PROCEDURE — 78452 HT MUSCLE IMAGE SPECT MULT: CPT | Mod: 26 | Performed by: INTERNAL MEDICINE

## 2021-12-28 PROCEDURE — A9502 TC99M TETROFOSMIN: HCPCS | Performed by: INTERNAL MEDICINE

## 2021-12-28 PROCEDURE — 93017 CV STRESS TEST TRACING ONLY: CPT

## 2021-12-28 PROCEDURE — 250N000011 HC RX IP 250 OP 636: Performed by: INTERNAL MEDICINE

## 2021-12-28 PROCEDURE — 343N000001 HC RX 343: Performed by: INTERNAL MEDICINE

## 2021-12-28 PROCEDURE — 93016 CV STRESS TEST SUPVJ ONLY: CPT | Performed by: INTERNAL MEDICINE

## 2021-12-28 RX ORDER — REGADENOSON 0.08 MG/ML
0.4 INJECTION, SOLUTION INTRAVENOUS ONCE
Status: COMPLETED | OUTPATIENT
Start: 2021-12-28 | End: 2021-12-28

## 2021-12-28 RX ADMIN — Medication 37.3 MILLICURIE: at 10:50

## 2021-12-28 RX ADMIN — REGADENOSON 0.4 MG: 0.08 INJECTION, SOLUTION INTRAVENOUS at 10:39

## 2021-12-29 ENCOUNTER — DOCUMENTATION ONLY (OUTPATIENT)
Dept: SLEEP MEDICINE | Facility: CLINIC | Age: 31
End: 2021-12-29
Payer: COMMERCIAL

## 2021-12-30 ENCOUNTER — HOSPITAL ENCOUNTER (OUTPATIENT)
Dept: NUCLEAR MEDICINE | Facility: CLINIC | Age: 31
Setting detail: NUCLEAR MEDICINE
End: 2021-12-30
Attending: INTERNAL MEDICINE
Payer: COMMERCIAL

## 2021-12-30 LAB
CV STRESS MAX HR HE: 112
NUC STRESS EJECTION FRACTION: 66 %
RATE PRESSURE PRODUCT: NORMAL
STRESS ECHO BASELINE DIASTOLIC HE: 76
STRESS ECHO BASELINE HR: 85 BPM
STRESS ECHO BASELINE SYSTOLIC BP: 106
STRESS ECHO CALCULATED PERCENT HR: 59 %
STRESS ECHO LAST STRESS DIASTOLIC BP: 84
STRESS ECHO LAST STRESS SYSTOLIC BP: 130
STRESS ECHO TARGET HR: 189

## 2021-12-30 PROCEDURE — A9502 TC99M TETROFOSMIN: HCPCS | Performed by: INTERNAL MEDICINE

## 2021-12-30 PROCEDURE — 343N000001 HC RX 343: Performed by: INTERNAL MEDICINE

## 2021-12-30 RX ADMIN — Medication 33.5 MILLICURIE: at 10:05

## 2022-01-04 DIAGNOSIS — E11.65 TYPE 2 DIABETES MELLITUS WITH HYPERGLYCEMIA, WITHOUT LONG-TERM CURRENT USE OF INSULIN (H): ICD-10-CM

## 2022-01-05 RX ORDER — PIOGLITAZONEHYDROCHLORIDE 30 MG/1
30 TABLET ORAL DAILY
Qty: 90 TABLET | Refills: 3 | Status: SHIPPED | OUTPATIENT
Start: 2022-01-05 | End: 2022-12-13

## 2022-01-05 NOTE — TELEPHONE ENCOUNTER
Routing refill request to provider for review/approval because:  Labs out of range:  NICOLE AdlerRN

## 2022-01-13 ENCOUNTER — VIRTUAL VISIT (OUTPATIENT)
Dept: FAMILY MEDICINE | Facility: CLINIC | Age: 32
End: 2022-01-13
Payer: COMMERCIAL

## 2022-01-13 DIAGNOSIS — Z20.822 EXPOSURE TO COVID-19 VIRUS: ICD-10-CM

## 2022-01-13 DIAGNOSIS — E11.65 TYPE 2 DIABETES MELLITUS WITH HYPERGLYCEMIA, WITHOUT LONG-TERM CURRENT USE OF INSULIN (H): ICD-10-CM

## 2022-01-13 DIAGNOSIS — J02.9 SORE THROAT: Primary | ICD-10-CM

## 2022-01-13 DIAGNOSIS — E66.01 MORBID OBESITY (H): ICD-10-CM

## 2022-01-13 DIAGNOSIS — F70 MILD INTELLECTUAL DISABILITIES: ICD-10-CM

## 2022-01-13 PROCEDURE — 99213 OFFICE O/P EST LOW 20 MIN: CPT | Mod: 95 | Performed by: FAMILY MEDICINE

## 2022-01-13 NOTE — PROGRESS NOTES
Bertram is a 31 year old who is being evaluated via a billable telephone visit.      What phone number would you like to be contacted at?   How would you like to obtain your AVS? MyChart    Assessment & Plan     Sore throat  - Streptococcus A Rapid Scr w Reflx to PCR - Lab Collect; Future    Exposure to COVID-19 virus    Mild intellectual disabilities    Type 2 diabetes    Morbid obesity      I recommended he have a COVID test.  He reports that this was already ordered and he is going to be doing it through [facility] later today.  I offered to order a COVID test with a strep test which he declined for now.  He would prefer to do the COVID test that he already ordered.  If the test result is negative then I recommended he schedule a lab appointment for strep test.  Overall, his symptoms of a sore throat with some slight congestion has been quite mild and he is not having any significant respiratory problems.  If this develops he will seek medical attention right away.  He will continue his regular medications as his chronic medical issues appear to be stable at this time.         Return in about 2 weeks (around 1/27/2022) for Follow up if symptoms not improving..    Ronal Jo, [facility]   Bertram is a 31 year old who presents for the following health issues     HPI     He reports being exposed to COVID-19 this past weekend.  Recently he developed a mild sore throat with slight congestion.  He ordered a home COVID test which is arriving today.  He reports that a medical provider will be doing a virtual appointment while he is taking the test.  He is not having fevers.  He is not coughing, short of breath or wheezing.  He has a history of mild intellectual impairment, morbid obesity and type 2 diabetes.    Review of Systems         Objective       Vitals:  No vitals were obtained today due to virtual visit.    Physical Exam   healthy, alert and no distress  PSYCH: Alert and  oriented times 3; coherent speech, normal   rate and volume, able to articulate logical thoughts, able   to abstract reason, no tangential thoughts, no hallucinations   or delusions  His affect is normal  RESP: No cough, no audible wheezing, able to talk in full sentences  Remainder of exam unable to be completed due to telephone visits                Phone call duration: 12 minutes

## 2022-01-17 ENCOUNTER — LAB (OUTPATIENT)
Dept: FAMILY MEDICINE | Facility: CLINIC | Age: 32
End: 2022-01-17
Payer: COMMERCIAL

## 2022-01-17 DIAGNOSIS — J02.9 SORE THROAT: ICD-10-CM

## 2022-01-17 DIAGNOSIS — Z20.822 SUSPECTED COVID-19 VIRUS INFECTION: ICD-10-CM

## 2022-01-17 LAB
DEPRECATED S PYO AG THROAT QL EIA: NEGATIVE
GROUP A STREP BY PCR: NOT DETECTED

## 2022-01-17 PROCEDURE — 87651 STREP A DNA AMP PROBE: CPT

## 2022-01-17 PROCEDURE — U0003 INFECTIOUS AGENT DETECTION BY NUCLEIC ACID (DNA OR RNA); SEVERE ACUTE RESPIRATORY SYNDROME CORONAVIRUS 2 (SARS-COV-2) (CORONAVIRUS DISEASE [COVID-19]), AMPLIFIED PROBE TECHNIQUE, MAKING USE OF HIGH THROUGHPUT TECHNOLOGIES AS DESCRIBED BY CMS-2020-01-R: HCPCS | Mod: 90

## 2022-01-17 PROCEDURE — 99000 SPECIMEN HANDLING OFFICE-LAB: CPT

## 2022-01-17 PROCEDURE — U0005 INFEC AGEN DETEC AMPLI PROBE: HCPCS | Mod: 90

## 2022-01-18 LAB — SARS-COV-2 RNA RESP QL NAA+PROBE: NOT DETECTED

## 2022-01-25 ENCOUNTER — MEDICAL CORRESPONDENCE (OUTPATIENT)
Dept: HEALTH INFORMATION MANAGEMENT | Facility: CLINIC | Age: 32
End: 2022-01-25
Payer: COMMERCIAL

## 2022-02-01 ENCOUNTER — TRANSFERRED RECORDS (OUTPATIENT)
Dept: HEALTH INFORMATION MANAGEMENT | Facility: CLINIC | Age: 32
End: 2022-02-01
Payer: COMMERCIAL

## 2022-02-01 LAB — RETINOPATHY: NEGATIVE

## 2022-02-08 ENCOUNTER — VIRTUAL VISIT (OUTPATIENT)
Dept: NUTRITION | Facility: CLINIC | Age: 32
End: 2022-02-08
Payer: COMMERCIAL

## 2022-02-08 DIAGNOSIS — E66.01 MORBID OBESITY WITH BODY MASS INDEX OF 45.0-49.9 IN ADULT (H): ICD-10-CM

## 2022-02-08 DIAGNOSIS — E11.9 TYPE 2 DIABETES MELLITUS WITHOUT COMPLICATION, WITHOUT LONG-TERM CURRENT USE OF INSULIN (H): Primary | ICD-10-CM

## 2022-02-08 PROCEDURE — 97803 MED NUTRITION INDIV SUBSEQ: CPT | Mod: 95 | Performed by: DIETITIAN, REGISTERED

## 2022-02-08 NOTE — PROGRESS NOTES
.  Medical Nutrition Therapy  Visit Type: reassessment and intervention    Visit Details    How would you like to obtain your AVS? MyChart  If the correspondence for visit is dropped, how would you like your dietitian to reconnect with you:   call back by phone? Yes    Text to cell phone: [phone]   Will anyone else be joining your video visit or telephone call? yes  {If patient encounters technical issues they should call [phone]    Type of service:  Telephone     Start Time: 10:00 am  End Time:  10:23 am     Originating Location (pt. Location): Home    Distant Location (provider location):  [facility] WORKING VIRTUAL FROM HOME       Referring Provider: Akbar Lara  [facility]     REASON FOR REFERRAL:   Bertram Foreman is a 30 year old male who is interested in Medical Nutrition Therapy (MNT) and education related to GI issues diarrhea and weight management. Type 2 diabetes. Not struggling with diarrhea any more when started fiber it help been on not even a week. Comes and goes sometimes has it 3-5 days in a role. The Citracal has been helpful and doesn't have loose water stool.   He is accompanied by [facility] ILS worker goes to all appointments.       Changes since previous consult No        Behavior Status:Unresolved no improvement shown  Barriers Include:Motivation/Ready to change , Financial concerns, Lack of social support, Lack of cooking skills , Lack of control over emotions/behaviors and Lack of nutrition knowlege     His blood sugars have been high. He doesn't check his blood sugars daily.  They have been above 200 sometimes. He wasn't able to provide exact numbers despite writing them down each time he checks. Didn't know where his book was so unable to let me know more specifics as he hasn't been check lately.     Has been incorporating more healthy fats through nut butter which he has been adding to toast with raisins.     He is dealing with a  lot right now and unable to work on making dietary changes. He is really tired and crabby today so hard to focus. In the past Helena has been there during consults but today she is out of town. He has been continuing to get healthier options with Helena at the grocery store. He likes to get the foods on sale and if healthy and not on sale he doesn't get it.  He is on a tight budget. He has been getting in more protein. He eats eggs but more when Helena cooks. He has been skipping breakfast though so not cooking. He often sleeps in till 2pm. When wakes up will have leftovers and depends on what he has in fridge or freezer. He will do burgers, chicken nuggets. Has only been having 1-2 cans of pop per day. He has been trying cut back. Hasn't been having fruit and discussed trying to get them in at least once per day. He doesn't really like fruit and some like oranges need to be in season. He used to like applesauce.  We went through the list of fruits but sounds like will only try grapes. He is going to look for fruit that is on sale that he may want to try.     Nutrition Consult Notes 7/13/21 Behavior Status:Improvement shown  Barriers Include:Motivation/Ready to change , Lack of control over emotions/behaviors and Lack of nutrition knowcarlosge     Joined a [employer] special olymics to help get more activity in the day. It starts in Sept every Friday night. He is still struggling with cutting back on sugar/carbs especially from pop. He was suppose to have a birthday party but now has a lot of extra food that isn't very healthy. He need to finish it off and trying to share with others. He made homemade salsa from the garden and zucchini boats. He did beef, bell peppers, cheese, onions on top. He loved them. He felt more full. He is trying to check his blood sugars. Last night his blood sugars were 271. He had another day where his blood sugars were in the high 300. This morning he had mountain dew around 7am - 322. He doesn't  do breakfast right. His diarrhea is better.     He didn't eat much yesterday first meal lunch 12pm was 4 slices of pizza, 1 can.     For supper biscuits and gravy with water flavored. He likes the flavored water and trying to have that or tea instead of pop. He bought some Gatorade with protein in it.     He does nuts for snack maybe once per week and doesn't want to eat them more often he doesn't enjoy the nuts he has right now because too spicy. Discussed getting different nuts/seeds and nut butter.     He lives on his own and gone down from 12 pack per day and growing veggies at [employer]. He has been cutting back on eating out. When not with support staff [facility] he doesn't really make the changes. His doctors and myself have been talking about making these changes and how important they are but Bertram doesn't really want to implement health changes.     Would like to focus on getting in more veggies like carrots but no broccoli unless cooked for now. Keep reducing intake of pop.     Notes from 7/13/21  Switched to different diabetes medication because causing digestive issues. He has been having diarrhea and sometimes feels he needs to vomit. It has only been a weeks since the med switch and had some slight improvement. His BMs are still about 1-2x per week of diarrhea. Sometime once and sometimes more. Doesn't really track what he is eating. Tried a few things the hummus or guac but not continuing to implement. He has been trying to do more salads. Still struggling with snacking on processed carbs. He likes what he likes and doesn't want to change. ILS worker is only with him 2 days per week and other days ILS not with him eats out has a lot of pizza and carbs. He has some good food in the fridge but chooses not to eat it. Doctors talked about making healthy choices and family as well as ILS worker is supporting him.  He has been maintaining his weight but the weight is interfering with his life and not  getting out and doing as many fun things. He doesn't want to continue nutrition counseling because he doesn't want to make healthy lifestyle changes. We discussed trying some other activities (cooking or exercise) to help make healthy lifestyle changes. He has been making some healthy meals with [facility]. There are some special Olympics that they are looking at.     Changes since previous consult Yes         Behavior Status:Improvement shown  Barriers Include:Motivation/Ready to change , Lack of social support, Lack of cooking skills , Lack of control over emotions/behaviors and Lack of nutrition knowlege     Bm's still soft and formed and more like 1-2x per week having diarrhea. Still struggling with cutting back on portion sizes to help decrease overall carbs. He has been trying to do more healthy fats instead. Bought avocados (made homemade guac), got nuts, asparagus, had a salad. He has been doing better and working on making these habits more consistent. He noticed that adding in more water, protein and healthy fats has given him more energy.     He likes the vitamin enhanced water and trying to do this instead of pop. He likes the variety of flavors. When has been doing the water daily and only doing pop at home anymore and used to do 7-8 cans per day and not buying any cans of pop now. He has only been having 1 pop small glass when out to eat about 1 day per week.     He did weigh himself and has been maintaining the weight vs climbing. He has been going to the [employer] once per week to swim. He wants to go more. He wants to get outside and exercise more too. He has a bike and hopes to see more improvement once he gets to ride it more. He had been continually gaining weight so maintaining is really good progress. Wants to keep working on the goals we set at our last consult with emphasis on activity for weight loss.    He hasn't been checking blood sugars for a few months now and suppose to be checking  twice daily.      He needs new diabetes device so going to be following up with the referring doctor. Last A1c 2 weeks ago was 7.5 which is up from 6.8 about 7months ago. Glucose by meter showed 122 mg/dL 3 months ago     Notes 3/16/21  Has been taking the Citracal and seems to be helping with the diarrhea. His BMs are now more soft and formed and every other day and not diarrhea every day. Has been trying to work on portion sizes but still struggling with cutting back on carbs and increasing protein intake. He has been drinking pop since a kid and hard to kick the habit. He has been doing egg bakes with broccoli. He has been having snacks but still struggling with picking healthy options. He continues to go to chips/crackers. He did try almonds once as a snack. He is willing to do it again. Open to trying more nuts and seeds instead of chips. He likes sunflower seeds. He hasn't had sunflower seed butter before and open to it. He likes ants on a log so open to try as snack. He loves quac and open to trying with veggies. He has never tried it with veggies.     NUTRITION ASSESSMENT:   Nutritional Goals 2/2/2021   Nutritional Goal Create healthier eating patterns;Create a plan to lose weight;Work on meal planning/recipes;Overcome emotional eating;Stabilize blood sugars;Eliminate cravings for sugar and refined carbohydrates;Increase energy;Address anxiety and or depression around eating;What to eat for my specific health concerns        Neurological 2/2/2021   Migraine Headaches Past   Tension Headache Current       No flowsheet data found.   No flowsheet data found.   Gastrointestinal 2/2/2021   Constipation Current   Nausea or Vomiting Past;Current   Hemorrhoids Past;Current   Diarrhea Current   Gas/bloating Past;Current   Heartburn/Reflux/GERD Past;Current   Blood in stool Past;Current   Abdominal Pain Current       No flowsheet data found.   Endocrine 2/2/2021   Type 2 diabetes Current   Overweight/obesity  Past;Current      Skin 2/2/2021   Acne Past   Dry Skin Past;Current      Cardiopulmonary 2/2/2021   High Cholesterol Current      Musculoskeletal 2/2/2021   Restless Legs Current      Psychological 2/2/2021   Depression/Anxiety Past;Current   Binge eating disorder Past;Current      No flowsheet data found.   No flowsheet data found.     Past Medical History:  Past Medical History:   Diagnosis Date     Depressive disorder All my life almost.     Diabetes (H) sometime in 2019     Sleep apnea        Previous Surgeries:   Past Surgical History:   Procedure Laterality Date     APPENDECTOMY  8/15/14     COLONOSCOPY N/A 12/28/2020    Procedure: COLONOSCOPY, WITH BIOPSY;  Surgeon: Haley Hernandez MD;  Location:  GI        Family History:  Family History   Problem Relation Age of Onset     Mental Illness Brother      Diabetes No family hx of      Depression No family hx of      Anxiety Disorder No family hx of         Lifestyle History:  Lifestyle 2/2/2021   Do you feel your life is stressful right now?  Yes   What is the cause(s) of stress in your life?  Finances;Work;Emotional problems   Are you currently implementing any strategies to help manage stress? Yes   What are you doing to manage stress?  Breathing exercises;Movement and exercise;Massage;Counseling;TV;Take time for self;Listening to music        Exercise History:  Exercise 2/2/2021   Does your occupation require extended periods of sitting?  No   Does your occupation require extended periods of repetitive movements (ex: walking or lifting)?  Yes   Do you currently participate in any forms of exercise? Yes   Check all the exercises you participate in: Walking;Biking;Swimming   How many times per week do you exercise? 2 times   How long do you usually exercise? 30 min        Sleep History:  Sleep 2/2/2021   How many hours (on average) do you sleep per night? 9-11   What time do you turn off the lights? 10 PM   How long does it take for you to fall asleep? 15-20  mins   What time do you stop using electronic devices? 10 PM   What time do you wake up? 11 AM   When do you eat your first meal?  11 AM   Do you feel well-rested during the day?  No   Do you take naps?  Yes   Do you have a comfortable bedroom environment (cool, quiet, dark, etc)? Yes   Do you have a sleep routine/ ritual that you do before bed?  Yes   How many hours do you spend per day looking at a screen (TV, computer, tablet and phone)? 3 to 4   Select all factors that apply to your current sleep habits: Difficulty falling asleep;Wake up in the middle of the night;Have nightmares;Take medication for sleep on most night of the week;Snore loudly or have been told you stop breathing;Eat large meals within 3 hours of going to bed;Night sweats;Feel tired/sluggish/fatigued during the day;Grinding teeth        Nutrition History:  Nutrition 2/2/2021   Have you ever had a nutrition consultation? Yes   Do you currently follow a special diet or nutritional program? No   What do you feel are the biggest barriers getting in the way of achieving you nutritional goals? Motivation/Readiness to change;Lack of control over emotions/behaviors;Stress   Do you have any food allergies, sensitivities or intolerances?  No       Digestion 2/2/2021   Do you experience stomach pains/cramping? Daily   Do you experience bloating?  2-3 times per week   Do you experience gas?  2-3 times per week   Do you experience heartburn/acid reflux/indigestion? 2-3 times per week   How often do you have a bowel movement? 3 or more times per day   What is a typical bowel movement like for you? Select all that apply: Loose;Liquid;With blood visible      Food Access:  2/2/2021   Who does the grocery shopping? Self   How often is grocery shopping done? Weekly   Where do you usually receive your groceries from? Select all that apply: [employer];Other   Do you read food labels? No   Who does the cooking? Select all that apply: Self;Assistant   How many meals do  you eat out per week?  3 to 5   What restaurants do you typically choose? [employer] ([employer], [employer], etc.)      Daily Patterns: 2/2/2021   How many days per week do you have breakfast? 3   How many days per week do you have lunch? 6   How many days per week do you have dinner? 7   How many days per week do you have snacks? 5      Protein Intake: 2/2/2021   How many times per day do you typically consume a protein source(s)? 4   What types of protein do you currently eat?  Ground Beef;Steak;Hamburgers;Beef Roast;Pork Chops;Pork Ribs;Hartley/Luquillo Hartley;Deli Ham;Sausage;Tuna;Chicken Breast;Chicken Thighs/Legs;Eggs       Fat Intake:  2/2/2021   How many times per day do you typically consume healthy fat(s)? 1   What types of health fats do you currently eat? Select all that apply:  Santana;Butter;Salad dressings       Fruit Intake:  2/2/2021   How many times per day do you typically consume fruits? 1   What types of fruit do currently eat? Apple;Banana;Cantaloupe;Mandarin oranges;Pineapple       Vegetable Intake:  2/2/2021   How many times per day do you typically consume vegetables? 1   What types of vegetables do you currently eat? Broccoli;Carrots;Celery;Corn;Green onions/scallions;Onions;Peas;Potato (baked, boiled, mashed, French fries);Yam, sweet potato      Grain Intake:  2/2/2021   How many times per day do you typically consume grains? 2   What types of grains do currently eat? Select all that apply:  Breads (non-gluten free);Cereals (non-gluten free);Chips (non-gluten free);Crackers (non-gluten free);Pasta (non-gluten free);Pretzels (non-gluten free)       Dairy Intake:  2/2/2021   How many times per day do you typically consume dairy? 1   What types of dairy do currently eat? Select all that apply:  Milk;Cheese;Yogurt;Ice cream       Non-Dairy Alternative Intake:  2/2/2021   How many times per day do you typically consume non-dairy alternatives? 0   What types of non-dairy alternatives do currently eat?  Select all that apply:  Other cheese       Sweets Intake:  2/2/2021   How many times per day do you typically consume sweets? 1      Beverage Intake:  2/2/2021   How many 8 oz cups of water do you typically consume per day?  0 to 1   How many 8 oz cups of caffeine do you typically consume per day?  1 to 2   How many drinks of alcohol do you typically consume per week (1 drink = 5 oz wine, 12 oz beer, 1.5 oz spirits)?   0       Lifestyle Recall:  2/2/2021   What time did you wake up? 8 AM   What time did you go to sleep? 8 PM   What time did you have breakfast? No breakfast   What time did you have a morning snack? 9-10 AM   Where did you have your morning snack? Home   What time did you have lunch? No lunch   What time did you have an afternoon snack? No snack   What time did you have dinner? 3-4 PM   Where did you have dinner?  Restaurant   What time did you have an evening snack? 7-8 PM   Where did you have your evening snack? Home   What time of day did you exercise? No Exercise          Additional concerns: If has breakfast does a replacement shake - [facility] will make eggs, barbour or sausage. Does microwave and doesn't do much cooking.   Has the means to do cooking but now just doesn't feel like it since mom passed away.     [email or web address]    MEDICATIONS:  Current Outpatient Medications   Medication Sig Dispense Refill     acetaminophen (TYLENOL) 325 MG tablet Take 325-650 mg by mouth as needed for mild pain        atorvastatin (LIPITOR) 20 MG tablet TAKE 1 TABLET BY MOUTH ONCE DAILY EVERY MORNING (AM PLUSPAK) 28 tablet 5     blood glucose calibration (ONETOUCH VERIO) solution USE AS DIRECTED 1 each 0     Blood Glucose Monitoring Suppl (ONETOUCH VERIO FLEX SYSTEM) w/Device KIT USE TO TEST TWICE DAILY 1 kit 0     divalproex sodium extended-release (DEPAKOTE ER) 500 MG 24 hr tablet TAKE THREE TABLETS BY MOUTH EVERY NIGHT AT BEDTIME (HS PLUSPAK)       FIBER PO Take 1 chew tab by mouth daily        glimepiride (AMARYL) 4 MG tablet TAKE 1 TABLET BY MOUTH ONCEDAILY EVERY MORNING BEFORE BREAKFAST 90 tablet 0     hydrocortisone, Perianal, (HYDROCORTISONE) 2.5 % cream Place rectally 2 times daily as needed for hemorrhoids 28 g 0     ibuprofen (ADVIL/MOTRIN) 200 MG tablet As needed       Lancets (ONETOUCH DELICA PLUS JHIZPI37F) MISC TEST TWICE DAILY 100 each 1     Multiple Vitamins-Minerals (MENS MULTIVITAMIN PO)        omeprazole (PRILOSEC) 20 MG DR capsule TAKE 1 CAPSULE BY MOUTH TWICE A DAY (AM AND EVENINGPLUSPAK) 60 capsule 5     ONETOUCH VERIO IQ test strip TEST TWICE DAILY 100 strip 1     pioglitazone (ACTOS) 30 MG tablet Take 1 tablet (30 mg) by mouth daily 90 tablet 3     QUEtiapine (SEROQUEL) 300 MG tablet Take 300 mg by mouth At Bedtime   0     TRAZODONE HCL PO Take 100 mg by mouth At Bedtime          Dietary Supplements 2/2/2021   Individual Vitamin Supplements General multivitamin   Herbal Complimentary products Fiber supplement         ALLERGIES:   Allergies   Allergen Reactions     Ceclor [Cefaclor] Swelling     Erythromycin GI Disturbance        .na  LABS:  Last Basic Metabolic Panel:  Lab Results   Component Value Date     09/08/2020     05/20/2020     01/16/2020      Lab Results   Component Value Date    POTASSIUM 4.0 09/08/2020    POTASSIUM 4.2 05/20/2020    POTASSIUM 4.2 01/16/2020     Lab Results   Component Value Date    CHLORIDE 105 09/08/2020    CHLORIDE 102 05/20/2020    CHLORIDE 105 01/16/2020     Lab Results   Component Value Date    CORY 9.1 09/08/2020    CORY 9.1 05/20/2020    CORY 9.4 01/16/2020     Lab Results   Component Value Date    CO2 27 09/08/2020    CO2 29 05/20/2020    CO2 26 01/16/2020     Lab Results   Component Value Date    BUN 9 09/08/2020    BUN 13 05/20/2020    BUN 13 01/16/2020     Lab Results   Component Value Date    CR 0.47 09/08/2020    CR 0.63 05/20/2020    CR 0.67 01/16/2020     Lab Results   Component Value Date     09/08/2020      05/20/2020     01/16/2020       Last Glucose Profile:   Hemoglobin A1C POCT   Date Value Ref Range Status   03/30/2021 7.5 (H) 0 - 5.6 % Final     Comment:     Normal <5.7% Prediabetes 5.7-6.4%  Diabetes 6.5% or higher - adopted from ADA   consensus guidelines.     09/08/2020 6.8 (H) 0 - 5.6 % Final     Comment:     Normal <5.7% Prediabetes 5.7-6.4%  Diabetes 6.5% or higher - adopted from ADA   consensus guidelines.     05/20/2020 8.0 (H) 0 - 5.6 % Final     Comment:     Normal <5.7% Prediabetes 5.7-6.4%  Diabetes 6.5% or higher - adopted from ADA   consensus guidelines.       Hemoglobin A1C   Date Value Ref Range Status   10/14/2021 8.3 (H) 0.0 - 5.6 % Final     Comment:     Results confirmed by repeat test.    Normal <5.7%   Prediabetes 5.7-6.4%    Diabetes 6.5% or higher     Note: Adopted from ADA consensus guidelines.       Last Lipid Profile:   Cholesterol   Date Value Ref Range Status   10/14/2021 184 <200 mg/dL Final   09/08/2020 157 <200 mg/dL Final   01/16/2020 230 (H) <200 mg/dL Final     Comment:     Desirable:       <200 mg/dl   07/05/2019 226 (H) <200 mg/dL Final     Comment:     Desirable:       <200 mg/dl     HDL Cholesterol   Date Value Ref Range Status   09/08/2020 40 >39 mg/dL Final   01/16/2020 43 >39 mg/dL Final   07/05/2019 38 (L) >39 mg/dL Final     Direct Measure HDL   Date Value Ref Range Status   10/14/2021 41 >=40 mg/dL Final     LDL Cholesterol Calculated   Date Value Ref Range Status   10/14/2021 104 (H) <=100 mg/dL Final   09/08/2020 83 <100 mg/dL Final     Comment:     Desirable:       <100 mg/dl   01/16/2020 151 (H) <100 mg/dL Final     Comment:     Above desirable:  100-129 mg/dl  Borderline High:  130-159 mg/dL  High:             160-189 mg/dL  Very high:       >189 mg/dl     07/05/2019 152 (H) <100 mg/dL Final     Comment:     Above desirable:  100-129 mg/dl  Borderline High:  130-159 mg/dL  High:             160-189 mg/dL  Very high:       >189 mg/dl       Triglycerides    Date Value Ref Range Status   10/14/2021 197 (H) <150 mg/dL Final   09/08/2020 170 (H) <150 mg/dL Final     Comment:     Borderline high:  150-199 mg/dl  High:             200-499 mg/dl  Very high:       >499 mg/dl  Non Fasting     01/16/2020 180 (H) <150 mg/dL Final     Comment:     Borderline high:  150-199 mg/dl  High:             200-499 mg/dl  Very high:       >499 mg/dl     07/05/2019 182 (H) <150 mg/dL Final     Comment:     Borderline high:  150-199 mg/dl  High:             200-499 mg/dl  Very high:       >499 mg/dl       No results found for: CHOLHDLRATIO    Most recent CBC:  Recent Labs   Lab Test 12/09/21  1047 09/08/20  1136 07/05/19  1009   WBC 9.0 12.0* 12.6*   HGB 12.2* 12.7* 12.5*   HCT 38.1* 40.6 39.5*    297 272     Most recent hepatic panel:  Recent Labs   Lab Test 12/09/21  1047 09/08/20  1136   ALT 83* 55   AST 32 28     Most recent creatinine:  Recent Labs   Lab Test 10/14/21  0906 09/08/20  1136   CR 0.73 0.47*       No components found for: GFRESETIMATEDLASTLAB(gfrestblack:1@  Lab Results   Component Value Date    ALBUMIN 3.6 09/08/2020       Last Thyroid Profile:   TSH   Date Value Ref Range Status   09/08/2020 3.18 0.40 - 4.00 mU/L Final   07/05/2019 3.40 0.40 - 4.00 mU/L Final   01/08/2019 2.13 0.40 - 4.00 mU/L Final     T4 Free   Date Value Ref Range Status   07/05/2019 0.79 0.76 - 1.46 ng/dL Final       Last Mineral Profile:   No results found for: MIRANDA, IRON, FEB    Autoimmune & Inflammatory   CRP Inflammation   Date Value Ref Range Status   12/10/2020 8.0 0.0 - 8.0 mg/L Final         Last Vitamin Profile:   No results found for: VDQ340, EENT601, [id number], VITD3, D2VIT, D3VIT, DTOT, [id number], [id number], [id number], [id number], [id number], [id number]    ANTHROPOMETRICS:  Vitals:   BP Readings from Last 1 Encounters:   12/09/21 114/86     Pulse Readings from Last 1 Encounters:   12/09/21 80     Estimated body mass index is 47.7 kg/m  as calculated from the following:     "Height as of 12/9/21: 1.88 m (6' 2\").    Weight as of 12/9/21: 168.5 kg (371 lb 8 oz).    Wt Readings from Last 5 Encounters:   12/09/21 (!) 168.5 kg (371 lb 8 oz)   12/02/21 (!) 168.3 kg (371 lb)   10/14/21 (!) 166.4 kg (366 lb 12.8 oz)   09/28/21 (!) 168.1 kg (370 lb 8 oz)   08/03/21 (!) 166.9 kg (368 lb)       Weight Change: would like to lose 100lbs       NUTRITION DIAGNOSIS:   1. Altered nutrition-related laboratory values related to endocrine dysfunction as evidenced by elevated BMI, HbA1c     2. Altered nutrition-related laboratory values related to cardiac as evidenced by elevated Total chol, elevated TG, LDL, and low HDL.     3. Overweight/Obesity related to food and nutrition related knowledge deficit (excessive CHO intake, Inadequate fiber intake, inappropriate intake of healthy omega 3 fatty acids) as evidenced by nutrition intake record (limited variety of foods), A1c >6%, recent labs.    4. Overweight/obesity related to inability to sustain diet/lifestyle change, as evidenced by many previous attempts at weight loss with weight regain over the past five years.     NUTRITION INTERVENTION   CARDIOMETABOLIC     Long Term Goals:   Goal: Lipid profile; Decrease TG <150 mg/dL, Decrease LDL <100mg/dL within 6 months   Goal: Lose 20-24lbs in 6 months.   Goal: Decrease Hemoglobin A1c <6 % within 2-6 months   Goal: Increase hemoglobin levels within 6 months     Short Term Goals:    1. Small Improvement Shown - needs to keep working on it. Focus on cutting back on carbs and increase protein intake - see handouts and info below - for example try to cut back on bag of chips and or 1 can of pop per day.  2.   Small Improvement Shown- Continue having snack 1-2x daily with healthy fats (ex: guac, nuts, hummus) and add a fruit/veggie for fiber- apple or banana with nut/seed butter (almond butter ) or sunflower seeds (sunflower seed butter with celery ) see ideas discussed and provided   3.  Some improvement- Keep " cutting back on pop and increasing water   4. Improvement Shown- Increase activity at least 15-60 mins. Look into special Olympics and see what activities resonate. Pack some healthy snacks in the cooler to take with for the day.     Start with walking 15 mins daily. With Helena doing activity at least 2 days with Helena. Set alarm and put in calendar for reminder to get up and move.   5. Test blood sugars to see how meal impact them.     6. Discussed trying to get a fruit in once per day for snack.     Test Blood Sugars    Measure your fasting blood sugar daily, first thing in morning before breakfast. Ideally your fasting blood sugar should be between 70-80 mg/dL.     Measure your blood sugar two hours after breakfast and two hours after dinner. Ideally your two-hour sugars should never go over 120mg/dL.     Mediterranean Approach: Eat whole, unprocessed real foods in their unprocessed forms such as fruits, vegetables, whole grains (prefer gluten free), nuts, legumes, extra virgin olive oil, spices, modest amounts of poultry, and fish.      Cardiometabolic Food plan - 1,800 calories per day     Protein 10-12 servings per day - include at each meal to stabilize blood sugars   (Choose 3oz or 21g per meal and aim for 1oz of 7g for snacks)   - Strive for 1-2 servings of fish per week especially of higher omega-3 fatty acid containing fish such as salmon.     Legumes 2 serving per day     Dairy alternatives 2-3 serving per day     Nuts and seeds 3-4 servings per day - great to incorporate as snacks    Fats and oils 4 servings per day     Non starchy vegetables 8-10 servings per day     Starchy vegetable limit 1 serving per day as they tend to impact blood sugar (they are moderate-GI).     Fruits 2 servings per day - best to couple with a little bit of protein or fat to offset a rise in blood sugars (they are low-moderate-GI foods).     Whole grains 2 serving per day - try gluten free whole grains instead      Choose Low  Glycemic (GI) foods: Regulate your sugar levels by eating foods that do not spike blood sugars.  Eat low -GI foods so only small fluctuations in blood glucose and insulin levels are produced.      Examples of low-GI foods: nuts, seeds, GF oats, most vegetables especially non-starchy and fruits.     Medium or high-GI foods should be eaten with a protein or fat, both of which blunt the glycemic effect of these foods. This reduces the overall glycemic impact of a meal.   Ex: Most grains and starchy veggies are medium/high GI.     Avoid foods containing refined sugars, artificial sweeteners, and refined grains they are considered high-GI because they lead to sharp increases in blood sugars levels, which increase insulin sensitivity causing increased TG, and low good cholesterol (HDL).   Ex: cakes, cookies, pies, bread, sodas, fruit drinks, presweetened tea, coffee drinks, energy or sport drinks, flavored milk and other processed foods.     Choose foods high in fiber: Aim for at least 5 grams of fiber per serving of food or a total of 25-35 grams fiber per day. Remember, when looking at the label, you can take the fiber away from the total carbs. Ex:15g of total carbs - 4g of fiber = 11g net carbs     Insoluble fiber acts like a bulky  inner broom,  sweeping out debris from the intestine and creating more motility and movement.      Soluble fiber attracts water and swells, creating a gel that slows digestion.  Also, slows the release of glucose from foods into the blood which stops spikes in blood sugar levels.  Soluble fiber traps toxins in the gut, helping to carry them to excretion and provides healthy bacteria in the digestive tract.     Choose High Quality Fats: Adding anti-inflammatory fats into your diet such as fish (salmon, herring, mackerel, and sardines), omega 3 eggs, elidia seeds, ground flax seeds/milk, hemp seeds/milk, walnuts and some other certain leafy greens will increase omega-3 fats to omeaga-6 fats  ratio.     Therapeutic fats both monounsaturated and polyunsaturated to include daily: ground flaxseeds, unsalted mixed nuts, avocados, olives, extra-virgin olive oil.     Emphasize high-quality oils and fats in the diet daily such as avocado oil, coconut oil, flaxseed, olive, sesame. Ex: 1 tsp to 1 tbsp of MCT oil from coconuts can be added into coffee, smoothies, and salad dressings per day.     Avoid trans fats found in processed foods     Drink more water. Hydration is critical, so drink at least six to eight glasses of water a day. Drink more water between meals and less at meals.     Try adding herbal teas (sugar free) or lemon/lime/cucumber/fruit to water for flavor. Avoid artificial sweetener packets to flavor your water.     Cut back on coffee switch to green tea. Avoid adding sugar and milk to coffee instead use dairy alternatives such as almond, flax, coconut milk.       Increase physical activity. Moving our body helps move our bowels and speeds up your metabolism.     Exercise 15-60 minutes daily--whether that looks like burst training, yoga, or vigorous walking.      NUTRITION RESOURCES:  1. Purchase Eat Fat Get Thin by Kenneth Membreno Book/Cookbook   2. IFM Cardiometabolic Packet     Functional Nutrition Fundamentals     Comprehensive Guide    Meal plan with recipes       PATIENT'S BEHAVIOR CHANGE GOALS:   See nutrition intervention for patient stated behavior change goals. AVS was printed and given to patient at today's appointment.    MONITOR / EVALUATE:  Registered Dietitian will monitor/evaluate the following:     Beliefs and attitudes related to food    Food and nutrition knowledge / skills    Food / Beverage / Nutrient intake     Pertinent Labs    Progress toward meeting stated nutrition-related goals    Readiness to change nutrition-related behaviors    Weight change    Digestion     COORDINATION OF CARE:  Follow up with gastroenterology      FOLLOW-UP:  He didn't want to schedule a time to follow  up. Discussed talking with Helena to find another time she can be on the consult with him and call into schedule a follow up.     Time spent 15 minutes:   Encounter: Individual    Angie Levy, RD, CLT, LD  Integrative Registered Dietitian

## 2022-02-09 ENCOUNTER — OFFICE VISIT (OUTPATIENT)
Dept: INTERNAL MEDICINE | Facility: CLINIC | Age: 32
End: 2022-02-09
Payer: COMMERCIAL

## 2022-02-09 VITALS
OXYGEN SATURATION: 97 % | DIASTOLIC BLOOD PRESSURE: 88 MMHG | TEMPERATURE: 96.7 F | WEIGHT: 315 LBS | HEIGHT: 74 IN | RESPIRATION RATE: 20 BRPM | SYSTOLIC BLOOD PRESSURE: 128 MMHG | BODY MASS INDEX: 40.43 KG/M2 | HEART RATE: 116 BPM

## 2022-02-09 DIAGNOSIS — E11.65 TYPE 2 DIABETES MELLITUS WITH HYPERGLYCEMIA, WITHOUT LONG-TERM CURRENT USE OF INSULIN (H): ICD-10-CM

## 2022-02-09 DIAGNOSIS — F90.2 ATTENTION DEFICIT HYPERACTIVITY DISORDER (ADHD), COMBINED TYPE: Primary | ICD-10-CM

## 2022-02-09 DIAGNOSIS — F70 MILD INTELLECTUAL DISABILITIES: ICD-10-CM

## 2022-02-09 LAB
ALBUMIN SERPL-MCNC: 3.5 G/DL (ref 3.4–5)
ALP SERPL-CCNC: 83 U/L (ref 40–150)
ALT SERPL W P-5'-P-CCNC: 79 U/L (ref 0–70)
ANION GAP SERPL CALCULATED.3IONS-SCNC: 7 MMOL/L (ref 3–14)
AST SERPL W P-5'-P-CCNC: 40 U/L (ref 0–45)
BILIRUB SERPL-MCNC: 0.2 MG/DL (ref 0.2–1.3)
BUN SERPL-MCNC: 12 MG/DL (ref 7–30)
CALCIUM SERPL-MCNC: 9.8 MG/DL (ref 8.5–10.1)
CHLORIDE BLD-SCNC: 100 MMOL/L (ref 94–109)
CO2 SERPL-SCNC: 27 MMOL/L (ref 20–32)
CREAT SERPL-MCNC: 0.49 MG/DL (ref 0.66–1.25)
GFR SERPL CREATININE-BSD FRML MDRD: >90 ML/MIN/1.73M2
GLUCOSE BLD-MCNC: 299 MG/DL (ref 70–99)
HBA1C MFR BLD: 8.6 % (ref 0–5.6)
POTASSIUM BLD-SCNC: 4.7 MMOL/L (ref 3.4–5.3)
PROT SERPL-MCNC: 8.4 G/DL (ref 6.8–8.8)
SODIUM SERPL-SCNC: 134 MMOL/L (ref 133–144)

## 2022-02-09 PROCEDURE — 36415 COLL VENOUS BLD VENIPUNCTURE: CPT | Performed by: INTERNAL MEDICINE

## 2022-02-09 PROCEDURE — 80053 COMPREHEN METABOLIC PANEL: CPT | Performed by: INTERNAL MEDICINE

## 2022-02-09 PROCEDURE — 99214 OFFICE O/P EST MOD 30 MIN: CPT | Performed by: INTERNAL MEDICINE

## 2022-02-09 PROCEDURE — 83036 HEMOGLOBIN GLYCOSYLATED A1C: CPT | Performed by: INTERNAL MEDICINE

## 2022-02-09 ASSESSMENT — MIFFLIN-ST. JEOR: SCORE: 2752.95

## 2022-02-09 ASSESSMENT — PAIN SCALES - GENERAL: PAINLEVEL: NO PAIN (0)

## 2022-02-09 NOTE — PATIENT INSTRUCTIONS
NUTRITION INTERVENTION   CARDIOMETABOLIC     Long Term Goals:   Goal: Lipid profile; Decrease TG <150 mg/dL, Decrease LDL <100mg/dL within 6 months   Goal: Lose 20-24lbs in 6 months.   Goal: Decrease Hemoglobin A1c <6 % within 2-6 months   Goal: Increase hemoglobin levels within 6 months     Short Term Goals:    1. Small Improvement Shown - needs to keep working on it. Focus on cutting back on carbs and increase protein intake - see handouts and info below - for example try to cut back on bag of chips and or 1 can of pop per day.  2.   Small Improvement Shown- Continue having snack 1-2x daily with healthy fats (ex: guac, nuts, hummus) and add a fruit/veggie for fiber- apple or banana with nut/seed butter (almond butter ) or sunflower seeds (sunflower seed butter with celery ) see ideas discussed and provided   3.  Some improvement- Keep cutting back on pop and increasing water   4. Improvement Shown- Increase activity at least 15-60 mins. Look into special Olympics and see what activities resonate. Pack some healthy snacks in the cooler to take with for the day.     Start with walking 15 mins daily. With Helena doing activity at least 2 days with Helena. Set alarm and put in calendar for reminder to get up and move.   5. Test blood sugars to see how meal impact them.     6. Discussed trying to get a fruit in once per day for snack.     Test Blood Sugars    Measure your fasting blood sugar daily, first thing in morning before breakfast. Ideally your fasting blood sugar should be between 70-80 mg/dL.     Measure your blood sugar two hours after breakfast and two hours after dinner. Ideally your two-hour sugars should never go over 120mg/dL.     Mediterranean Approach: Eat whole, unprocessed real foods in their unprocessed forms such as fruits, vegetables, whole grains (prefer gluten free), nuts, legumes, extra virgin olive oil, spices, modest amounts of poultry, and fish.      Cardiometabolic Food plan - 1,800 calories  per day     Protein 10-12 servings per day - include at each meal to stabilize blood sugars   (Choose 3oz or 21g per meal and aim for 1oz of 7g for snacks)   - Strive for 1-2 servings of fish per week especially of higher omega-3 fatty acid containing fish such as salmon.     Legumes 2 serving per day     Dairy alternatives 2-3 serving per day     Nuts and seeds 3-4 servings per day - great to incorporate as snacks    Fats and oils 4 servings per day     Non starchy vegetables 8-10 servings per day     Starchy vegetable limit 1 serving per day as they tend to impact blood sugar (they are moderate-GI).     Fruits 2 servings per day - best to couple with a little bit of protein or fat to offset a rise in blood sugars (they are low-moderate-GI foods).     Whole grains 2 serving per day - try gluten free whole grains instead      Choose Low Glycemic (GI) foods: Regulate your sugar levels by eating foods that do not spike blood sugars.  Eat low -GI foods so only small fluctuations in blood glucose and insulin levels are produced.      Examples of low-GI foods: nuts, seeds, GF oats, most vegetables especially non-starchy and fruits.     Medium or high-GI foods should be eaten with a protein or fat, both of which blunt the glycemic effect of these foods. This reduces the overall glycemic impact of a meal.   Ex: Most grains and starchy veggies are medium/high GI.     Avoid foods containing refined sugars, artificial sweeteners, and refined grains they are considered high-GI because they lead to sharp increases in blood sugars levels, which increase insulin sensitivity causing increased TG, and low good cholesterol (HDL).   Ex: cakes, cookies, pies, bread, sodas, fruit drinks, presweetened tea, coffee drinks, energy or sport drinks, flavored milk and other processed foods.     Choose foods high in fiber: Aim for at least 5 grams of fiber per serving of food or a total of 25-35 grams fiber per day. Remember, when looking at  the label, you can take the fiber away from the total carbs. Ex:15g of total carbs - 4g of fiber = 11g net carbs     Insoluble fiber acts like a bulky  inner broom,  sweeping out debris from the intestine and creating more motility and movement.      Soluble fiber attracts water and swells, creating a gel that slows digestion.  Also, slows the release of glucose from foods into the blood which stops spikes in blood sugar levels.  Soluble fiber traps toxins in the gut, helping to carry them to excretion and provides healthy bacteria in the digestive tract.     Choose High Quality Fats: Adding anti-inflammatory fats into your diet such as fish (salmon, herring, mackerel, and sardines), omega 3 eggs, elidia seeds, ground flax seeds/milk, hemp seeds/milk, walnuts and some other certain leafy greens will increase omega-3 fats to omeaga-6 fats ratio.     Therapeutic fats both monounsaturated and polyunsaturated to include daily: ground flaxseeds, unsalted mixed nuts, avocados, olives, extra-virgin olive oil.     Emphasize high-quality oils and fats in the diet daily such as avocado oil, coconut oil, flaxseed, olive, sesame. Ex: 1 tsp to 1 tbsp of MCT oil from coconuts can be added into coffee, smoothies, and salad dressings per day.     Avoid trans fats found in processed foods     Drink more water. Hydration is critical, so drink at least six to eight glasses of water a day. Drink more water between meals and less at meals.     Try adding herbal teas (sugar free) or lemon/lime/cucumber/fruit to water for flavor. Avoid artificial sweetener packets to flavor your water.     Cut back on coffee switch to green tea. Avoid adding sugar and milk to coffee instead use dairy alternatives such as almond, flax, coconut milk.       Increase physical activity. Moving our body helps move our bowels and speeds up your metabolism.     Exercise 15-60 minutes daily--whether that looks like burst training, yoga, or vigorous  walking.      NUTRITION RESOURCES:  1. Purchase Eat Fat Get Thin by Kenneth Membreno Book/Cookbook   2. IFM Cardiometabolic Packet     Functional Nutrition Fundamentals     Comprehensive Guide    Meal plan with recipes

## 2022-02-09 NOTE — PROGRESS NOTES
"Tahira Burden is a 31 year old who presents for the following health issues     HPI     Chief Complaint   Patient presents with     Patient Request     would like to get tested for add/adhd and needs depakote level        EMR reviewed including:             Complaint, History of Chief Complaint, Corresponding Review of Systems, and Complaint Specific Physical Examination.    #1   Patient now believes that he has attention deficit disorder.  Has taken \"the test\" on the computer and turned out to be positive.  Is now strongly requesting to be tested for ADHD.  Does have a history of some mild intellectual disability but is overall capable of maintaining his ADLs.  Does generate require assistance in overview.        Exam:   PSYCH: The patient appears grossly appropriate. Maintains good eye contact, does not have any jittery or atypical motion. Displays appropriate affect.      #2   Follow-up on diabetes  Compliant with medication.  Entirely noncompliant with diet or activity.  Does not check blood sugars at home  Last A1c was 8.3 in October   LUNGS: clear bilaterally, airflow is brisk, no intercostal retraction or stridor is noted. No coughing is noted during visit.   HEART:  regular without rubs, clicks, gallops, or murmurs. PMI is nondisplaced. Upstrokes are brisk. S1,S2 are heard.   NEURO: Pt is alert and appropriate. No neurologic lateralization is noted. Cranial nerves 2-12 are intact. Peripheral sensory and motor function are grossly normal.           Patient has been interviewed, applicable history and applied review of systems have been performed.    Vital Signs:   /88   Pulse 116   Temp (!) 96.7  F (35.9  C) (Temporal)   Resp 20   Ht 1.88 m (6' 2\")   Wt (!) 172.8 kg (381 lb)   SpO2 97%   BMI 48.92 kg/m        Decision Making    Problem and Complexity     1. Attention deficit hyperactivity disorder (ADHD), combined type  It is conceivable patient does have ADHD.  We will set him up with " a proper psychologic specialist to determine this as it is out of my [facility].  - Adult Mental Health  Referral; Future    2. Type 2 diabetes mellitus with hyperglycemia, without long-term current use of insulin (H)  We will check A1c.  Strongly recommended compliance.  Discussed long-term side effects of uncontrolled diabetes.  Caregiver present for conversation  - Hemoglobin A1c; Future  - Comprehensive metabolic panel (BMP + Alb, Alk Phos, ALT, AST, Total. Bili, TP); Future  - Hemoglobin A1c  - Comprehensive metabolic panel (BMP + Alb, Alk Phos, ALT, AST, Total. Bili, TP)    3. Mild intellectual disabilities  As above  - Adult Mental Health  Referral; Future                                FOLLOW UP   I have asked the patient to make an appointment for followup with me in 1 month        I have carefully explained the diagnosis and treatment options to the patient.  The patient has displayed an understanding of the above, and all subsequent questions were answered.      DO DONNELL Jin    Portions of this note were produced using Polar  Although every attempt at real-time proof reading has been made, occasional grammar/syntax errors may have been missed.

## 2022-02-10 ENCOUNTER — NURSE TRIAGE (OUTPATIENT)
Dept: INTERNAL MEDICINE | Facility: CLINIC | Age: 32
End: 2022-02-10
Payer: COMMERCIAL

## 2022-02-10 NOTE — TELEPHONE ENCOUNTER
Patient is given home care information via [place] and on phone. He is informed he will need to take care of the reason he is straining when he has a BM, and then the hemorrhoids will improve.  He is very inconsistent with his story.  He states he pushes with hard stool, then states she has not had a hard stool for a very long time, but that he has diarrhea.  But then when given information about this, he states he has not had diarrhea for months either.    He states he has been using his hemorrhoid cream BID, but when asked the last time he filled this medication, he states about 1 year ago.    He wants to know if PCP would like to prescribe him something different for his hemorrhoids.  He is again informed he needs to take care of the reason he is pushing/straining with BMs.  He is informed a message will be sent to PCP to see if he has anything else he would like to add.      Reason for Disposition    Mild rectal pain    Mild rectal itching    Painless lump in rectal area    Additional Information    Negative: Sounds like a life-threatening emergency to the triager    Negative: Diarrhea is the main symptom    Negative: Constipation is the main symptom (e.g., pain or discomfort caused by passage of hard BMs)    Negative: Blood in or on bowel movement is the main symptom    Negative: Sexual assault    Negative: Injury to rectum    Negative: Patient sounds very sick or weak to the triager    Negative: Severe rectal pain    Negative: Rectal pain or redness and fever > 100.4 F (38.0 C)    Negative: Acute onset rectal pain and constipation (straining with rectal pressure or fullness), which is not relieved by Sitz bath or suppository    Negative: MODERATE-SEVERE rectal pain (i.e., interferes with school, work, or sleep)    Negative: MODERATE-SEVERE rectal itching (i.e., interferes with school, work, or sleep)    Negative: Last bowel movement (BM) > 4 days ago    Negative: Rectal area looks infected (e.g., draining  sore, spreading redness)    Negative: Rash of rectal area (e.g., open sore, painful tiny water blisters, unexplained bumps)    Negative: Caller is worried about a sexually transmitted disease (STD)    Protocols used: RECTAL SYMPTOMS-A-[place]

## 2022-02-11 DIAGNOSIS — E11.65 TYPE 2 DIABETES MELLITUS WITH HYPERGLYCEMIA, WITHOUT LONG-TERM CURRENT USE OF INSULIN (H): ICD-10-CM

## 2022-02-11 NOTE — TELEPHONE ENCOUNTER
Would recommend that the patient uses Citrucel and takes 1 scoop in a large glass of water twice daily.  This should help alleviate both the diarrhea and constipation.    With respect to any hemorrhoidal concerns, he should continue to use his current cream.    Jordon

## 2022-02-11 NOTE — TELEPHONE ENCOUNTER
I have notified pt of the message below. Pt states he won't stop the metamucil as he likes that better. He declines trying the citrucel. He wanted Dr. Ruvalcaba to know that. I will route back as RACHAEL Dunham, SONU

## 2022-02-14 RX ORDER — GLIMEPIRIDE 4 MG/1
TABLET ORAL
Qty: 90 TABLET | Refills: 1 | Status: SHIPPED | OUTPATIENT
Start: 2022-02-14 | End: 2022-08-23

## 2022-02-14 NOTE — TELEPHONE ENCOUNTER
Glimepiride  Prescription approved per [facility] Refill Protocol.    Alicia Freeman RN

## 2022-03-05 ASSESSMENT — SLEEP AND FATIGUE QUESTIONNAIRES
HOW LIKELY ARE YOU TO NOD OFF OR FALL ASLEEP WHILE WATCHING TV: HIGH CHANCE OF DOZING
HOW LIKELY ARE YOU TO NOD OFF OR FALL ASLEEP WHILE LYING DOWN TO REST IN THE AFTERNOON WHEN CIRCUMSTANCES PERMIT: HIGH CHANCE OF DOZING
HOW LIKELY ARE YOU TO NOD OFF OR FALL ASLEEP WHILE SITTING AND READING: HIGH CHANCE OF DOZING
HOW LIKELY ARE YOU TO NOD OFF OR FALL ASLEEP WHEN YOU ARE A PASSENGER IN A CAR FOR AN HOUR WITHOUT A BREAK: MODERATE CHANCE OF DOZING
HOW LIKELY ARE YOU TO NOD OFF OR FALL ASLEEP IN A CAR, WHILE STOPPED FOR A FEW MINUTES IN TRAFFIC: SLIGHT CHANCE OF DOZING
HOW LIKELY ARE YOU TO NOD OFF OR FALL ASLEEP WHILE SITTING QUIETLY AFTER LUNCH WITHOUT ALCOHOL: MODERATE CHANCE OF DOZING
HOW LIKELY ARE YOU TO NOD OFF OR FALL ASLEEP WHILE SITTING INACTIVE IN A PUBLIC PLACE: MODERATE CHANCE OF DOZING
HOW LIKELY ARE YOU TO NOD OFF OR FALL ASLEEP WHILE SITTING AND TALKING TO SOMEONE: WOULD NEVER DOZE

## 2022-03-07 NOTE — PROGRESS NOTES
"Does Bertram have a CPAP/Bipap?  Yes       Type of mask: nasal     [facility]: [place] [phone]    [email or web address]     Aptos Sleep Scale: 16    Obstructive Sleep Apnea - PAP Follow-Up Visit:    Chief Complaint   Patient presents with     CPAP Follow Up       Bertram Foreman comes in today for follow-up of their severe cmplex sleep apnea, managed with CPAP.     No specialty comments available.    Overall, he rates the experience with PAP as 0 (0 poor, 10 great). The mask is not comfortable.  Patient just received a new nasal mask that appears to be fitting better today.  Has not really been able to use his auto CPAP device until done.    His PAP interface is Nasal Mask.    Total score - Aptos: 16 (3/5/2022  9:50 AM)      No data recorded    ResMed        Auto-PAP 6 - 12 cmH2O 30 day usage data:            Past medical/surgical history, family history, social history, medications and allergies were reviewed.      Problem List:  Patient Active Problem List    Diagnosis Date Noted     Diabetes mellitus, type 2 (H) 02/27/2019     Priority: Medium     Intellectual disability 02/04/2019     Priority: Medium     Recurrent major depressive disorder, in partial remission (H) 02/04/2019     Priority: Medium     Morbid obesity (H) 01/08/2019     Priority: Medium     Morbid obesity with body mass index of 45.0-49.9 in adult (H) 04/04/2017     Priority: Medium     Mantoux: positive, treated 05/01/2011     Priority: Medium     Overview:   5/2011, went through (more than) 9 months of isoniazid.  NOT TO HAVE MANTOUX IN FUTURE       Mild intellectual disabilities 07/15/2010     Priority: Medium        /86   Pulse 91   Ht 1.88 m (6' 2\")   Wt (!) 171 kg (377 lb)   SpO2 95%   BMI 48.40 kg/m      Impression/Plan:  Patient currently starting auto CPAP 6 to 12 cm.  He has not really been able to use his new device due to problems getting proper mask fit.  I am a bit " skeptical in regard to efficacy of auto CPAP managing fully his complex severe sleep apnea with significant central component possibly related to his history of Chiari malformation.  Therefore would follow closely with a download in 3 weeks.  Certainly if the abdominal results are not favorable in managing his complex sleep apnea he would need titration study to address the central component.      Bertram DAVIS Kellen will follow up in about 3 week(s) virtually with abdominal.     20 minutes spent with patient, all of which were spent face-to-face counseling, consulting, coordinating plan of care.      CC:  Tremaine Ruvalcaba Oleg Froymovich, MD

## 2022-03-08 ENCOUNTER — OFFICE VISIT (OUTPATIENT)
Dept: SLEEP MEDICINE | Facility: CLINIC | Age: 32
End: 2022-03-08
Payer: COMMERCIAL

## 2022-03-08 VITALS
WEIGHT: 315 LBS | HEIGHT: 74 IN | DIASTOLIC BLOOD PRESSURE: 86 MMHG | OXYGEN SATURATION: 95 % | SYSTOLIC BLOOD PRESSURE: 120 MMHG | HEART RATE: 91 BPM | BODY MASS INDEX: 40.43 KG/M2

## 2022-03-08 DIAGNOSIS — G47.33 OSA (OBSTRUCTIVE SLEEP APNEA): Primary | ICD-10-CM

## 2022-03-08 PROCEDURE — 99213 OFFICE O/P EST LOW 20 MIN: CPT | Performed by: OTOLARYNGOLOGY

## 2022-03-09 ENCOUNTER — TELEPHONE (OUTPATIENT)
Dept: NUTRITION | Facility: CLINIC | Age: 32
End: 2022-03-09
Payer: COMMERCIAL

## 2022-03-09 NOTE — TELEPHONE ENCOUNTER
3/9 2nd attempt. Provided phone number [phone] to schedule follow up in about 3 months (around 5/8/2022) .    Serena gongora Procedure   Orthopedics, Podiatry, Sports Medicine, ENT/Eye Specialties  [facility]   [phone]

## 2022-04-14 DIAGNOSIS — K29.00 ACUTE GASTRITIS WITHOUT HEMORRHAGE, UNSPECIFIED GASTRITIS TYPE: ICD-10-CM

## 2022-04-14 DIAGNOSIS — E78.5 HYPERLIPIDEMIA LDL GOAL <130: ICD-10-CM

## 2022-04-15 RX ORDER — ATORVASTATIN CALCIUM 20 MG/1
TABLET, FILM COATED ORAL
Qty: 28 TABLET | Refills: 5 | Status: SHIPPED | OUTPATIENT
Start: 2022-04-15 | End: 2022-10-18

## 2022-05-01 ENCOUNTER — NURSE TRIAGE (OUTPATIENT)
Dept: NURSING | Facility: CLINIC | Age: 32
End: 2022-05-01
Payer: COMMERCIAL

## 2022-05-02 NOTE — TELEPHONE ENCOUNTER
"\"I have had a pimple on my butt cheek for the last month. When I go to the bathroom it bleeds. Tonight there is skin that has been broken off and I can put the tip of my finger in the opening. On 5/10/2022 I have a physical appointment scheduled.   He estimates that the pimple was the size of a nickel to quarter size. \" I can't see it. It is close to my anal opening. When I first had it, I put a hot wash cloth on it. But it didn't help.\" It is not painful.    He says that there is an urgent care he could go to tomorrow when they reopen. He is a type 2 diabetic: on Pioglitazone. No fever noted.   Triaged to a disposition of See PCP within 24 hrs. He states he will go to  tomorrow, or call back in am to schedule a clinic appointment if needed.      Brandi Tellez RN Triage Nurse Advisor 10:22 PM 5/1/2022  Reason for Disposition    Large sore (> 1 inch or 2.5 cm across)    Sores on genital area    Additional Information    Negative: Sounds like a life-threatening emergency to the triager    Negative: Followed an injury (i.e., from an abrasion or scratch)    Negative: Wound infection suspected (in traumatic or surgical wound)    Negative: Soft yellow scabs (crusts)    Negative: Followed a burn    Negative: Looks like insect bite    Negative: Looks like chickenpox    Negative: On one side of the lip    Negative: Looks like poison ivy    Negative: Looks like ringworm    Negative: Patient sounds very sick or weak to the triager    Negative: [1] Red area or streak AND [2] fever    Negative: [1] Looks infected (spreading redness, pus) AND [2] large red area (> 2 in. or 5 cm)    Negative: [1] Looks infected (spreading redness, pus) AND [2] diabetes mellitus or weak immune system (e.g., HIV positive, cancer chemo, splenectomy, organ transplant, chronic steroids)    Negative: [1] Red streak runs from sore AND [2] longer than 1 inch (2.5 cm)    Negative: [1] Ulcer with black scab AND [2] spreading AND [3] painless AND [4] " cause unknown    Negative: [1] Small red streak or spreading redness (<2 inches; 5 cm) AND [2] no fever    Negative: [1] Unexplained sores AND [2] 3 or more    Protocols used: SORES-A-AH  COVID 19 Nurse Triage Plan/Patient Instructions    Please be aware that novel coronavirus (COVID-19) may be circulating in the community. If you develop symptoms such as fever, cough, or SOB or if you have concerns about the presence of another infection including coronavirus (COVID-19), please contact your health care provider or visit [email or web address].     Disposition/Instructions    In-Person Visit with provider recommended. Reference Visit Selection Guide.    Thank you for taking steps to prevent the spread of this virus.  o Limit your contact with others.  o Wear a simple mask to cover your cough.  o Wash your hands well and often.    Resources    [facility]: About COVID-19: [email or web address]    CDC: What to Do If You're Sick: www.cdc.gov/coronavirus/2019-ncov/about/steps-when-sick.html    CDC: Ending Home Isolation: www.cdc.gov/coronavirus/2019-ncov/hcp/disposition-in-home-patients.html     CDC: Caring for Someone: www.cdc.gov/coronavirus/2019-ncov/if-you-are-sick/care-for-someone.html     [facility]: Interim Guidance for Hospital Discharge to Home: www.health.[facility].[place]/diseases/coronavirus/hcp/hospdischarge.pdf    [facility] clinical trials (COVID-19 research studies): clinicalaffairs.[facility]/n-clinical-trials     Below are the COVID-19 hotlines at the [place] [employer] [facility] ([facility]). Interpreters are available.   o For health questions: Call 656-255-7508 or 1-943.557.5610 (7 a.m. to 7 p.m.)  o For questions about schools and childcare: Call 387-223-4223 or 1-159.843.4251 (7 a.m. to 7 p.m.)

## 2022-05-17 ENCOUNTER — VIRTUAL VISIT (OUTPATIENT)
Dept: NUTRITION | Facility: CLINIC | Age: 32
End: 2022-05-17
Payer: COMMERCIAL

## 2022-05-17 DIAGNOSIS — E66.01 MORBID OBESITY (H): ICD-10-CM

## 2022-05-17 DIAGNOSIS — E11.9 TYPE 2 DIABETES MELLITUS WITHOUT COMPLICATION, WITHOUT LONG-TERM CURRENT USE OF INSULIN (H): Primary | ICD-10-CM

## 2022-05-17 PROCEDURE — 97803 MED NUTRITION INDIV SUBSEQ: CPT | Mod: 95 | Performed by: DIETITIAN, REGISTERED

## 2022-05-17 NOTE — PROGRESS NOTES
.  Medical Nutrition Therapy  Visit Type: reassessment and intervention    Visit Details    How would you like to obtain your AVS? MyChart  If the correspondence for visit is dropped, how would you like your dietitian to reconnect with you:   call back by phone? Yes    Text to cell phone: [phone]   Will anyone else be joining your video visit or telephone call? yes  {If patient encounters technical issues they should call [phone] :33    Type of service:  Telephone     Start Time: 10:05 am  End Time:  10:50 am     Originating Location (pt. Location): Home    Distant Location (provider location):  [facility] WORKING VIRTUAL FROM HOME       Referring Provider: Akbar Lara  [facility]     REASON FOR REFERRAL:   Bertram Foreman is a 30 year old male who is interested in Medical Nutrition Therapy (MNT) and education related to weight management. Type 2 diabetes.    He is accompanied by [facility] ILS worker goes to all appointments.   Changes since previous consult Yes        Behavior Status: Unresolved needs improvement Barriers Include:Motivation/Ready to change , Financial concerns, Lack of social support, Lack of cooking skills , Lack of nutrition knowlege and Lack of control over emotions/behaviors        It has been a year of making changes and Bertram has struggled with implementing goals that we talk about at our nutrition appointments. His ILS worker Helena has been assisting him in trying to implement healthy goals.  Bertram has been making very minimal changes. He wants to do changes his way. He struggles with cutting back on carbs especially pop. He tries to add in fruits and veggies. He has been trying to choose salads for example when goes out to eat with Helena. He has been doing sausage and eggs and barbour for breakfast. He used to do more peanut butter and sunflower seeds as a kid but doesn't do them anymore. He doesn't like them very much. He tried young  before but didn't like it but willing to do guac and eats with chips. He is open to trying a vegetable to go with the guac     He has been going swimming at the [facility] with Helena.     He has been having some weight loss but not as much as everyone would like. He was at 387lbs and now 376lbs.     He tried bowling last year but only one time and it got canceled due to covid so doesn't want to do any sports this year. He has been doing his own exercise on Saturdays an hour movie for 20-30 mins. He just started it back up about a week ago so wants to try to to make it more consistent. He has been very busy with two jobs now so it makes it hard to make a time committment to exercise.        Notes from 2-8-22  Changes since previous consult No        Behavior Status:Unresolved no improvement shown  Barriers Include:Motivation/Ready to change , Financial concerns, Lack of social support, Lack of cooking skills , Lack of control over emotions/behaviors and Lack of nutrition knowlege     His blood sugars have been high. He doesn't check his blood sugars daily.  They have been above 200 sometimes. He wasn't able to provide exact numbers despite writing them down each time he checks. Didn't know where his book was so unable to let me know more specifics as he hasn't been check lately.     Has been incorporating more healthy fats through nut butter which he has been adding to toast with raisins.     He is dealing with a lot right now and unable to work on making dietary changes. He is really tired and crabby today so hard to focus. In the past Helena has been there during consults but today she is out of town. He has been continuing to get healthier options with Helena at the grocery store. He likes to get the foods on sale and if healthy and not on sale he doesn't get it.  He is on a tight budget. He has been getting in more protein. He eats eggs but more when Helena cooks. He has been skipping breakfast though so not cooking. He  often sleeps in till 2pm. When wakes up will have leftovers and depends on what he has in fridge or freezer. He will do burgers, chicken nuggets. Has only been having 1-2 cans of pop per day. He has been trying cut back. Hasn't been having fruit and discussed trying to get them in at least once per day. He doesn't really like fruit and some like oranges need to be in season. He used to like applesauce.  We went through the list of fruits but sounds like will only try grapes. He is going to look for fruit that is on sale that he may want to try.     Nutrition Consult Notes 7/13/21 Behavior Status:Improvement shown  Barriers Include:Motivation/Ready to change , Lack of control over emotions/behaviors and Lack of nutrition knowlege     Joined a [employer] to help get more activity in the day. It starts in Sept every Friday night. He is still struggling with cutting back on sugar/carbs especially from pop. He was suppose to have a birthday party but now has a lot of extra food that isn't very healthy. He need to finish it off and trying to share with others. He made homemade salsa from the garden and zucchini boats. He did beef, bell peppers, cheese, onions on top. He loved them. He felt more full. He is trying to check his blood sugars. Last night his blood sugars were 271. He had another day where his blood sugars were in the high 300. This morning he had mountain dew around 7am - 322. He doesn't do breakfast right. His diarrhea is better.     He didn't eat much yesterday first meal lunch 12pm was 4 slices of pizza, 1 can.     For supper biscuits and gravy with water flavored. He likes the flavored water and trying to have that or tea instead of pop. He bought some Gatorade with protein in it.     He does nuts for snack maybe once per week and doesn't want to eat them more often he doesn't enjoy the nuts he has right now because too spicy. Discussed getting different nuts/seeds and nut butter.     He  lives on his own and gone down from 12 pack per day and growing veggies at [employer]. He has been cutting back on eating out. When not with support staff Helena he doesn't really make the changes. His doctors and myself have been talking about making these changes and how important they are but Bertram doesn't really want to implement health changes.     Would like to focus on getting in more veggies like carrots but no broccoli unless cooked for now. Keep reducing intake of pop.     Notes from 7/13/21  Switched to different diabetes medication because causing digestive issues. He has been having diarrhea and sometimes feels he needs to vomit. It has only been a weeks since the med switch and had some slight improvement. His BMs are still about 1-2x per week of diarrhea. Sometime once and sometimes more. Doesn't really track what he is eating. Tried a few things the hummus or guac but not continuing to implement. He has been trying to do more salads. Still struggling with snacking on processed carbs. He likes what he likes and doesn't want to change. ILS worker is only with him 2 days per week and other days ILS not with him eats out has a lot of pizza and carbs. He has some good food in the fridge but chooses not to eat it. Doctors talked about making healthy choices and family as well as ILS worker is supporting him.  He has been maintaining his weight but the weight is interfering with his life and not getting out and doing as many fun things. He doesn't want to continue nutrition counseling because he doesn't want to make healthy lifestyle changes. We discussed trying some other activities (cooking or exercise) to help make healthy lifestyle changes. He has been making some healthy meals with Helena. There are some special Olympics that they are looking at.     Changes since previous consult Yes         Behavior Status:Improvement shown  Barriers Include:Motivation/Ready to change , Lack of social support,  Lack of cooking skills , Lack of control over emotions/behaviors and Lack of nutrition knowlege     Bm's still soft and formed and more like 1-2x per week having diarrhea. Still struggling with cutting back on portion sizes to help decrease overall carbs. He has been trying to do more healthy fats instead. Bought avocados (made homemade guac), got nuts, asparagus, had a salad. He has been doing better and working on making these habits more consistent. He noticed that adding in more water, protein and healthy fats has given him more energy.     He likes the vitamin enhanced water and trying to do this instead of pop. He likes the variety of flavors. When has been doing the water daily and only doing pop at home anymore and used to do 7-8 cans per day and not buying any cans of pop now. He has only been having 1 pop small glass when out to eat about 1 day per week.     He did weigh himself and has been maintaining the weight vs climbing. He has been going to the [facility] once per week to swim. He wants to go more. He wants to get outside and exercise more too. He has a bike and hopes to see more improvement once he gets to ride it more. He had been continually gaining weight so maintaining is really good progress. Wants to keep working on the goals we set at our last consult with emphasis on activity for weight loss.    He hasn't been checking blood sugars for a few months now and suppose to be checking twice daily.      He needs new diabetes device so going to be following up with the referring doctor. Last A1c 2 weeks ago was 7.5 which is up from 6.8 about 7months ago. Glucose by meter showed 122 mg/dL 3 months ago     Notes 3/16/21  Has been taking the Citracal and seems to be helping with the diarrhea. His BMs are now more soft and formed and every other day and not diarrhea every day. Has been trying to work on portion sizes but still struggling with cutting back on carbs and increasing protein intake. He  has been drinking pop since a kid and hard to kick the habit. He has been doing egg bakes with broccoli. He has been having snacks but still struggling with picking healthy options. He continues to go to chips/crackers. He did try almonds once as a snack. He is willing to do it again. Open to trying more nuts and seeds instead of chips. He likes sunflower seeds. He hasn't had sunflower seed butter before and open to it. He likes ants on a log so open to try as snack. He loves quac and open to trying with veggies. He has never tried it with veggies.     NUTRITION ASSESSMENT:   Nutritional Goals 2/2/2021   Nutritional Goal Create healthier eating patterns;Create a plan to lose weight;Work on meal planning/recipes;Overcome emotional eating;Stabilize blood sugars;Eliminate cravings for sugar and refined carbohydrates;Increase energy;Address anxiety and or depression around eating;What to eat for my specific health concerns        Neurological 2/2/2021   Migraine Headaches Past   Tension Headache Current       No flowsheet data found.   No flowsheet data found.   Gastrointestinal 2/2/2021   Constipation Current   Nausea or Vomiting Past;Current   Hemorrhoids Past;Current   Diarrhea Current   Gas/bloating Past;Current   Heartburn/Reflux/GERD Past;Current   Blood in stool Past;Current   Abdominal Pain Current       No flowsheet data found.   Endocrine 2/2/2021   Type 2 diabetes Current   Overweight/obesity Past;Current      Skin 2/2/2021   Acne Past   Dry Skin Past;Current      Cardiopulmonary 2/2/2021   High Cholesterol Current      Musculoskeletal 2/2/2021   Restless Legs Current      Psychological 2/2/2021   Depression/Anxiety Past;Current   Binge eating disorder Past;Current      No flowsheet data found.   No flowsheet data found.     Past Medical History:  Past Medical History:   Diagnosis Date     Depressive disorder All my life almost.     Diabetes (H) sometime in 2019     Sleep apnea        Previous Surgeries:    Past Surgical History:   Procedure Laterality Date     APPENDECTOMY  8/15/14     COLONOSCOPY N/A 12/28/2020    Procedure: COLONOSCOPY, WITH BIOPSY;  Surgeon: Haley Hernandez MD;  Location:  GI        Family History:  Family History   Problem Relation Age of Onset     Mental Illness Brother      Diabetes No family hx of      Depression No family hx of      Anxiety Disorder No family hx of         Lifestyle History:  Lifestyle 2/2/2021   Do you feel your life is stressful right now?  Yes   What is the cause(s) of stress in your life?  Finances;Work;Emotional problems   Are you currently implementing any strategies to help manage stress? Yes   What are you doing to manage stress?  Breathing exercises;Movement and exercise;Massage;Counseling;TV;Take time for self;Listening to music        Exercise History:  Exercise 2/2/2021   Does your occupation require extended periods of sitting?  No   Does your occupation require extended periods of repetitive movements (ex: walking or lifting)?  Yes   Do you currently participate in any forms of exercise? Yes   Check all the exercises you participate in: Walking;Biking;Swimming   How many times per week do you exercise? 2 times   How long do you usually exercise? 30 min        Sleep History:  Sleep 2/2/2021   How many hours (on average) do you sleep per night? 9-11   What time do you turn off the lights? 10 PM   How long does it take for you to fall asleep? 15-20 mins   What time do you stop using electronic devices? 10 PM   What time do you wake up? 11 AM   When do you eat your first meal?  11 AM   Do you feel well-rested during the day?  No   Do you take naps?  Yes   Do you have a comfortable bedroom environment (cool, quiet, dark, etc)? Yes   Do you have a sleep routine/ ritual that you do before bed?  Yes   How many hours do you spend per day looking at a screen (TV, computer, tablet and phone)? 3 to 4   Select all factors that apply to your current sleep habits: Difficulty  falling asleep;Wake up in the middle of the night;Have nightmares;Take medication for sleep on most night of the week;Snore loudly or have been told you stop breathing;Eat large meals within 3 hours of going to bed;Night sweats;Feel tired/sluggish/fatigued during the day;Grinding teeth        Nutrition History:  Nutrition 2/2/2021   Have you ever had a nutrition consultation? Yes   Do you currently follow a special diet or nutritional program? No   What do you feel are the biggest barriers getting in the way of achieving you nutritional goals? Motivation/Readiness to change;Lack of control over emotions/behaviors;Stress   Do you have any food allergies, sensitivities or intolerances?  No       Digestion 2/2/2021   Do you experience stomach pains/cramping? Daily   Do you experience bloating?  2-3 times per week   Do you experience gas?  2-3 times per week   Do you experience heartburn/acid reflux/indigestion? 2-3 times per week   How often do you have a bowel movement? 3 or more times per day   What is a typical bowel movement like for you? Select all that apply: Loose;Liquid;With blood visible      Food Access:  2/2/2021   Who does the grocery shopping? Self   How often is grocery shopping done? Weekly   Where do you usually receive your groceries from? Select all that apply: [employer];Other   Do you read food labels? No   Who does the cooking? Select all that apply: Self;Assistant   How many meals do you eat out per week?  3 to 5   What restaurants do you typically choose? Fast Casual ([employer], [employer], etc.)      Daily Patterns: 2/2/2021   How many days per week do you have breakfast? 3   How many days per week do you have lunch? 6   How many days per week do you have dinner? 7   How many days per week do you have snacks? 5      Protein Intake: 2/2/2021   How many times per day do you typically consume a protein source(s)? 4   What types of protein do you currently eat?  Ground Beef;Steak;Hamburgers;Beef Roast;Pork  Chops;Pork Ribs;Hartley/Dalton Hartley;Deli Ham;Sausage;Tuna;Chicken Breast;Chicken Thighs/Legs;Eggs       Fat Intake:  2/2/2021   How many times per day do you typically consume healthy fat(s)? 1   What types of health fats do you currently eat? Select all that apply:  Santana;Butter;Salad dressings       Fruit Intake:  2/2/2021   How many times per day do you typically consume fruits? 1   What types of fruit do currently eat? Apple;Banana;Cantaloupe;Mandarin oranges;Pineapple       Vegetable Intake:  2/2/2021   How many times per day do you typically consume vegetables? 1   What types of vegetables do you currently eat? Broccoli;Carrots;Celery;Corn;Green onions/scallions;Onions;Peas;Potato (baked, boiled, mashed, French fries);Yam, sweet potato      Grain Intake:  2/2/2021   How many times per day do you typically consume grains? 2   What types of grains do currently eat? Select all that apply:  Breads (non-gluten free);Cereals (non-gluten free);Chips (non-gluten free);Crackers (non-gluten free);Pasta (non-gluten free);Pretzels (non-gluten free)       Dairy Intake:  2/2/2021   How many times per day do you typically consume dairy? 1   What types of dairy do currently eat? Select all that apply:  Milk;Cheese;Yogurt;Ice cream       Non-Dairy Alternative Intake:  2/2/2021   How many times per day do you typically consume non-dairy alternatives? 0   What types of non-dairy alternatives do currently eat? Select all that apply:  Other cheese       Sweets Intake:  2/2/2021   How many times per day do you typically consume sweets? 1      Beverage Intake:  2/2/2021   How many 8 oz cups of water do you typically consume per day?  0 to 1   How many 8 oz cups of caffeine do you typically consume per day?  1 to 2   How many drinks of alcohol do you typically consume per week (1 drink = 5 oz wine, 12 oz beer, 1.5 oz spirits)?   0       Lifestyle Recall:  2/2/2021   What time did you wake up? 8 AM   What time did you go to sleep? 8  PM   What time did you have breakfast? No breakfast   What time did you have a morning snack? 9-10 AM   Where did you have your morning snack? Home   What time did you have lunch? No lunch   What time did you have an afternoon snack? No snack   What time did you have dinner? 3-4 PM   Where did you have dinner?  Restaurant   What time did you have an evening snack? 7-8 PM   Where did you have your evening snack? Home   What time of day did you exercise? No Exercise          Additional concerns: If has breakfast does a replacement shake - Helena will make eggs, barbour or sausage. Does microwave and doesn't do much cooking.   Has the means to do cooking but now just doesn't feel like it since mom passed away.     [email or web address]    MEDICATIONS:  Current Outpatient Medications   Medication Sig Dispense Refill     acetaminophen (TYLENOL) 325 MG tablet Take 325-650 mg by mouth as needed for mild pain        atorvastatin (LIPITOR) 20 MG tablet TAKE 1 TABLET BY MOUTH ONCEDAILY EVERY MORNING (AM PLUSPAK) 28 tablet 5     blood glucose calibration (ONETOUCH VERIO) solution USE AS DIRECTED 1 each 0     Blood Glucose Monitoring Suppl (ONETOUCH VERIO FLEX SYSTEM) w/Device KIT USE TO TEST TWICE DAILY 1 kit 0     divalproex sodium extended-release (DEPAKOTE ER) 500 MG 24 hr tablet TAKE THREE TABLETS BY MOUTH EVERY NIGHT AT BEDTIME (HS PLUSPAK)       FIBER PO Take 1 chew tab by mouth daily       glimepiride (AMARYL) 4 MG tablet TAKE 1 TABLET BY MOUTH ONCE DAILY EVERY MORNING BEFORE BREAKFAST 90 tablet 1     hydrocortisone, Perianal, (HYDROCORTISONE) 2.5 % cream Place rectally 2 times daily as needed for hemorrhoids 28 g 0     ibuprofen (ADVIL/MOTRIN) 200 MG tablet As needed       Lancets (ONETOUCH DELICA PLUS [id number]) MISC TEST TWICE DAILY 100 each 1     Multiple Vitamins-Minerals (MENS MULTIVITAMIN PO)  (Patient not taking: Reported on 2/9/2022)       omeprazole (PRILOSEC) 20 MG DR capsule TAKE 1 CAPSULE BY MOUTH  TWICE A DAY IN THE MORNING AND EVENING PLUS MELVIN 60 capsule 5     ONETOUCH VERIO IQ test strip TEST TWICE DAILY 100 strip 1     pioglitazone (ACTOS) 30 MG tablet Take 1 tablet (30 mg) by mouth daily 90 tablet 3     QUEtiapine (SEROQUEL) 300 MG tablet Take 300 mg by mouth At Bedtime   0     TRAZODONE HCL PO Take 100 mg by mouth At Bedtime          Dietary Supplements 2/2/2021   Individual Vitamin Supplements General multivitamin   Herbal Complimentary products Fiber supplement         ALLERGIES:   Allergies   Allergen Reactions     Ceclor [Cefaclor] Swelling     Erythromycin GI Disturbance        .na  LABS:  Last Basic Metabolic Panel:  Lab Results   Component Value Date     09/08/2020     05/20/2020     01/16/2020      Lab Results   Component Value Date    POTASSIUM 4.0 09/08/2020    POTASSIUM 4.2 05/20/2020    POTASSIUM 4.2 01/16/2020     Lab Results   Component Value Date    CHLORIDE 105 09/08/2020    CHLORIDE 102 05/20/2020    CHLORIDE 105 01/16/2020     Lab Results   Component Value Date    CORY 9.1 09/08/2020    CORY 9.1 05/20/2020    CORY 9.4 01/16/2020     Lab Results   Component Value Date    CO2 27 09/08/2020    CO2 29 05/20/2020    CO2 26 01/16/2020     Lab Results   Component Value Date    BUN 9 09/08/2020    BUN 13 05/20/2020    BUN 13 01/16/2020     Lab Results   Component Value Date    CR 0.47 09/08/2020    CR 0.63 05/20/2020    CR 0.67 01/16/2020     Lab Results   Component Value Date     09/08/2020     05/20/2020     01/16/2020       Last Glucose Profile:   Hemoglobin A1C POCT   Date Value Ref Range Status   03/30/2021 7.5 (H) 0 - 5.6 % Final     Comment:     Normal <5.7% Prediabetes 5.7-6.4%  Diabetes 6.5% or higher - adopted from ADA   consensus guidelines.     09/08/2020 6.8 (H) 0 - 5.6 % Final     Comment:     Normal <5.7% Prediabetes 5.7-6.4%  Diabetes 6.5% or higher - adopted from ADA   consensus guidelines.     05/20/2020 8.0 (H) 0 - 5.6 % Final     Comment:      Normal <5.7% Prediabetes 5.7-6.4%  Diabetes 6.5% or higher - adopted from ADA   consensus guidelines.       Hemoglobin A1C   Date Value Ref Range Status   02/09/2022 8.6 (H) 0.0 - 5.6 % Final     Comment:     Normal <5.7%   Prediabetes 5.7-6.4%    Diabetes 6.5% or higher     Note: Adopted from ADA consensus guidelines.   10/14/2021 8.3 (H) 0.0 - 5.6 % Final     Comment:     Results confirmed by repeat test.    Normal <5.7%   Prediabetes 5.7-6.4%    Diabetes 6.5% or higher     Note: Adopted from ADA consensus guidelines.       Last Lipid Profile:   Cholesterol   Date Value Ref Range Status   10/14/2021 184 <200 mg/dL Final   09/08/2020 157 <200 mg/dL Final   01/16/2020 230 (H) <200 mg/dL Final     Comment:     Desirable:       <200 mg/dl   07/05/2019 226 (H) <200 mg/dL Final     Comment:     Desirable:       <200 mg/dl     HDL Cholesterol   Date Value Ref Range Status   09/08/2020 40 >39 mg/dL Final   01/16/2020 43 >39 mg/dL Final   07/05/2019 38 (L) >39 mg/dL Final     Direct Measure HDL   Date Value Ref Range Status   10/14/2021 41 >=40 mg/dL Final     LDL Cholesterol Calculated   Date Value Ref Range Status   10/14/2021 104 (H) <=100 mg/dL Final   09/08/2020 83 <100 mg/dL Final     Comment:     Desirable:       <100 mg/dl   01/16/2020 151 (H) <100 mg/dL Final     Comment:     Above desirable:  100-129 mg/dl  Borderline High:  130-159 mg/dL  High:             160-189 mg/dL  Very high:       >189 mg/dl     07/05/2019 152 (H) <100 mg/dL Final     Comment:     Above desirable:  100-129 mg/dl  Borderline High:  130-159 mg/dL  High:             160-189 mg/dL  Very high:       >189 mg/dl       Triglycerides   Date Value Ref Range Status   10/14/2021 197 (H) <150 mg/dL Final   09/08/2020 170 (H) <150 mg/dL Final     Comment:     Borderline high:  150-199 mg/dl  High:             200-499 mg/dl  Very high:       >499 mg/dl  Non Fasting     01/16/2020 180 (H) <150 mg/dL Final     Comment:     Borderline high:  150-199  "mg/dl  High:             200-499 mg/dl  Very high:       >499 mg/dl     07/05/2019 182 (H) <150 mg/dL Final     Comment:     Borderline high:  150-199 mg/dl  High:             200-499 mg/dl  Very high:       >499 mg/dl       No results found for: CHOLHDLRATIO    Most recent CBC:  Recent Labs   Lab Test 12/09/21  1047 09/08/20  1136 07/05/19  1009   WBC 9.0 12.0* 12.6*   HGB 12.2* 12.7* 12.5*   HCT 38.1* 40.6 39.5*    297 272     Most recent hepatic panel:  Recent Labs   Lab Test 02/09/22  1014 12/09/21  1047   ALT 79* 83*   AST 40 32     Most recent creatinine:  Recent Labs   Lab Test 02/09/22  1014 10/14/21  0906   CR 0.49* 0.73       No components found for: GFRESETIMATEDLASTLAB(gfrestblack:1@  Lab Results   Component Value Date    ALBUMIN 3.6 09/08/2020       Last Thyroid Profile:   TSH   Date Value Ref Range Status   09/08/2020 3.18 0.40 - 4.00 mU/L Final   07/05/2019 3.40 0.40 - 4.00 mU/L Final   01/08/2019 2.13 0.40 - 4.00 mU/L Final     T4 Free   Date Value Ref Range Status   07/05/2019 0.79 0.76 - 1.46 ng/dL Final       Last Mineral Profile:   No results found for: MIRANDA, IRON, FEB    Autoimmune & Inflammatory   CRP Inflammation   Date Value Ref Range Status   12/10/2020 8.0 0.0 - 8.0 mg/L Final         Last Vitamin Profile:   No results found for: QPH344, HUDH060, WBRQ14GDCQB, VITD3, D2VIT, D3VIT, DTOT, [id number], [id number], [id number], [id number], [id number], [id number]    ANTHROPOMETRICS:  Vitals:   BP Readings from Last 1 Encounters:   03/08/22 120/86     Pulse Readings from Last 1 Encounters:   03/08/22 91     Estimated body mass index is 48.4 kg/m  as calculated from the following:    Height as of 3/8/22: 1.88 m (6' 2\").    Weight as of 3/8/22: 171 kg (377 lb).    Wt Readings from Last 5 Encounters:   03/08/22 (!) 171 kg (377 lb)   02/09/22 (!) 172.8 kg (381 lb)   12/09/21 (!) 168.5 kg (371 lb 8 oz)   12/02/21 (!) 168.3 kg (371 lb)   10/14/21 (!) 166.4 kg (366 lb 12.8 oz)       Weight Change: " would like to lose 100lbs       NUTRITION DIAGNOSIS:   1. Altered nutrition-related laboratory values related to endocrine dysfunction as evidenced by elevated BMI, HbA1c     2. Altered nutrition-related laboratory values related to cardiac as evidenced by elevated Total chol, elevated TG, LDL, and low HDL.     3. Overweight/Obesity related to food and nutrition related knowledge deficit (excessive CHO intake, Inadequate fiber intake, inappropriate intake of healthy omega 3 fatty acids) as evidenced by nutrition intake record (limited variety of foods), A1c >6%, recent labs.    4. Overweight/obesity related to inability to sustain diet/lifestyle change, as evidenced by many previous attempts at weight loss with weight regain over the past five years.     NUTRITION INTERVENTION   CARDIOMETABOLIC     Long Term Goals:   Goal: Lipid profile; Decrease TG <150 mg/dL, Decrease LDL <100mg/dL within 6 months   Goal: Lose 20-24lbs in 6 months.   Goal: Decrease Hemoglobin A1c <6 % within 2-6 months   Goal: Increase hemoglobin levels within 6 months     Short Term Goals:    1. Small Improvement Shown - needs to keep working on it. Focus on cutting back on carbs and increase protein intake - see handouts and info below - for example try to cut back on bag of chips and or 1 can of pop per day.  2.   Small Improvement Shown- Continue having snack 1-2x daily with healthy fats (ex: guac, nuts, hummus) and add a fruit/veggie for fiber- apple or banana with nut/seed butter (almond butter ) or sunflower seeds (sunflower seed butter with celery ) see ideas discussed and provided   3. Some improvement- Keep cutting back on pop and increasing water   4. Improvement Shown- Increase activity at least 15-60 mins.     Continue walking 15 mins daily. With Helena doing activity at least 2 days with Helena. Set alarm and put in calendar for reminder to get up and move.     5. No Improvement Shown, forgets to do this- Test blood sugars to see how  meal impact them.     6. Discussed trying to get a fruit in once per day for snack.     Test Blood Sugars    Measure your fasting blood sugar daily, first thing in morning before breakfast. Ideally your fasting blood sugar should be between 70-80 mg/dL.     Measure your blood sugar two hours after breakfast and two hours after dinner. Ideally your two-hour sugars should never go over 120mg/dL.     Mediterranean Approach: Eat whole, unprocessed real foods in their unprocessed forms such as fruits, vegetables, whole grains (prefer gluten free), nuts, legumes, extra virgin olive oil, spices, modest amounts of poultry, and fish.      Cardiometabolic Food plan - 1,800 calories per day     Protein 10-12 servings per day - include at each meal to stabilize blood sugars   (Choose 3oz or 21g per meal and aim for 1oz of 7g for snacks)   - Strive for 1-2 servings of fish per week especially of higher omega-3 fatty acid containing fish such as salmon.     Legumes 2 serving per day     Dairy alternatives 2-3 serving per day     Nuts and seeds 3-4 servings per day - great to incorporate as snacks    Fats and oils 4 servings per day     Non starchy vegetables 8-10 servings per day     Starchy vegetable limit 1 serving per day as they tend to impact blood sugar (they are moderate-GI).     Fruits 2 servings per day - best to couple with a little bit of protein or fat to offset a rise in blood sugars (they are low-moderate-GI foods).     Whole grains 2 serving per day - try gluten free whole grains instead      Choose Low Glycemic (GI) foods: Regulate your sugar levels by eating foods that do not spike blood sugars.  Eat low -GI foods so only small fluctuations in blood glucose and insulin levels are produced.      Examples of low-GI foods: nuts, seeds, GF oats, most vegetables especially non-starchy and fruits.     Medium or high-GI foods should be eaten with a protein or fat, both of which blunt the glycemic effect of these foods.  This reduces the overall glycemic impact of a meal.   Ex: Most grains and starchy veggies are medium/high GI.     Avoid foods containing refined sugars, artificial sweeteners, and refined grains they are considered high-GI because they lead to sharp increases in blood sugars levels, which increase insulin sensitivity causing increased TG, and low good cholesterol (HDL).   Ex: cakes, cookies, pies, bread, sodas, fruit drinks, presweetened tea, coffee drinks, energy or sport drinks, flavored milk and other processed foods.     Choose foods high in fiber: Aim for at least 5 grams of fiber per serving of food or a total of 25-35 grams fiber per day. Remember, when looking at the label, you can take the fiber away from the total carbs. Ex:15g of total carbs - 4g of fiber = 11g net carbs     Insoluble fiber acts like a bulky  inner broom,  sweeping out debris from the intestine and creating more motility and movement.      Soluble fiber attracts water and swells, creating a gel that slows digestion.  Also, slows the release of glucose from foods into the blood which stops spikes in blood sugar levels.  Soluble fiber traps toxins in the gut, helping to carry them to excretion and provides healthy bacteria in the digestive tract.     Choose High Quality Fats: Adding anti-inflammatory fats into your diet such as fish (salmon, herring, mackerel, and sardines), omega 3 eggs, elidia seeds, ground flax seeds/milk, hemp seeds/milk, walnuts and some other certain leafy greens will increase omega-3 fats to omeaga-6 fats ratio.     Therapeutic fats both monounsaturated and polyunsaturated to include daily: ground flaxseeds, unsalted mixed nuts, avocados, olives, extra-virgin olive oil.     Emphasize high-quality oils and fats in the diet daily such as avocado oil, coconut oil, flaxseed, olive, sesame. Ex: 1 tsp to 1 tbsp of MCT oil from coconuts can be added into coffee, smoothies, and salad dressings per day.     Avoid trans fats  found in processed foods     Drink more water. Hydration is critical, so drink at least six to eight glasses of water a day. Drink more water between meals and less at meals.     Try adding herbal teas (sugar free) or lemon/lime/cucumber/fruit to water for flavor. Avoid artificial sweetener packets to flavor your water.     Cut back on coffee switch to green tea. Avoid adding sugar and milk to coffee instead use dairy alternatives such as almond, flax, coconut milk.       Increase physical activity. Moving our body helps move our bowels and speeds up your metabolism.     Exercise 15-60 minutes daily--whether that looks like burst training, yoga, or vigorous walking.      NUTRITION RESOURCES:  1. IFM Cardiometabolic Packet     Functional Nutrition Fundamentals     Comprehensive Guide    Meal plan with recipes       PATIENT'S BEHAVIOR CHANGE GOALS:   See nutrition intervention for patient stated behavior change goals. AVS was printed and given to patient at today's appointment.    MONITOR / EVALUATE:  Registered Dietitian will monitor/evaluate the following:     Beliefs and attitudes related to food    Food and nutrition knowledge / skills    Food / Beverage / Nutrient intake     Pertinent Labs    Progress toward meeting stated nutrition-related goals    Readiness to change nutrition-related behaviors    Weight change    Digestion     COORDINATION OF CARE:  Follow up with gastroenterology      FOLLOW-UP:  He didn't want to schedule a time to follow up. Discussed talking with Helena to find another time she can be on the consult with him and call into schedule a follow up.     Time spent 45 minutes:   Encounter: Individual    Angie Levy, RD, CLT, LD  Integrative Registered Dietitian

## 2022-05-22 ASSESSMENT — ENCOUNTER SYMPTOMS
PALPITATIONS: 0
EYE PAIN: 1
CHILLS: 0
DIARRHEA: 1
FEVER: 0
ABDOMINAL PAIN: 0
CONSTIPATION: 0
WEAKNESS: 0
HEMATURIA: 0
DYSURIA: 0
HEADACHES: 1
COUGH: 0
SHORTNESS OF BREATH: 0
JOINT SWELLING: 0
HEARTBURN: 0
ARTHRALGIAS: 1
PARESTHESIAS: 0
SORE THROAT: 0
NERVOUS/ANXIOUS: 0
DIZZINESS: 0
HEMATOCHEZIA: 1
MYALGIAS: 1
NAUSEA: 0
FREQUENCY: 1

## 2022-05-22 ASSESSMENT — PATIENT HEALTH QUESTIONNAIRE - PHQ9
SUM OF ALL RESPONSES TO PHQ QUESTIONS 1-9: 9
10. IF YOU CHECKED OFF ANY PROBLEMS, HOW DIFFICULT HAVE THESE PROBLEMS MADE IT FOR YOU TO DO YOUR WORK, TAKE CARE OF THINGS AT HOME, OR GET ALONG WITH OTHER PEOPLE: SOMEWHAT DIFFICULT
SUM OF ALL RESPONSES TO PHQ QUESTIONS 1-9: 9

## 2022-05-22 ASSESSMENT — ACTIVITIES OF DAILY LIVING (ADL): CURRENT_FUNCTION: NO ASSISTANCE NEEDED

## 2022-05-23 ENCOUNTER — MYC MEDICAL ADVICE (OUTPATIENT)
Dept: INTERNAL MEDICINE | Facility: CLINIC | Age: 32
End: 2022-05-23
Payer: COMMERCIAL

## 2022-05-26 ENCOUNTER — OFFICE VISIT (OUTPATIENT)
Dept: INTERNAL MEDICINE | Facility: CLINIC | Age: 32
End: 2022-05-26
Payer: COMMERCIAL

## 2022-05-26 VITALS
RESPIRATION RATE: 20 BRPM | BODY MASS INDEX: 40.43 KG/M2 | OXYGEN SATURATION: 96 % | WEIGHT: 315 LBS | SYSTOLIC BLOOD PRESSURE: 132 MMHG | DIASTOLIC BLOOD PRESSURE: 84 MMHG | HEIGHT: 74 IN | HEART RATE: 110 BPM | TEMPERATURE: 97.4 F

## 2022-05-26 DIAGNOSIS — R37 SEXUAL DYSFUNCTION: ICD-10-CM

## 2022-05-26 DIAGNOSIS — E11.65 TYPE 2 DIABETES MELLITUS WITH HYPERGLYCEMIA, WITHOUT LONG-TERM CURRENT USE OF INSULIN (H): ICD-10-CM

## 2022-05-26 DIAGNOSIS — F70 MILD INTELLECTUAL DISABILITIES: Primary | ICD-10-CM

## 2022-05-26 DIAGNOSIS — E66.01 MORBID OBESITY WITH BODY MASS INDEX OF 45.0-49.9 IN ADULT (H): ICD-10-CM

## 2022-05-26 PROBLEM — E11.9 DIABETES MELLITUS, TYPE 2 (H): Status: RESOLVED | Noted: 2022-05-26 | Resolved: 2022-05-26

## 2022-05-26 PROBLEM — E11.9 DIABETES MELLITUS, TYPE 2 (H): Status: RESOLVED | Noted: 2019-02-27 | Resolved: 2022-05-26

## 2022-05-26 PROBLEM — E11.9 DIABETES MELLITUS, TYPE 2 (H): Status: ACTIVE | Noted: 2022-05-26

## 2022-05-26 PROBLEM — E11.649 TYPE 2 DIABETES MELLITUS WITH HYPOGLYCEMIA, WITHOUT LONG-TERM CURRENT USE OF INSULIN (H): Status: ACTIVE | Noted: 2022-05-26

## 2022-05-26 LAB
ANION GAP SERPL CALCULATED.3IONS-SCNC: 9 MMOL/L (ref 3–14)
BUN SERPL-MCNC: 7 MG/DL (ref 7–30)
CALCIUM SERPL-MCNC: 9.6 MG/DL (ref 8.5–10.1)
CHLORIDE BLD-SCNC: 98 MMOL/L (ref 94–109)
CO2 SERPL-SCNC: 26 MMOL/L (ref 20–32)
CREAT SERPL-MCNC: 0.56 MG/DL (ref 0.66–1.25)
GFR SERPL CREATININE-BSD FRML MDRD: >90 ML/MIN/1.73M2
GLUCOSE BLD-MCNC: 445 MG/DL (ref 70–99)
HBA1C MFR BLD: 10.6 % (ref 0–5.6)
POTASSIUM BLD-SCNC: 4.2 MMOL/L (ref 3.4–5.3)
SODIUM SERPL-SCNC: 133 MMOL/L (ref 133–144)

## 2022-05-26 PROCEDURE — 83036 HEMOGLOBIN GLYCOSYLATED A1C: CPT | Performed by: INTERNAL MEDICINE

## 2022-05-26 PROCEDURE — 99214 OFFICE O/P EST MOD 30 MIN: CPT | Performed by: INTERNAL MEDICINE

## 2022-05-26 PROCEDURE — 80048 BASIC METABOLIC PNL TOTAL CA: CPT | Performed by: INTERNAL MEDICINE

## 2022-05-26 PROCEDURE — 36415 COLL VENOUS BLD VENIPUNCTURE: CPT | Performed by: INTERNAL MEDICINE

## 2022-05-26 ASSESSMENT — ACTIVITIES OF DAILY LIVING (ADL): CURRENT_FUNCTION: NO ASSISTANCE NEEDED

## 2022-05-26 ASSESSMENT — PAIN SCALES - GENERAL: PAINLEVEL: MILD PAIN (2)

## 2022-05-26 NOTE — PROGRESS NOTES
"SUBJECTIVE:   CC: Bertram Foreman is an 31 year old male who presents for preventative health visit. He is a male with high functioning mild intellectual disabilities. He presents today with PCA for his annual health visit. He reports he does not check his BS, or really watches what he eats, for his DM-2. He does take all his medications regularly without lapse. No associated SOB, CP, NVD, or decreased sensation. He also complains of decreased sperm output, that he notices when he is intimate with his partner. He does not have issues with erection.     Patient has been advised of split billing requirements and indicates understanding: Yes  Healthy Habits:     In general, how would you rate your overall health?  Fair    Frequency of exercise:  2-3 days/week    Duration of exercise:  Less than 15 minutes    Do you usually eat at least 4 servings of fruit and vegetables a day, include whole grains    & fiber and avoid regularly eating high fat or \"junk\" foods?  No    Taking medications regularly:  Yes    Medication side effects:  None    Ability to successfully perform activities of daily living:  No assistance needed    Home Safety:  No safety concerns identified    Hearing Impairment:  No hearing concerns    In the past 6 months, have you been bothered by leaking of urine? Yes    In general, how would you rate your overall mental or emotional health?  Fair      PHQ-2 Total Score: 2    Additional concerns today:  Yes              Today's PHQ-2 Score:   PHQ-2 ( 1999 [employer]) 5/22/2022   Q1: Little interest or pleasure in doing things 1   Q2: Feeling down, depressed or hopeless 1   PHQ-2 Score 2   PHQ-2 Total Score (12-17 Years)- Positive if 3 or more points; Administer PHQ-A if positive -   Q1: Little interest or pleasure in doing things Several days   Q2: Feeling down, depressed or hopeless Several days   PHQ-2 Score 2       Abuse: Current or Past(Physical, Sexual or Emotional)- No  Do you feel safe in your " "environment? Yes        Social History     Tobacco Use     Smoking status: Former Smoker     Packs/day: 1.00     Years: 0.00     Pack years: 0.00     Types: Cigarettes     Start date: 2014     Quit date: 3/1/2014     Years since quittin.2     Smokeless tobacco: Never Used   Substance Use Topics     Alcohol use: Yes     Alcohol/week: 0.0 standard drinks     Comment: rare     If you drink alcohol do you typically have >3 drinks per day or >7 drinks per week? No    Alcohol Use 2022   Prescreen: >3 drinks/day or >7 drinks/week? Not Applicable   Prescreen: >3 drinks/day or >7 drinks/week? -       Last PSA: No results found for: PSA    Reviewed orders with patient. Reviewed health maintenance and updated orders accordingly - Yes  Lab work is in process    Reviewed and updated as needed this visit by clinical staff                    Reviewed and updated as needed this visit by Provider                 CONSTITUTIONAL: NEGATIVE for fever, chills, change in weight  INTEGUMENTARY/SKIN: NEGATIVE for worrisome rashes, moles or lesions  EYES: NEGATIVE for vision changes or irritation  ENT/MOUTH: NEGATIVE for ear, mouth and throat problems  RESP: NEGATIVE for significant cough or SOB  BREAST: NEGATIVE for masses, tenderness or discharge  CV: NEGATIVE for chest pain, palpitations or peripheral edema  GI: NEGATIVE for nausea, abdominal pain, heartburn, or change in bowel habits  : Positive for frequency. NEGATIVE for dysuria, or hematuria  MUSCULOSKELETAL: NEGATIVE for significant arthralgias or myalgia  NEURO: NEGATIVE for weakness, dizziness or paresthesias  ENDOCRINE: NEGATIVE for temperature intolerance, skin/hair changes  HEME: NEGATIVE for bleeding problems  PSYCHIATRIC: NEGATIVE for changes in mood or affect        OBJECTIVE:   /84 (BP Location: Right arm, Patient Position: Sitting, Cuff Size: Adult Large)   Pulse 110   Temp 97.4  F (36.3  C) (Temporal)   Resp 20   Ht 1.88 m (6' 2\")   Wt (!) 171.1 " kg (377 lb 3.2 oz)   SpO2 96%   BMI 48.43 kg/m      Physical Exam  GENERAL: Obese, alert and no distress  EYES: Eyes grossly normal to inspection, PERRL and conjunctivae and sclerae normal  HENT: ear canals and TM's normal, nose and mouth without ulcers or lesions  NECK: no adenopathy, no asymmetry, masses, or scars and thyroid normal to palpation  RESP: lungs clear to auscultation - no rales, rhonchi or wheezes  CV: regular rate and rhythm, normal S1 S2, no S3 or S4, no murmur, click or rub, no peripheral edema and peripheral pulses strong  ABDOMEN: soft, nontender, no hepatosplenomegaly, no masses and bowel sounds normal  MS: no gross musculoskeletal defects noted, no edema  SKIN: no suspicious lesions or rashes  NEURO: Normal strength and tone, mentation intact and speech normal  PSYCH: Mentally disabled. mentation appears normal, affect normal/bright    Diagnostic Test Results:  Labs reviewed in Epic    ASSESSMENT/PLAN:   (F70) Mild intellectual disabilities  (primary encounter diagnosis)  Comment: Well managed. Has PCA for specific needs.    (E11.65) Type 2 diabetes mellitus with hyperglycemia, without long-term current use of insulin (H)  Comment: He does not check his blood sugar. Had a lengthy discussion about the harmful effects of diabetes on his health.   Plan: Check HGB A1C and continue current diabetes medication regimen.    (E66.01,  Z68.42) Morbid obesity with body mass index of 45.0-49.9 in adult (H)  Comment/Plan: Recommended to improve his diet, and exercise.    (R37) Sexual dysfunction  Comment/Plan: Had a lengthy conversation with the patient that his current psych meds, Seroquel and Depakote will cause sexual dysfunction. He acknowledges he needs to stay on these medications and that there is not a treatment currently that will improve his low semen volume.    Patient has been advised of split billing requirements and indicates understanding: Yes    COUNSELING:   Reviewed preventive health  "counseling, as reflected in patient instructions       Regular exercise       Healthy diet/nutrition       Vision screening       Safe sex practices/STD prevention    Estimated body mass index is 48.4 kg/m  as calculated from the following:    Height as of 3/8/22: 1.88 m (6' 2\").    Weight as of 3/8/22: 171 kg (377 lb).     Weight management plan: Discussed healthy diet and exercise guidelines    He reports that he quit smoking about 8 years ago. His smoking use included cigarettes. He started smoking about 8 years ago. He smoked 1.00 pack per day for 0.00 years. He has never used smokeless tobacco.        Time spent With/On Patient  Length of time   33 minutes      Chart reviewed  y    Review of outside records y    Review of test results y    Interpretation of results y     Patient visit  y  Documentation  y  Discussion with family y  Discussion with providers harish Ruvaclaba [facility]      This patient has been interviewed, examined, diagnosed, and informed of the above by me personally.  Medical records and available pertinent information has been reviewed by me personally.  All decisions and discussion have been between myself and the patient/family.  This was done in the presence of Mitchell Gaston, who acted as a medical scribe and recorded the events above.  No diagnosis or decision making was made by the above-mentioned scribe.  The patient, and or his/her ensurors will not be billed for the presence or actions of this scribe.  The information recorded by the scribe has been reviewed by me and found to be accurate.    "

## 2022-05-30 NOTE — PATIENT INSTRUCTIONS
NUTRITION INTERVENTION   CARDIOMETABOLIC     Long Term Goals:   Goal: Lipid profile; Decrease TG <150 mg/dL, Decrease LDL <100mg/dL within 6 months   Goal: Lose 20-24lbs in 6 months.   Goal: Decrease Hemoglobin A1c <6 % within 2-6 months   Goal: Increase hemoglobin levels within 6 months     Short Term Goals:    Small Improvement Shown - needs to keep working on it. Focus on cutting back on carbs and increase protein intake - see handouts and info below - for example try to cut back on bag of chips and or 1 can of pop per day.  2.   Small Improvement Shown- Continue having snack 1-2x daily with healthy fats (ex: guac, nuts, hummus) and add a fruit/veggie for fiber- apple or banana with nut/seed butter (almond butter ) or sunflower seeds (sunflower seed butter with celery ) see ideas discussed and provided   3. Some improvement- Keep cutting back on pop and increasing water   4. Improvement Shown- Increase activity at least 15-60 mins.     Continue walking 15 mins daily. With Helena doing activity at least 2 days with Helena. Set alarm and put in calendar for reminder to get up and move.     5. No Improvement Shown, forgets to do this- Test blood sugars to see how meal impact them.     6. Discussed trying to get a fruit in once per day for snack.     Test Blood Sugars  Measure your fasting blood sugar daily, first thing in morning before breakfast. Ideally your fasting blood sugar should be between 70-80 mg/dL.   Measure your blood sugar two hours after breakfast and two hours after dinner. Ideally your two-hour sugars should never go over 120mg/dL.     Mediterranean Approach: Eat whole, unprocessed real foods in their unprocessed forms such as fruits, vegetables, whole grains (prefer gluten free), nuts, legumes, extra virgin olive oil, spices, modest amounts of poultry, and fish.      Cardiometabolic Food plan - 1,800 calories per day   Protein 10-12 servings per day - include at each meal to stabilize blood sugars    (Choose 3oz or 21g per meal and aim for 1oz of 7g for snacks)   Strive for 1-2 servings of fish per week especially of higher omega-3 fatty acid containing fish such as salmon.   Legumes 2 serving per day   Dairy alternatives 2-3 serving per day   Nuts and seeds 3-4 servings per day - great to incorporate as snacks  Fats and oils 4 servings per day   Non starchy vegetables 8-10 servings per day   Starchy vegetable limit 1 serving per day as they tend to impact blood sugar (they are moderate-GI).   Fruits 2 servings per day - best to couple with a little bit of protein or fat to offset a rise in blood sugars (they are low-moderate-GI foods).   Whole grains 2 serving per day - try gluten free whole grains instead      Choose Low Glycemic (GI) foods: Regulate your sugar levels by eating foods that do not spike blood sugars.  Eat low -GI foods so only small fluctuations in blood glucose and insulin levels are produced.      Examples of low-GI foods: nuts, seeds, GF oats, most vegetables especially non-starchy and fruits.     Medium or high-GI foods should be eaten with a protein or fat, both of which blunt the glycemic effect of these foods. This reduces the overall glycemic impact of a meal.   Ex: Most grains and starchy veggies are medium/high GI.     Avoid foods containing refined sugars, artificial sweeteners, and refined grains they are considered high-GI because they lead to sharp increases in blood sugars levels, which increase insulin sensitivity causing increased TG, and low good cholesterol (HDL).   Ex: cakes, cookies, pies, bread, sodas, fruit drinks, presweetened tea, coffee drinks, energy or sport drinks, flavored milk and other processed foods.     Choose foods high in fiber: Aim for at least 5 grams of fiber per serving of food or a total of 25-35 grams fiber per day. Remember, when looking at the label, you can take the fiber away from the total carbs. Ex:15g of total carbs - 4g of fiber = 11g net  carbs     Insoluble fiber acts like a bulky  inner broom,  sweeping out debris from the intestine and creating more motility and movement.      Soluble fiber attracts water and swells, creating a gel that slows digestion.  Also, slows the release of glucose from foods into the blood which stops spikes in blood sugar levels.  Soluble fiber traps toxins in the gut, helping to carry them to excretion and provides healthy bacteria in the digestive tract.     Choose High Quality Fats: Adding anti-inflammatory fats into your diet such as fish (salmon, herring, mackerel, and sardines), omega 3 eggs, elidia seeds, ground flax seeds/milk, hemp seeds/milk, walnuts and some other certain leafy greens will increase omega-3 fats to omeaga-6 fats ratio.     Therapeutic fats both monounsaturated and polyunsaturated to include daily: ground flaxseeds, unsalted mixed nuts, avocados, olives, extra-virgin olive oil.     Emphasize high-quality oils and fats in the diet daily such as avocado oil, coconut oil, flaxseed, olive, sesame. Ex: 1 tsp to 1 tbsp of MCT oil from coconuts can be added into coffee, smoothies, and salad dressings per day.   Avoid trans fats found in processed foods     Drink more water. Hydration is critical, so drink at least six to eight glasses of water a day. Drink more water between meals and less at meals.   Try adding herbal teas (sugar free) or lemon/lime/cucumber/fruit to water for flavor. Avoid artificial sweetener packets to flavor your water.   Cut back on coffee switch to green tea. Avoid adding sugar and milk to coffee instead use dairy alternatives such as almond, flax, coconut milk.       Increase physical activity. Moving our body helps move our bowels and speeds up your metabolism.   Exercise 15-60 minutes daily--whether that looks like burst training, yoga, or vigorous walking.      NUTRITION RESOURCES:  IFM Cardiometabolic Packet   Functional Nutrition Fundamentals   Comprehensive Guide  Meal plan  with recipes

## 2022-05-31 ENCOUNTER — TELEPHONE (OUTPATIENT)
Dept: NUTRITION | Facility: CLINIC | Age: 32
End: 2022-05-31

## 2022-05-31 NOTE — TELEPHONE ENCOUNTER
5/31 Provided phone number [phone] to schedule follow up  in about 3 months (around 8/17/2022.    Serena gongora Procedure   Orthopedics, Podiatry, Sports Medicine, ENT/Eye Specialties  [facility]   [phone]

## 2022-06-03 NOTE — TELEPHONE ENCOUNTER
6/3 2nd attempt.  Provided phone number [phone] to schedule follow up  in about 3 months (around 8/17/2022.    Serena gongora Procedure   Orthopedics, Podiatry, Sports Medicine, ENT/Eye Specialties  [facility]   [phone]

## 2022-06-08 NOTE — TELEPHONE ENCOUNTER
Pt returned call. Stated she will be seeing a Dietician outside of [facility]. She would like to be removed from the call list.

## 2022-06-13 NOTE — PROGRESS NOTES
Does Bertram have a CPAP/Bipap? No       [place] Sleep Scale: 13  Obstructive Sleep Apnea - PAP Follow-Up Visit:    Chief Complaint   Patient presents with     CPAP Follow Up       Bertram Foreman comes in today for follow-up of their severe sleep apnea,he had to return his CPAP due to non-compliance. He did not tolerate the mask well, felt it was difficult to breathe with the auto cpap on due to the pressure. Due to covid restrictions it was challenging for him to get a well fitted mask as he was not able to try a variety of masks on. He has severe complex sleep apnea .6, central apnea index 48.7.     No specialty comments available.    Total score - [place]: 13 (6/14/2022 11:17 AM)      ABDI Total Score: 24                Past medical/surgical history, family history, social history, medications and allergies were reviewed.      Problem List:  Patient Active Problem List    Diagnosis Date Noted     Type 2 diabetes mellitus with hypoglycemia, without long-term current use of insulin (H) 05/26/2022     Priority: Medium     Sexual dysfunction 05/26/2022     Priority: Medium     Intellectual disability 02/04/2019     Priority: Medium     Recurrent major depressive disorder, in partial remission (H) 02/04/2019     Priority: Medium     Morbid obesity (H) 01/08/2019     Priority: Medium     Morbid obesity with body mass index of 45.0-49.9 in adult (H) 04/04/2017     Priority: Medium     Mantoux: positive, treated 05/01/2011     Priority: Medium     Overview:   5/2011, went through (more than) 9 months of isoniazid.  NOT TO HAVE MANTOUX IN FUTURE       Mild intellectual disabilities 07/15/2010     Priority: Medium        There were no vitals taken for this visit.    Impression/Plan:    Severe Complex Sleep apnea. He did not Tolerate PAP well, returned due to non compliance. Discussed a titration study to consider BIPAP, AVAPS, benefit of a technician working with him on mask options. He was adamant that he did not  want another study, we also discussed tracheostomy due to severity of sleep apnea. He would not be interested in that either. Discussed implications of not treating a severe sleep apnea, premature death. States he would rather die than come back into the sleep lab. His  (Lynne) was present for the call. He is encouraged to give it some time to reconsider repeating a study and to contact the sleep lab if he changes his mind.       Bertram Foreman will follow up as needed.     Fifteen minutes spent with patient, all of which were spent face-to-face counseling, consulting, coordinating plan of care.      CC:  Tremaine Ruvalcaba,

## 2022-06-14 ENCOUNTER — TELEPHONE (OUTPATIENT)
Dept: INTERNAL MEDICINE | Facility: CLINIC | Age: 32
End: 2022-06-14

## 2022-06-14 ENCOUNTER — VIRTUAL VISIT (OUTPATIENT)
Dept: SLEEP MEDICINE | Facility: CLINIC | Age: 32
End: 2022-06-14
Payer: COMMERCIAL

## 2022-06-14 DIAGNOSIS — G47.30 SEVERE SLEEP APNEA: Primary | ICD-10-CM

## 2022-06-14 PROCEDURE — 99213 OFFICE O/P EST LOW 20 MIN: CPT | Mod: 95 | Performed by: PHYSICIAN ASSISTANT

## 2022-06-14 ASSESSMENT — SLEEP AND FATIGUE QUESTIONNAIRES
HOW LIKELY ARE YOU TO NOD OFF OR FALL ASLEEP WHEN YOU ARE A PASSENGER IN A CAR FOR AN HOUR WITHOUT A BREAK: SLIGHT CHANCE OF DOZING
HOW LIKELY ARE YOU TO NOD OFF OR FALL ASLEEP IN A CAR, WHILE STOPPED FOR A FEW MINUTES IN TRAFFIC: SLIGHT CHANCE OF DOZING
HOW LIKELY ARE YOU TO NOD OFF OR FALL ASLEEP WHILE SITTING QUIETLY AFTER LUNCH WITHOUT ALCOHOL: MODERATE CHANCE OF DOZING
HOW LIKELY ARE YOU TO NOD OFF OR FALL ASLEEP WHILE SITTING AND TALKING TO SOMEONE: MODERATE CHANCE OF DOZING
HOW LIKELY ARE YOU TO NOD OFF OR FALL ASLEEP WHILE SITTING INACTIVE IN A PUBLIC PLACE: MODERATE CHANCE OF DOZING
HOW LIKELY ARE YOU TO NOD OFF OR FALL ASLEEP WHILE SITTING AND READING: SLIGHT CHANCE OF DOZING
HOW LIKELY ARE YOU TO NOD OFF OR FALL ASLEEP WHILE LYING DOWN TO REST IN THE AFTERNOON WHEN CIRCUMSTANCES PERMIT: MODERATE CHANCE OF DOZING
HOW LIKELY ARE YOU TO NOD OFF OR FALL ASLEEP WHILE WATCHING TV: MODERATE CHANCE OF DOZING

## 2022-06-14 NOTE — TELEPHONE ENCOUNTER
Patient calling wanting to make an appointment with Diabetic Educator please call patient at phone# [phone].    Danielle Gil     [facility]

## 2022-07-13 ENCOUNTER — MYC MEDICAL ADVICE (OUTPATIENT)
Dept: INTERNAL MEDICINE | Facility: CLINIC | Age: 32
End: 2022-07-13

## 2022-07-13 DIAGNOSIS — K64.4 EXTERNAL HEMORRHOIDS: Primary | ICD-10-CM

## 2022-07-13 RX ORDER — HYDROCORTISONE 25 MG/G
CREAM TOPICAL 2 TIMES DAILY PRN
Qty: 30 G | Refills: 0 | Status: SHIPPED | OUTPATIENT
Start: 2022-07-13 | End: 2022-08-30

## 2022-07-25 ENCOUNTER — E-VISIT (OUTPATIENT)
Dept: FAMILY MEDICINE | Facility: CLINIC | Age: 32
End: 2022-07-25
Payer: COMMERCIAL

## 2022-07-25 DIAGNOSIS — Z20.822 SUSPECTED COVID-19 VIRUS INFECTION: Primary | ICD-10-CM

## 2022-07-25 PROCEDURE — 99207 PR NON-BILLABLE SERV PER CHARTING: CPT | Performed by: INTERNAL MEDICINE

## 2022-07-28 DIAGNOSIS — K64.4 EXTERNAL HEMORRHOIDS: ICD-10-CM

## 2022-07-28 RX ORDER — HYDROCORTISONE 25 MG/G
CREAM TOPICAL 2 TIMES DAILY PRN
Refills: 0 | OUTPATIENT
Start: 2022-07-28

## 2022-07-28 NOTE — CONFIDENTIAL NOTE
Hydrocortisone (Perianal) (Alanna-sol-HC) 2.5% cream  Place rectally two daily as needed for hemorrhoids    Last Written Prescription Date:  7/13/2022   *this was written by PCP   Last Fill Quantity: 30g ,  # refills: 0  Last office visit: Visit date not found with prescribing provider:  2/23/2021  Future Office Visit: None scheduled.       Refused as duplicate. PCP/IM MD just filled on 7/13/2022.     Karla Reynoso RN

## 2022-08-02 DIAGNOSIS — E11.9 TYPE 2 DIABETES MELLITUS WITHOUT COMPLICATION, WITHOUT LONG-TERM CURRENT USE OF INSULIN (H): ICD-10-CM

## 2022-08-03 RX ORDER — LANCETS
EACH MISCELLANEOUS
Qty: 100 EACH | Refills: 1 | Status: SHIPPED | OUTPATIENT
Start: 2022-08-03 | End: 2024-08-05

## 2022-08-09 ENCOUNTER — VIRTUAL VISIT (OUTPATIENT)
Dept: PSYCHOLOGY | Facility: CLINIC | Age: 32
End: 2022-08-09
Payer: COMMERCIAL

## 2022-08-09 DIAGNOSIS — F43.23 ADJUSTMENT DISORDER WITH MIXED ANXIETY AND DEPRESSED MOOD: ICD-10-CM

## 2022-08-09 DIAGNOSIS — F79 INTELLECTUAL DISABILITY: Primary | ICD-10-CM

## 2022-08-09 PROCEDURE — 90791 PSYCH DIAGNOSTIC EVALUATION: CPT | Mod: 95 | Performed by: PSYCHOLOGIST

## 2022-08-09 ASSESSMENT — ANXIETY QUESTIONNAIRES
7. FEELING AFRAID AS IF SOMETHING AWFUL MIGHT HAPPEN: SEVERAL DAYS
GAD7 TOTAL SCORE: 5
5. BEING SO RESTLESS THAT IT IS HARD TO SIT STILL: NOT AT ALL
2. NOT BEING ABLE TO STOP OR CONTROL WORRYING: SEVERAL DAYS
6. BECOMING EASILY ANNOYED OR IRRITABLE: SEVERAL DAYS
3. WORRYING TOO MUCH ABOUT DIFFERENT THINGS: SEVERAL DAYS
GAD7 TOTAL SCORE: 5
1. FEELING NERVOUS, ANXIOUS, OR ON EDGE: NOT AT ALL
IF YOU CHECKED OFF ANY PROBLEMS ON THIS QUESTIONNAIRE, HOW DIFFICULT HAVE THESE PROBLEMS MADE IT FOR YOU TO DO YOUR WORK, TAKE CARE OF THINGS AT HOME, OR GET ALONG WITH OTHER PEOPLE: SOMEWHAT DIFFICULT

## 2022-08-09 ASSESSMENT — PATIENT HEALTH QUESTIONNAIRE - PHQ9
SUM OF ALL RESPONSES TO PHQ QUESTIONS 1-9: 10
5. POOR APPETITE OR OVEREATING: SEVERAL DAYS

## 2022-08-09 ASSESSMENT — COLUMBIA-SUICIDE SEVERITY RATING SCALE - C-SSRS
6. HAVE YOU EVER DONE ANYTHING, STARTED TO DO ANYTHING, OR PREPARED TO DO ANYTHING TO END YOUR LIFE?: NO
1. IN THE PAST MONTH, HAVE YOU WISHED YOU WERE DEAD OR WISHED YOU COULD GO TO SLEEP AND NOT WAKE UP?: NO
TOTAL  NUMBER OF ABORTED OR SELF INTERRUPTED ATTEMPTS LIFETIME: NO
2. HAVE YOU ACTUALLY HAD ANY THOUGHTS OF KILLING YOURSELF?: NO
ATTEMPT LIFETIME: NO
TOTAL  NUMBER OF INTERRUPTED ATTEMPTS LIFETIME: NO
2. HAVE YOU ACTUALLY HAD ANY THOUGHTS OF KILLING YOURSELF?: YES
5. HAVE YOU STARTED TO WORK OUT OR WORKED OUT THE DETAILS OF HOW TO KILL YOURSELF? DO YOU INTEND TO CARRY OUT THIS PLAN?: NO
1. HAVE YOU WISHED YOU WERE DEAD OR WISHED YOU COULD GO TO SLEEP AND NOT WAKE UP?: YES
3. HAVE YOU BEEN THINKING ABOUT HOW YOU MIGHT KILL YOURSELF?: YES
REASONS FOR IDEATION LIFETIME: MOSTLY TO END OR STOP THE PAIN (YOU COULDN'T GO ON LIVING WITH THE PAIN OR HOW YOU WERE FEELING)
4. HAVE YOU HAD THESE THOUGHTS AND HAD SOME INTENTION OF ACTING ON THEM?: NO
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
## 2022-08-15 ASSESSMENT — PATIENT HEALTH QUESTIONNAIRE - PHQ9
SUM OF ALL RESPONSES TO PHQ QUESTIONS 1-9: 10
10. IF YOU CHECKED OFF ANY PROBLEMS, HOW DIFFICULT HAVE THESE PROBLEMS MADE IT FOR YOU TO DO YOUR WORK, TAKE CARE OF THINGS AT HOME, OR GET ALONG WITH OTHER PEOPLE: SOMEWHAT DIFFICULT
SUM OF ALL RESPONSES TO PHQ QUESTIONS 1-9: 10

## 2022-08-16 ENCOUNTER — VIRTUAL VISIT (OUTPATIENT)
Dept: PSYCHOLOGY | Facility: CLINIC | Age: 32
End: 2022-08-16
Payer: COMMERCIAL

## 2022-08-16 DIAGNOSIS — F79 INTELLECTUAL DISABILITY: Primary | ICD-10-CM

## 2022-08-16 DIAGNOSIS — F43.23 ADJUSTMENT DISORDER WITH MIXED ANXIETY AND DEPRESSED MOOD: ICD-10-CM

## 2022-08-16 PROCEDURE — 90834 PSYTX W PT 45 MINUTES: CPT | Mod: 95 | Performed by: PSYCHOLOGIST
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
## 2022-08-20 DIAGNOSIS — E11.65 TYPE 2 DIABETES MELLITUS WITH HYPERGLYCEMIA, WITHOUT LONG-TERM CURRENT USE OF INSULIN (H): ICD-10-CM

## 2022-08-23 RX ORDER — GLIMEPIRIDE 4 MG/1
TABLET ORAL
Qty: 90 TABLET | Refills: 1 | Status: SHIPPED | OUTPATIENT
Start: 2022-08-23 | End: 2023-02-06

## 2022-08-23 NOTE — TELEPHONE ENCOUNTER
"Routing refill request to provider for review/approval because:    Requested Prescriptions   Pending Prescriptions Disp Refills    glimepiride (AMARYL) 4 MG tablet [Pharmacy Med Name: GLIMEPIRIDE 4MG TABLET] 90 tablet 1     Sig: TAKE 1 TABLET BY MOUTH ONCEDAILY EVERY MORNING BEFORE BREAKFAST        Sulfonylurea Agents Failed - 8/20/2022 10:14 AM        Failed - Patient has a recent creatinine (normal) within the past 12 mos.     Recent Labs   Lab Test 05/26/22  1043   CR 0.56*       Ok to refill medication if creatinine is low          Passed - Patient has documented A1c within the specified period of time.     If HgbA1C is 8 or greater, it needs to be on file within the past 3 months.  If less than 8, must be on file within the past 6 months.     Recent Labs   Lab Test 05/26/22  1043   A1C 10.6*             Passed - Medication is active on med list        Passed - Patient is age 18 or older        Passed - Recent (6 mo) or future (30 days) visit within the authorizing provider's specialty     Patient had office visit in the last 6 months or has a visit in the next 30 days with authorizing provider or within the authorizing provider's specialty.  See \"Patient Info\" tab in inbasket, or \"Choose Columns\" in Meds & Orders section of the refill encounter.                        "
no

## 2022-08-30 ENCOUNTER — OFFICE VISIT (OUTPATIENT)
Dept: INTERNAL MEDICINE | Facility: CLINIC | Age: 32
End: 2022-08-30
Payer: COMMERCIAL

## 2022-08-30 VITALS
SYSTOLIC BLOOD PRESSURE: 130 MMHG | HEART RATE: 100 BPM | OXYGEN SATURATION: 96 % | DIASTOLIC BLOOD PRESSURE: 88 MMHG | TEMPERATURE: 97.4 F | RESPIRATION RATE: 22 BRPM

## 2022-08-30 DIAGNOSIS — K59.1 FUNCTIONAL DIARRHEA: ICD-10-CM

## 2022-08-30 DIAGNOSIS — R19.7 DIARRHEA OF PRESUMED INFECTIOUS ORIGIN: ICD-10-CM

## 2022-08-30 DIAGNOSIS — E78.5 HYPERLIPIDEMIA LDL GOAL <130: ICD-10-CM

## 2022-08-30 DIAGNOSIS — F70 MILD INTELLECTUAL DISABILITIES: ICD-10-CM

## 2022-08-30 DIAGNOSIS — M79.671 PAIN IN BOTH FEET: ICD-10-CM

## 2022-08-30 DIAGNOSIS — E66.01 MORBID OBESITY WITH BODY MASS INDEX OF 45.0-49.9 IN ADULT (H): ICD-10-CM

## 2022-08-30 DIAGNOSIS — Z00.00 ROUTINE GENERAL MEDICAL EXAMINATION AT A HEALTH CARE FACILITY: ICD-10-CM

## 2022-08-30 DIAGNOSIS — M79.672 PAIN IN BOTH FEET: ICD-10-CM

## 2022-08-30 DIAGNOSIS — E11.65 TYPE 2 DIABETES MELLITUS WITH HYPERGLYCEMIA, WITHOUT LONG-TERM CURRENT USE OF INSULIN (H): Primary | ICD-10-CM

## 2022-08-30 DIAGNOSIS — A06.0: ICD-10-CM

## 2022-08-30 DIAGNOSIS — A06.9: ICD-10-CM

## 2022-08-30 LAB
ANION GAP SERPL CALCULATED.3IONS-SCNC: 6 MMOL/L (ref 3–14)
BUN SERPL-MCNC: 14 MG/DL (ref 7–30)
CALCIUM SERPL-MCNC: 9.7 MG/DL (ref 8.5–10.1)
CHLORIDE BLD-SCNC: 101 MMOL/L (ref 94–109)
CO2 SERPL-SCNC: 27 MMOL/L (ref 20–32)
CREAT SERPL-MCNC: 0.5 MG/DL (ref 0.66–1.25)
GFR SERPL CREATININE-BSD FRML MDRD: >90 ML/MIN/1.73M2
GLUCOSE BLD-MCNC: 397 MG/DL (ref 70–99)
HBA1C MFR BLD: 11.4 % (ref 0–5.6)
POTASSIUM BLD-SCNC: 4 MMOL/L (ref 3.4–5.3)
SODIUM SERPL-SCNC: 134 MMOL/L (ref 133–144)

## 2022-08-30 PROCEDURE — 36415 COLL VENOUS BLD VENIPUNCTURE: CPT | Performed by: INTERNAL MEDICINE

## 2022-08-30 PROCEDURE — 80048 BASIC METABOLIC PNL TOTAL CA: CPT | Performed by: INTERNAL MEDICINE

## 2022-08-30 PROCEDURE — 83036 HEMOGLOBIN GLYCOSYLATED A1C: CPT | Performed by: INTERNAL MEDICINE

## 2022-08-30 PROCEDURE — 99214 OFFICE O/P EST MOD 30 MIN: CPT | Performed by: INTERNAL MEDICINE

## 2022-08-30 RX ORDER — HYDROCORTISONE 25 MG/G
CREAM TOPICAL 2 TIMES DAILY PRN
Qty: 30 G | Refills: 3 | Status: SHIPPED | OUTPATIENT
Start: 2022-08-30

## 2022-08-30 ASSESSMENT — PAIN SCALES - GENERAL: PAINLEVEL: MODERATE PAIN (4)

## 2022-08-30 NOTE — PROGRESS NOTES
Assessment & Plan     Routine general medical examination at a health care facility      Type 2 diabetes mellitus with hyperglycemia, without long-term current use of insulin (H)    - Hemoglobin A1c; Future  - Basic metabolic panel  (Ca, Cl, CO2, Creat, Gluc, K, Na, BUN); Future  - UA Macro with Reflex to Micro and Culture - lab collect; Future  - hydrocortisone, Perianal, (HYDROCORTISONE) 2.5 % cream; Place rectally 2 times daily as needed for hemorrhoids  - Hemoglobin A1c  - Basic metabolic panel  (Ca, Cl, CO2, Creat, Gluc, K, Na, BUN)  - UA Macro with Reflex to Micro and Culture - lab collect    Mild intellectual disabilities      Morbid obesity with body mass index of 45.0-49.9 in adult (H)      Hyperlipidemia LDL goal <130      Pain in both feet    - Orthopedic  Referral; Future    Functional diarrhea      Diarrhea of presumed infectious origin      Amebic dysentery    - Enteric Bacteria and Virus Panel by SHARON Stool; Future  - Ova and Parasite Exam Routine; Future  - Clostridium difficile Toxin B PCR; Future  - Enteric Bacteria and Virus Panel by SHARON Stool  - Ova and Parasite Exam Routine  - Clostridium difficile Toxin B PCR    Acute amebic dysentery     - Enteric Bacteria and Virus Panel by SHARON Stool; Future  - Enteric Bacteria and Virus Panel by SHARON Stool   PLANS (Optional) Includes COVID19 Treatment Plan:[id number]}      DO MUNIRA Farley [facility] SHAWNEE Burden is a 32 year old accompanied by his Supervisor, presenting for the following health issues:  Diabetes      History of Present Illness       Reason for visit:  Knee pain, want Dr LOMBARDO to look at my feet.    He eats 2-3 servings of fruits and vegetables daily.He consumes 2 sweetened beverage(s) daily.He exercises with enough effort to increase his heart rate 10 to 19 minutes per day.  He exercises with enough effort to increase his heart rate 3 or less days per week. He is missing 1 dose(s) of  medications per week.  He is not taking prescribed medications regularly due to remembering to take.        EMR reviewed including:             Complaint, History of Chief Complaint, Corresponding Review of Systems, and Complaint Specific Physical Examination.    #1   Follow-up on type 2 diabetes  Hemoglobin A1c has been creeping upward.  Patient notes that he is somewhat more compliant with he describes drinking soda pop eating birthday cake.  Denies any polyuria or polydipsia.  Denies acute vision changes.        Exam:   LUNGS: clear bilaterally, airflow is brisk, no intercostal retraction or stridor is noted. No coughing is noted during visit.   HEART:  regular without rubs, clicks, gallops, or murmurs. PMI is nondisplaced. Upstrokes are brisk. S1,S2 are heard.   NEURO: Pt is alert and appropriate. No neurologic lateralization is noted. Cranial nerves 2-12 are intact. Peripheral sensory and motor function are grossly normal.       #2   Complains of foot pain.  Has history of oversupination.  Previously had orthotics implants for issues.  Notes increased pain bilaterally and wishes to see podiatry.  Notes no trauma or injury to the feet.        Exam:   MS: Minimal crepitance is noted in the extremities. No deformity is present. Muscle strength is appropriate and equal bilaterally. No acute joint erythema or swelling is present.      #3   Ongoing diabetes.  Occasionally associated with fecal incontinence and urgency.  Was taken off metformin previously and this made very little difference.  Lives in senior housing.  Concern regarding possibility of infectious etiology.  Bowel movements are constantly soft and sometimes explosive.  Significantly hinders his ability to maintain employment or normal activities.  Has 1-2 episodes per day.        Exam:   GI: Abdomen is soft, without rebound, guarding or tenderness. Bowel sounds are appropriate. No renal bruits are heard.        Patient has been interviewed, applicable  history and applied review of systems have been performed.    Vital Signs:   /88   Pulse 100   Temp 97.4  F (36.3  C) (Temporal)   Resp 22   SpO2 96%       Decision Making    Problem and Complexity     1. Routine general medical examination at a health care facility      2. Type 2 diabetes mellitus with hyperglycemia, without long-term current use of insulin (H)    - Hemoglobin A1c; Future  - Basic metabolic panel  (Ca, Cl, CO2, Creat, Gluc, K, Na, BUN); Future  - UA Macro with Reflex to Micro and Culture - lab collect; Future  - hydrocortisone, Perianal, (HYDROCORTISONE) 2.5 % cream; Place rectally 2 times daily as needed for hemorrhoids  Dispense: 30 g; Refill: 3  - Hemoglobin A1c  - Basic metabolic panel  (Ca, Cl, CO2, Creat, Gluc, K, Na, BUN)  - UA Macro with Reflex to Micro and Culture - lab collect    3. Mild intellectual disabilities  Stable.  Patient is functional with closer guidance and assistance    4. Morbid obesity with body mass index of 45.0-49.9 in adult (H)  Recommend continued exercise and weight loss    5. Hyperlipidemia LDL goal <130      6. Pain in both feet  We will set up with podiatry  - Orthopedic  Referral; Future    7. Functional diarrhea  Rule out infectious    8. Diarrhea of presumed infectious origin  Rule out infection    9. Amebic dysentery suspected   rule out infectious  - Enteric Bacteria and Virus Panel by SHARON Stool; Future  - Ova and Parasite Exam Routine; Future  - Clostridium difficile Toxin B PCR; Future  - Enteric Bacteria and Virus Panel by SHARON Stool  - Ova and Parasite Exam Routine  - Clostridium difficile Toxin B PCR  teric Bacteria and Virus Panel by SHARON Stool                                FOLLOW UP   I have asked the patient to make an appointment for followup with me in 1 month        I have carefully explained the diagnosis and treatment options to the patient.  The patient has displayed an understanding of the above, and all subsequent questions  were answered.      DO DONNELL Jin    Portions of this note were produced using anywayanyday  Although every attempt at real-time proof reading has been made, occasional grammar/syntax errors may have been missed.

## 2022-09-01 ENCOUNTER — DOCUMENTATION ONLY (OUTPATIENT)
Dept: PSYCHOLOGY | Facility: CLINIC | Age: 32
End: 2022-09-01

## 2022-09-01 NOTE — PROGRESS NOTES
"Client Name: Bertram Foreman   MRN: [id number]  : 1990    Keely Adult ADHD Rating Scale-IV: Self and Other Reports (BAARS-IV)  The BAARS-IV assesses for symptoms of ADHD that are experienced in one's daily life. This assessment measure includes self and collateral rating scales designed to provide information regarding current and childhood symptoms of ADHD including inattention, hyperactivity, and impulsivity. Self-report scores are reported as percentiles. Scores at the 76th-83rd percentile are considered marginal, scores at the 84th-92nd percentile are considered borderline, scores at the 93rd-95th percentile are considered mild, scores at the 96th-98th percentile are considered moderate, and those at the 99th percentile are considered severe. Collateral or \"other\" rating scales are reported as number of symptoms observed in comparison to those reported by the client. Norms and percentile scores are not available for collateral reports.      Current Symptoms Scale--Self Report:   Client completed the self-report inventory of current symptoms. The results indicate that the client's Total ADHD Score was 44 which places him in the 96th percentile for overall ADHD symptoms. In addition, the client endorsed the following occur \"often\" or \"very often\": 5/9 (96th percentile) Inattention symptoms, 3/9 (93rd percentile) Hyperactivity/Impulsivity symptoms, and 7/9 (97th percentile) Sluggish Cognitive Tempo symptoms. Client indicated that the reported symptoms have resulted in impaired functioning in school, home, work and social relationships. Overall, the results suggest the client is reporting moderate symptoms of inattention and mild symptoms of hyperactivity/impulsivity at this time.      Current Symptoms Scale--Other Report:  Client's ILS worker, Danyell, completed the collateral report inventory of current symptoms. Based on the collateral contact's observation of symptoms, the client demonstrates the " "following \"often\" or \"very often\": 0/9 Inattention symptoms, 0/5 Hyperactivity symptoms, 0/4 Impulsivity symptoms, and 3/9 Sluggish Cognitive Tempo symptoms. The client's Total ADHD Score was 20. The collateral- and self-report scores are discrepant. The ILS worker did not note current symptoms of ADHD.      Childhood Symptoms Scale--Self-Report:  Client completed the self-report inventory of childhood symptoms. The results indicate that the client's Total ADHD Score was 46 which places him in the 93rd percentile for overall ADHD symptoms in childhood. In addition, the client endorsed having experienced the following \"often\" or \"very often\": 5/9 (92nd percentile) Inattention symptoms and 3/9 (87th percentile) Hyperactivity-Impulsivity symptoms. Client indicated that the reported symptoms resulted in impaired functioning in school, social relationships and at home. Overall, the results suggest the client reported experiencing borderline symptoms of inattention and borderline symptoms of hyperactivity/impulsivity as a child.     Childhood Symptoms Scale--Other Report:  Client was unable to find someone to complete the collateral report for childhood symptoms.    Keely Functional Impairment Scale: Self and Other Reports (BFIS)  The BFIS is used to assess an individuals' psychosocial impairment in major life/daily activities that may be due to a mental health disorder. This assessment measure includes self and collateral rating scales. Self-report scores are reported as percentiles. Scores at the 76th-83rd percentile are considered marginal, scores at the 84th-92nd percentile are considered borderline, scores at the 93rd-95th percentile are considered mild, scores at the 96th-98th percentile are considered moderate, and those at the 99th percentile are considered severe. Collateral or \"other\" rating scales are reported as number of symptoms observed in comparison to those reported by the client. Norms and percentile " "scores are not available for collateral reports.      Results indicate the client identified impairment (scores at or greater than 93rd percentile) in the following areas: money management and health maintenance. The client's Mean Impairment Score was 3.11 (51-75th percentile) indicating the client is not reporting impairment in functioning across domains. Client's ILS worker, Danyell, completed the collateral rating scale, which indicated similar results. His scores were generally higher (e.g., Mean Impairment Score of 5.11). She noted impairment in the areas of: work, money management,  and health maintenance.       Keely Deficits in Executive Functioning Scale (BDEFS)  The BDEFS is a measure used for evaluating dimensions of adult executive functioning in daily life. This assessment measure includes self and collateral rating scales. Self-report scores are reported as percentiles. Scores at the 76th-83rd percentile are considered marginal, scores at the 84th-92nd percentile are considered borderline, scores at the 93rd-95th percentile are considered mild, scores at the 96th-98th percentile are considered moderate, and those at the 99th percentile are considered severe. Collateral or \"other\" rating scales are reported as number of symptoms observed in comparison to those reported by the client. Norms and percentile scores are not available for collateral reports.      Results indicate the client's Total Executive Functioning Score was 187 (86th percentile). The ADHD-Executive Functioning Index score was 23 (86th percentile). These scores suggest the client is reporting borderline deficits in executive functioning. Specifically, he noted the following: self-organization/problem-solving (mild). Client s ILS worker, Danyell, completed the collateral report which demonstrated discrepant results. Her scores were considerably higher. She noted deficits in areas of: self-organization/problem-solving, self-restraint, " self-motivation and self-regulation of emotions.    Generalized Anxiety Disorder Questionnaire (RONAL-7)  This questionnaire is designed to screen for anxiety in adults. Based on the client's score of 3, he is not reporting symptoms of anxiety at this time.     Patient Health Questionnaire- 9 (PHQ-9)   This questionnaire is designed to screen for depression in adults. Based on the client's score of 11, he is reporting mild symptoms of depression at this time. Client identified the following symptoms of depression: little interest or pleasure in doing things; trouble falling asleep/sleeping too much, feeling tired or having little energy, and poor appetite or overeating.

## 2022-09-07 LAB
ALBUMIN UR-MCNC: NEGATIVE MG/DL
APPEARANCE UR: CLEAR
BACTERIA #/AREA URNS HPF: ABNORMAL /HPF
BILIRUB UR QL STRIP: NEGATIVE
C DIFF TOX B STL QL: NEGATIVE
COLOR UR AUTO: YELLOW
GLUCOSE UR STRIP-MCNC: >=1000 MG/DL
HGB UR QL STRIP: NEGATIVE
KETONES UR STRIP-MCNC: ABNORMAL MG/DL
LEUKOCYTE ESTERASE UR QL STRIP: NEGATIVE
NITRATE UR QL: NEGATIVE
PH UR STRIP: 7 [PH] (ref 5–7)
RBC URINE: 1 /HPF
SP GR UR STRIP: 1.01 (ref 1–1.03)
SQUAMOUS EPITHELIAL: 1 /HPF
UROBILINOGEN UR STRIP-MCNC: NORMAL MG/DL
WBC URINE: 1 /HPF

## 2022-09-07 PROCEDURE — 81001 URINALYSIS AUTO W/SCOPE: CPT | Performed by: INTERNAL MEDICINE

## 2022-09-07 PROCEDURE — 87493 C DIFF AMPLIFIED PROBE: CPT | Mod: 59 | Performed by: INTERNAL MEDICINE

## 2022-09-07 PROCEDURE — 87177 OVA AND PARASITES SMEARS: CPT | Performed by: INTERNAL MEDICINE

## 2022-09-07 PROCEDURE — 87506 IADNA-DNA/RNA PROBE TQ 6-11: CPT | Performed by: INTERNAL MEDICINE

## 2022-09-07 PROCEDURE — 87209 SMEAR COMPLEX STAIN: CPT | Performed by: INTERNAL MEDICINE

## 2022-09-08 ENCOUNTER — TELEPHONE (OUTPATIENT)
Dept: INTERNAL MEDICINE | Facility: CLINIC | Age: 32
End: 2022-09-08

## 2022-09-08 LAB
C COLI+JEJUNI+LARI FUSA STL QL NAA+PROBE: NOT DETECTED
EC STX1 GENE STL QL NAA+PROBE: NOT DETECTED
EC STX2 GENE STL QL NAA+PROBE: NOT DETECTED
NOROV GI+II ORF1-ORF2 JNC STL QL NAA+PR: NOT DETECTED
O+P STL MICRO: NEGATIVE
RVA NSP5 STL QL NAA+PROBE: NOT DETECTED
SALMONELLA SP RPOD STL QL NAA+PROBE: NOT DETECTED
SHIGELLA SP+EIEC IPAH STL QL NAA+PROBE: NOT DETECTED
V CHOL+PARA RFBL+TRKH+TNAA STL QL NAA+PR: NOT DETECTED
Y ENTERO RECN STL QL NAA+PROBE: NOT DETECTED

## 2022-09-08 NOTE — TELEPHONE ENCOUNTER
Diabetes Education Scheduling Outreach #1:    Call to patient to schedule. He will call back with his caretaker to make appointment on Tuesday.    Lilo Larsen [facility]  Diabetes and Nutrition Scheduling

## 2022-09-13 ENCOUNTER — TELEPHONE (OUTPATIENT)
Dept: INTERNAL MEDICINE | Facility: CLINIC | Age: 32
End: 2022-09-13

## 2022-09-13 NOTE — LETTER
[address] [id number] [phone]      September 14, 2022    Bertram Foreman                                                                                                              [address] [place] [id number]      Dear Dr. Jordon Burden has recommended you schedule a Medication Therapy Management (MTM) appointment. MTM is designed to help you get the most of out of your medicines.     During an MTM appointment a specially trained pharmacist will review all of your medicines, both prescription and over-the-counter. They will make sure your medicines are the best choice for you and are safe and convenient for you.  MTM pharmacists work together with you and your doctor to help you understand your medicines, solve any problems related to your medicines and help you get the best results from taking your medicines.     At [facility], we strongly believe in a team approach to health care. We want to help you understand your medicines and health conditions. To learn more about how you might benefit from MTM services, watch the patient video at [email or web address].     To make an appointment, please call the clinic at [phone] or the MTM scheduling line at [phone] (toll-free at [phone]).    We look forward to hearing from you!        Thom Rush, PharmD, [facility]  Medication Therapy Management Pharmacist  Pager: [phone]

## 2022-09-13 NOTE — TELEPHONE ENCOUNTER
MTM referral from: [facility] visit (referral by provider)    MTM referral outreach attempt #2 on September 13, 2022 at 9:09 AM      Outcome: Patient not reachable after several attempts, will route to MTM Pharmacist/Provider as an FYI.  MT scheduling number is [phone].  Thank you for the referral.    Canelo Fernandez, MTM coordinator

## 2022-09-14 NOTE — TELEPHONE ENCOUNTER
Referral letter sent.    Thom Rush, PharmD, [facility]  Medication Therapy Management Pharmacist  Pager: [phone]

## 2022-09-21 ENCOUNTER — TELEPHONE (OUTPATIENT)
Dept: INTERNAL MEDICINE | Facility: CLINIC | Age: 32
End: 2022-09-21

## 2022-09-21 ENCOUNTER — OFFICE VISIT (OUTPATIENT)
Dept: PHARMACY | Facility: CLINIC | Age: 32
End: 2022-09-21
Payer: COMMERCIAL

## 2022-09-21 DIAGNOSIS — R19.7 DIARRHEA, UNSPECIFIED TYPE: ICD-10-CM

## 2022-09-21 DIAGNOSIS — K21.9 GASTROESOPHAGEAL REFLUX DISEASE, UNSPECIFIED WHETHER ESOPHAGITIS PRESENT: ICD-10-CM

## 2022-09-21 DIAGNOSIS — E11.649 TYPE 2 DIABETES MELLITUS WITH HYPOGLYCEMIA WITHOUT COMA, WITHOUT LONG-TERM CURRENT USE OF INSULIN (H): Primary | ICD-10-CM

## 2022-09-21 DIAGNOSIS — E78.5 HYPERLIPIDEMIA LDL GOAL <100: ICD-10-CM

## 2022-09-21 DIAGNOSIS — F33.41 RECURRENT MAJOR DEPRESSIVE DISORDER, IN PARTIAL REMISSION (H): ICD-10-CM

## 2022-09-21 DIAGNOSIS — R52 PAIN: ICD-10-CM

## 2022-09-21 DIAGNOSIS — G47.00 INSOMNIA, UNSPECIFIED TYPE: ICD-10-CM

## 2022-09-21 PROCEDURE — 99605 MTMS BY PHARM NP 15 MIN: CPT | Performed by: PHARMACIST

## 2022-09-21 PROCEDURE — 99607 MTMS BY PHARM ADDL 15 MIN: CPT | Performed by: PHARMACIST

## 2022-09-21 NOTE — LETTER
"Recommended To-Do List      Prepared on: Sep 21, 2022       You can get the best results from your medications by completing the items on this \"To-Do List.\"      Bring your To-Do List when you go to your doctor. And, share it with your family or caregivers.    My To-Do List:  What we talked about: What I should do:   The importance of taking your medication as intended    Education: You would benefit from trying to take fiber supplement daily. If you are not able to tolerate Metamucil powder we could look at changing to an alternative fiber supplement formulation.            What we talked about: What I should do:   The importance of taking your medication as intended    Change when you are taking pioglitazone (ACTOS). I called [facility] and they moved this medication to evening dosing.                     "

## 2022-09-21 NOTE — Clinical Note
Aaron Nguyen:  This patient was referred to diabetes ed, but has not been able to connect with schedules.  Had a hard time engaging patient around his medications and he was not open to a lot of changes. We focused on adherence, but I did give him and his ILS worker the diabetes ed # to call and schedule.   Thom Rush, JaneD, [facility] Medication Therapy Management Pharmacist Pager: [phone]

## 2022-09-21 NOTE — LETTER
_  Medication List        Prepared on: Sep 21, 2022     Bring your Medication List when you go to the doctor, hospital, or   emergency room. And, share it with your family or caregivers.     Note any changes to how you take your medications.  Cross out medications when you no longer use them.    Medication How I take it Why I use it Prescriber   acetaminophen (TYLENOL) 325 MG tablet Take 325-650 mg by mouth as needed for mild pain  Pain Patient Reported   atorvastatin (LIPITOR) 20 MG tablet TAKE 1 TABLET BY MOUTH ONCEDAILY EVERY MORNING (AM PLUSPAK) Heart; Cholesterol Tremaine Ruvalcaba DO   divalproex sodium extended-release (DEPAKOTE ER) 500 MG 24 hr tablet TAKE THREE TABLETS BY MOUTH EVERY NIGHT AT BEDTIME (HS PLUSPAK) Mental Health Tremaine Ruvalcaba DO   glimepiride (AMARYL) 4 MG tablet TAKE 1 TABLET BY MOUTH ONCEDAILY EVERY MORNING BEFORE BREAKFAST Type 2 diabetes Noé Gonzales MD   hydrocortisone, Perianal, (HYDROCORTISONE) 2.5 % cream Place rectally 2 times daily as needed for hemorrhoids External Hemorrhoids Akbar Lara PA-C   ibuprofen (ADVIL/MOTRIN) 200 MG tablet Take 1-2 tablets by mouth every 8 hours as needed Pain Patient Reported   Microlet Lancets MISC TEST EVERY DAY Type 2 diabetes  Tremaine Ruvalcaba DO   Multiple Vitamins-Minerals (MENS MULTIVITAMIN PO) Take 1 tablet by mouth daily General Health Patient Reported   omeprazole (PRILOSEC) 20 MG DR capsule TAKE 1 CAPSULE BY MOUTH TWICE A DAY IN THE MORNING AND EVENING PLUS MELVIN Heartburn Tremaine Ruvalcaba DO   ONETOUCH VERIO IQ test strip TEST TWICE DAILY Type 2 diabetes Tremaine Ruvalcaba DO   pioglitazone (ACTOS) 30 MG tablet Take 1 tablet (30 mg) by mouth daily in the evening Type 2 diabetes Tremaine Ruvalcaba DO   psyllium (METAMUCIL/KONSYL) 58.6 % powder Take 1 Tablespoonful by mouth daily Diarrhea Patient Reported   QUEtiapine (SEROQUEL) 300 MG tablet Take 300 mg by mouth At Bedtime  Mental  Health Tremainerain Ruvalcaba, DO   TRAZODONE HCL PO Take 100 mg by mouth At Bedtime  Mental Health Tremaine Ruvalcaba, DO         Add new medications, over-the-counter drugs, herbals, vitamins, or  minerals in the blank rows below.    Medication How I take it Why I use it Prescriber                          Allergies:      ceclor [cefaclor]; erythromycin        Side effects I have had:               Other Information:              My notes and questions:

## 2022-09-21 NOTE — TELEPHONE ENCOUNTER
Forms/Letter Request    [facility]     Have you been seen for this request: No    Do we have the form/letter: Yes:     When is form/letter needed by: ANNUAL DIABETES $30. REWARD    How would you like the form/letter returned:fax & want in chart    Patient Notified form requests are processed in 3-5 business days: yes    [facility] or would you prefer to receive a phone call?:   Patient would prefer a phone call   Okay to leave a detailed message?:yes at Home number on file [phone] (home)

## 2022-09-21 NOTE — LETTER
September 23, 2022  Bertram Foreman  [address]   [facility] [address]    Dear Earl Foreman, [facility]     Thank you for talking with me on Sep 21, 2022 about your health and medications. As a follow-up to our conversation, I have included two documents:      1. Your Recommended To-Do List has steps you should take to get the best results from your medications.  2. Your Medication List will help you keep track of your medications and how to take them.    If you want to talk about these documents, please call Ellis Rush RPH at phone: [phone], Monday-Friday 8-4:30pm.    I look forward to working with you and your doctors to make sure your medications work well for you.    Sincerely,  Ellis Rush RPH  [facility] Pharmacist, [facility]

## 2022-09-21 NOTE — PATIENT INSTRUCTIONS
Recommendations from today's MTM visit:                                                    MTM (medication therapy management) is a service provided by a clinical pharmacist designed to help you get the most of out of your medicines.   Today we reviewed what your medicines are for, how to know if they are working, that your medicines are safe and how to make your medicine regimen as easy as possible.      1. I will talk to [facility] about the timing of your PIOGLITAZONE and GLIMEPIRIDE diabetes medications.   We may decide to move the pioglitazone to evening to help make sure you are taking this consistently.  Try to focus on making sure to take your AM medications every day even if you are waking up later.     2. If you have trouble taking your Metamucil we could decide on changing this to a different formulation of fiber in the future to help you tolerate this.  Additionally we could look at alternative treatments such as scheduling Imodium (loperamide) over the counter OR looking at a different powder product called CHOLESTYRAMINE that is a prescription.     3. Here is the contact information for the diabetes educator - you can schedule with Wendy the diabetes educator here at [place].    If you have not heard from the scheduling office within 2 business days, please call [phone] for [facility], [phone] for the [facility] or [phone] for [facility].        GLP-1 Agonists (Victoza, Trulicity, Ozempic, Bydureon) - These are non-insulin injectable medications that help your insulin to stay around longer to do its job, decrease appetite, and slow how you absorb sugar into your blood.  They are generally very effective in lowering blood sugar.  Depending on the option there may be some heart protective benefits and are also they most effective weight loss medications on the market.  Currently these are brand name medications so can be higher  "cost - they do have co-pay cards for commercial insurance. Side effects we monitor for include nausea, vomiting, bloating, constipation mainly, and abdominal pain.     SGLT2 inhibitors (Invokana, Jardiance, Farxiga) - These oral medications work by eliminating extra sugar from your blood through your kidneys/urination.  They are somewhat effective in lowering blood sugars, blood pressure, and have some potential weight loss benefit. For those who have had a heart attack they may protect against a future event.  They have also been shown to slow the progression of kidney disease.  These are currently brand name - for commercial insurance plans they do have co-pay card available. Some side effects include urinary frequency, dehydration, urinary tract infections (more common in women), low blood pressure.     DPP4 inhibitors (Januvia, Tradjenta) - These oral medications work by slowing the breakdown of insulin in your blood. They are slightly effective in lowering blood sugars.  They are currently brand name - for commerical insurance plans they do have co-pay cards available. They are typically very well tolerated - side effects include nausea, headache, upper respiratory infection, and joint pain (rare).     Follow-up: 3-6 months or sooner if needed    It was great speaking with you today.  I value your experience and would be very thankful for your time in providing feedback in our clinic survey. In the next few days, you may receive an email or text message from [employer] with a link to a survey related to your  clinical pharmacist.\"     To schedule another MTM appointment, please call the clinic directly or you may call the MTM scheduling line at [phone] or toll-free at [phone].     My Clinical Pharmacist's contact information:                                                      Please feel free to contact me with any questions or concerns you have.      Thom Rush, PharmD, BCACP  Medication Therapy " Management Pharmacist  Pager: [phone]

## 2022-09-21 NOTE — PROGRESS NOTES
Medication Therapy Management (MTM) Encounter    ASSESSMENT:                            Medication Adherence/Access: See below for considerations    Type 2 Diabetes:  A1c is not at goal of <7%.  Patient would likely benefit from addition of GLP-1 agonist or SGLT2 inhibitor to help lower blood sugars and decrease weight, but patient declines any changes to medications today. Could consider dose increase of glimepiride, but would benefit the most from taking his medication consistently.    Diarrhea: Not well controlled. Would benefit from trying to use fiber product at least daily to start. If not effective could consider alternative such as cholestyramine or loperamide to help control.     Pain: Stable per patient.     Hyperlipidemia: Current therapy includes atorvastatin 20 mg daily.  Patient reports no significant myalgias or other side effects though does get cramping in lower legs.   The ASCVD Risk score (Oscar SUSANNAH Jr., et al., 2013) failed to calculate for the following reasons:    The 2013 ASCVD risk score is only valid for ages 40 to 79  Recent Labs   Lab Test 10/14/21  0906 09/08/20  1136   CHOL 184 157   HDL 41 40   * 83   TRIG 197* 170*       Depression/Insomnia:  Somewhat stable. Would benefit from     GERD: Stable.     PLAN:                            1. I will talk to La Nena Borges about the timing of your PIOGLITAZONE and GLIMEPIRIDE diabetes medications.   We may decide to move the pioglitazone to evening to help make sure you are taking this consistently.  Try to focus on making sure to take your AM medications every day even if you are waking up later. Talked to Zoe at [facility] and we moved pioglitazone from AM to PM.     2. If you have trouble taking your Metamucil we could decide on changing this to a different formulation of fiber in the future to help you tolerate this.  Additionally we could look at alternative treatments such as scheduling Imodium (loperamide) over the counter  OR looking at a different powder product called CHOLESTYRAMINE that is a prescription.     3. Here is the contact information for the diabetes educator - you can schedule with Wendy the diabetes educator here at [facility].    If you have not heard from the scheduling office within 2 business days, please call [phone] for [facility], [phone] for the [facility] or [phone] for [facility] and [facility].        GLP-1 Agonists (Victoza, Trulicity, Ozempic, Bydureon) - These are non-insulin injectable medications that help your insulin to stay around longer to do its job, decrease appetite, and slow how you absorb sugar into your blood.  They are generally very effective in lowering blood sugar.  Depending on the option there may be some heart protective benefits and are also they most effective weight loss medications on the market.  Currently these are brand name medications so can be higher cost - they do have co-pay cards for commercial insurance. Side effects we monitor for include nausea, vomiting, bloating, constipation mainly, and abdominal pain.     SGLT2 inhibitors (Invokana, Jardiance, Farxiga) - These oral medications work by eliminating extra sugar from your blood through your kidneys/urination.  They are somewhat effective in lowering blood sugars, blood pressure, and have some potential weight loss benefit. For those who have had a heart attack they may protect against a future event.  They have also been shown to slow the progression of kidney disease.  These are currently brand name - for commercial insurance plans they do have co-pay card available. Some side effects include urinary frequency, dehydration, urinary tract infections (more common in women), low blood pressure.     DPP4 inhibitors (Januvia, Tradjenta) - These oral medications work by slowing the breakdown of insulin in your blood. They are slightly effective in lowering blood sugars.   They are currently brand name - for commerical insurance plans they do have co-pay cards available. They are typically very well tolerated - side effects include nausea, headache, upper respiratory infection, and joint pain (rare).     Follow-up: 3-6 months or sooner if needed    SUBJECTIVE/OBJECTIVE:                          Bertram Foreman is a 32 year old male coming in for an initial visit. He was referred to me from Dr. Ruvalcaba. Joined for visit today by ILS workerDanyell.    Reason for visit: Comprehensive medication review - diabetes management.    Allergies/ADRs: Reviewed in chart  Past Medical History: Reviewed in chart  Tobacco: He reports that he quit smoking about 8 years ago. His smoking use included cigarettes and cigarettes. He started smoking about 8 years ago. He smoked 1.00 pack per day for 0.00 years. He has never used smokeless tobacco.  Alcohol: Less than 1 beverage / month  Other Substance Use: none  Caffeine: pop - 1 or more cans of pop/day - usually sugar free or diet - occasionally regular soda    Medication Adherence/Access: Patient uses bubble pack(s).  Patient takes medications 2 time(s) per day.   Per patient, misses medication 2 to 3 times per week. Often will sleep in to varying times of the day - just skips morning doses.   Medication barriers: none.   The patient fills medications at [facility]: NO, fills medications at [facility] in [place], [place].    Type 2 Diabetes:  Currently prescribed pioglitazone 30 mg by mouth every morning and glimepiride 4 mg every morning. He questions why he is even at today's visit. States he previously was working with diabetes educator - they were referred back but unclear if they were contacted. He is not interested in making any changes to his medications. Previously on metformin XR that appeared to worsen his diarrhea, but he continues to have several loose stools/urgent bowel movements. Opposed to injectables - though [facility] works states she would be  "able to help if it was a weekly medication. Did discuss about diagnostic continuous glucose monitor, but patient refused to let diabetes educator place previously and is not interested in considering today. Patient is hesitant to participate in the conversation today - often looking down quietly or expressing frustration regarding having to take his medications.  Blood sugar monitoring: never - when asked if he would start he answers honestly - \"no\"  Symptoms of low blood sugar? none  Symptoms of high blood sugar? none  Eye exam: up to date  Foot exam: up to date  Diet/Exercise: Mostly eating microwave meal - prepared foods in the microwave.  Does not eat breakfast.  Lunch depends. Lots of eating out.  Does state that he goes for walks, but unclear how far or how long.   Aspirin: Not taking due to age  Statin: Yes: atorvastatin   ACEi/ARB: No.   Urine Albumin:   Lab Results   Component Value Date    UMALCR 7.84 12/15/2021      Lab Results   Component Value Date    A1C 11.4 08/30/2022    A1C 10.6 05/26/2022    A1C 8.6 02/09/2022    A1C 8.3 10/14/2021    A1C 7.5 03/30/2021    A1C 6.8 09/08/2020    A1C 8.0 05/20/2020    A1C 7.2 01/16/2020    A1C 6.9 10/11/2019     Diarrhea: Currently taking no medications. Has been recommended to take fiber to see if this helps but states Metamucil is like \"snot\" so does not use. Declines any interest in changing to alternative fiber. Not interested in pursuing other options like Imodium or cholestyramine. Has frequent diarrhea - almost daily and often more than once a day.  Has impacted his life/work given unpredictability.     Pain: Currently taking acetaminophen and ibuprofen as needed. Pt finds this to be somewhat effective. Pt reports no current issues.        Hyperlipidemia: Current therapy includes atorvastatin 20 mg daily.  Patient reports no significant myalgias or other side effects though does get cramping in lower legs.   The ASCVD Risk score ([place] SUSANNAH Jr., et al., 2013) " "failed to calculate for the following reasons:    The 2013 ASCVD risk score is only valid for ages 40 to 79  Recent Labs   Lab Test 10/14/21  0906 09/08/20  1136   CHOL 184 157   HDL 41 40   * 83   TRIG 197* 170*       Depression/Insomnia:  Current medications include: Depakote ER 1500 mg at bedtime, quetiapine 300 mg at bedtime, and trazodone 100 mg at bedtime. Follows with Kelsie Jefferson PA-C at [facility] - next appointment on October 11th, 2022. States that his mood is \"okay\". Does not sleep well - up a lot - tired the next day. Severe sleep apnea, but was not able to tolerate masks (during COVID he was not fitted) and he is not willing to do another sleep study.   PHQ-9 SCORE 5/22/2022 8/9/2022 8/15/2022   PHQ-9 Total Score MyChart 9 (Mild depression) - 10 (Moderate depression)   PHQ-9 Total Score 9 10 10     GERD: Current medications include: Prilosec (omeprazole) 20 mg twice daily. Patient reports no current symptoms.  Patient feels that current regimen is effective.    Today's Vitals: /86  Patient declined being weighed.    BP Readings from Last 3 Encounters:   09/21/22 132/86   08/30/22 130/88   05/26/22 132/84     ----------------      I spent 50 minutes with this patient today (an extra 15 minutes was spent creating the Medication Action Plan). All changes were made via collaborative practice agreement with Tremaine Ruvalcaba DO. A copy of the visit note was provided to the patient's provider(s).    The patient was given a summary of these recommendations.     Thom Rush, JaneD, BCACP  Medication Therapy Management Pharmacist  Pager: [phone]     Medication Therapy Recommendations  Diarrhea, unspecified type    Current Medication: psyllium (METAMUCIL/KONSYL) 58.6 % powder   Rationale: Does not understand instructions - Adherence - Adherence   Recommendation: Provide Education - Start taking Metamucil once daily. If not able to tolerate formulation we can change to alternative " fiber.   Status: Accepted - no CPA Needed         Type 2 diabetes mellitus with hypoglycemia, without long-term current use of insulin (H)    Current Medication: pioglitazone (ACTOS) 30 MG tablet   Rationale: Patient forgets to take - Adherence - Adherence   Recommendation: Change Administration Time - Move pioglitazone 30 mg by mouth to every evening   Status: Accepted per CPA

## 2022-09-23 VITALS — DIASTOLIC BLOOD PRESSURE: 86 MMHG | SYSTOLIC BLOOD PRESSURE: 132 MMHG

## 2022-09-28 ENCOUNTER — TELEPHONE (OUTPATIENT)
Dept: INTERNAL MEDICINE | Facility: CLINIC | Age: 32
End: 2022-09-28

## 2022-09-28 DIAGNOSIS — E11.65 TYPE 2 DIABETES MELLITUS WITH HYPERGLYCEMIA, WITHOUT LONG-TERM CURRENT USE OF INSULIN (H): Primary | ICD-10-CM

## 2022-09-28 NOTE — TELEPHONE ENCOUNTER
Forms/Letter Request    Type of form/letter: for gift card and new diabetic shoes    Have you been seen for this request: Yes     Do we have the form/letter: Yes:     When is form/letter needed by: asap    How would you like the form/letter returned: Fax    Patient Notified form requests are processed in 3-5 business days:Yes    Could we send this information to you in [employer] or would you prefer to receive a phone call?:   Patient would like to be contacted via [employer]iat

## 2022-09-28 NOTE — TELEPHONE ENCOUNTER
Patient Returning Call    Reason for call:  Patient was speaking to Hayleye and was disconnected.     Information relayed to patient:  Tried to call the [facility] but was unable to get through. Informed patient that a message would be left to call patient back.    Patient has additional questions:  No    What are your questions/concerns:      Could we [facility] this [facility] or would you prefer to receive a phone call?:   Patient would prefer a phone call   Okay to leave a detailed message?: Yes at Cell number on file:    Telephone Information:   Mobile [phone]

## 2022-09-28 NOTE — TELEPHONE ENCOUNTER
I called Bertram to gather more information but we were disconnected.   Left message for patient to call  back.  Dr Ruvalcaba does not have gift cards.  He failed his appointment with Dr Latif 9/27/22. He should reschedule with Dr Latif.

## 2022-09-28 NOTE — TELEPHONE ENCOUNTER
The 3 gift card forms were placed in Dr Workman basket for signature.    The diabetic shoe form will need Dr Latif's office notes.    Patient notified.

## 2022-10-05 ENCOUNTER — HOSPITAL ENCOUNTER (OUTPATIENT)
Dept: GENERAL RADIOLOGY | Facility: CLINIC | Age: 32
Discharge: HOME | End: 2022-10-05
Attending: PODIATRIST | Admitting: PODIATRIST
Payer: COMMERCIAL

## 2022-10-05 ENCOUNTER — OFFICE VISIT (OUTPATIENT)
Dept: PODIATRY | Facility: CLINIC | Age: 32
End: 2022-10-05
Payer: COMMERCIAL

## 2022-10-05 VITALS
WEIGHT: 315 LBS | TEMPERATURE: 95 F | BODY MASS INDEX: 45.58 KG/M2 | SYSTOLIC BLOOD PRESSURE: 130 MMHG | DIASTOLIC BLOOD PRESSURE: 70 MMHG

## 2022-10-05 DIAGNOSIS — E11.65 TYPE 2 DIABETES MELLITUS WITH HYPERGLYCEMIA, WITHOUT LONG-TERM CURRENT USE OF INSULIN (H): Primary | ICD-10-CM

## 2022-10-05 DIAGNOSIS — E08.43 DIABETES MELLITUS DUE TO UNDERLYING CONDITION WITH DIABETIC AUTONOMIC NEUROPATHY, WITHOUT LONG-TERM CURRENT USE OF INSULIN (H): ICD-10-CM

## 2022-10-05 DIAGNOSIS — M79.673 FOOT PAIN: ICD-10-CM

## 2022-10-05 PROCEDURE — 99213 OFFICE O/P EST LOW 20 MIN: CPT | Performed by: PODIATRIST

## 2022-10-05 PROCEDURE — 73630 X-RAY EXAM OF FOOT: CPT | Mod: 50

## 2022-10-05 ASSESSMENT — PAIN SCALES - GENERAL: PAINLEVEL: WORST PAIN (10)

## 2022-10-05 NOTE — LETTER
10/5/2022         RE: Bertram Foreman  [address]  [facility] [address]        Dear Colleague,    Thank you for referring your patient, Bertram Foreman, to the [facility]. Please see a copy of my visit note below.    HPI:  Patient requesting DM shoes as they are worn out.  Also pain in leg off and on. Last ankle sprain 2 years ago. No foot ulcers. Dm since 2015, using oral.  Patient does describe burning and tingling off-and-on but not consistent.  Presents with a caregiver today.    ROS:  10 point ROS neg other than the symptoms noted above in the HPI.    Patient Active Problem List   Diagnosis     Morbid obesity (H)     Intellectual disability     Recurrent major depressive disorder, in partial remission (H)     Mantoux: positive, treated     Morbid obesity with body mass index of 45.0-49.9 in adult (H)     Mild intellectual disabilities     Type 2 diabetes mellitus with hypoglycemia, without long-term current use of insulin (H)     Sexual dysfunction       PAST MEDICAL HISTORY:   Past Medical History:   Diagnosis Date     Depressive disorder All my life almost.     Diabetes (H) sometime in 2019     Sleep apnea         PAST SURGICAL HISTORY:   Past Surgical History:   Procedure Laterality Date     APPENDECTOMY  8/15/14     COLONOSCOPY N/A 12/28/2020    Procedure: COLONOSCOPY, WITH BIOPSY;  Surgeon: Mary, MD Haley;  Location:  GI        MEDICATIONS:   Current Outpatient Medications:      acetaminophen (TYLENOL) 325 MG tablet, Take 325-650 mg by mouth as needed for mild pain , Disp: , Rfl:      atorvastatin (LIPITOR) 20 MG tablet, TAKE 1 TABLET BY MOUTH ONCEDAILY EVERY MORNING (AM PLUSPAK), Disp: 28 tablet, Rfl: 5     blood glucose calibration (ONETOUCH VERIO) solution, USE AS DIRECTED, Disp: 1 each, Rfl: 0     Blood Glucose Monitoring Suppl (ONETOUCH VERIO FLEX SYSTEM) w/Device KIT, USE TO TEST TWICE DAILY, Disp: 1 kit, Rfl: 0     divalproex sodium extended-release  (DEPAKOTE ER) 500 MG 24 hr tablet, TAKE THREE TABLETS BY MOUTH EVERY NIGHT AT BEDTIME (HS PLUSPAK), Disp: , Rfl:      glimepiride (AMARYL) 4 MG tablet, TAKE 1 TABLET BY MOUTH ONCEDAILY EVERY MORNING BEFORE BREAKFAST, Disp: 90 tablet, Rfl: 1     hydrocortisone, Perianal, (HYDROCORTISONE) 2.5 % cream, Place rectally 2 times daily as needed for hemorrhoids, Disp: 30 g, Rfl: 3     ibuprofen (ADVIL/MOTRIN) 200 MG tablet, As needed, Disp: , Rfl:      Microlet Lancets MISC, TEST EVERY DAY, Disp: 100 each, Rfl: 1     Multiple Vitamins-Minerals (MENS MULTIVITAMIN PO), , Disp: , Rfl:      omeprazole (PRILOSEC) 20 MG DR capsule, TAKE 1 CAPSULE BY MOUTH TWICE A DAY IN THE MORNING AND EVENING PLUS MELVIN, Disp: 60 capsule, Rfl: 5     ONETOUCH VERIO IQ test strip, TEST TWICE DAILY, Disp: 100 strip, Rfl: 1     pioglitazone (ACTOS) 30 MG tablet, Take 1 tablet (30 mg) by mouth daily, Disp: 90 tablet, Rfl: 3     psyllium (METAMUCIL/KONSYL) 58.6 % powder, Take 1 Tablespoonful by mouth daily, Disp: , Rfl:      QUEtiapine (SEROQUEL) 300 MG tablet, Take 300 mg by mouth At Bedtime , Disp: , Rfl: 0     TRAZODONE HCL PO, Take 100 mg by mouth At Bedtime , Disp: , Rfl:      ALLERGIES:    Allergies   Allergen Reactions     Ceclor [Cefaclor] Swelling     Erythromycin GI Disturbance        SOCIAL HISTORY:   Social History     Socioeconomic History     Marital status: Single     Spouse name: Not on file     Number of children: Not on file     Years of education: Not on file     Highest education level: Not on file   Occupational History     Not on file   Tobacco Use     Smoking status: Former Smoker     Packs/day: 1.00     Years: 0.00     Pack years: 0.00     Types: Cigarettes, Cigarettes     Start date: 2014     Quit date: 3/1/2014     Years since quittin.6     Smokeless tobacco: Never Used   Vaping Use     Vaping Use: Never used   Substance and Sexual Activity     Alcohol use: Yes     Comment: rare     Drug use: No     Sexual activity:  Never   Other Topics Concern     Parent/sibling w/ CABG, MI or angioplasty before 65F 55M? No   Social History Narrative     Not on file     Social Determinants of Health     Financial Resource Strain: Not on file   Food Insecurity: Not on file   Transportation Needs: Not on file   Physical Activity: Not on file   Stress: Not on file   Social Connections: Not on file   Intimate Partner Violence: Not on file   Housing Stability: Not on file        FAMILY HISTORY:   Family History   Problem Relation Age of Onset     Mental Illness Brother      Diabetes No family hx of      Depression No family hx of      Anxiety Disorder No family hx of         EXAM:Vitals: /70   Temp (!) 95  F (35  C) (Temporal)   Wt (!) 161 kg (355 lb)   BMI 45.58 kg/m    BMI= Body mass index is 45.58 kg/m .    General appearance: Patient is alert and fully cooperative with history & exam.  No sign of distress is noted during the visit.     Psychiatric: Affect is pleasant & appropriate.  Patient appears motivated to improve health.     Respiratory: Breathing is regular & unlabored while sitting.     HEENT: Hearing is intact to spoken word.  Speech is clear.  No gross evidence of visual impairment that would impact ambulation.     Vascular: DP & PT pulses are intact & regular bilaterally.  No significant edema or varicosities noted.  CFT and skin temperature is normal to both lower extremities.     Neurologic: Lower extremity sensation is intact to light touch.  Protective threshold is intact +9/10 applications however patient does describe off and on burning and tingling that is disruptive for him.  He describes some balance and mobility issues as well.    Dermatologic: Skin is intact to both lower extremities with mildly diminished texture, turgor and tone about the integument.  No paronychia or evidence of soft tissue infection is noted.  There is some hyperkeratosis noted that plantar medial hallux IPJ and hammertoes.  No full-thickness  ulceration.  Skin is dry.    Musculoskeletal: Patient is ambulatory without assistive device or brace.  Generalized valgus foot type noted bilateral.  There is semirigid contracture of the lesser digits causing irritation erythema on the dorsal aspect of the PIPJ and distal aspect of the distal phalanx of the lesser digits.  Manual muscle strength was 5/5 to all 4 quadrants.  Patient describes some aching about the left leg distal to the knee but I am not able to localize pain along any specific structure.  No pain with palpation along the posterior medial crest of the tibia or anterior crest.    Radiographs 3 views bilateral 10/5/2022 demonstrate pes valgus and hammering of the toes.    Hemoglobin A1C (%)   Date Value   08/30/2022 11.4 (H)   05/26/2022 10.6 (H)   02/09/2022 8.6 (H)   10/14/2021 8.3 (H)   03/30/2021 7.5 (H)   09/08/2020 6.8 (H)   05/20/2020 8.0 (H)   01/16/2020 7.2 (H)   10/11/2019 6.9 (H)   02/04/2019 6.8 (H)     Creatinine (mg/dL)   Date Value   08/30/2022 0.50 (L)   05/26/2022 0.56 (L)   02/09/2022 0.49 (L)   10/14/2021 0.73   09/08/2020 0.47 (L)   05/20/2020 0.63 (L)   01/16/2020 0.67   08/21/2019 0.59 (L)   07/05/2019 0.56 (L)   01/08/2019 0.72       ASSESSMENT:       ICD-10-CM    1. Type 2 diabetes mellitus with hyperglycemia, without long-term current use of insulin (H)  E11.65 [facility] [facility] DME Orthotic; Diabetic Shoe(s)/Insert(s); 3 pair; Progress note must support the need for shoes/orthotics. Use .diabeticfootexam or diabetic foot exam picklist in [facility].   2. Diabetes mellitus due to underlying condition with diabetic autonomic neuropathy, without long-term current use of insulin (H)  E08.43 [facility]otics [facility] Prosthetics DME Orthotic; Diabetic Shoe(s)/Insert(s); 3 pair; Progress note must support the need for shoes/orthotics. Use .diabeticfootexam or diabetic foot exam picklist in [employer].        PLAN:  Reviewed patient's chart in [facility].      10/5/2022   Obtained and interpreted  radiographs  Patient does qualify for class findings and diabetic shoe gear but does not have loss of protective threshold he does have deformity with diabetes and symptoms of neuropathy.  Order was placed  Discussed risk factors associated with diabetes in his feet and deformities and how to prevent problems.  Discussed the aching in his left leg is likely associated with this current shoe gear not being adequate support.  Follow-up once yearly for diabetic foot evaluation    Lopez Latif DPM        Again, thank you for allowing me to participate in the care of your patient.        Sincerely,        Lopez Latif DPM

## 2022-10-05 NOTE — PROGRESS NOTES
HPI:  Patient requesting DM shoes as they are worn out.  Also pain in leg off and on. Last ankle sprain 2 years ago. No foot ulcers. Dm since 2015, using oral.  Patient does describe burning and tingling off-and-on but not consistent.  Presents with a caregiver today.    ROS:  10 point ROS neg other than the symptoms noted above in the HPI.    Patient Active Problem List   Diagnosis     Morbid obesity (H)     Intellectual disability     Recurrent major depressive disorder, in partial remission (H)     Mantoux: positive, treated     Morbid obesity with body mass index of 45.0-49.9 in adult (H)     Mild intellectual disabilities     Type 2 diabetes mellitus with hypoglycemia, without long-term current use of insulin (H)     Sexual dysfunction       PAST MEDICAL HISTORY:   Past Medical History:   Diagnosis Date     Depressive disorder All my life almost.     Diabetes (H) sometime in 2019     Sleep apnea         PAST SURGICAL HISTORY:   Past Surgical History:   Procedure Laterality Date     APPENDECTOMY  8/15/14     COLONOSCOPY N/A 12/28/2020    Procedure: COLONOSCOPY, WITH BIOPSY;  Surgeon: Haley Hernandez MD;  Location:  GI        MEDICATIONS:   Current Outpatient Medications:      acetaminophen (TYLENOL) 325 MG tablet, Take 325-650 mg by mouth as needed for mild pain , Disp: , Rfl:      atorvastatin (LIPITOR) 20 MG tablet, TAKE 1 TABLET BY MOUTH ONCEDAILY EVERY MORNING (AM PLUSPAK), Disp: 28 tablet, Rfl: 5     blood glucose calibration (ONETOUCH VERIO) solution, USE AS DIRECTED, Disp: 1 each, Rfl: 0     Blood Glucose Monitoring Suppl (ONETOUCH VERIO FLEX SYSTEM) w/Device KIT, USE TO TEST TWICE DAILY, Disp: 1 kit, Rfl: 0     divalproex sodium extended-release (DEPAKOTE ER) 500 MG 24 hr tablet, TAKE THREE TABLETS BY MOUTH EVERY NIGHT AT BEDTIME (HS PLUSPAK), Disp: , Rfl:      glimepiride (AMARYL) 4 MG tablet, TAKE 1 TABLET BY MOUTH ONCEDAILY EVERY MORNING BEFORE BREAKFAST, Disp: 90 tablet, Rfl: 1     hydrocortisone,  Perianal, (HYDROCORTISONE) 2.5 % cream, Place rectally 2 times daily as needed for hemorrhoids, Disp: 30 g, Rfl: 3     ibuprofen (ADVIL/MOTRIN) 200 MG tablet, As needed, Disp: , Rfl:      Microlet Lancets MISC, TEST EVERY DAY, Disp: 100 each, Rfl: 1     Multiple Vitamins-Minerals (MENS MULTIVITAMIN PO), , Disp: , Rfl:      omeprazole (PRILOSEC) 20 MG DR capsule, TAKE 1 CAPSULE BY MOUTH TWICE A DAY IN THE MORNING AND EVENING PLUS MELVIN, Disp: 60 capsule, Rfl: 5     ONETOUCH VERIO IQ test strip, TEST TWICE DAILY, Disp: 100 strip, Rfl: 1     pioglitazone (ACTOS) 30 MG tablet, Take 1 tablet (30 mg) by mouth daily, Disp: 90 tablet, Rfl: 3     psyllium (METAMUCIL/KONSYL) 58.6 % powder, Take 1 Tablespoonful by mouth daily, Disp: , Rfl:      QUEtiapine (SEROQUEL) 300 MG tablet, Take 300 mg by mouth At Bedtime , Disp: , Rfl: 0     TRAZODONE HCL PO, Take 100 mg by mouth At Bedtime , Disp: , Rfl:      ALLERGIES:    Allergies   Allergen Reactions     Ceclor [Cefaclor] Swelling     Erythromycin GI Disturbance        SOCIAL HISTORY:   Social History     Socioeconomic History     Marital status: Single     Spouse name: Not on file     Number of children: Not on file     Years of education: Not on file     Highest education level: Not on file   Occupational History     Not on file   Tobacco Use     Smoking status: Former Smoker     Packs/day: 1.00     Years: 0.00     Pack years: 0.00     Types: Cigarettes, Cigarettes     Start date: 2014     Quit date: 3/1/2014     Years since quittin.6     Smokeless tobacco: Never Used   Vaping Use     Vaping Use: Never used   Substance and Sexual Activity     Alcohol use: Yes     Comment: rare     Drug use: No     Sexual activity: Never   Other Topics Concern     Parent/sibling w/ CABG, MI or angioplasty before 65F 55M? No   Social History Narrative     Not on file     Social Determinants of Health     Financial Resource Strain: Not on file   Food Insecurity: Not on file   Transportation  Needs: Not on file   Physical Activity: Not on file   Stress: Not on file   Social Connections: Not on file   Intimate Partner Violence: Not on file   Housing Stability: Not on file        FAMILY HISTORY:   Family History   Problem Relation Age of Onset     Mental Illness Brother      Diabetes No family hx of      Depression No family hx of      Anxiety Disorder No family hx of         EXAM:Vitals: /70   Temp (!) 95  F (35  C) (Temporal)   Wt (!) 161 kg (355 lb)   BMI 45.58 kg/m    BMI= Body mass index is 45.58 kg/m .    General appearance: Patient is alert and fully cooperative with history & exam.  No sign of distress is noted during the visit.     Psychiatric: Affect is pleasant & appropriate.  Patient appears motivated to improve health.     Respiratory: Breathing is regular & unlabored while sitting.     HEENT: Hearing is intact to spoken word.  Speech is clear.  No gross evidence of visual impairment that would impact ambulation.     Vascular: DP & PT pulses are intact & regular bilaterally.  No significant edema or varicosities noted.  CFT and skin temperature is normal to both lower extremities.     Neurologic: Lower extremity sensation is intact to light touch.  Protective threshold is intact +9/10 applications however patient does describe off and on burning and tingling that is disruptive for him.  He describes some balance and mobility issues as well.    Dermatologic: Skin is intact to both lower extremities with mildly diminished texture, turgor and tone about the integument.  No paronychia or evidence of soft tissue infection is noted.  There is some hyperkeratosis noted that plantar medial hallux IPJ and hammertoes.  No full-thickness ulceration.  Skin is dry.    Musculoskeletal: Patient is ambulatory without assistive device or brace.  Generalized valgus foot type noted bilateral.  There is semirigid contracture of the lesser digits causing irritation erythema on the dorsal aspect of the  PIPJ and distal aspect of the distal phalanx of the lesser digits.  Manual muscle strength was 5/5 to all 4 quadrants.  Patient describes some aching about the left leg distal to the knee but I am not able to localize pain along any specific structure.  No pain with palpation along the posterior medial crest of the tibia or anterior crest.    Radiographs 3 views bilateral 10/5/2022 demonstrate pes valgus and hammering of the toes.    Hemoglobin A1C (%)   Date Value   08/30/2022 11.4 (H)   05/26/2022 10.6 (H)   02/09/2022 8.6 (H)   10/14/2021 8.3 (H)   03/30/2021 7.5 (H)   09/08/2020 6.8 (H)   05/20/2020 8.0 (H)   01/16/2020 7.2 (H)   10/11/2019 6.9 (H)   02/04/2019 6.8 (H)     Creatinine (mg/dL)   Date Value   08/30/2022 0.50 (L)   05/26/2022 0.56 (L)   02/09/2022 0.49 (L)   10/14/2021 0.73   09/08/2020 0.47 (L)   05/20/2020 0.63 (L)   01/16/2020 0.67   08/21/2019 0.59 (L)   07/05/2019 0.56 (L)   01/08/2019 0.72       ASSESSMENT:       ICD-10-CM    1. Type 2 diabetes mellitus with hyperglycemia, without long-term current use of insulin (H)  E11.65 [facility] D[facility] Orthotic; Diabetic Shoe(s)/Insert(s); 3 pair; Progress note must support the need for shoes/orthotics. Use .diabeticfootexam or diabetic foot exam picklist in [facility].   2. Diabetes mellitus due to underlying condition with diabetic autonomic neuropathy, without long-term current use of insulin (H)  E08.43 [facility] DME Orthotic; Diabetic Shoe(s)/Insert(s); 3 pair; Progress note must support the need for shoes/orthotics. Use .diabeticfootexam or diabetic foot exam picklist in [facility].        PLAN:  Reviewed patient's chart in [facility].      10/5/2022   Obtained and interpreted radiographs  Patient does qualify for class findings and diabetic shoe gear but does not have loss of protective threshold he does have deformity with diabetes and symptoms of neuropathy.  Order was placed  Discussed risk factors associated with diabetes in his  feet and deformities and how to prevent problems.  Discussed the aching in his left leg is likely associated with this current shoe gear not being adequate support.  Follow-up once yearly for diabetic foot evaluation    Lopez Latif DPM

## 2022-10-05 NOTE — PATIENT INSTRUCTIONS
"DIABETES AND YOUR FEET    What effect does diabetes have on the feet?  Diabetes can result in several problems in the feet including contractures of the tendons leading to deformities and reduced function of the bones, skin ulcers or open sores on pressure points or prominent deformities, reduced sensation, reduced blood flow and thus reduced oxygen and immune cells to the tiny vessels in our feet. This all leads to higher risk of hospitalization, infections, and amputations.     What is neuropathy?  Neuropathy is a term used to describe a loss of nerve function.  Patients with diabetes are at risk of developing neuropathy if their sugars continue to run high and are above the normal value of 140.  The elevated blood sugar in the body enters the nerves causing it to swell and impair nerve function.  The higher the blood sugar and the longer it is elevated, the more damage is done to nerves.  This damage is permanent and irreversible.  These damaged sensory nerves can then cause reduced feeling or cause pain.  Damaged motor nerves can reduce blood flow and white blood cells into into your foot, skin and bones reducing your ability to heal a small problem. And neuropathy can cause tendons to become unbalanced and contribute to the formation of deformity and contractures in our feet. Often times, neuropathy can be prevented by controlling your blood sugar.  Your risk of developing neuropathy goes up dramatically as your hemoglobin A1C raises above 7.5.      How do I know if I have neuropathy?  When a person develops neuropathy, they usually begin to feel numbness or tingling in their feet and sometimes in their legs.  Other symptoms may include painful burning or hot feet, tingling, electrical sensations or feeling like insects or ants are crawling on your feet or legs.  If blood sugar remains above 140  for long periods of time, neuropathy can also occur in the hands.  When a person loses their \"protective threshold\" " or ability to detect a 5.07 [place] Kelvin monofilament is when they have elevated risk for developing foot deformity, contractures, foot infections, amputations, Charcot arthropathy, or other complications. Keep your hemoglobin A1C below 7.5 to reduce this risk.    What is vascular disease?  Peripheral vascular disease is a term used to describe a loss or decrease in circulation (blood flow).  There is a problem in getting blood, immune cells, and oxygen to areas that need it.  Similar to neuropathy, sugars can build up in the walls of the arteries (blood vessels) and cause them to become swollen, thickened and hardened.  This decreases the amount of blood that can go to an area that needs it.  Though this is common in the legs of diabetic patients, it can also affect other arteries (blood vessels) in the body such as in the heart, kidney, eyes, and the blood flow into bones.  It is often seen first in the small vessels of her body notably our feet and toes.    How do I know if I have vascular disease?  In the legs, vascular disease usually results in cramping.  Patients who develop leg cramps after walking the same distance every time (i.e. One block, half a mile, ect.) need to let their doctors know so that their circulation may be checked.  Cramps causing severe pain in the feet and/or legs while sleeping and the cramps go away when you stand or hang your legs off the side of the bed, may also be a sign of poor blood circulation.  Occasional cramping in cold weather or on rare occasions with activity may not be due to poor circulation, but you should inform your doctor.    How can these problems be prevented?  The key to prevention is good blood sugar control all day every day.  Inadequate blood sugar control is the most common way patients experience these problems. Reducing, controlling and measuring your daily consumption of sugar or carbohydrates is essential to understanding and managing diabetes.   Physical activity (exercise) is a very good way to help decrease your blood sugars.  Exercise can lower your blood sugar, blood pressure, and cholesterol.  It also reduces your risk for heart disease and stroke, relieves stress, and strengthens your heart, muscles and bones. Physical activity also increases your balance and reduces development of contractures and foot deformities over time. In addition, regular activity helps insulin work better, improves your blood circulation, and keeps your joints flexible.  If you're trying to lose weight, a combination of exercise and wise food choices can help you reach your target weight and maintain it.  Activity and exercise alone can not make up for poor diet choices, eating too much, or eating too many sugars or carbohydrates.  Ask your doctor for help when you are not meeting your blood sugar goals. Changes or increases in medication are powerful tools in reducing your blood sugar.    Know your blood sugar and hemoglobin A1C trend.  Upon first diagnosis or during acute illness, checking your blood sugar 4 times a day can help you understand how your diet, activity, and lifestyle affect your blood sugar.  Monitoring your hemoglobin A1C can help you understand how well you are managing blood sugar over the long run.  Your hemoglobin A1C tells you what your blood sugar averages all day, every day, over the past 90 days.       To experience the lowest risk of complications associated with diabetes such as neuropathy, loss of blood flow, bone or joint infection, charcot arthropathy, or amputation, the American Diabetes Association recommends a target hemoglobin A1C of less than 7.0%, while the American Association of Clinical Endocrinologists' recommendation is 6.5% or less.  Both organizations advise that the goals be individualized based on patient factors such as other health conditions, history of hypoglycemia, education, and life expectancy.  A patients risk of  experiencing complications associated with diabetes is only slightly elevated with a hemoglobin A1C above 6.0.  However, this risk goes up exponentially when the hemoglobin A1C is above 7.5.  The longer the hemoglobin A1C is elevated, the more risk that patient will experience in their lifetime. The damage that occurs to nerves, blood vessels, tendons, bones and body organs, while their hemoglobin A1C is elevated is mostly irreversible and worsens with each additional time period of elevated hemoglobin A1C.     You must understand and manage your disease.  Your health insurance or medical team cannot manage this disease for you.  When you take responsibility for understanding and managing your disease, you can expect to experience fewer problems associated with diabetes in your lifetime.  You will  Also experience a higher quality of life and health and reduced cost of health care.              Diabetic Foot Care Recommendations  The following are recommendations for avoiding serious foot problems or injury    DO'S  1. Be aware of your hemoglobin A1C and continue to follow up with your medical team for adjustments in your lifestyle and medication until your reach your A1C goal.  Keep this below 7.5 to reduce your risk of developing complications associated with diabetes.    2.  Wash your feet with lukewarm water and a mild soap and then dry them thoroughly, especially between the toes.  Gently floss your towel or washcloth between each toe at every bath.  Soaking your feet in water cannot clean dead skin, debris, and bacteria from your feet and is not necessary.   3. Examine your feet daily looking for cuts, corns, blisters, cracks, ect..., especially after wearing new shoes or increased or changed activities.  Make sure to look between your toes.  If you cannot see the bottom of your feet, set a mirror on the floor and hold your foot over it, or ask a family member to examine your feet for you daily.  Contact your  doctor immediately if new problems are noted or if sores are not healing.  4.  Immediately apply moisturizer cream such as Cetaphil to the tops and bottoms of your feet, avoiding areas between the toes.  Apply sunscreen or cover your feet if they will be exposed to extended sunlight.  5.Use clean comfortable shoes.  Socks should not have thick seams or cut off the circulation around the leg.  Break in new shoes slowly and rotate with older shoes until broken in.  Check the inside of your shoes with your hand to look for areas of irritation or objects that may have fallen into your shoes.    6. Keep slippers by the side of your bed for use during the night.  7. Shoes should be fitted by a professional and should not cause areas of irritation.  Check your feet regularly when wearing a new pair of shoes and replace them as needed.  8.  Talk to your doctor about proper exercise.  Exercise and stretching stimulate blood flow to your feet and maintain proper glucose levels.  Use it or lose it!  9.  Monitor your blood glucose level and your hemoglobin A1C.  Notify your doctor immediately if your blood sugar is abnormally high or low.  10.  Cut your nails straight across, but then gently round any sharp edges with a nail file.  If you have neuropathy, peripheral vascular disease or cannot see that well to trim your own toenails, see a medical professional for care.    DONT'S  1.  Do not soak your feet if you have an open sore or your provider has informed you that you have neuropathy or loss of protective threshold.  Use only lukewarm water and always check the temperature with your hand as hot water can easily burn your feet.    2.  Never use a hot water bottle or heating pad on your feet.  Also do not apply hot or cold compresses to your feet.  With decreased sensation, you could burn or freeze your feet.  Do not rest your feet near a heat source such as a heater or heat register.    3.  Do not apply any of these to your  feet:    - over the counter medicine for corns or warts    -  Harsh chemicals like boric acid    -  Do not self-treat corns, cuts, blisters or infections.  Always consult your doctor.   4.  Do not wear sandals, slippers or walk barefoot, especially on harsh surfaces.  5.  If you smoke, stop!!!

## 2022-10-05 NOTE — NURSING NOTE
HPI:  Bertram Foreman is a 32 year old male who is seen in consultation at the request of .    Tremaine Denis*     Pt presents for eval of:   (Onset, Location, L/R, Character, Treatments, Injury if yes)     Chief Complaint   Patient presents with     Foot Pain     Bilateral Foot pain. He does have orthotics but doesn't wear them. Paper work for Diabetic shoes.        Unemployed       Weight management plan: Patient was referred to their PCP to discuss a diet and exercise plan.

## 2022-10-13 NOTE — TELEPHONE ENCOUNTER
Left message for patient to return call to discuss getting lab done for mircoalbumin  Gift care form is in my pending folder.

## 2022-10-15 DIAGNOSIS — K29.00 ACUTE GASTRITIS WITHOUT HEMORRHAGE, UNSPECIFIED GASTRITIS TYPE: ICD-10-CM

## 2022-10-15 DIAGNOSIS — E78.5 HYPERLIPIDEMIA LDL GOAL <130: ICD-10-CM

## 2022-10-18 RX ORDER — ATORVASTATIN CALCIUM 20 MG/1
TABLET, FILM COATED ORAL
Qty: 28 TABLET | Refills: 0 | Status: SHIPPED | OUTPATIENT
Start: 2022-10-18 | End: 2022-11-15

## 2022-10-26 ENCOUNTER — LAB (OUTPATIENT)
Dept: LAB | Facility: CLINIC | Age: 32
End: 2022-10-26
Payer: COMMERCIAL

## 2022-10-26 ENCOUNTER — MYC MEDICAL ADVICE (OUTPATIENT)
Dept: INTERNAL MEDICINE | Facility: CLINIC | Age: 32
End: 2022-10-26

## 2022-10-26 DIAGNOSIS — E11.65 TYPE 2 DIABETES MELLITUS WITH HYPERGLYCEMIA, WITHOUT LONG-TERM CURRENT USE OF INSULIN (H): ICD-10-CM

## 2022-10-26 LAB
CREAT UR-MCNC: 277 MG/DL
MICROALBUMIN UR-MCNC: 315 MG/L
MICROALBUMIN/CREAT UR: 113.72 MG/G CR (ref 0–17)

## 2022-10-26 PROCEDURE — 82043 UR ALBUMIN QUANTITATIVE: CPT

## 2022-10-27 NOTE — TELEPHONE ENCOUNTER
Pt wondering about taking an over the counter medication called  Nutrition Bedtime Burn PM fat burner, non stimulant metabolism booster and sleeping aid support appetite suppression. Pt wanting to make sure it doesn't interfere with other medications he is taking.

## 2022-11-01 NOTE — TELEPHONE ENCOUNTER
"There have been no studies regarding this \"dietary supplement\" and real medication.  Furthermore, I cannot seem to find a list of ingredients.  At best, I can suggest that he takes it on a trial basis and observed closely possible side effects or interactions.    [facility]  "

## 2022-11-11 DIAGNOSIS — E78.5 HYPERLIPIDEMIA LDL GOAL <130: ICD-10-CM

## 2022-11-15 ENCOUNTER — LAB (OUTPATIENT)
Dept: LAB | Facility: CLINIC | Age: 32
End: 2022-11-15
Payer: COMMERCIAL

## 2022-11-15 DIAGNOSIS — E11.649: ICD-10-CM

## 2022-11-15 DIAGNOSIS — Z13.220 SCREENING FOR HYPERLIPIDEMIA: ICD-10-CM

## 2022-11-15 LAB
ALBUMIN SERPL-MCNC: 3.6 G/DL (ref 3.4–5)
ALP SERPL-CCNC: 83 U/L (ref 40–150)
ALT SERPL W P-5'-P-CCNC: 49 U/L (ref 0–70)
AST SERPL W P-5'-P-CCNC: 23 U/L (ref 0–45)
BASOPHILS # BLD AUTO: 0.1 10E3/UL (ref 0–0.2)
BASOPHILS NFR BLD AUTO: 1 %
BILIRUB DIRECT SERPL-MCNC: 0.1 MG/DL (ref 0–0.2)
BILIRUB SERPL-MCNC: 0.3 MG/DL (ref 0.2–1.3)
CHOLEST SERPL-MCNC: 181 MG/DL
EOSINOPHIL # BLD AUTO: 0.1 10E3/UL (ref 0–0.7)
EOSINOPHIL NFR BLD AUTO: 1 %
ERYTHROCYTE [DISTWIDTH] IN BLOOD BY AUTOMATED COUNT: 13.4 % (ref 10–15)
FASTING STATUS PATIENT QL REPORTED: YES
FASTING STATUS PATIENT QL REPORTED: YES
GLUCOSE BLD-MCNC: 282 MG/DL (ref 70–99)
HBA1C MFR BLD: 11 % (ref 0–5.6)
HCT VFR BLD AUTO: 41.7 % (ref 40–53)
HDLC SERPL-MCNC: 40 MG/DL
HGB BLD-MCNC: 12.9 G/DL (ref 13.3–17.7)
IMM GRANULOCYTES # BLD: 0.1 10E3/UL
IMM GRANULOCYTES NFR BLD: 1 %
LDLC SERPL CALC-MCNC: 111 MG/DL
LYMPHOCYTES # BLD AUTO: 3 10E3/UL (ref 0.8–5.3)
LYMPHOCYTES NFR BLD AUTO: 33 %
MCH RBC QN AUTO: 27 PG (ref 26.5–33)
MCHC RBC AUTO-ENTMCNC: 30.9 G/DL (ref 31.5–36.5)
MCV RBC AUTO: 87 FL (ref 78–100)
MONOCYTES # BLD AUTO: 0.5 10E3/UL (ref 0–1.3)
MONOCYTES NFR BLD AUTO: 6 %
NEUTROPHILS # BLD AUTO: 5.2 10E3/UL (ref 1.6–8.3)
NEUTROPHILS NFR BLD AUTO: 58 %
NONHDLC SERPL-MCNC: 141 MG/DL
NRBC # BLD AUTO: 0 10E3/UL
NRBC BLD AUTO-RTO: 0 /100
PLATELET # BLD AUTO: 218 10E3/UL (ref 150–450)
PROT SERPL-MCNC: 8.2 G/DL (ref 6.8–8.8)
RBC # BLD AUTO: 4.78 10E6/UL (ref 4.4–5.9)
TRIGL SERPL-MCNC: 150 MG/DL
WBC # BLD AUTO: 8.9 10E3/UL (ref 4–11)

## 2022-11-15 PROCEDURE — 85025 COMPLETE CBC W/AUTO DIFF WBC: CPT

## 2022-11-15 PROCEDURE — 80061 LIPID PANEL: CPT

## 2022-11-15 PROCEDURE — 36415 COLL VENOUS BLD VENIPUNCTURE: CPT

## 2022-11-15 PROCEDURE — 83036 HEMOGLOBIN GLYCOSYLATED A1C: CPT

## 2022-11-15 PROCEDURE — 82947 ASSAY GLUCOSE BLOOD QUANT: CPT

## 2022-11-15 PROCEDURE — 80076 HEPATIC FUNCTION PANEL: CPT

## 2022-11-15 RX ORDER — ATORVASTATIN CALCIUM 20 MG/1
TABLET, FILM COATED ORAL
Qty: 28 TABLET | Refills: 0 | Status: SHIPPED | OUTPATIENT
Start: 2022-11-15 | End: 2022-12-13

## 2022-11-15 NOTE — TELEPHONE ENCOUNTER
Routing refill request to provider for review/approval because:    Requested Prescriptions   Pending Prescriptions Disp Refills    atorvastatin (LIPITOR) 20 MG tablet [Pharmacy Med Name: ATORVASTATIN 20MG TABLET] 28 tablet 0     Sig: TAKE 1 TABLET BY MOUTH ONCE DAILY EVERY MORNING (AMPLUSPAK)       Statins Protocol Failed - 11/11/2022  2:24 PM        Failed - LDL on file in past 12 months     Recent Labs   Lab Test 10/14/21  0906   *

## 2022-12-09 DIAGNOSIS — E78.5 HYPERLIPIDEMIA LDL GOAL <130: ICD-10-CM

## 2022-12-09 DIAGNOSIS — E11.65 TYPE 2 DIABETES MELLITUS WITH HYPERGLYCEMIA, WITHOUT LONG-TERM CURRENT USE OF INSULIN (H): ICD-10-CM

## 2022-12-13 RX ORDER — ATORVASTATIN CALCIUM 20 MG/1
TABLET, FILM COATED ORAL
Qty: 28 TABLET | Refills: 3 | Status: SHIPPED | OUTPATIENT
Start: 2022-12-13 | End: 2023-04-26

## 2022-12-13 RX ORDER — PIOGLITAZONEHYDROCHLORIDE 30 MG/1
30 TABLET ORAL DAILY
Qty: 90 TABLET | Refills: 0 | Status: SHIPPED | OUTPATIENT
Start: 2022-12-13 | End: 2023-03-08

## 2023-01-14 ENCOUNTER — HEALTH MAINTENANCE LETTER (OUTPATIENT)
Age: 33
End: 2023-01-14

## 2023-01-17 ENCOUNTER — OFFICE VISIT (OUTPATIENT)
Dept: INTERNAL MEDICINE | Facility: CLINIC | Age: 33
End: 2023-01-17
Payer: COMMERCIAL

## 2023-01-17 VITALS
OXYGEN SATURATION: 95 % | TEMPERATURE: 96.2 F | RESPIRATION RATE: 16 BRPM | SYSTOLIC BLOOD PRESSURE: 120 MMHG | BODY MASS INDEX: 40.43 KG/M2 | HEIGHT: 74 IN | HEART RATE: 78 BPM | WEIGHT: 315 LBS | DIASTOLIC BLOOD PRESSURE: 84 MMHG

## 2023-01-17 DIAGNOSIS — E78.5 HYPERLIPIDEMIA LDL GOAL <130: ICD-10-CM

## 2023-01-17 DIAGNOSIS — K59.1 FUNCTIONAL DIARRHEA: ICD-10-CM

## 2023-01-17 DIAGNOSIS — F70 MILD INTELLECTUAL DISABILITIES: ICD-10-CM

## 2023-01-17 DIAGNOSIS — E11.65 TYPE 2 DIABETES MELLITUS WITH HYPERGLYCEMIA, WITHOUT LONG-TERM CURRENT USE OF INSULIN (H): ICD-10-CM

## 2023-01-17 DIAGNOSIS — Z00.00 MEDICARE ANNUAL WELLNESS VISIT, SUBSEQUENT: Primary | ICD-10-CM

## 2023-01-17 LAB
ALBUMIN SERPL BCG-MCNC: 4.1 G/DL (ref 3.5–5.2)
ALP SERPL-CCNC: 92 U/L (ref 40–129)
ALT SERPL W P-5'-P-CCNC: 35 U/L (ref 10–50)
ANION GAP SERPL CALCULATED.3IONS-SCNC: 16 MMOL/L (ref 7–15)
AST SERPL W P-5'-P-CCNC: 19 U/L (ref 10–50)
BILIRUB SERPL-MCNC: 0.2 MG/DL
BUN SERPL-MCNC: 16.5 MG/DL (ref 6–20)
CALCIUM SERPL-MCNC: 9.3 MG/DL (ref 8.6–10)
CHLORIDE SERPL-SCNC: 93 MMOL/L (ref 98–107)
CREAT SERPL-MCNC: 0.6 MG/DL (ref 0.67–1.17)
DEPRECATED HCO3 PLAS-SCNC: 26 MMOL/L (ref 22–29)
GFR SERPL CREATININE-BSD FRML MDRD: >90 ML/MIN/1.73M2
GLUCOSE SERPL-MCNC: 331 MG/DL (ref 70–99)
HBA1C MFR BLD: 9.7 %
POTASSIUM SERPL-SCNC: 4.4 MMOL/L (ref 3.4–5.3)
PROT SERPL-MCNC: 7.9 G/DL (ref 6.4–8.3)
SODIUM SERPL-SCNC: 135 MMOL/L (ref 136–145)

## 2023-01-17 PROCEDURE — G0438 PPPS, INITIAL VISIT: HCPCS | Performed by: INTERNAL MEDICINE

## 2023-01-17 PROCEDURE — 83036 HEMOGLOBIN GLYCOSYLATED A1C: CPT | Performed by: INTERNAL MEDICINE

## 2023-01-17 PROCEDURE — G0008 ADMIN INFLUENZA VIRUS VAC: HCPCS | Performed by: INTERNAL MEDICINE

## 2023-01-17 PROCEDURE — 36415 COLL VENOUS BLD VENIPUNCTURE: CPT | Performed by: INTERNAL MEDICINE

## 2023-01-17 PROCEDURE — 80053 COMPREHEN METABOLIC PANEL: CPT | Performed by: INTERNAL MEDICINE

## 2023-01-17 PROCEDURE — 90686 IIV4 VACC NO PRSV 0.5 ML IM: CPT | Performed by: INTERNAL MEDICINE

## 2023-01-17 RX ORDER — CHOLESTYRAMINE LIGHT 4 G/5.7G
4 POWDER, FOR SUSPENSION ORAL 2 TIMES DAILY WITH MEALS
Qty: 231 G | Refills: 0 | Status: SHIPPED | OUTPATIENT
Start: 2023-01-17 | End: 2023-04-26

## 2023-01-17 ASSESSMENT — ACTIVITIES OF DAILY LIVING (ADL)
CURRENT_FUNCTION: HOUSEWORK REQUIRES ASSISTANCE
CURRENT_FUNCTION: MONEY MANAGEMENT REQUIRES ASSISTANCE
CURRENT_FUNCTION: TRANSPORTATION REQUIRES ASSISTANCE
CURRENT_FUNCTION: PREPARING MEALS REQUIRES ASSISTANCE
CURRENT_FUNCTION: SHOPPING REQUIRES ASSISTANCE

## 2023-01-17 ASSESSMENT — ENCOUNTER SYMPTOMS
ABDOMINAL PAIN: 1
CHILLS: 1
JOINT SWELLING: 0
WEAKNESS: 0
HEADACHES: 1
HEMATOCHEZIA: 1
MYALGIAS: 1
SHORTNESS OF BREATH: 0
FREQUENCY: 1
NAUSEA: 0
FEVER: 0
COUGH: 0
CONSTIPATION: 1
EYE PAIN: 0
HEARTBURN: 1
DIZZINESS: 0
DYSURIA: 1
PARESTHESIAS: 0
PALPITATIONS: 0
ARTHRALGIAS: 1
DIARRHEA: 1
SORE THROAT: 0
NERVOUS/ANXIOUS: 1

## 2023-01-17 NOTE — PROGRESS NOTES
"  Assessment & Plan      This has been a Medicare Annual Wellness Visit    Type 2 diabetes mellitus with hyperglycemia, without long-term current use of insulin (H)    - Hemoglobin A1c; Future  - Comprehensive metabolic panel (BMP + Alb, Alk Phos, ALT, AST, Total. Bili, TP); Future  - Hemoglobin A1c  - Comprehensive metabolic panel (BMP + Alb, Alk Phos, ALT, AST, Total. Bili, TP)    Mild intellectual disabilities      Functional diarrhea    - cholestyramine light (PREVALITE) 4 GM powder; Take 1 packet (4 g) by mouth 2 times daily (with meals)    Hyperlipidemia LDL goal <130                                          FOLLOW UP   I have asked the patient to make an appointment for followup with:  1) Me  2) the patient's preferred provider  3) Any available provider  In 4 months    Tremaine Ruvalcaba DO  [facility] SHAWNEE Burden is a 32 year old, presenting for the following health issues:  Recheck Medication      Healthy Habits:     In general, how would you rate your overall health?  Poor    Frequency of exercise:  2-3 days/week    Duration of exercise:  15-30 minutes    Do you usually eat at least 4 servings of fruit and vegetables a day, include whole grains    & fiber and avoid regularly eating high fat or \"junk\" foods?  No    Taking medications regularly:  No    Barriers to taking medications:  Problems remembering to take them    Medication side effects:  None    Ability to successfully perform activities of daily living:  Transportation requires assistance, shopping requires assistance, preparing meals requires assistance, housework requires assistance and money management requires assistance    Home Safety:  No safety concerns identified    Hearing Impairment:  Difficulty following a conversation in a noisy restaurant or crowded room, need to ask people to speak up or repeat themselves and difficulty understanding soft or whispered speech    In the past 6 months, have you " "been bothered by leaking of urine? Yes    In general, how would you rate your overall mental or emotional health?  Good      PHQ-2 Total Score: 2    Additional concerns today:  No       Annual Wellness Visit    Patient has been advised of split billing requirements and indicates understanding: Yes     Are you in the first 12 months of your Medicare Part B coverage?      Physical Health:    In general, how would you rate your overall physical health? good    Outside of work, how many days during the week do you exercise?none    Outside of work, approximately how many minutes a day do you exercise?not applicable    If you drink alcohol do you typically have >3 drinks per day or >7 drinks per week? No    Do you usually eat at least 4 servings of fruit and vegetables a day, include whole grains & fiber and avoid regularly eating high fat or \"junk\" foods? Yes    Do you have any problems taking medications regularly? No    Do you have any side effects from medications? none    Needs assistance for the following daily activities: no assistance needed    Which of the following safety concerns are present in your home?  none identified     Hearing impairment: No    In the past 6 months, have you been bothered by leaking of urine? no    Mental Health:    In general, how would you rate your overall mental or emotional health? good  PHQ-2 Score: 2    Do you feel safe in your environment? Yes    Have you ever done Advance Care Planning? (For example, a Health Directive, POLST, or a discussion with a medical provider or your loved ones about your wishes)? No, advance care planning information given to patient to review.  Patient plans to discuss their wishes with loved ones or provider.      Fall risk:       Cognitive Screenin) Repeat 3 items (Leader, Season, Table)    2) Clock draw: NORMAL  3) 3 item recall: Recalls 3 objects  Results: 3 items recalled: COGNITIVE IMPAIRMENT LESS LIKELY    Mini-CogTM Copyright S Maximo. " Licensed by the author for use in [facility]; reprinted with permission (momo@[facility]). All rights reserved.      Do you have sleep apnea, excessive snoring or daytime drowsiness?: yes    Current providers sharing in care for this patient include:   Patient Care Team:  Tremaine Ruvalcaba DO as PCP - General (Internal Medicine)  Chiara Hair RD as Diabetes Educator (Dietitian, Registered)  Tremaine Ruvalcaba DO as Assigned PCP  Elizabeth Flower MD as MD (Dermatology)  Lopez Latif DPM as Assigned Musculoskeletal Provider  Lucio Echevarria MD as Assigned Pulmonology Provider  Liborio Hidalgo MD as Assigned Surgical Provider  Prashant Iglesias PA-C as Assigned Sleep Provider  Vicky Kelly, PhD as Assigned Behavioral Health Provider  Ellis Rush RPH as Pharmacist (Pharmacist Clinician- Clinical Pharmacy Specialist)  Ellis Rush RPH as Assigned MTM Pharmacist    Patient has been advised of split billing requirements and indicates understanding: Yes      Review of Systems   Constitutional: Positive for chills. Negative for fever.   HENT: Negative for congestion, ear pain, hearing loss and sore throat.    Eyes: Negative for pain and visual disturbance.   Respiratory: Negative for cough and shortness of breath.    Cardiovascular: Positive for chest pain. Negative for palpitations and peripheral edema.   Gastrointestinal: Positive for abdominal pain, constipation, diarrhea, heartburn and hematochezia. Negative for nausea.   Genitourinary: Positive for dysuria, frequency, genital sores and urgency. Negative for impotence and penile discharge.   Musculoskeletal: Positive for arthralgias and myalgias. Negative for joint swelling.   Skin: Negative for rash.   Neurological: Positive for headaches. Negative for dizziness, weakness and paresthesias.   Psychiatric/Behavioral: Negative for mood changes. The patient is nervous/anxious.       No recent  "chest pain.  Rare abdominal discomfort with intermittent diarrhea and constipation.  No recent hematochezia.  Denies recent urinary symptoms including urgency, frequency or dysuria.  Denies recent headache.  Patient does have limited cognitive capability and the self-reported review of systems is somewhat inaccurate      Objective    /84   Pulse 78   Temp (!) 96.2  F (35.7  C) (Temporal)   Resp 16   Ht 1.88 m (6' 2\")   Wt (!) 165.1 kg (364 lb)   SpO2 95%   BMI 46.73 kg/m    Body mass index is 46.73 kg/m .  Physical Exam   GENERAL: healthy, alert and no distress  EYES: Eyes grossly normal to inspection, PERRL and conjunctivae and sclerae normal  HENT: ear canals and TM's normal, nose and mouth without ulcers or lesions  NECK: no adenopathy, no asymmetry, masses, or scars and thyroid normal to palpation  RESP: lungs clear to auscultation - no rales, rhonchi or wheezes  CV: regular rate and rhythm, normal S1 S2, no S3 or S4, no murmur, click or rub, no peripheral edema and peripheral pulses strong  ABDOMEN: soft, nontender, no hepatosplenomegaly, no masses and bowel sounds normal  MS: no gross musculoskeletal defects noted, no edema  SKIN: no suspicious lesions or rashes  NEURO: Normal strength and tone, mentation intact and speech normal  PSYCH: mentation appears normal, affect normal/bright                    I have carefully explained the diagnosis and treatment options to the patient.  The patient has displayed an understanding of the above, and all subsequent questions were answered.      DO DONNELL Jin    Portions of this note were produced using SecureWave  Although every attempt at real-time proof reading has been made, occasional grammar/syntax errors may have been missed.              "

## 2023-01-17 NOTE — PROGRESS NOTES
Prior to immunization administration, verified patients identity using patient s name and date of birth. Please see Immunization Activity for additional information.     Screening Questionnaire for Adult Immunization    Are you sick today?   No   Do you have allergies to medications, food, a vaccine component or latex?   Yes   Have you ever had a serious reaction after receiving a vaccination?   No   Do you have a long-term health problem with heart, lung, kidney, or metabolic disease (e.g., diabetes), asthma, a blood disorder, no spleen, complement component deficiency, a cochlear implant, or a spinal fluid leak?  Are you on long-term aspirin therapy?   No   Do you have cancer, leukemia, HIV/AIDS, or any other immune system problem?   No   Do you have a parent, brother, or sister with an immune system problem?   No   In the past 3 months, have you taken medications that affect  your immune system, such as prednisone, other steroids, or anticancer drugs; drugs for the treatment of rheumatoid arthritis, Crohn s disease, or psoriasis; or have you had radiation treatments?   No   Have you had a seizure, or a brain or other nervous system problem?   No   During the past year, have you received a transfusion of blood or blood    products, or been given immune (gamma) globulin or antiviral drug?   No   For women: Are you pregnant or is there a chance you could become       pregnant during the next month?   No   Have you received any vaccinations in the past 4 weeks?   No     Immunization questionnaire was positive for at least one answer.  Notified Yes.        Per orders of Dr. Ruvalcaba, injection of flu given by Patricia Rainey CMA. Patient instructed to remain in clinic for 15 minutes afterwards, and to report any adverse reaction to me immediately.       Screening performed by Patricia Rainey CMA on 1/17/2023 at 10:14 AM.

## 2023-02-01 ENCOUNTER — TRANSFERRED RECORDS (OUTPATIENT)
Dept: HEALTH INFORMATION MANAGEMENT | Facility: CLINIC | Age: 33
End: 2023-02-01
Payer: COMMERCIAL

## 2023-02-01 LAB — RETINOPATHY: NEGATIVE

## 2023-02-03 DIAGNOSIS — E11.65 TYPE 2 DIABETES MELLITUS WITH HYPERGLYCEMIA, WITHOUT LONG-TERM CURRENT USE OF INSULIN (H): ICD-10-CM

## 2023-02-06 RX ORDER — GLIMEPIRIDE 4 MG/1
TABLET ORAL
Qty: 90 TABLET | Refills: 0 | Status: SHIPPED | OUTPATIENT
Start: 2023-02-06 | End: 2023-04-21

## 2023-03-06 DIAGNOSIS — E11.65 TYPE 2 DIABETES MELLITUS WITH HYPERGLYCEMIA, WITHOUT LONG-TERM CURRENT USE OF INSULIN (H): ICD-10-CM

## 2023-03-07 NOTE — TELEPHONE ENCOUNTER
"Requested Prescriptions   Pending Prescriptions Disp Refills    pioglitazone (ACTOS) 30 MG tablet 90 tablet 0     Sig: Take 1 tablet (30 mg) by mouth daily       Thiazolidinedione Agents (TZDs)  Failed - 3/6/2023 11:19 AM        Failed - Patient has a normal serum Creatinine in the past 12 months     Recent Labs   Lab Test 01/17/23  1007   CR 0.60*       Ok to refill medication if creatinine is low          Passed - Patient has a normal ALT within the past 12 mos.     Recent Labs   Lab Test 01/17/23  1007   ALT 35             Passed - Patient has a normal AST within the past 12 mos.      Recent Labs   Lab Test 01/17/23  1007   AST 19             Passed - Patient has documented A1c within the specified period of time.     If HgbA1C is 8 or greater, it needs to be on file within the past 3 months.  If less than 8, must be on file within the past 6 months.     Recent Labs   Lab Test 01/17/23  1007   A1C 9.7*             Passed - Diagnosis not CHF        Passed - Medication is active on med list        Passed - Patient is age 18 or older        Passed - Recent (6 mo) or future (30 days) visit within the authorizing provider's specialty     Patient had office visit in the last 6 months or has a visit in the next 30 days with authorizing provider or within the authorizing provider's specialty.  See \"Patient Info\" tab in inbasket, or \"Choose Columns\" in Meds & Orders section of the refill encounter.                 "

## 2023-03-08 ENCOUNTER — OFFICE VISIT (OUTPATIENT)
Dept: PHARMACY | Facility: CLINIC | Age: 33
End: 2023-03-08
Payer: COMMERCIAL

## 2023-03-08 VITALS — DIASTOLIC BLOOD PRESSURE: 84 MMHG | SYSTOLIC BLOOD PRESSURE: 130 MMHG

## 2023-03-08 DIAGNOSIS — R52 PAIN: ICD-10-CM

## 2023-03-08 DIAGNOSIS — R19.7 DIARRHEA, UNSPECIFIED TYPE: ICD-10-CM

## 2023-03-08 DIAGNOSIS — K21.9 GASTROESOPHAGEAL REFLUX DISEASE, UNSPECIFIED WHETHER ESOPHAGITIS PRESENT: ICD-10-CM

## 2023-03-08 DIAGNOSIS — E11.649 TYPE 2 DIABETES MELLITUS WITH HYPOGLYCEMIA WITHOUT COMA, WITHOUT LONG-TERM CURRENT USE OF INSULIN (H): Primary | ICD-10-CM

## 2023-03-08 DIAGNOSIS — F33.41 RECURRENT MAJOR DEPRESSIVE DISORDER, IN PARTIAL REMISSION (H): ICD-10-CM

## 2023-03-08 DIAGNOSIS — G47.00 INSOMNIA, UNSPECIFIED TYPE: ICD-10-CM

## 2023-03-08 DIAGNOSIS — E78.5 HYPERLIPIDEMIA LDL GOAL <100: ICD-10-CM

## 2023-03-08 PROCEDURE — 99605 MTMS BY PHARM NP 15 MIN: CPT | Performed by: PHARMACIST

## 2023-03-08 RX ORDER — NAPHAZOLINE HCL 0.012 %
1-3 DROPS OPHTHALMIC (EYE) DAILY PRN
COMMUNITY
End: 2023-08-17

## 2023-03-08 RX ORDER — PIOGLITAZONEHYDROCHLORIDE 30 MG/1
30 TABLET ORAL DAILY
Qty: 90 TABLET | Refills: 0 | Status: SHIPPED | OUTPATIENT
Start: 2023-03-08 | End: 2023-05-10

## 2023-03-08 NOTE — LETTER
"Recommended To-Do List      Prepared on: Mar 8, 2023       You can get the best results from your medications by completing the items on this \"To-Do List.\"      Bring your To-Do List when you go to your doctor. And, share it with your family or caregivers.    My To-Do List:  What we talked about: What I should do:    What my medicines are for, how to know if my medicines are working, made sure my medicines are safe for me and reviewed how to take my medicines.    Take my medicines every day                "

## 2023-03-08 NOTE — LETTER
March 9, 2023  Bertram Foreman  [address]   [place] [address]    Dear Earl Foreman, [facility]     Thank you for talking with me on Mar 8, 2023 about your health and medications. As a follow-up to our conversation, I have included two documents:      1. Your Recommended To-Do List has steps you should take to get the best results from your medications.  2. Your Medication List will help you keep track of your medications and how to take them.    If you want to talk about these documents, please call Ellis Rush RPH at phone: [phone], Monday-Friday 8-4:30pm.    I look forward to working with you and your doctors to make sure your medications work well for you.    Sincerely,  Ellis Rush RPH  [facility] Pharmacist, [facility]

## 2023-03-08 NOTE — PATIENT INSTRUCTIONS
"Recommendations from today's MTM visit:                                                    MTM (medication therapy management) is a service provided by a clinical pharmacist designed to help you get the most of out of your medicines.      Could consider trying Dr. Alireza Yaneser PM (kidney bean extract/green tea extract are likely what helps with weight loss/appetite) with the rest of your medications.    If this does not do enough or your change in meal plan at the new group home we could consider that diabetes pill called Rybelsus (semaglutide) that helps with blood sugars, appetite, and weight loss.     If you start to experience looser stools (hopefully that fiber gummy is helping) you can use that cholestyramine as needed for stretches if desired.     Follow-up: 3 months or sooner if needed    It was great speaking with you today.  I value your experience and would be very thankful for your time in providing feedback in our clinic survey. In the next few days, you may receive an email or text message from [employer] with a link to a survey related to your  clinical pharmacist.\"     To schedule another MTM appointment, please call the clinic directly or you may call the MTM scheduling line at [phone] or toll-free at [phone].     My Clinical Pharmacist's contact information:                                                      Please feel free to contact me with any questions or concerns you have.      Thom Rush, PharmD, [facility]  Medication Therapy Management Pharmacist  Pager: [phone]    "

## 2023-03-08 NOTE — LETTER
_  Medication List        Prepared on: Mar 8, 2023     Bring your Medication List when you go to the doctor, hospital, or   emergency room. And, share it with your family or caregivers.     Note any changes to how you take your medications.  Cross out medications when you no longer use them.    Medication How I take it Why I use it Prescriber   acetaminophen (TYLENOL) 325 MG tablet Take 325-650 mg by mouth as needed for mild pain  Pain Patient Reported   aspirin-acetaminophen-caffeine (EXCEDRIN EXTRA STRENGTH) 250-250-65 MG tablet Take 1-3 tablets by mouth daily as needed for moderate pain (4-6) Backache Patient Reported   atorvastatin (LIPITOR) 20 MG tablet TAKE 1 TABLET BY MOUTH ONCEDAILY EVERY MORNING (AMPLUSPAK) Heart; Cholesterol Tremaine Ruvalcaba DO   cholestyramine light (PREVALITE) 4 GM powder Take 1 packet (4 g) by mouth 2 times daily (with meals) Functional Diarrhea Tremaine Ruvalcaba DO   divalproex sodium extended-release (DEPAKOTE ER) 500 MG 24 hr tablet TAKE THREE TABLETS BY MOUTH EVERY NIGHT AT BEDTIME (HS PLUSPAK)  Mental Health Kelsie Jefferson PA-C   FIBER ADULT GUMMIES PO Take 1 chew tab by mouth daily as needed Diarrhea Patient Reported   glimepiride (AMARYL) 4 MG tablet TAKE 1 TABLET BY MOUTH ONCE DAILY EVERY MORNING BEFORE BREAKFAST Type 2 diabetes Noé Gonzales MD   hydrocortisone, Perianal, (HYDROCORTISONE) 2.5 % cream Place rectally 2 times daily as needed for hemorrhoids Hemorrhoids Tremaine Ruvalcaba DO   ibuprofen (ADVIL/MOTRIN) 200 MG tablet Take 600-800 mg by mouth every 6 hours as needed Pain Patient Reported   Microlet Lancets MISC TEST EVERY DAY Type 2 diabetes Tremaine Ruvalcaba DO   omeprazole (PRILOSEC) 20 MG DR capsule TAKE 1 CAPSULE BY MOUTH TWICE A DAY IN THE MORNING AND EVENING PLUS MELVIN Heartburn Tremaine Ruvalcaba DO   ONETOUCH VERIO IQ test strip TEST TWICE DAILY Type 2 diabetes Tremaine Ruvalcaba DO   pioglitazone (ACTOS) 30  MG tablet Take 1 tablet (30 mg) by mouth daily Type 2 diabetes Tremaine Ruvalcaba DO   psyllium (METAMUCIL/KONSYL) 58.6 % powder Take 1 Tablespoonful by mouth daily Diarrhea Patient Reported   QUEtiapine (SEROQUEL) 300 MG tablet Take 300 mg by mouth At Bedtime  Mental Health Kelsie Jefferson PA-C   TRAZODONE HCL PO Take 100 mg by mouth At Bedtime  Insomnia Kelsie Jefferson PA-C         Add new medications, over-the-counter drugs, herbals, vitamins, or  minerals in the blank rows below.    Medication How I take it Why I use it Prescriber                                      Allergies:      ceclor [cefaclor]; erythromycin        Side effects I have had:               Other Information:              My notes and questions:

## 2023-03-08 NOTE — PROGRESS NOTES
Medication Therapy Management (MTM) Encounter    ASSESSMENT:                            Medication Adherence/Access: No issues identified    Type 2 Diabetes:  A1c is not at goal of <7%.  Improving and dietary modifications are upcoming at [facility]. We did discuss Rybelsus as a potential option for patient and he was not ready to try this. No major drug:supplement interactions of concern with Bedtime Burner PM product.     Diarrhea: Improved per patient.     Pain: Somewhat stable per patient.     Hyperlipidemia: Stable.    Depression/Insomnia:  Somewhat stable per patient.    GERD: Stable.     PLAN:                            1. Could consider trying Dr. Lay Bedtime Burner PM (kidney bean extract/green tea extract are likely what helps with weight loss/appetite) with the rest of your medications.    2. If this does not do enough or your change in meal plan at the new group [place] we could consider that diabetes pill called Rybelsus (semaglutide) that helps with blood sugars, appetite, and weight loss.     3. If you start to experience looser stools (hopefully that fiber gummy is helping) you can use that cholestyramine as needed for stretches if desired.     Follow-up: 3 months or sooner if needed    SUBJECTIVE/OBJECTIVE:                          Bertram Foreman is a 32 year old male coming in for a follow-up visit.  Today's visit is a follow-up MTM visit from 9/21/2022. Joined today by ILS Helena ding.      Reason for visit: Comprehensive medication review.    Allergies/ADRs: Reviewed in chart  Past Medical History: Reviewed in chart  Tobacco: He reports that he quit smoking about 9 years ago. His smoking use included cigarettes and cigarettes. He started smoking about 9 years ago. He smoked an average of 1.00 packs per day. He has never used smokeless tobacco.  Alcohol: Less than 1 beverage / month  Other Substance Use: none  Caffeine: pop - 1 or more cans of pop/day - usually sugar free or diet -  "occasionally regular soda    Medication Adherence/Access: Patient uses bubble pack(s).  Patient takes medications 2 time(s) per day.   Per patient, misses medication 2 to 3 times per week. Often will sleep in to varying times of the day - just skips morning doses.   Medication barriers: none.   The patient fills medications at [place]: NO, fills medications at [facility] in [place], [place].    Type 2 Diabetes:  Currently prescribed pioglitazone 30 mg by mouth every morning and glimepiride 4 mg every morning. Patient is much more engaged today during the visit with regards to his health. He prefers not to make any changes to his medications due to upcoming move to a group home where they will be cooking meals/managing portion sizes.  Previously on metformin XR that appeared to worsen his diarrhea, but this has recently improved where it is manageable. Opposed to injectables - though ILS works states she would be able to help if it was a weekly medication. He is interested in Dr. Weber's Nutrition Bedtime Burn PM supplement though cost may be a barrier - he wonders if this would be safe to take with his other medications.   Blood sugar monitoring: never - not likely to start  Symptoms of low blood sugar? none  Symptoms of high blood sugar? none  Eye exam: up to date  Foot exam: up to date  Diet/Exercise: Pain issues limiting activity. Still eating mainly prepared meals.   Aspirin: Not taking due to age  Statin: Yes: atorvastatin   ACEi/ARB: No.   Urine Albumin:   Lab Results   Component Value Date    UMALCR 113.72 (H) 10/26/2022      Lab Results   Component Value Date    A1C 9.7 01/17/2023    A1C 11.0 11/15/2022    A1C 11.4 08/30/2022    A1C 10.6 05/26/2022    A1C 8.6 02/09/2022    A1C 7.5 03/30/2021    A1C 6.8 09/08/2020    A1C 8.0 05/20/2020    A1C 7.2 01/16/2020    A1C 6.9 10/11/2019     Diarrhea: Currently taking fiber gummy daily. Previously was told to take Metamucil but feels it taste like \"snot\" so does not use. " "Declines any interest in changing to alternative fiber. Did try cholestyramine not a big fan of this either, but  Has impacted his life/work given unpredictability.     Pain: Currently taking Excedrin Extra Strength daily as needed, acetaminophen and ibuprofen as needed. Pt finds this to be somewhat effective - some back pain issues. Not working so not as many issues. Pt reports no current issues.      Hyperlipidemia: Current therapy includes atorvastatin 20 mg daily.  Patient reports no significant myalgias or other side effects though does get cramping in lower legs.   The ASCVD Risk score (Minor LONG, et al., 2019) failed to calculate for the following reasons:    The 2019 ASCVD risk score is only valid for ages 40 to 79   Recent Labs   Lab Test 11/15/22  1021 10/14/21  0906   CHOL 181 184   HDL 40 41   * 104*   TRIG 150* 197*       Depression/Insomnia:  Current medications include: Depakote ER 1500 mg at bedtime, quetiapine 300 mg at bedtime, and trazodone 100 mg at bedtime. Follows with Kelsie Jefferson PA-C at [facility]. States that his mood is \"okay\" - some issues related to mom per patient. Severe sleep apnea, but was not able to tolerate masks.  PHQ 5/22/2022 8/9/2022 8/15/2022   PHQ-9 Total Score 9 10 10   Q9: Thoughts of better off dead/self-harm past 2 weeks Not at all Not at all Not at all       GERD: Current medications include: Prilosec (omeprazole) 20 mg twice daily. Patient reports no current symptoms.  Patient feels that current regimen is effective.    Today's Vitals: There were no vitals taken for this visit.     Wt Readings from Last 5 Encounters:   01/17/23 (!) 364 lb (165.1 kg)   10/05/22 (!) 355 lb (161 kg)   05/26/22 (!) 377 lb 3.2 oz (171.1 kg)   03/08/22 (!) 377 lb (171 kg)   02/09/22 (!) 381 lb (172.8 kg)     ----------------      I spent 25 minutes with this patient today. A copy of the visit note was provided to the patient's provider(s).    A summary of these recommendations was " given to the patient.    Thom Rush, PharmD, [facility]  Medication Therapy Management Pharmacist  Pager: [phone]     Medication Therapy Recommendations  No medication therapy recommendations to display

## 2023-03-14 ENCOUNTER — TELEPHONE (OUTPATIENT)
Dept: INTERNAL MEDICINE | Facility: CLINIC | Age: 33
End: 2023-03-14
Payer: COMMERCIAL

## 2023-03-14 NOTE — TELEPHONE ENCOUNTER
Forms/Letter Request    Type of form/letter: so he can move into group home    Have you been seen for this request: Yes     Do we have the form/letter: Yes:     When is form/letter needed by: before 3/29/asap    How would you like the form/letter returned:     Patient Notified form requests are processed in 3-5 business days:Yes    Could we send this information to you in [facility] or would you prefer to receive a phone call?:   Patient would prefer a phone call   Okay to leave a detailed message?: Yes at Home number on file [phone] (home)

## 2023-03-20 ENCOUNTER — TELEPHONE (OUTPATIENT)
Dept: INTERNAL MEDICINE | Facility: CLINIC | Age: 33
End: 2023-03-20
Payer: COMMERCIAL

## 2023-03-20 NOTE — TELEPHONE ENCOUNTER
General Call   Form     Contacts       Type Contact Phone/Fax    03/20/2023 11:39 AM CDT Phone (Incoming) Bertram Foreman (Self) [phone] ()        Reason for Call:  Patient needs the forms that were dropped off on the 14th. Of March. Before March 27th.      Could we send this information to you in [employer] or would you prefer to receive a phone call?:   Patient would prefer a phone call   Okay to leave a detailed message?: Yes at Home number on file [phone] ([place])

## 2023-03-21 ENCOUNTER — TELEPHONE (OUTPATIENT)
Dept: INTERNAL MEDICINE | Facility: CLINIC | Age: 33
End: 2023-03-21
Payer: COMMERCIAL

## 2023-03-21 NOTE — TELEPHONE ENCOUNTER
Patient calling in and would like provider to give consent to patients father, Andrea Kellen.  So that group home staff can talk to father about medical concerns that concern patient.  Patient is moving into [facility] group home in [place] on 3/29.

## 2023-03-22 NOTE — TELEPHONE ENCOUNTER
Bertram notified that this will need to be in writting. The group home should have a consent to communicate for him to sign and he should sign one the next time he is in the clinic.

## 2023-03-24 ENCOUNTER — MEDICAL CORRESPONDENCE (OUTPATIENT)
Dept: HEALTH INFORMATION MANAGEMENT | Facility: CLINIC | Age: 33
End: 2023-03-24

## 2023-03-24 NOTE — TELEPHONE ENCOUNTER
"Patient calling on status of these forms that had been dropped off. Patient is questioning if they are ready to be picked up? Informed of not being sure. Patient became very upset, \"I need these forms to be able to move into my new group home! You guys need to figure this out! I am suppose to be moving into the group home on the 29th and I need these before I can move in!\" Writer explained that his provider had been out ill and just returned to the office yesterday. Informed we will send message to the care team on the status of the forms. Please call patient with an update and if they are ready for . Rowena Hemphill LPN    "

## 2023-03-24 NOTE — TELEPHONE ENCOUNTER
Forms have been faxed, copy sent to scanning, copy placed up front for patient to .    Patient notified.    Becky Garcia XRO/

## 2023-04-04 ENCOUNTER — TELEPHONE (OUTPATIENT)
Dept: INTERNAL MEDICINE | Facility: CLINIC | Age: 33
End: 2023-04-04
Payer: COMMERCIAL

## 2023-04-04 DIAGNOSIS — E11.65 TYPE 2 DIABETES MELLITUS WITH HYPERGLYCEMIA, WITHOUT LONG-TERM CURRENT USE OF INSULIN (H): Primary | ICD-10-CM

## 2023-04-04 NOTE — TELEPHONE ENCOUNTER
Patient is scheduled for an appointment.  Can you fill these strips for the patient.    Becky Garcia XRO/

## 2023-04-04 NOTE — TELEPHONE ENCOUNTER
Reason for Call:  Appointment Request    Patient requesting this type of appt:  Medication follow up to get test strips ordered    Requested provider: Tremaine Ruvalcaba    Reason patient unable to be scheduled: Not within requested timeframe    When does patient want to be seen/preferred time: Needs to be in April ( offered 4/21 but there is no staff available on this date to bring him.)    Comments: Nia, with group home, called and stated patient is going to run out of test strips somewhere between 1-2 weeks. He has to see Dr. Ruvalcaba to get a diagnosis so his insurance will cover test strips. He has been buying them over the counter in the past. Nia asked if Dr. Ruvalcaba cannot see him in April, if he would be wiling to send an order for test strips to pharmacy below:    [facility]: [address] [place] [id number] ph: [phone]    Could we send this information to you in [facility] or would you prefer to receive a phone call?:   Patient would prefer a phone call   Okay to leave a detailed message?: Yes at Group Home: [phone] - ASK TO SPEAK WITH STAFF SPECIFICALLY    Call taken on 4/4/2023 at 12:37 PM by Holli Gagnon

## 2023-04-13 DIAGNOSIS — E11.65 TYPE 2 DIABETES MELLITUS WITH HYPERGLYCEMIA, WITHOUT LONG-TERM CURRENT USE OF INSULIN (H): ICD-10-CM

## 2023-04-14 RX ORDER — GLIMEPIRIDE 4 MG/1
TABLET ORAL
Qty: 30 TABLET | Refills: 10 | OUTPATIENT
Start: 2023-04-14

## 2023-04-20 ENCOUNTER — TELEPHONE (OUTPATIENT)
Dept: INTERNAL MEDICINE | Facility: CLINIC | Age: 33
End: 2023-04-20
Payer: COMMERCIAL

## 2023-04-20 NOTE — TELEPHONE ENCOUNTER
Prior Authorization Retail Medication Request    Medication/Dose: blood glucose (NO BRAND SPECIFIED) test strip  Patient currently has Contour Next strips and meter  Order: [id number]      ICD code (if different than what is on RX):    Previously Tried and Failed:    Rationale:  Type 2 diabetes mellitus with hyperglycemia, without long-term current use of insulin (H) [E11.65]    Insurance Name:  [facility]  Insurance ID:  [id number]      Pharmacy Information (if different than what is on RX)  Name:  [facility]  Phone: [phone]

## 2023-04-21 DIAGNOSIS — E11.65 TYPE 2 DIABETES MELLITUS WITH HYPERGLYCEMIA, WITHOUT LONG-TERM CURRENT USE OF INSULIN (H): ICD-10-CM

## 2023-04-21 RX ORDER — GLIMEPIRIDE 4 MG/1
TABLET ORAL
Qty: 90 TABLET | Refills: 0 | Status: SHIPPED | OUTPATIENT
Start: 2023-04-21 | End: 2023-07-07

## 2023-04-21 NOTE — TELEPHONE ENCOUNTER
Routing refill request to provider for review/approval because:    Requested Prescriptions   Pending Prescriptions Disp Refills    glimepiride (AMARYL) 4 MG tablet 90 tablet 0       Sulfonylurea Agents Failed - 4/21/2023  7:27 AM        Failed - Patient has documented A1c within the specified period of time.     If HgbA1C is 8 or greater, it needs to be on file within the past 3 months.  If less than 8, must be on file within the past 6 months.     Recent Labs   Lab Test 01/17/23  1007   A1C 9.7*

## 2023-04-25 NOTE — TELEPHONE ENCOUNTER
Central Prior Authorization Team  Phone: [phone]    PA Initiation    Medication: Contour Next Test strips  Insurance Company: [employer] - Phone [phone] Fax [phone]  Pharmacy Filling the Rx: [facility] - [place], [place] - [facility] [address]  Filling Pharmacy Phone: [phone]  Filling Pharmacy Fax:    Start Date: 4/25/2023

## 2023-04-26 ENCOUNTER — OFFICE VISIT (OUTPATIENT)
Dept: INTERNAL MEDICINE | Facility: CLINIC | Age: 33
End: 2023-04-26
Payer: COMMERCIAL

## 2023-04-26 VITALS
WEIGHT: 315 LBS | DIASTOLIC BLOOD PRESSURE: 80 MMHG | RESPIRATION RATE: 20 BRPM | SYSTOLIC BLOOD PRESSURE: 128 MMHG | HEART RATE: 80 BPM | BODY MASS INDEX: 45.57 KG/M2 | TEMPERATURE: 96.9 F | OXYGEN SATURATION: 96 %

## 2023-04-26 DIAGNOSIS — F70 MILD INTELLECTUAL DISABILITIES: ICD-10-CM

## 2023-04-26 DIAGNOSIS — E78.5 HYPERLIPIDEMIA LDL GOAL <130: ICD-10-CM

## 2023-04-26 DIAGNOSIS — E66.01 MORBID OBESITY (H): ICD-10-CM

## 2023-04-26 DIAGNOSIS — E11.65 TYPE 2 DIABETES MELLITUS WITH HYPERGLYCEMIA, WITHOUT LONG-TERM CURRENT USE OF INSULIN (H): ICD-10-CM

## 2023-04-26 DIAGNOSIS — L30.8 OTHER ECZEMA: Primary | ICD-10-CM

## 2023-04-26 LAB — HBA1C MFR BLD: 9.9 %

## 2023-04-26 PROCEDURE — 99214 OFFICE O/P EST MOD 30 MIN: CPT | Performed by: INTERNAL MEDICINE

## 2023-04-26 PROCEDURE — 36415 COLL VENOUS BLD VENIPUNCTURE: CPT | Performed by: INTERNAL MEDICINE

## 2023-04-26 PROCEDURE — 83036 HEMOGLOBIN GLYCOSYLATED A1C: CPT | Performed by: INTERNAL MEDICINE

## 2023-04-26 RX ORDER — ATORVASTATIN CALCIUM 20 MG/1
TABLET, FILM COATED ORAL
Qty: 90 TABLET | Refills: 3 | Status: SHIPPED | OUTPATIENT
Start: 2023-04-26 | End: 2024-03-21

## 2023-04-26 RX ORDER — TRIAMCINOLONE ACETONIDE 1 MG/G
CREAM TOPICAL 2 TIMES DAILY
Qty: 30 G | Refills: 0 | Status: SHIPPED | OUTPATIENT
Start: 2023-04-26 | End: 2023-04-27

## 2023-04-26 ASSESSMENT — PATIENT HEALTH QUESTIONNAIRE - PHQ9
10. IF YOU CHECKED OFF ANY PROBLEMS, HOW DIFFICULT HAVE THESE PROBLEMS MADE IT FOR YOU TO DO YOUR WORK, TAKE CARE OF THINGS AT HOME, OR GET ALONG WITH OTHER PEOPLE: SOMEWHAT DIFFICULT
SUM OF ALL RESPONSES TO PHQ QUESTIONS 1-9: 8
SUM OF ALL RESPONSES TO PHQ QUESTIONS 1-9: 8

## 2023-04-26 ASSESSMENT — PAIN SCALES - GENERAL: PAINLEVEL: MODERATE PAIN (5)

## 2023-04-26 NOTE — TELEPHONE ENCOUNTER
Central Prior Authorization Team  Phone: [phone]    PRIOR AUTHORIZATION DENIED    Medication: Contour Next Test strips        Denial Date: 4/25/2023    Denial Rational:           Appeal Information:

## 2023-04-26 NOTE — PROGRESS NOTES
"      Tahira Burden is a 32 year old, presenting for the following health issues:  Diabetes and Lipids        4/26/2023     2:39 PM   Additional Questions   Roomed by Amina DURON LPN   Accompanied by [facility] staff     History of Present Illness       Reason for visit:  N/A    He eats 0-1 servings of fruits and vegetables daily.He consumes 0 sweetened beverage(s) daily.He exercises with enough effort to increase his heart rate 9 or less minutes per day.  He exercises with enough effort to increase his heart rate 3 or less days per week.   He is taking medications regularly.    Today's PHQ-9         PHQ-9 Total Score: 8    PHQ-9 Q9 Thoughts of better off dead/self-harm past 2 weeks :   Not at all    How difficult have these problems made it for you to do your work, take care of things at home, or get along with other people: Somewhat difficult        EMR reviewed including:             Complaint, History of Chief Complaint, Corresponding Review of Systems, and Complaint Specific Physical Examination.    #1   Poorly controlled type 2 diabetes  Extremely noncompliant  Despite living in a \"group home\" he eats candy regularly.  Blood sugar recently greater than 400 requiring urgent care visit.  Does not check blood sugars frequently.  Does not like his glucometer.  Denies polyuria or polydipsia.  Denies hypoglycemic episode.        Exam:   LUNGS: clear bilaterally, airflow is brisk, no intercostal retraction or stridor is noted. No coughing is noted during visit.   HEART:  regular without rubs, clicks, gallops, or murmurs. PMI is nondisplaced. Upstrokes are brisk. S1,S2 are heard.   GI: Abdomen is soft, without rebound, guarding or tenderness. Bowel sounds are appropriate. No renal bruits are heard.  Abdomen is morbidly obese.   NEURO: Pt is alert and appropriate. No neurologic lateralization is noted. Cranial nerves 2-12 are intact. Peripheral sensory and motor function are grossly normal.       #2   Rash " on the back of his hands  Started off as xeroderma but now appears to be eczematous.  Crusty, red, slightly raised.  Itches.  No sign of secondary infection.  No other rash noted.      #3   Does not want take Questran.  Diarrhea has resolved.  States it upsets his stomach.        Patient has been interviewed, applicable history and applied review of systems have been performed.    Vital Signs:   /80 (BP Location: Right arm, Patient Position: Chair)   Pulse 80   Temp 96.9  F (36.1  C) (Temporal)   Resp 20   Wt (!) 161 kg (354 lb 14.4 oz)   SpO2 96%   BMI 45.57 kg/m        Decision Making    Problem and Complexity     1. Type 2 diabetes mellitus with hyperglycemia, without long-term current use of insulin (H)  Check A1c.  Urged importance of compliance.  Discussed that he does not really have a lot of options regarding his preferred glucose meter of choice if he is going to have his insurance company pay for it.  - Hemoglobin A1c; Future  - Hemoglobin A1c    2. Hyperlipidemia LDL goal <130  Continue atorvastatin.  - atorvastatin (LIPITOR) 20 MG tablet; TAKE 1 TABLET BY MOUTH ONCEDAILY EVERY MORNING (AMPLUSPAK)  Dispense: 90 tablet; Refill: 3    3. Other eczema  Trial of triamcinolone to the back of the hands.  - triamcinolone (KENALOG) 0.1 % external cream; Apply topically 2 times daily  Dispense: 30 g; Refill: 0    4. Mild intellectual disabilities  Stable    5. Morbid obesity (H)  Stable.                                FOLLOW UP   I have asked the patient to make an appointment for followup with either:  1.  Me,  2.  The patient's preferred provider,  3.  Any available provider  In 3 months or as predicated by lab results        Regarding routine vaccinations:  I have reviewed the patient's vaccination schedule and discussed the benefits of prophylactic vaccination in detail.  I recommend the patient contact their pharmacist (not the pharmacy within the clinic) for vaccinations.  Discussed that most  insurance companies now favor reimbursement to the pharmacies and it will financially behoove the patient to have vaccinations performed at their pharmacy.        I have carefully explained the diagnosis and treatment options to the patient.  The patient has displayed an understanding of the above, and all subsequent questions were answered.      DO DONNELL Jin    Portions of this note were produced using [employer]  Although every attempt at real-time proof reading has been made, occasional grammar/syntax errors may have been missed.

## 2023-04-27 ENCOUNTER — TELEPHONE (OUTPATIENT)
Dept: INTERNAL MEDICINE | Facility: CLINIC | Age: 33
End: 2023-04-27
Payer: COMMERCIAL

## 2023-04-27 RX ORDER — IBUPROFEN 200 MG
600 TABLET ORAL 3 TIMES DAILY
COMMUNITY
Start: 2023-04-27

## 2023-04-27 RX ORDER — TRIAMCINOLONE ACETONIDE 1 MG/G
CREAM TOPICAL 2 TIMES DAILY
Qty: 30 G | Refills: 0 | COMMUNITY
Start: 2023-04-27 | End: 2023-08-17

## 2023-04-27 NOTE — TELEPHONE ENCOUNTER
Spoke with Nia, care giver at [facility] wanting to clarify the medications that patient is to be taking.    Informed her that patient is to stop the cholestyramine powder.    Patient is to start taking triamcinolone cream, apply to the back of the hands 2 times daily.  Nia states she needs an order stating that it is to be applied to the back of the hands.    Informed Nia that RN would give her a call back after talking with provider to get confirmation on medications that patient should be currently taking.    Message handled by Nurse Triage with [facility] - provider name: Dr. Ruvalcaba.  He has updated patient's medication list and this has been faxed to Nia at [facility] - F: [phone].    Call placed to Nia - T: [phone] informing her of the above.

## 2023-04-28 ENCOUNTER — TELEPHONE (OUTPATIENT)
Dept: INTERNAL MEDICINE | Facility: CLINIC | Age: 33
End: 2023-04-28
Payer: COMMERCIAL

## 2023-04-28 NOTE — TELEPHONE ENCOUNTER
Reason for Call:  Other     Detailed comments: Keri from [facility] had called wanting to know if someone can email her patients AVS from last appt w/ [email or web address]      Call taken on 4/28/2023 at 10:40 AM by Nadia Contreras

## 2023-04-28 NOTE — TELEPHONE ENCOUNTER
Mariza from [facility] is calling and requesting medication list and last after visit summary be faxed to patient's foster home, [phone].      Informed Mariza, patient has an active My Chart, so he is able to review his health care at any time.  Mariza stated understanding.    Printed and faxed patient AVS with medication list from 4/26/2023.    Alicia Freeman RN

## 2023-05-04 ENCOUNTER — TELEPHONE (OUTPATIENT)
Dept: INTERNAL MEDICINE | Facility: CLINIC | Age: 33
End: 2023-05-04
Payer: COMMERCIAL

## 2023-05-04 NOTE — TELEPHONE ENCOUNTER
Nia calling from [facility] and needing documentation for where to apply his triamcinolone cream. This has been faxed twice before but they have not received it. New fax number given. Faxed med order over to them today.    Fax: [phone]  Attn: Chiara

## 2023-05-10 DIAGNOSIS — E11.65 TYPE 2 DIABETES MELLITUS WITH HYPERGLYCEMIA, WITHOUT LONG-TERM CURRENT USE OF INSULIN (H): ICD-10-CM

## 2023-05-10 RX ORDER — PIOGLITAZONEHYDROCHLORIDE 30 MG/1
TABLET ORAL
Qty: 28 TABLET | Refills: 10 | Status: SHIPPED | OUTPATIENT
Start: 2023-05-10 | End: 2024-04-10

## 2023-05-10 NOTE — TELEPHONE ENCOUNTER
Pending Prescriptions:                       Disp   Refills    pioglitazone (ACTOS) 30 MG tablet [Pharmac*28 tab*10       Sig: TAKE ONE TABLET BY MOUTH DAILY      Routing refill request to provider for review/approval because:  Labs out of range:  creatinine    Radha Bell RN on 5/10/2023 at 3:01 PM

## 2023-05-23 ENCOUNTER — TELEPHONE (OUTPATIENT)
Dept: INTERNAL MEDICINE | Facility: CLINIC | Age: 33
End: 2023-05-23
Payer: COMMERCIAL

## 2023-05-23 NOTE — TELEPHONE ENCOUNTER
"Chiara jones [facility] is calling and is asking a diabetic protocol of some sort because Bertram's blood sugars have been running in the 300-400's lately.  He is always running high and instead of always having to call Dr Resendiz, would like some parameters if possible.    He did see a diabetic educator but when asked about his eating habits he states, \"no comment.\"        She is asking for parameters on what to do if the blood sugar gets to 300-400's.      Also asking for parameters on what to do when blood sugars get too low.    And when to call 911.    Fax #  [phone]    Sandra Melton RN  [facility] ~ Registered Nurse  Clinic Triage ~ [place] [facility] [place]  May 23, 2023            "

## 2023-05-23 NOTE — TELEPHONE ENCOUNTER
There are no parameters.  The patient is unconsciounably noncompliant.    If the blood sugar is in excess of 350, he should go to the emergency department.    He is not on titratable medication (insulin).    Jordon

## 2023-05-24 NOTE — TELEPHONE ENCOUNTER
Phoned Chiara from Patient's group home.  Informed her of documentation per Dr. Jordon MD as stated below.  Chiara stated understanding and asked if she could have a print out of documented information.  Faxed encounter to Chiara at [phone].    Alicia Freeman RN

## 2023-06-15 ENCOUNTER — TELEPHONE (OUTPATIENT)
Dept: INTERNAL MEDICINE | Facility: CLINIC | Age: 33
End: 2023-06-15
Payer: COMMERCIAL

## 2023-06-15 NOTE — TELEPHONE ENCOUNTER
[facility] residential physical form was sent to the provider asking if patient should have 24 hour nursing.  They do not have 24 hour nursing available.    They sent the form in March.    They would like to know if this has been addressed?    They need refaxed to:  [phone].    Radha Bell RN on 6/15/2023 at 1:40 PM

## 2023-06-19 NOTE — TELEPHONE ENCOUNTER
Chiara calling on status of previous form on physical completed with date on form from 2/8/23. She is stating there are 3 questions needing to be answered. Writer was able to print off previous form and was able to answer the questions with Chiara on the phone. Form has been refaxed to [phone]. Rowena Hemphill LPN

## 2023-07-03 ENCOUNTER — TELEPHONE (OUTPATIENT)
Dept: INTERNAL MEDICINE | Facility: CLINIC | Age: 33
End: 2023-07-03
Payer: COMMERCIAL

## 2023-07-03 NOTE — TELEPHONE ENCOUNTER
FYI - Status Update    Who is Calling: Keri from [facility]    Update: Patients glucose reading this morning is 352 and per their protocol they are to send him to the ER.  She is stating patient is refusing to go to the ER. Patient is not exhibiting any other symptoms.     Does caller want a call/response back: Yes, Keri can be reached at [phone]

## 2023-07-03 NOTE — TELEPHONE ENCOUNTER
RN TRIAGE CALL:    Patient Contact    Attempt # 1    Was call answered?  No.  Unable to leave message. Phone continued to ring multiple times, then automatically disconnected.    Alicia Freeman RN

## 2023-07-05 NOTE — TELEPHONE ENCOUNTER
Phoned Mariza from [facility] group home.  Mariza stated patient did not exhibit any signs or symptoms of hyperglycemia at the time of results on 7/3/2023.  She stated he is doing well at this time.  She stated group home has noted finding empty Mountian Dew cans/ bottles in his room and believe he is snacking during the night.    Advised Mariza to call 911 for symptoms of hyperglycemia per protocol.  Mariza stated understanding.    Alicia Freeman RN

## 2023-07-06 ENCOUNTER — TELEPHONE (OUTPATIENT)
Dept: INTERNAL MEDICINE | Facility: CLINIC | Age: 33
End: 2023-07-06
Payer: COMMERCIAL

## 2023-07-06 NOTE — TELEPHONE ENCOUNTER
FYI - Status Update    Who is Calling: Nia from [facility] [place]    Update: Patients blood glucose was 405 this morning. Per their protocol he is to go the ED or clinic. Patient refused. They called EMT and on arrival of EMT and , patient refused any treatment or recheck of his blood sugar. Was noted to be throwing things at the . Refused to go with the EMT's.    Dad stepped in and said patient would be fine and to let him be.    EMT stated to try and recheck glucose again later today    Does caller want a call/response back: Yes, group [place], Nia would like call back with any new orders or recommendations, [phone]

## 2023-07-07 DIAGNOSIS — E11.65 TYPE 2 DIABETES MELLITUS WITH HYPERGLYCEMIA, WITHOUT LONG-TERM CURRENT USE OF INSULIN (H): ICD-10-CM

## 2023-07-07 RX ORDER — GLIMEPIRIDE 4 MG/1
TABLET ORAL
Qty: 28 TABLET | Refills: 10 | Status: SHIPPED | OUTPATIENT
Start: 2023-07-07 | End: 2024-06-07

## 2023-07-07 NOTE — TELEPHONE ENCOUNTER
"Requested Prescriptions   Pending Prescriptions Disp Refills    glimepiride (AMARYL) 4 MG tablet [Pharmacy Med Name: Glimepiride 4 MG Tablet] 28 tablet 10     Sig: TAKE ONE TABLET BY MOUTH DAILY IN THE MORNING BEFORE BREAKFAST       Sulfonylurea Agents Failed - 7/7/2023  7:42 AM        Failed - Patient has a recent creatinine (normal) within the past 12 mos.     Recent Labs   Lab Test 01/17/23  1007   CR 0.60*       Ok to refill medication if creatinine is low          Passed - Patient has documented A1c within the specified period of time.     If HgbA1C is 8 or greater, it needs to be on file within the past 3 months.  If less than 8, must be on file within the past 6 months.     Recent Labs   Lab Test 04/26/23  1517   A1C 9.9*             Passed - Medication is active on med list        Passed - Patient is age 18 or older        Passed - Recent (6 mo) or future (30 days) visit within the authorizing provider's specialty     Patient had office visit in the last 6 months or has a visit in the next 30 days with authorizing provider or within the authorizing provider's specialty.  See \"Patient Info\" tab in inbasket, or \"Choose Columns\" in Meds & Orders section of the refill encounter.                 "

## 2023-07-07 NOTE — TELEPHONE ENCOUNTER
If the patient continues to eat whatever food/candy/snacks/soda he wishes throughout the day, there is very little I can do to control his blood sugar.      Possibly, dietary consultation may be helpful.    But, I doubt it.        Jordon

## 2023-07-19 ENCOUNTER — HOSPITAL ENCOUNTER (EMERGENCY)
Facility: CLINIC | Age: 33
Discharge: HOME | End: 2023-07-19
Attending: EMERGENCY MEDICINE | Admitting: EMERGENCY MEDICINE
Payer: COMMERCIAL

## 2023-07-19 ENCOUNTER — TELEPHONE (OUTPATIENT)
Dept: INTERNAL MEDICINE | Facility: CLINIC | Age: 33
End: 2023-07-19

## 2023-07-19 VITALS
RESPIRATION RATE: 18 BRPM | SYSTOLIC BLOOD PRESSURE: 144 MMHG | TEMPERATURE: 98.8 F | OXYGEN SATURATION: 95 % | HEART RATE: 72 BPM | DIASTOLIC BLOOD PRESSURE: 74 MMHG

## 2023-07-19 DIAGNOSIS — E11.65 TYPE 2 DIABETES MELLITUS WITH HYPERGLYCEMIA, WITHOUT LONG-TERM CURRENT USE OF INSULIN (H): ICD-10-CM

## 2023-07-19 LAB
GLUCOSE BLDC GLUCOMTR-MCNC: 321 MG/DL (ref 70–99)
GLUCOSE SERPL-MCNC: 304 MG/DL (ref 70–99)
HBA1C MFR BLD: 9.6 %

## 2023-07-19 PROCEDURE — 36415 COLL VENOUS BLD VENIPUNCTURE: CPT | Performed by: EMERGENCY MEDICINE

## 2023-07-19 PROCEDURE — 99283 EMERGENCY DEPT VISIT LOW MDM: CPT | Performed by: EMERGENCY MEDICINE

## 2023-07-19 PROCEDURE — 99284 EMERGENCY DEPT VISIT MOD MDM: CPT | Performed by: EMERGENCY MEDICINE

## 2023-07-19 PROCEDURE — 83036 HEMOGLOBIN GLYCOSYLATED A1C: CPT | Performed by: EMERGENCY MEDICINE

## 2023-07-19 PROCEDURE — 82962 GLUCOSE BLOOD TEST: CPT

## 2023-07-19 PROCEDURE — 82947 ASSAY GLUCOSE BLOOD QUANT: CPT | Performed by: EMERGENCY MEDICINE

## 2023-07-19 ASSESSMENT — ACTIVITIES OF DAILY LIVING (ADL): ADLS_ACUITY_SCORE: 35

## 2023-07-19 NOTE — ED TRIAGE NOTES
"Pt is a type II DM with oral medications with a BS of 392. Did not have breakfast, group home called 911 per their protocol.   11pm yesterday was last time of intake.  Pt states he feels \"Fine. Just hungry!\"  Alert and oriented.     Triage Assessment     Row Name 07/19/23 0896       Triage Assessment (Adult)    Airway WDL WDL       Respiratory WDL    Respiratory WDL WDL       Cognitive/Neuro/Behavioral WDL    Cognitive/Neuro/Behavioral WDL WDL              "

## 2023-07-19 NOTE — DISCHARGE INSTRUCTIONS
Hyperglycemia is not in the an emergent range.  His hemoglobin A1c is above 9 which suggest that his daily glucose values have been running in the high 200s to low to mid 300s for last 3 months.  This chronic hyperglycemic picture is due to his continued obesity, noncompliance with his diet and occasional refusal to take his diabetic medications.  At this time we are not recommending follow-up with his primary care provider and a referral for diabetic nutritional assistance.

## 2023-07-19 NOTE — ED NOTES
Bed: ED08  Expected date:   Expected time:   Means of arrival:   Comments:  [place]   33 y male  hypergylcemia

## 2023-07-19 NOTE — TELEPHONE ENCOUNTER
FYI - Status Update    Who is Calling: Keri from [facility] group home    Update: Patient had a glucose reading this morning of 392 and per their protocol patient is to go to the ED if above 300.  Patient did comply and allowed the EMT's to take him to the ER.    The group home is asking if they can have an additional order to check blood sugar PRN as needed daily besides the one time daily?    Does caller want a call/response back: Yes, with additional order as requested, [phone]

## 2023-07-19 NOTE — TELEPHONE ENCOUNTER
"Please consider the following an official \"order\".    Then forward this official \"order\".  To the patient's group home.    Check blood sugar/check glucometer reading up to 3 times daily as needed.        Thank you.    Jordon"

## 2023-07-19 NOTE — ED PROVIDER NOTES
History     Chief Complaint   Patient presents with    Hyperglycemia     HPI  Bertram Foreman is a 32 year old male who presents from a adult group home for hyperglycemia.  His glucose values were in the high 300s per protocol with a brought him in by EMS for evaluation.  History of DM type II.  Currently on Amaryl and Actos.  Off of metformin because of loose stools causing periods of incontinence.  Patient is noncompliant with his diet.  He notes that he eats out most of the time and does not watch his diet.  Also intermittently in the last month has been refusing his medications.  He is morbidly obese.    Allergies:  Allergies   Allergen Reactions    Ceclor [Cefaclor] Swelling    Erythromycin GI Disturbance       Problem List:    Patient Active Problem List    Diagnosis Date Noted    Type 2 diabetes mellitus with hypoglycemia, without long-term current use of insulin (H) 05/26/2022     Priority: Medium    Sexual dysfunction 05/26/2022     Priority: Medium    Intellectual disability 02/04/2019     Priority: Medium    Recurrent major depressive disorder, in partial remission (H) 02/04/2019     Priority: Medium    Morbid obesity (H) 01/08/2019     Priority: Medium    Morbid obesity with body mass index of 45.0-49.9 in adult (H) 04/04/2017     Priority: Medium    Mantoux: positive, treated 05/01/2011     Priority: Medium     Overview:   5/2011, went through (more than) 9 months of isoniazid.  NOT TO HAVE MANTOUX IN FUTURE      Mild intellectual disabilities 07/15/2010     Priority: Medium        Past Medical History:    Past Medical History:   Diagnosis Date    Depressive disorder All my life almost.    Diabetes (H) sometime in 2019    Sleep apnea        Past Surgical History:    Past Surgical History:   Procedure Laterality Date    APPENDECTOMY  8/15/14    COLONOSCOPY N/A 12/28/2020    Procedure: COLONOSCOPY, WITH BIOPSY;  Surgeon: Haley Hernandez MD;  Location:  GI       Family History:    Family History    Problem Relation Age of Onset    Mental Illness Brother     Diabetes No family hx of     Depression No family hx of     Anxiety Disorder No family hx of        Social History:  Marital Status:  Single [1]  Social History     Tobacco Use    Smoking status: Former     Packs/day: 1.00     Years: 0.00     Pack years: 0.00     Types: Cigarettes     Start date: 2014     Quit date: 3/1/2014     Years since quittin.3    Smokeless tobacco: Never   Vaping Use    Vaping Use: Never used   Substance Use Topics    Alcohol use: Yes     Comment: rare    Drug use: No        Medications:    acetaminophen (TYLENOL) 325 MG tablet  aspirin-acetaminophen-caffeine (EXCEDRIN EXTRA STRENGTH) 250-250-65 MG tablet  atorvastatin (LIPITOR) 20 MG tablet  blood glucose (NO BRAND SPECIFIED) test strip  blood glucose calibration (ONETOUCH VERIO) solution  Blood Glucose Monitoring Suppl (ONETOUCH VERIO FLEX SYSTEM) w/Device KIT  divalproex sodium extended-release (DEPAKOTE ER) 500 MG 24 hr tablet  FIBER ADULT GUMMIES PO  glimepiride (AMARYL) 4 MG tablet  hydrocortisone, Perianal, (HYDROCORTISONE) 2.5 % cream  ibuprofen (ADVIL/MOTRIN) 200 MG tablet  Microlet Lancets MISC  omeprazole (PRILOSEC) 20 MG DR capsule  ONETOUCH VERIO IQ test strip  pioglitazone (ACTOS) 30 MG tablet  psyllium (METAMUCIL/KONSYL) 58.6 % powder  QUEtiapine (SEROQUEL) 300 MG tablet  TRAZODONE HCL PO  triamcinolone (KENALOG) 0.1 % external cream          Review of Systems   All other systems reviewed and are negative.      Physical Exam   BP: (!) 144/74  Pulse: 72  Temp: 98.8  F (37.1  C)  Resp: 18  SpO2: 95 %      Physical Exam  Vitals and nursing note reviewed.   Constitutional:       Appearance: He is not ill-appearing.   HENT:      Head: Normocephalic.      Nose: Nose normal.   Eyes:      Conjunctiva/sclera: Conjunctivae normal.   Cardiovascular:      Rate and Rhythm: Normal rate.   Pulmonary:      Effort: Pulmonary effort is normal.   Skin:     General: Skin is  warm.      Capillary Refill: Capillary refill takes less than 2 seconds.      Findings: No rash.   Neurological:      General: No focal deficit present.      Mental Status: He is alert and oriented to person, place, and time.   Psychiatric:         Mood and Affect: Mood normal.         Behavior: Behavior normal.         ED Course                 Procedures                    Results for orders placed or performed during the hospital encounter of 07/19/23 (from the past 24 hour(s))   Glucose by meter   Result Value Ref Range    GLUCOSE BY METER POCT 321 (H) 70 - 99 mg/dL   Hemoglobin A1c   Result Value Ref Range    Hemoglobin A1C 9.6 (H) <5.7 %   Glucose   Result Value Ref Range    Glucose 304 (H) 70 - 99 mg/dL       Medications - No data to display    Assessments & Plan (with Medical Decision Making)  Bertram is 32 years of age.  DM type II.  Currently on Actos and Amaryl.  Off metformin because of loose stools/diarrhea causing stool incontinence.  Noncompliant on diet.  Remains morbidly obese.  At times refusing his oral hypoglycemics.  Came in from the group home today because his glucose was in the high 300s.  When he arrived today his glucose was 304.  No signs for any infection.  No signs for hyperosmolar hyperglycemic state or ketosis.  Most likely because of his noncompliance with his diet.  Nothing emergent.  Discharged back to group home with instruction to follow-up with his primary care provider to see if they want to make a medication adjustment or refer him for diabetic nutritional education.     I have reviewed the nursing notes.    I have reviewed the findings, diagnosis, plan and need for follow up with the patient.          New Prescriptions    No medications on file       Final diagnoses:   Type 2 diabetes mellitus with hyperglycemia, without long-term current use of insulin (H) - non compliant with diet       7/19/2023   [facility] EMERGENCY DEPT       Kvng Rainey,  DO  07/19/23 1936

## 2023-07-20 NOTE — TELEPHONE ENCOUNTER
The group home only got one page.    They state they also need to know if the glucose tests are in addition to the current order.    Please update and refax.    Radha Bell RN on 7/20/2023 at 1:22 PM

## 2023-07-20 NOTE — TELEPHONE ENCOUNTER
Information faxed again, asked for written request for review and signature by provider if further orders are needed..    Becky Garcia XRO/

## 2023-07-27 ENCOUNTER — TELEPHONE (OUTPATIENT)
Dept: INTERNAL MEDICINE | Facility: CLINIC | Age: 33
End: 2023-07-27

## 2023-07-27 ENCOUNTER — HOSPITAL ENCOUNTER (EMERGENCY)
Facility: CLINIC | Age: 33
Discharge: HOME | End: 2023-07-27
Attending: EMERGENCY MEDICINE | Admitting: EMERGENCY MEDICINE
Payer: COMMERCIAL

## 2023-07-27 VITALS
SYSTOLIC BLOOD PRESSURE: 134 MMHG | WEIGHT: 315 LBS | TEMPERATURE: 98.9 F | BODY MASS INDEX: 46.22 KG/M2 | HEART RATE: 79 BPM | OXYGEN SATURATION: 95 % | RESPIRATION RATE: 17 BRPM | DIASTOLIC BLOOD PRESSURE: 74 MMHG

## 2023-07-27 DIAGNOSIS — E11.65 TYPE 2 DIABETES MELLITUS WITH HYPERGLYCEMIA, WITHOUT LONG-TERM CURRENT USE OF INSULIN (H): ICD-10-CM

## 2023-07-27 LAB
ALBUMIN UR-MCNC: NEGATIVE MG/DL
ANION GAP SERPL CALCULATED.3IONS-SCNC: 11 MMOL/L (ref 7–15)
APPEARANCE UR: CLEAR
B-OH-BUTYR SERPL-SCNC: <0.18 MMOL/L
BASE EXCESS BLDV CALC-SCNC: 5.2 MMOL/L (ref -7.7–1.9)
BASOPHILS # BLD AUTO: 0 10E3/UL (ref 0–0.2)
BASOPHILS NFR BLD AUTO: 0 %
BILIRUB UR QL STRIP: NEGATIVE
BUN SERPL-MCNC: 9.1 MG/DL (ref 6–20)
CALCIUM SERPL-MCNC: 9 MG/DL (ref 8.6–10)
CHLORIDE SERPL-SCNC: 98 MMOL/L (ref 98–107)
COLOR UR AUTO: YELLOW
CREAT SERPL-MCNC: 0.5 MG/DL (ref 0.67–1.17)
DEPRECATED HCO3 PLAS-SCNC: 28 MMOL/L (ref 22–29)
EOSINOPHIL # BLD AUTO: 0.1 10E3/UL (ref 0–0.7)
EOSINOPHIL NFR BLD AUTO: 1 %
ERYTHROCYTE [DISTWIDTH] IN BLOOD BY AUTOMATED COUNT: 14.1 % (ref 10–15)
GFR SERPL CREATININE-BSD FRML MDRD: >90 ML/MIN/1.73M2
GLUCOSE BLDC GLUCOMTR-MCNC: 318 MG/DL (ref 70–99)
GLUCOSE SERPL-MCNC: 339 MG/DL (ref 70–99)
GLUCOSE UR STRIP-MCNC: >499 MG/DL
HCO3 BLDV-SCNC: 31 MMOL/L (ref 21–28)
HCT VFR BLD AUTO: 35.4 % (ref 40–53)
HGB BLD-MCNC: 11.2 G/DL (ref 13.3–17.7)
HGB UR QL STRIP: NEGATIVE
IMM GRANULOCYTES # BLD: 0 10E3/UL
IMM GRANULOCYTES NFR BLD: 0 %
KETONES UR STRIP-MCNC: 5 MG/DL
LEUKOCYTE ESTERASE UR QL STRIP: NEGATIVE
LYMPHOCYTES # BLD AUTO: 3.5 10E3/UL (ref 0.8–5.3)
LYMPHOCYTES NFR BLD AUTO: 37 %
MCH RBC QN AUTO: 27.3 PG (ref 26.5–33)
MCHC RBC AUTO-ENTMCNC: 31.6 G/DL (ref 31.5–36.5)
MCV RBC AUTO: 86 FL (ref 78–100)
MONOCYTES # BLD AUTO: 0.7 10E3/UL (ref 0–1.3)
MONOCYTES NFR BLD AUTO: 7 %
MUCOUS THREADS #/AREA URNS LPF: PRESENT /LPF
NEUTROPHILS # BLD AUTO: 5.1 10E3/UL (ref 1.6–8.3)
NEUTROPHILS NFR BLD AUTO: 55 %
NITRATE UR QL: NEGATIVE
NRBC # BLD AUTO: 0 10E3/UL
NRBC BLD AUTO-RTO: 0 /100
O2/TOTAL GAS SETTING VFR VENT: 21 %
PCO2 BLDV: 48 MM HG (ref 40–50)
PH BLDV: 7.41 [PH] (ref 7.32–7.43)
PH UR STRIP: 6 [PH] (ref 5–7)
PLATELET # BLD AUTO: 237 10E3/UL (ref 150–450)
PO2 BLDV: 57 MM HG (ref 25–47)
POTASSIUM SERPL-SCNC: 4 MMOL/L (ref 3.4–5.3)
RBC # BLD AUTO: 4.11 10E6/UL (ref 4.4–5.9)
RBC URINE: 0 /HPF
SODIUM SERPL-SCNC: 137 MMOL/L (ref 136–145)
SP GR UR STRIP: 1.03 (ref 1–1.03)
SQUAMOUS EPITHELIAL: 1 /HPF
UROBILINOGEN UR STRIP-MCNC: NORMAL MG/DL
WBC # BLD AUTO: 9.4 10E3/UL (ref 4–11)
WBC URINE: <1 /HPF

## 2023-07-27 PROCEDURE — 99283 EMERGENCY DEPT VISIT LOW MDM: CPT

## 2023-07-27 PROCEDURE — 82962 GLUCOSE BLOOD TEST: CPT

## 2023-07-27 PROCEDURE — 36415 COLL VENOUS BLD VENIPUNCTURE: CPT | Performed by: EMERGENCY MEDICINE

## 2023-07-27 PROCEDURE — 85025 COMPLETE CBC W/AUTO DIFF WBC: CPT | Performed by: EMERGENCY MEDICINE

## 2023-07-27 PROCEDURE — 82803 BLOOD GASES ANY COMBINATION: CPT | Performed by: EMERGENCY MEDICINE

## 2023-07-27 PROCEDURE — 80048 BASIC METABOLIC PNL TOTAL CA: CPT | Performed by: EMERGENCY MEDICINE

## 2023-07-27 PROCEDURE — 82010 KETONE BODYS QUAN: CPT | Performed by: EMERGENCY MEDICINE

## 2023-07-27 PROCEDURE — 258N000003 HC RX IP 258 OP 636: Performed by: EMERGENCY MEDICINE

## 2023-07-27 PROCEDURE — 81001 URINALYSIS AUTO W/SCOPE: CPT | Performed by: EMERGENCY MEDICINE

## 2023-07-27 PROCEDURE — 99283 EMERGENCY DEPT VISIT LOW MDM: CPT | Performed by: EMERGENCY MEDICINE

## 2023-07-27 RX ADMIN — SODIUM CHLORIDE 1000 ML: 9 INJECTION, SOLUTION INTRAVENOUS at 08:55

## 2023-07-27 ASSESSMENT — ACTIVITIES OF DAILY LIVING (ADL): ADLS_ACUITY_SCORE: 35

## 2023-07-27 NOTE — DISCHARGE INSTRUCTIONS
Blood work showed high blood sugar, but otherwise does not show any worrisome findings.    Continue to monitor your blood sugars at home as directed.  Continue all your medications as prescribed    Follow-up with your primary doctor within 1 week    You develop any new or worsening symptoms, if you have persistent high blood sugar and or feeling ill, or vomiting, have signs of infection such as a fever or a rash, diarrhea, or new concerns you should return to the emergency room for evaluation

## 2023-07-27 NOTE — ED TRIAGE NOTES
Patient brought to ED by EMS from his group home with concerns for high blood sugars. He has had a slight cough. EMS noted his sats to be low 90's- 95 on room air. Soledad Beauchamp RN

## 2023-07-27 NOTE — ED PROVIDER NOTES
History     Chief Complaint   Patient presents with    Hyperglycemia     HPI  Bertram Foreman is a 32 year old male who presents to the emergency room via EMS from his group home for concern of hyperglycemia. He is diabetic, and takes oral medication. He says he took his meds last night, but took them later than usual. Blood sugar checked today noted to be over 400. Per his care plan, he is to be sent to ED if elevated glucose over 350 since he does not have insulin or titratable medication. He is denying fever, nausea or vomiting, cough, chest pain, diarrhea, rash, painful urination. A member of the staff from the group home is at bedside, but she is new and does not know if there have been any problems recently with medication compliance or concern for infection.     Allergies:  Allergies   Allergen Reactions    Ceclor [Cefaclor] Swelling    Erythromycin GI Disturbance       Problem List:    Patient Active Problem List    Diagnosis Date Noted    Type 2 diabetes mellitus with hypoglycemia, without long-term current use of insulin (H) 05/26/2022     Priority: Medium    Sexual dysfunction 05/26/2022     Priority: Medium    Intellectual disability 02/04/2019     Priority: Medium    Recurrent major depressive disorder, in partial remission (H) 02/04/2019     Priority: Medium    Morbid obesity (H) 01/08/2019     Priority: Medium    Morbid obesity with body mass index of 45.0-49.9 in adult (H) 04/04/2017     Priority: Medium    Mantoux: positive, treated 05/01/2011     Priority: Medium     Overview:   5/2011, went through (more than) 9 months of isoniazid.  NOT TO HAVE MANTOUX IN FUTURE      Mild intellectual disabilities 07/15/2010     Priority: Medium        Past Medical History:    Past Medical History:   Diagnosis Date    Depressive disorder All my life almost.    Diabetes (H) sometime in 2019    Sleep apnea        Past Surgical History:    Past Surgical History:   Procedure Laterality Date    APPENDECTOMY   8/15/14    COLONOSCOPY N/A 2020    Procedure: COLONOSCOPY, WITH BIOPSY;  Surgeon: Haley Hernandez MD;  Location:  GI       Family History:    Family History   Problem Relation Age of Onset    Mental Illness Brother     Diabetes No family hx of     Depression No family hx of     Anxiety Disorder No family hx of        Social History:  Marital Status:  Single [1]  Social History     Tobacco Use    Smoking status: Former     Packs/day: 1.00     Years: 0.00     Pack years: 0.00     Types: Cigarettes     Start date: 2014     Quit date: 3/1/2014     Years since quittin.4    Smokeless tobacco: Never   Vaping Use    Vaping Use: Never used   Substance Use Topics    Alcohol use: Yes     Comment: rare    Drug use: No        Medications:    acetaminophen (TYLENOL) 325 MG tablet  aspirin-acetaminophen-caffeine (EXCEDRIN EXTRA STRENGTH) 250-250-65 MG tablet  atorvastatin (LIPITOR) 20 MG tablet  blood glucose (NO BRAND SPECIFIED) test strip  blood glucose calibration (ONETOUCH VERIO) solution  Blood Glucose Monitoring Suppl (ONETOUCH VERIO FLEX SYSTEM) w/Device KIT  divalproex sodium extended-release (DEPAKOTE ER) 500 MG 24 hr tablet  FIBER ADULT GUMMIES PO  glimepiride (AMARYL) 4 MG tablet  hydrocortisone, Perianal, (HYDROCORTISONE) 2.5 % cream  ibuprofen (ADVIL/MOTRIN) 200 MG tablet  Microlet Lancets MISC  omeprazole (PRILOSEC) 20 MG DR capsule  ONETOUCH VERIO IQ test strip  pioglitazone (ACTOS) 30 MG tablet  psyllium (METAMUCIL/KONSYL) 58.6 % powder  QUEtiapine (SEROQUEL) 300 MG tablet  TRAZODONE HCL PO  triamcinolone (KENALOG) 0.1 % external cream          Review of Systems   All other systems reviewed and are negative.      Physical Exam   BP: (!) 142/83  Pulse: 81  Temp: 98.9  F (37.2  C)  Resp: 18  Weight: (!) 163.3 kg (360 lb)  SpO2: 97 %      Physical Exam  Vitals and nursing note reviewed.   Constitutional:       General: He is not in acute distress.     Appearance: He is obese. He is not toxic-appearing.  "  HENT:      Nose: Nose normal.      Mouth/Throat:      Mouth: Mucous membranes are moist.      Comments: Red staining on corners of mouth, bottom lip and tongue. He says from \"tatyana-aid\" and says it is sugar free  Cardiovascular:      Rate and Rhythm: Normal rate and regular rhythm.   Pulmonary:      Effort: Pulmonary effort is normal.      Breath sounds: Normal breath sounds.   Abdominal:      Palpations: Abdomen is soft.      Tenderness: There is no abdominal tenderness.   Skin:     General: Skin is warm and dry.   Neurological:      Mental Status: He is alert. Mental status is at baseline.   Psychiatric:         Mood and Affect: Mood normal.         Behavior: Behavior normal.         ED Course                 Procedures              Critical Care time:  none               Results for orders placed or performed during the hospital encounter of 07/27/23 (from the past 24 hour(s))   Glucose by meter   Result Value Ref Range    GLUCOSE BY METER POCT 318 (H) 70 - 99 mg/dL   Basic metabolic panel   Result Value Ref Range    Sodium 137 136 - 145 mmol/L    Potassium 4.0 3.4 - 5.3 mmol/L    Chloride 98 98 - 107 mmol/L    Carbon Dioxide (CO2) 28 22 - 29 mmol/L    Anion Gap 11 7 - 15 mmol/L    Urea Nitrogen 9.1 6.0 - 20.0 mg/dL    Creatinine 0.50 (L) 0.67 - 1.17 mg/dL    Calcium 9.0 8.6 - 10.0 mg/dL    Glucose 339 (H) 70 - 99 mg/dL    GFR Estimate >90 >60 mL/min/1.73m2   CBC with Platelets & Differential    Narrative    The following orders were created for panel order CBC with Platelets & Differential.  Procedure                               Abnormality         Status                     ---------                               -----------         ------                     CBC with platelets and d...[[id number]]  Abnormal            Final result                 Please view results for these tests on the individual orders.   Blood gas venous   Result Value Ref Range    pH Venous 7.41 7.32 - 7.43    pCO2 Venous 48 40 - 50 mm " Hg    pO2 Venous 57 (H) 25 - 47 mm Hg    Bicarbonate Venous 31 (H) 21 - 28 mmol/L    Base Excess/Deficit (+/-) 5.2 (H) -7.7 - 1.9 mmol/L    FIO2 21    CBC with platelets and differential   Result Value Ref Range    WBC Count 9.4 4.0 - 11.0 10e3/uL    RBC Count 4.11 (L) 4.40 - 5.90 10e6/uL    Hemoglobin 11.2 (L) 13.3 - 17.7 g/dL    Hematocrit 35.4 (L) 40.0 - 53.0 %    MCV 86 78 - 100 fL    MCH 27.3 26.5 - 33.0 pg    MCHC 31.6 31.5 - 36.5 g/dL    RDW 14.1 10.0 - 15.0 %    Platelet Count 237 150 - 450 10e3/uL    % Neutrophils 55 %    % Lymphocytes 37 %    % Monocytes 7 %    % Eosinophils 1 %    % Basophils 0 %    % Immature Granulocytes 0 %    NRBCs per 100 WBC 0 <1 /100    Absolute Neutrophils 5.1 1.6 - 8.3 10e3/uL    Absolute Lymphocytes 3.5 0.8 - 5.3 10e3/uL    Absolute Monocytes 0.7 0.0 - 1.3 10e3/uL    Absolute Eosinophils 0.1 0.0 - 0.7 10e3/uL    Absolute Basophils 0.0 0.0 - 0.2 10e3/uL    Absolute Immature Granulocytes 0.0 <=0.4 10e3/uL    Absolute NRBCs 0.0 10e3/uL   UA with Microscopic reflex to Culture    Specimen: Urine, NOS   Result Value Ref Range    Color Urine Yellow Colorless, Straw, Light Yellow, Yellow    Appearance Urine Clear Clear    Glucose Urine >499 (A) Negative mg/dL    Bilirubin Urine Negative Negative    Ketones Urine 5 (A) Negative mg/dL    Specific Gravity Urine 1.026 1.003 - 1.035    Blood Urine Negative Negative    pH Urine 6.0 5.0 - 7.0    Protein Albumin Urine Negative Negative mg/dL    Urobilinogen Urine Normal Normal, 2.0 mg/dL    Nitrite Urine Negative Negative    Leukocyte Esterase Urine Negative Negative    Mucus Urine Present (A) None Seen /LPF    RBC Urine 0 <=2 /HPF    WBC Urine <1 <=5 /HPF    Squamous Epithelials Urine 1 <=1 /HPF    Narrative    Urine Culture not indicated       Medications   0.9% sodium chloride BOLUS (1,000 mLs Intravenous $New Bag 7/27/23 0805)       Assessments & Plan (with Medical Decision Making)  Bertram is a 32 y.o M residing at a group home due to  previous TBI, history of diabetes on oral medications. He is brought to the group home for concern of high blood sugar. See history and physical exam as above.  Obese, nontoxic appearing 32 y.o M in no distress, vitally stable and afebrile. POC glucose 318. He denies any symptoms. Blood work drawn and given IV fluids  Labs as above. No ketones. No acute worrisome findings noted. Elevated glucose, but explained since he does not take insulin, and it would only have temporary effect, will not need to give any treatment. Should continue all meds as prescribed and follow up with primary provider. Return if developing any symptoms or glucose remaining elevated above 350. Discharged with group home staff member in no distress.     I have reviewed the nursing notes.    I have reviewed the findings, diagnosis, plan and need for follow up with the patient.           Medical Decision Making  The patient's presentation was of straightforward complexity (a clearly self-limited or minor problem).    The patient's evaluation involved:  ordering and/or review of 3+ test(s) in this encounter (see separate area of note for details)    The patient's management necessitated only low risk treatment.        Discharge Medication List as of 7/27/2023  9:59 AM          Final diagnoses:   Type 2 diabetes mellitus with hyperglycemia, without long-term current use of insulin (H)       7/27/2023   [facility] EMERGENCY DEPT       Betty Aceves DO  07/28/23 2048

## 2023-07-27 NOTE — TELEPHONE ENCOUNTER
FYI - Status Update    Who is Calling: [facility] employee    Update: Patient was in ED again due BG being over 350. This is [facility]'s protocol at this time. The ED checked labs and then patient was sent home.     There will be a meeting held tomorrow regarding plan of care as patient has been in ED multiple times due to high BG and this protocol.     Writer did suggest that they have the  or RN reach out from agency to see if there should be some changes to patient's orders.      Does caller want a call/response back: PRASANTH Black, RN

## 2023-08-02 DIAGNOSIS — Z79.899 HIGH RISK MEDICATIONS (NOT ANTICOAGULANTS) LONG-TERM USE: Primary | ICD-10-CM

## 2023-08-17 ENCOUNTER — OFFICE VISIT (OUTPATIENT)
Dept: INTERNAL MEDICINE | Facility: CLINIC | Age: 33
End: 2023-08-17
Payer: COMMERCIAL

## 2023-08-17 VITALS
SYSTOLIC BLOOD PRESSURE: 126 MMHG | TEMPERATURE: 96.9 F | WEIGHT: 315 LBS | DIASTOLIC BLOOD PRESSURE: 70 MMHG | HEART RATE: 78 BPM | OXYGEN SATURATION: 97 % | RESPIRATION RATE: 18 BRPM | BODY MASS INDEX: 45.45 KG/M2

## 2023-08-17 DIAGNOSIS — K59.00 CONSTIPATION, UNSPECIFIED CONSTIPATION TYPE: ICD-10-CM

## 2023-08-17 DIAGNOSIS — F70 MILD INTELLECTUAL DISABILITIES: ICD-10-CM

## 2023-08-17 DIAGNOSIS — Z79.899 HIGH RISK MEDICATIONS (NOT ANTICOAGULANTS) LONG-TERM USE: ICD-10-CM

## 2023-08-17 DIAGNOSIS — M25.572 PAIN IN JOINT INVOLVING ANKLE AND FOOT, LEFT: ICD-10-CM

## 2023-08-17 DIAGNOSIS — H90.3 BILATERAL SENSORINEURAL HEARING LOSS: Primary | ICD-10-CM

## 2023-08-17 DIAGNOSIS — E11.65 TYPE 2 DIABETES MELLITUS WITH HYPERGLYCEMIA, WITHOUT LONG-TERM CURRENT USE OF INSULIN (H): ICD-10-CM

## 2023-08-17 LAB
AMYLASE SERPL-CCNC: 68 U/L (ref 28–100)
BASOPHILS # BLD AUTO: 0.1 10E3/UL (ref 0–0.2)
BASOPHILS NFR BLD AUTO: 1 %
BILIRUB DIRECT SERPL-MCNC: <0.2 MG/DL (ref 0–0.3)
CHOLEST SERPL-MCNC: 148 MG/DL
EOSINOPHIL # BLD AUTO: 0.2 10E3/UL (ref 0–0.7)
EOSINOPHIL NFR BLD AUTO: 2 %
ERYTHROCYTE [DISTWIDTH] IN BLOOD BY AUTOMATED COUNT: 13.7 % (ref 10–15)
FASTING STATUS PATIENT QL REPORTED: YES
GLUCOSE SERPL-MCNC: 127 MG/DL (ref 70–99)
HBA1C MFR BLD: 9.1 %
HCT VFR BLD AUTO: 37.9 % (ref 40–53)
HDLC SERPL-MCNC: 37 MG/DL
HGB BLD-MCNC: 12.1 G/DL (ref 13.3–17.7)
IMM GRANULOCYTES # BLD: 0.1 10E3/UL
IMM GRANULOCYTES NFR BLD: 1 %
LDLC SERPL CALC-MCNC: 88 MG/DL
LYMPHOCYTES # BLD AUTO: 3.3 10E3/UL (ref 0.8–5.3)
LYMPHOCYTES NFR BLD AUTO: 33 %
MCH RBC QN AUTO: 27.3 PG (ref 26.5–33)
MCHC RBC AUTO-ENTMCNC: 31.9 G/DL (ref 31.5–36.5)
MCV RBC AUTO: 86 FL (ref 78–100)
MONOCYTES # BLD AUTO: 0.9 10E3/UL (ref 0–1.3)
MONOCYTES NFR BLD AUTO: 9 %
NEUTROPHILS # BLD AUTO: 5.5 10E3/UL (ref 1.6–8.3)
NEUTROPHILS NFR BLD AUTO: 54 %
NONHDLC SERPL-MCNC: 111 MG/DL
NRBC # BLD AUTO: 0 10E3/UL
NRBC BLD AUTO-RTO: 0 /100
PLATELET # BLD AUTO: 233 10E3/UL (ref 150–450)
PROLACTIN SERPL 3RD IS-MCNC: 10 NG/ML (ref 4–15)
RBC # BLD AUTO: 4.43 10E6/UL (ref 4.4–5.9)
TRIGL SERPL-MCNC: 116 MG/DL
VALPROATE SERPL-MCNC: 62.6 UG/ML
WBC # BLD AUTO: 10 10E3/UL (ref 4–11)

## 2023-08-17 PROCEDURE — 99214 OFFICE O/P EST MOD 30 MIN: CPT | Performed by: INTERNAL MEDICINE

## 2023-08-17 PROCEDURE — 82150 ASSAY OF AMYLASE: CPT | Performed by: INTERNAL MEDICINE

## 2023-08-17 PROCEDURE — 85025 COMPLETE CBC W/AUTO DIFF WBC: CPT | Performed by: INTERNAL MEDICINE

## 2023-08-17 PROCEDURE — 80061 LIPID PANEL: CPT | Performed by: INTERNAL MEDICINE

## 2023-08-17 PROCEDURE — 83036 HEMOGLOBIN GLYCOSYLATED A1C: CPT | Performed by: INTERNAL MEDICINE

## 2023-08-17 PROCEDURE — 80164 ASSAY DIPROPYLACETIC ACD TOT: CPT | Performed by: INTERNAL MEDICINE

## 2023-08-17 PROCEDURE — 82947 ASSAY GLUCOSE BLOOD QUANT: CPT | Performed by: INTERNAL MEDICINE

## 2023-08-17 PROCEDURE — 82248 BILIRUBIN DIRECT: CPT | Performed by: INTERNAL MEDICINE

## 2023-08-17 PROCEDURE — 36415 COLL VENOUS BLD VENIPUNCTURE: CPT | Performed by: INTERNAL MEDICINE

## 2023-08-17 PROCEDURE — 84146 ASSAY OF PROLACTIN: CPT | Performed by: INTERNAL MEDICINE

## 2023-08-17 ASSESSMENT — PATIENT HEALTH QUESTIONNAIRE - PHQ9
10. IF YOU CHECKED OFF ANY PROBLEMS, HOW DIFFICULT HAVE THESE PROBLEMS MADE IT FOR YOU TO DO YOUR WORK, TAKE CARE OF THINGS AT HOME, OR GET ALONG WITH OTHER PEOPLE: SOMEWHAT DIFFICULT
SUM OF ALL RESPONSES TO PHQ QUESTIONS 1-9: 11
SUM OF ALL RESPONSES TO PHQ QUESTIONS 1-9: 11

## 2023-08-17 NOTE — PROGRESS NOTES
Tahira Burden is a 33 year old, presenting for the following health issues:  Constipation and Recheck Medication      8/17/2023     3:23 PM   Additional Questions   Roomed by Janet ABDALLA MA       History of Present Illness       Reason for visit:  N/A    He eats 0-1 servings of fruits and vegetables daily.He consumes 4 sweetened beverage(s) daily.He exercises with enough effort to increase his heart rate 9 or less minutes per day.  He exercises with enough effort to increase his heart rate 4 days per week. He is missing 1 dose(s) of medications per week.  He is not taking prescribed medications regularly due to other.          EMR reviewed including:             Complaint, History of Chief Complaint, Corresponding Review of Systems, and Complaint Specific Physical Examination.    #1   Patient notes decreased hearing bilaterally.  Notes it is becoming somewhat problematic.  Uses his electronic devices loudly at the inconvenience of others.  States that he has some difficulty with conversation.  Does not have any known will not exposure or ear trauma.  Denies discharge or drainage from the ears.  Denies significant vertigo or labyrinthine problems.        Exam:   ENT: Pharynx is non-erythemous, minimal PND, no significant nasal obstruction, TM's not red or retracted, hearing intact bilaterally. No carotid bruits are heard. No JVD seen. Thyroid is not nodular or enlarged.  Patient does have difficulty hearing high-frequency tones and whispers.        #2   Follow-up on constipation  Patient has eased up on using recurrent laxatives and then having rebound constipation following discontinuation.  Previously discussed in great detail fiber supplementation stool softeners.  Patient is doing better.  Denies any melena or hematochezia.        Exam:   GI: Abdomen is soft, without rebound, guarding or tenderness. Bowel sounds are appropriate. No renal bruits are heard.  Abdomen is markedly obese.    #3   Follow-up on  "type 2 diabetes  Currently taking Amaryl daily.  Also taking Actos 30 mg daily.  Did not tolerate metformin for GI concerns.  Does not check blood sugars at home frequently.  Denies polyuria or polydipsia.  Last A1c was 9.6 on July 19.  Patient notes that he has been working on it.  Was previously 11.4.        Exam:   LUNGS: clear bilaterally, airflow is brisk, no intercostal retraction or stridor is noted. No coughing is noted during visit.   HEART:  regular without rubs, clicks, gallops, or murmurs. PMI is nondisplaced. Upstrokes are brisk. S1,S2 are heard.   NEURO: Pt is alert and appropriate. No neurologic lateralization is noted. Cranial nerves 2-12 are intact. Peripheral sensory and motor function are grossly normal.    SKIN:  warm and dry. No erythema, or rashes are noted. No specific lesions of concern are noted.         #4   Complains of pain in his left foot and ankle.  Notes that he has had it for years.  Has not mentioned previously.  Has taken over-the-counter anti-inflammatories.  No benefit.  Wishes to see \"ankle specialist\"        Exam:  No acute inflammation or disruption of the ankle is noted.  Foot is unremarkable.  Minimal crepitance noted.        Patient has been interviewed, applicable history and applied review of systems have been performed.    Vital Signs:   /70   Pulse 78   Temp 96.9  F (36.1  C) (Temporal)   Resp 18   Wt (!) 160.6 kg (354 lb)   SpO2 97%   BMI 45.45 kg/m        Decision Making    Problem and Complexity     1. Bilateral sensorineural hearing loss  We will set up with audiology.  - Adult Audiology  Referral; Future    2. Constipation, unspecified constipation type  Improved.  Recommend continued fiber supplementation and stool softeners.    3. Type 2 diabetes mellitus with hyperglycemia, without long-term current use of insulin (H)  We will check A1c in the future.  He will continue his current medications.  Additionally we will continue the statin.  " Recommend 1 baby aspirin daily.  We will discuss ACE inhibitor in the future.    4. Pain in joint involving ankle and foot, left  Set up with orthopedics per patient request  - Orthopedic  Referral; Future    5. High risk medications (not anticoagulants) long-term use  Check lab work due to high risk medications including his Depakote for his seizure activity  - Prolactin  - Glucose  - Hemoglobin A1c  - Lipid Profile  - Valproic acid  - Amylase  - CBC with Platelets & Differential  - Bilirubin direct    6. Mild intellectual disabilities  Stable.                                FOLLOW UP   I have asked the patient to make an appointment for followup with me in 3 months    Regarding routine vaccinations:  I have reviewed the patient's vaccination schedule and discussed the benefits of prophylactic vaccination in detail.  I recommend the patient contact their pharmacist for vaccinations.  Discussed that most insurance companies now favor reimbursement to the pharmacies and it will financially behoove the patient to have vaccinations performed at their pharmacy.        I have carefully explained the diagnosis and treatment options to the patient.  The patient has displayed an understanding of the above, and all subsequent questions were answered.      DO DONNELL Jin    Portions of this note were produced using Chain  Although every attempt at real-time proof reading has been made, occasional grammar/syntax errors may have been missed.

## 2023-08-31 ENCOUNTER — TELEPHONE (OUTPATIENT)
Dept: INTERNAL MEDICINE | Facility: CLINIC | Age: 33
End: 2023-08-31
Payer: COMMERCIAL

## 2023-08-31 NOTE — TELEPHONE ENCOUNTER
Call from Keri with [facility].     She says patient is currently taking Citrucel powder, but insurance does not cover anything powder.     Wondering if an alternative can be prescribed for patient.     Pharmacy - [facility] pharmacy

## 2023-09-23 NOTE — COMMUNITY RESOURCES LIST (ENGLISH)
09/23/2023   [facility] [employer]  N/A  For questions about this resource list or additional care needs, please contact your primary care clinic or care manager.  Phone: [phone]   Email: N/A   Address: [address] [place] [address]   Hours: N/A        Food and Nutrition       Food pantry  1  [employer] Pantry Distance: 0.86 miles      Pickup   [address] [place] [address]  Language: English  Hours: Mon 1:00 PM - 3:00 PM , Wed 1:00 PM - 3:00 PM , Fri 9:00 AM - 11:00 AM  Fees: Free   Phone: (629) 586-9114 Email: info@[employer] Website: http://sites.[employer].com/[employer]/home?authuser=0     2  [place] Area Pantry Distance: 12.42 miles      Pickup   [address] [place] [address]  Language: English  Hours: Thu 12:00 PM - 4:00 PM  Fees: Free   Phone: (441) 881-7971 Email: ulises@[facility] Website: https://www.ePetWorld.com/"[employer]"/          Important Numbers & Websites       Emergency Services   911  City Services   311  Poison Control   (375) 239-2909  Suicide Prevention Lifeline   (117) 378-9946 (TALK)  Child Abuse Hotline   (681) 788-3706 (4-A-Child)  Sexual Assault Hotline   (626) 428-3951 (HOPE)  National Runaway Safeline   (306) 686-8999 (RUNAWAY)  All-Options Talkline   (136) 841-8405  Substance Abuse Referral   (860) 360-1893 (HELP)

## 2023-09-25 ENCOUNTER — VIRTUAL VISIT (OUTPATIENT)
Dept: FAMILY MEDICINE | Facility: CLINIC | Age: 33
End: 2023-09-25
Payer: COMMERCIAL

## 2023-09-25 DIAGNOSIS — E11.649 TYPE 2 DIABETES MELLITUS WITH HYPOGLYCEMIA WITHOUT COMA, WITHOUT LONG-TERM CURRENT USE OF INSULIN (H): ICD-10-CM

## 2023-09-25 DIAGNOSIS — R05.1 ACUTE COUGH: ICD-10-CM

## 2023-09-25 DIAGNOSIS — J06.9 URI, ACUTE: Primary | ICD-10-CM

## 2023-09-25 PROCEDURE — 99213 OFFICE O/P EST LOW 20 MIN: CPT | Mod: VID | Performed by: INTERNAL MEDICINE

## 2023-09-25 RX ORDER — GUAIFENESIN 200 MG/10ML
200 LIQUID ORAL EVERY 8 HOURS PRN
Qty: 180 ML | Refills: 0 | Status: SHIPPED | OUTPATIENT
Start: 2023-09-25

## 2023-09-25 RX ORDER — FLUTICASONE PROPIONATE 50 MCG
1 SPRAY, SUSPENSION (ML) NASAL DAILY
Qty: 15.8 ML | Refills: 0 | Status: SHIPPED | OUTPATIENT
Start: 2023-09-25

## 2023-09-25 NOTE — COMMUNITY RESOURCES LIST (ENGLISH)
09/25/2023   [facility] [employer]  N/A  For questions about this resource list or additional care needs, please contact your primary care clinic or care manager.  Phone: [phone]   Email: N/A   Address: [address] [place] [address]   Hours: N/A        Food and Nutrition       Food pantry  1  [place] Pantry Distance: 0.86 miles      Pickup   [address] [place] [address]  Language: English  Hours: Mon 1:00 PM - 3:00 PM , Wed 1:00 PM - 3:00 PM , Fri 9:00 AM - 11:00 AM  Fees: Free   Phone: (607) 449-2709 Email: info@[employer] Website: http://sites.[employer].com/[employer]/home?authuser=0     2  [place] Area Pantry Distance: 12.42 miles      Pickup   [address] [place] [address]  Language: English  Hours: Thu 12:00 PM - 4:00 PM  Fees: Free   Phone: (356) 983-2260 Email: ulises@[facility] Website: https://www.[employer]enVault.com/[employer]/          Important Numbers & Websites       Emergency Services   911  City Services   311  Poison Control   (848) 105-9703  Suicide Prevention Lifeline   (602) 770-2753 (TALK)  Child Abuse Hotline   (649) 787-6268 (4-A-Child)  Sexual Assault Hotline   (949) 825-2068 (HOPE)  National Runaway Safeline   (595) 806-4460 (RUNAWAY)  All-Options Talkline   (867) 511-4999  Substance Abuse Referral   (470) 278-4678 (HELP)

## 2023-09-25 NOTE — COMMUNITY RESOURCES LIST (ENGLISH)
09/25/2023   [facility] [employer]  N/A  For questions about this resource list or additional care needs, please contact your primary care clinic or care manager.  Phone: [phone]   Email: N/A   Address: [address] [place] [address]   Hours: N/A        Food and Nutrition       Food pantry  1  [place] Pantry Distance: 0.86 miles      Pickup   [address] [place] [address]  Language: English  Hours: Mon 1:00 PM - 3:00 PM , Wed 1:00 PM - 3:00 PM , Fri 9:00 AM - 11:00 AM  Fees: Free   Phone: (235) 500-4444 Email: info@[employer] Website: http://sites.[employer].com/[employer]/home?authuser=0     2  [place] Area Pantry Distance: 12.42 miles      Pickup   [address] [place] [address]  Language: English  Hours: Thu 12:00 PM - 4:00 PM  Fees: Free   Phone: (465) 756-9448 Email: ulises@[facility] Website: https://www.[employer].com/[employer]/          Important Numbers & Websites       Emergency Services   911  City Services   311  Poison Control   (914) 868-9932  Suicide Prevention Lifeline   (396) 607-3837 (TALK)  Child Abuse Hotline   (714) 909-9578 (4-A-Child)  Sexual Assault Hotline   (318) 179-6957 (HOPE)  National Runaway Safeline   (548) 907-7128 (RUNAWAY)  All-Options Talkline   (453) 734-9850  Substance Abuse Referral   (911) 322-1464 (HELP)

## 2023-09-25 NOTE — PROGRESS NOTES
Bertram is a 33 year old who is being evaluated via a billable video visit.      How would you like to obtain your AVS? MyChart  If the video visit is dropped, the invitation should be resent by: Text to cell phone: [phone]  Will anyone else be joining your video visit? No          Assessment & Plan   Problem List Items Addressed This Visit          Endocrine    Type 2 diabetes mellitus with hypoglycemia, without long-term current use of insulin (H)     Other Visit Diagnoses       URI, acute    -  Primary    Relevant Medications    fluticasone (FLONASE) 50 MCG/ACT nasal spray    guaiFENesin (ROBITUSSIN) 20 mg/mL liquid    Acute cough        Relevant Medications    fluticasone (FLONASE) 50 MCG/ACT nasal spray    guaiFENesin (ROBITUSSIN) 20 mg/mL liquid           Viral URI symptoms.  Patient is not toxic or sick looking from the video visit he can talk in full sentences.  Did not appear lethargic.  He was not coughing during the visit, was not cyanotic, appears well-nourished.  He was alert and responsive to all questions.  He can talk in full sentences.  There was no ongoing cough during the physical exam.  No signs of respiratory distress during the video visit.  Advised continued supportive measures including adequate hydration, stressed importance of taking the oral diabetic medication with food as he is on Actos and glimepiride given that he had a recent illness.  He does not sound to be having any complication from the viral URI symptoms does not sound that he has ongoing active infection such as pneumonia we will continue to treat supportively we will send an antitussive medication with Robitussin as well as Flonase nasal spray.  Keep well-hydrated any worsening of symptoms including vomiting lethargy spiking high fever more than 101 Fahrenheit to seek immediate medical attention.  Again patient did not appear to be in any distress during the visit.  All questions answered       BMI:   Estimated body mass  "index is 45.45 kg/m  as calculated from the following:    Height as of 1/17/23: 1.88 m (6' 2\").    Weight as of 8/17/23: 160.6 kg (354 lb).     Laina Carlisle MD  [facility] PETER Burden is a 33 year old, presenting for the following health issues:  Cold Symptoms (Patient reports no exposure to covid)      History of Present Illness       Reason for visit:  Erick    He eats 0-1 servings of fruits and vegetables daily.He consumes 1 sweetened beverage(s) daily.He exercises with enough effort to increase his heart rate 9 or less minutes per day.  He exercises with enough effort to increase his heart rate 3 or less days per week.   He is taking medications regularly.       Keith presenting with the presence of a caregiver for evaluation of her recent history of viral URI symptoms.  Caregiver's name is Nia.  Patient reports he is doing better feeling better.  Has not done a COVID test.  His main symptoms were stuffy nose and sore throat.  Denies any associated fever or headaches.  His appetite is good.  Denies any lethargy.  He was coughing a lot but that has subsided describes as \"medium\".  He had vital signs taken yesterday none today.  Denies any fever.  He is on Medicare Medicaid.  His pharmacy is [facility].  He currently has no insurance plan does not have a way to pay for his medications as he reports.    Review of Systems   Constitutional, HEENT, cardiovascular, pulmonary, gi and gu systems are negative, except as otherwise noted.      Objective    Vitals - Patient Reported  Pain Score: Severe Pain (7)  Pain Loc: Throat      Vitals:  No vitals were obtained today due to virtual visit.    Physical Exam   GENERAL: Healthy, alert and no distress  EYES: Eyes grossly normal to inspection.  No discharge or erythema, or obvious scleral/conjunctival abnormalities.  RESP: No audible wheeze, cough, or visible cyanosis.  No visible retractions or increased work of breathing.    SKIN: Visible skin " clear. No significant rash, abnormal pigmentation or lesions.  NEURO: Cranial nerves grossly intact.  Mentation and speech appropriate for age.  PSYCH: Mentation appears normal, affect normal/bright, judgement and insight intact, normal speech and appearance well-groomed.            Video-Visit Details    Type of service:  Video Visit   Video Start Time:  11:22 AM  Video End Time: 11:38 AM    Originating Location (pt. Location): Home    Distant Location (provider location):  On-site  Platform used for Video Visit: Bass Manager

## 2023-10-04 ENCOUNTER — TELEPHONE (OUTPATIENT)
Dept: INTERNAL MEDICINE | Facility: CLINIC | Age: 33
End: 2023-10-04
Payer: COMMERCIAL

## 2023-10-04 NOTE — TELEPHONE ENCOUNTER
Keri, from [facility] called requesting that the orders for the 2 medications prescribed during a VV on 9/25/23 be faxed to their facility.     Writer faxed orders for these meds to [phone].     Thank you,  Dora Bucio RN

## 2023-10-10 DIAGNOSIS — E11.9 TYPE 2 DIABETES MELLITUS WITHOUT COMPLICATION, WITHOUT LONG-TERM CURRENT USE OF INSULIN (H): ICD-10-CM

## 2023-10-13 ENCOUNTER — MYC MEDICAL ADVICE (OUTPATIENT)
Dept: INTERNAL MEDICINE | Facility: CLINIC | Age: 33
End: 2023-10-13
Payer: COMMERCIAL

## 2023-10-13 DIAGNOSIS — F98.1 PSYCHOGENIC FECAL INCONTINENCE: Primary | ICD-10-CM

## 2023-10-16 NOTE — TELEPHONE ENCOUNTER
Alexandra from 10/13/23:  I will get an order over to Dr. Ruvalcaba for the briefs/depends and put on the order 4XL but no guarantees.  Also what quantity are you thinking you would like?     Have you tried imodium at all to help with diarrhea?  How long have you had diarrhea?  How many episodes are you having in a 12 hour period?     Let me know.     --SERINA Koch

## 2023-10-25 ENCOUNTER — TELEPHONE (OUTPATIENT)
Dept: INTERNAL MEDICINE | Facility: CLINIC | Age: 33
End: 2023-10-25
Payer: COMMERCIAL

## 2023-10-25 NOTE — TELEPHONE ENCOUNTER
Reason for Call:  Appointment Request    Patient requesting this type of appt:  office visit    Requested provider:  any    Reason patient unable to be scheduled: Not within requested timeframe    When does patient want to be seen/preferred time: 3-7 days    Comments:Patient is complaining of urinating too much.    [facility] or would you prefer to receive a phone call?:   Patient would prefer a phone call   Okay to leave a detailed message?: Yes Keri at [facility] Home Phone [phone]    Call taken on 10/25/2023 at 5:25 PM by Mariluz Huitron LPN

## 2023-11-02 ENCOUNTER — TELEPHONE (OUTPATIENT)
Dept: INTERNAL MEDICINE | Facility: CLINIC | Age: 33
End: 2023-11-02

## 2023-11-02 NOTE — TELEPHONE ENCOUNTER
General Call    Contacts         Type Contact Phone/Fax    11/02/2023 08:37 AM CDT Phone (Incoming) Amrita [phone]     [facility] Insurance Care coordination          Reason for Call:  Patient received an A1C kit, Urine sample and blood kit sample at his home.     What are your questions or concerns:  The patient is wondering why and did it come from our clinic? He is unsure where is came from    Date of last appointment with provider: next appt 11/7/23    Please call Amrita with [facility] Insurance care coordination  [phone]

## 2023-11-10 ENCOUNTER — TELEPHONE (OUTPATIENT)
Dept: PHARMACY | Facility: CLINIC | Age: 33
End: 2023-11-10
Payer: COMMERCIAL

## 2023-11-10 NOTE — TELEPHONE ENCOUNTER
This patient is due for MTM follow-up. I called the patient to schedule an appointment and was not able to leave message due to number not being in service.    Thom Rush, JaneD, [facility]  Medication Therapy Management Pharmacist  Pager: [phone]

## 2023-11-15 NOTE — TELEPHONE ENCOUNTER
Mariza with [facility] reports she has faxed a written order to discontinue Flomax multiple times. Mariza is resting response today. She will resend written order now.  Caller received clinics cover page bu no cover page.     TC, please give order to provider for review. If order has been signed, please refax.     Callers phone and fax number is the same [phone].

## 2023-11-16 ENCOUNTER — OFFICE VISIT (OUTPATIENT)
Dept: AUDIOLOGY | Facility: CLINIC | Age: 33
End: 2023-11-16
Payer: COMMERCIAL

## 2023-11-16 DIAGNOSIS — H90.3 BILATERAL SENSORINEURAL HEARING LOSS: ICD-10-CM

## 2023-11-16 DIAGNOSIS — H90.3 SENSORINEURAL HEARING LOSS, BILATERAL: Primary | ICD-10-CM

## 2023-11-16 PROCEDURE — 99207 PR NO CHARGE LOS: CPT | Performed by: AUDIOLOGIST

## 2023-11-16 PROCEDURE — 92557 COMPREHENSIVE HEARING TEST: CPT | Performed by: AUDIOLOGIST

## 2023-11-16 PROCEDURE — 92550 TYMPANOMETRY & REFLEX THRESH: CPT | Performed by: AUDIOLOGIST

## 2023-11-16 NOTE — PROGRESS NOTES
AUDIOLOGY REPORT:    Background:  Referred by Dr. Ruvalcaba for concern regarding decreased hearing. Bertram reports he is not hearing well, has brief episodes of tinnitus every once and a while, and feels dizzy when he gets up too fast.    Results:  Otoscopy showed minimal wax with visible landmarks bilaterally. Tympanograms showed normal eardrum movement right and left  ears. Ipis and contra acoustic reflexes were present bilaterally. Distortion product otoacoustic emissions were tested from 1k-8, absent bilaterally. Pure tones show low normal hearing sloping to mild sensorineural hearing loss bilaterally. Good SRT / PTA agreement. Good word understanding in quiet 100% right and left ears, 25-word list.    Recommendations:  Recommend hearing aid consultation if Bertram feels like he is having enough trouble to get hearing aids. Otherwise audiology evaluation in 1-2 years, sooner if there are concerns about decreases in hearing, increases in tinnitus or changes in balance.    Prabhu Bourne Licensed Audiologist #[id number]

## 2023-11-16 NOTE — TELEPHONE ENCOUNTER
Previously signed order was in Blue [employer] accordion folder.  Fax number on order was not going through.  I called Mariza and got a different fax number.  Fax did go through.

## 2023-11-30 ENCOUNTER — OFFICE VISIT (OUTPATIENT)
Dept: PODIATRY | Facility: CLINIC | Age: 33
End: 2023-11-30
Attending: INTERNAL MEDICINE
Payer: COMMERCIAL

## 2023-11-30 ENCOUNTER — IMMUNIZATION (OUTPATIENT)
Dept: FAMILY MEDICINE | Facility: CLINIC | Age: 33
End: 2023-11-30
Payer: COMMERCIAL

## 2023-11-30 ENCOUNTER — MEDICAL CORRESPONDENCE (OUTPATIENT)
Dept: HEALTH INFORMATION MANAGEMENT | Facility: CLINIC | Age: 33
End: 2023-11-30

## 2023-11-30 ENCOUNTER — ANCILLARY PROCEDURE (OUTPATIENT)
Dept: GENERAL RADIOLOGY | Facility: CLINIC | Age: 33
End: 2023-11-30
Attending: PODIATRIST
Payer: COMMERCIAL

## 2023-11-30 VITALS
SYSTOLIC BLOOD PRESSURE: 118 MMHG | DIASTOLIC BLOOD PRESSURE: 76 MMHG | WEIGHT: 315 LBS | TEMPERATURE: 95.5 F | BODY MASS INDEX: 40.43 KG/M2 | HEIGHT: 74 IN

## 2023-11-30 DIAGNOSIS — M25.571 RIGHT ANKLE PAIN: ICD-10-CM

## 2023-11-30 DIAGNOSIS — S93.491A SPRAIN OF ANTERIOR TALOFIBULAR LIGAMENT OF RIGHT ANKLE, INITIAL ENCOUNTER: ICD-10-CM

## 2023-11-30 DIAGNOSIS — M25.572 PAIN IN JOINT INVOLVING ANKLE AND FOOT, LEFT: Primary | ICD-10-CM

## 2023-11-30 PROCEDURE — 91320 SARSCV2 VAC 30MCG TRS-SUC IM: CPT

## 2023-11-30 PROCEDURE — 90480 ADMN SARSCOV2 VAC 1/ONLY CMP: CPT

## 2023-11-30 PROCEDURE — 73610 X-RAY EXAM OF ANKLE: CPT | Mod: TC | Performed by: RADIOLOGY

## 2023-11-30 PROCEDURE — G0008 ADMIN INFLUENZA VIRUS VAC: HCPCS

## 2023-11-30 PROCEDURE — 90686 IIV4 VACC NO PRSV 0.5 ML IM: CPT

## 2023-11-30 PROCEDURE — 99213 OFFICE O/P EST LOW 20 MIN: CPT | Performed by: PODIATRIST

## 2023-11-30 ASSESSMENT — PAIN SCALES - GENERAL: PAINLEVEL: EXTREME PAIN (9)

## 2023-11-30 NOTE — NURSING NOTE
Dispensed 1 Pneumatic Walking Brace, Size medium, with [facility] agreement signed by patient. Briana Brooks CMA, November 30, 2023

## 2023-11-30 NOTE — LETTER
11/30/2023         RE: Bertram Foreman  [address]  [facility] [address]        Dear Colleague,    Thank you for referring your patient, Bertram Foreman, to the [facility]. Please see a copy of my visit note below.    HPI:  Bertram Foreman is a 33 year old male who is seen in consultation at the request of Tremiane Ruvalcaba DO    Pt presents for eval of:   (Onset, Location, L/R, Character, Treatments, Injury if yes)    XR Right ankle 11/30/2023     Onset 11/29/2023, stepped on uneven area by the driveway in the dark, twisting Right ankle. Presents with lateral Right ankle pain, accompanied by Keri from [facility].  Constant swelling, dull ache. Intermittent sharp pain 9/10.    Ice, tylenol    BertramLa Ruche qui dit Oui.     ROS:  10 point ROS neg other than the symptoms noted above in the HPI.    Patient Active Problem List   Diagnosis     Morbid obesity (H)     Intellectual disability     Recurrent major depressive disorder, in partial remission (H24)     Mantoux: positive, treated     Morbid obesity with body mass index of 45.0-49.9 in adult (H)     Mild intellectual disabilities     Type 2 diabetes mellitus with hypoglycemia, without long-term current use of insulin (H)     Sexual dysfunction       PAST MEDICAL HISTORY:   Past Medical History:   Diagnosis Date     Depressive disorder All my life almost.     Diabetes (H) sometime in 2019     Sleep apnea         PAST SURGICAL HISTORY:   Past Surgical History:   Procedure Laterality Date     APPENDECTOMY  8/15/14     COLONOSCOPY N/A 12/28/2020    Procedure: COLONOSCOPY, WITH BIOPSY;  Surgeon: Haley Hernandez MD;  Location:  GI        MEDICATIONS:   Current Outpatient Medications:      acetaminophen (TYLENOL) 325 MG tablet, Take 325-650 mg by mouth as needed for mild pain, Disp: , Rfl:      atorvastatin (LIPITOR) 20 MG tablet, TAKE 1 TABLET BY MOUTH ONCEDAILY EVERY MORNING (AMPLUSPAK), Disp: 90 tablet, Rfl:  3     divalproex sodium extended-release (DEPAKOTE ER) 500 MG 24 hr tablet, TAKE THREE TABLETS BY MOUTH EVERY NIGHT AT BEDTIME (HS PLUSPAK), Disp: , Rfl:      glimepiride (AMARYL) 4 MG tablet, TAKE ONE TABLET BY MOUTH DAILY IN THE MORNING BEFORE BREAKFAST, Disp: 28 tablet, Rfl: 10     hydrocortisone, Perianal, (HYDROCORTISONE) 2.5 % cream, Place rectally 2 times daily as needed for hemorrhoids, Disp: 30 g, Rfl: 3     ibuprofen (ADVIL/MOTRIN) 200 MG tablet, Take 3 tablets (600 mg) by mouth 3 times daily, Disp: , Rfl:      omeprazole (PRILOSEC) 20 MG DR capsule, TAKE 1 CAPSULE BY MOUTH TWICE A DAY IN THE MORNING AND EVENING PLUS MELVIN, Disp: 60 capsule, Rfl: 5     pioglitazone (ACTOS) 30 MG tablet, TAKE ONE TABLET BY MOUTH DAILY, Disp: 28 tablet, Rfl: 10     QUEtiapine (SEROQUEL) 300 MG tablet, Take 300 mg by mouth At Bedtime , Disp: , Rfl: 0     TRAZODONE HCL PO, Take 100 mg by mouth At Bedtime , Disp: , Rfl:      blood glucose calibration (ONETOUCH VERIO) solution, USE AS DIRECTED, Disp: 1 each, Rfl: 0     Blood Glucose Monitoring Suppl (ONETOUCH VERIO FLEX SYSTEM) w/Device KIT, USE TO TEST TWICE DAILY, Disp: 1 kit, Rfl: 0     FIBER ADULT GUMMIES PO, Take 1 chew tab by mouth daily as needed (Patient not taking: Reported on 4/26/2023), Disp: , Rfl:      fluticasone (FLONASE) 50 MCG/ACT nasal spray, Spray 1 spray into both nostrils daily (Patient not taking: Reported on 11/30/2023), Disp: 15.8 mL, Rfl: 0     guaiFENesin (ROBITUSSIN) 20 mg/mL liquid, Take 10 mLs (200 mg) by mouth every 8 hours as needed for cough (Patient not taking: Reported on 11/30/2023), Disp: 180 mL, Rfl: 0     Microlet Lancets MISC, TEST EVERY DAY, Disp: 100 each, Rfl: 1     ONETOUCH VERIO IQ test strip, TEST TWICE DAILY, Disp: 100 strip, Rfl: 1     ALLERGIES:    Allergies   Allergen Reactions     Ceclor [Cefaclor] Swelling     Erythromycin GI Disturbance        SOCIAL HISTORY:   Social History     Socioeconomic History     Marital status: Single      Spouse name: Not on file     Number of children: Not on file     Years of education: Not on file     Highest education level: Not on file   Occupational History     Not on file   Tobacco Use     Smoking status: Former     Packs/day: 1.00     Years: 0.00     Additional pack years: 0.00     Total pack years: 0.00     Types: Cigarettes     Start date: 2014     Quit date: 3/1/2014     Years since quittin.7     Smokeless tobacco: Never   Vaping Use     Vaping Use: Never used   Substance and Sexual Activity     Alcohol use: Yes     Comment: rare     Drug use: No     Sexual activity: Never   Other Topics Concern     Parent/sibling w/ CABG, MI or angioplasty before 65F 55M? No   Social History Narrative     Not on file     Social Determinants of Health     Financial Resource Strain: Unknown (2023)    Financial Resource Strain      Within the past 12 months, have you or your family members you live with been unable to get utilities (heat, electricity) when it was really needed?: Patient refused   Food Insecurity: High Risk (2023)    Food Insecurity      Within the past 12 months, did you worry that your food would run out before you got money to buy more?: Yes      Within the past 12 months, did the food you bought just not last and you didn t have money to get more?: Yes   Transportation Needs: Low Risk  (2023)    Transportation Needs      Within the past 12 months, has lack of transportation kept you from medical appointments, getting your medicines, non-medical meetings or appointments, work, or from getting things that you need?: No   Physical Activity: Not on file   Stress: Not on file   Social Connections: Not on file   Interpersonal Safety: Unknown (2023)    Interpersonal Safety      Do you feel physically and emotionally safe where you currently live?: Patient refused      Within the past 12 months, have you been hit, slapped, kicked or otherwise physically hurt by someone?: Patient  "refused      Within the past 12 months, have you been humiliated or emotionally abused in other ways by your partner or ex-partner?: Patient refused   Housing Stability: Low Risk  (9/23/2023)    Housing Stability      Do you have housing? : Yes      Are you worried about losing your housing?: No        FAMILY HISTORY:   Family History   Problem Relation Age of Onset     Mental Illness Brother      Diabetes No family hx of      Depression No family hx of      Anxiety Disorder No family hx of         EXAM:Vitals: /76 (BP Location: Left arm, Patient Position: Sitting, Cuff Size: Adult Large)   Temp (!) 95.5  F (35.3  C) (Temporal)   Ht 1.88 m (6' 2\")   Wt (!) 158.3 kg (349 lb)   BMI 44.81 kg/m    BMI= Body mass index is 44.81 kg/m .    General appearance: Patient is alert and fully cooperative with history & exam.  No sign of distress is noted during the visit.     Psychiatric: Affect is pleasant & appropriate.  Patient appears motivated to improve health.     Respiratory: Breathing is regular & unlabored while sitting.     HEENT: Hearing is intact to spoken word.  Speech is clear.  No gross evidence of visual impairment that would impact ambulation.     Vascular: DP & PT pulses are intact & regular bilaterally.  No significant edema or varicosities noted.  CFT and skin temperature is normal to both lower extremities.     Neurologic: Lower extremity sensation is intact to light touch.  No evidence of weakness or contracture in the lower extremities.  No evidence of neuropathy.    Dermatologic: Skin is intact to both lower extremities with adequate texture, turgor and tone about the integument.  No paronychia or evidence of soft tissue infection is noted.     Musculoskeletal: Patient is ambulatory without assistive device or brace.  There is palpable edema about the right lateral ankle directly over the ATF ligament.  Negative anterior drawer equal bilateral.  Subtle antalgic gait bilateral lower extremities.  " Very diminished balance bilateral lower extremities.  Manual muscle strength appears to be equal bilateral.     ASSESSMENT:       ICD-10-CM    1. Pain in joint involving ankle and foot, left  M25.572 Orthopedic  Referral      2. Sprain of anterior talofibular ligament of right ankle, initial encounter  S93.491A Physical Therapy Referral     Ankle/Foot Bracing Supplies DME Walking Boot (medium); Right; Pneumatic; Tall           PLAN:  Reviewed patient's chart in [facility].      11/30/2023   Dispensed a fracture boot and ordered and interpreted radiographs.  Order placed to begin PT in buck  Stay in boot for all weight bearing until PT proves strength and balance is the same on injured right ankle as the uninjured left ankle which will be 2-6 weeks of therapy.  Important to keep doing the PT at home as well.  Follow up with me in 4-6 weeks.   Can rest and sleep without the boot.  Can be weight bearing in the boot as much as tolerated.     Lopez Latif DPM          Again, thank you for allowing me to participate in the care of your patient.        Sincerely,        Lopez Latif DPM

## 2023-11-30 NOTE — PATIENT INSTRUCTIONS
Stay in boot for all weight bearing until PT proves strength and balance is the same on injured right ankle as the uninjured left ankle which will be 2-6 weeks of therapy.  Important to keep doing the PT at home as well.  Follow up with me in 4-6 weeks.   Can rest and sleep without the boot.  Can be weight bearing in the boot as much as tolerated.

## 2023-11-30 NOTE — PROGRESS NOTES
HPI:  Bertram Foreman is a 33 year old male who is seen in consultation at the request of Tremaine Ruvalcaba DO    Pt presents for eval of:   (Onset, Location, L/R, Character, Treatments, Injury if yes)    XR Right ankle 11/30/2023     Onset 11/29/2023, stepped on uneven area by the driveway in the dark, twisting Right ankle. Presents with lateral Right ankle pain, [facility] by [facility].  Constant swelling, dull ache. Intermittent sharp pain 9/10.    Ice, tylenol    Bertram[employer].     ROS:  10 point ROS neg other than the symptoms noted above in the HPI.    Patient Active Problem List   Diagnosis    Morbid obesity (H)    Intellectual disability    Recurrent major depressive disorder, in partial remission (H24)    Mantoux: positive, treated    Morbid obesity with body mass index of 45.0-49.9 in adult (H)    Mild intellectual disabilities    Type 2 diabetes mellitus with hypoglycemia, without long-term current use of insulin (H)    Sexual dysfunction       PAST MEDICAL HISTORY:   Past Medical History:   Diagnosis Date    Depressive disorder All my life almost.    Diabetes (H) sometime in 2019    Sleep apnea         PAST SURGICAL HISTORY:   Past Surgical History:   Procedure Laterality Date    APPENDECTOMY  8/15/14    COLONOSCOPY N/A 12/28/2020    Procedure: COLONOSCOPY, WITH BIOPSY;  Surgeon: Haley Hernandez MD;  Location:  GI        MEDICATIONS:   Current Outpatient Medications:     acetaminophen (TYLENOL) 325 MG tablet, Take 325-650 mg by mouth as needed for mild pain, Disp: , Rfl:     atorvastatin (LIPITOR) 20 MG tablet, TAKE 1 TABLET BY MOUTH ONCEDAILY EVERY MORNING (AMPLUSPAK), Disp: 90 tablet, Rfl: 3    divalproex sodium extended-release (DEPAKOTE ER) 500 MG 24 hr tablet, TAKE THREE TABLETS BY MOUTH EVERY NIGHT AT BEDTIME (HS PLUSPAK), Disp: , Rfl:     glimepiride (AMARYL) 4 MG tablet, TAKE ONE TABLET BY MOUTH DAILY IN THE MORNING BEFORE BREAKFAST, Disp: 28  tablet, Rfl: 10    hydrocortisone, Perianal, (HYDROCORTISONE) 2.5 % cream, Place rectally 2 times daily as needed for hemorrhoids, Disp: 30 g, Rfl: 3    ibuprofen (ADVIL/MOTRIN) 200 MG tablet, Take 3 tablets (600 mg) by mouth 3 times daily, Disp: , Rfl:     omeprazole (PRILOSEC) 20 MG DR capsule, TAKE 1 CAPSULE BY MOUTH TWICE A DAY IN THE MORNING AND EVENING PLUS MELVIN, Disp: 60 capsule, Rfl: 5    pioglitazone (ACTOS) 30 MG tablet, TAKE ONE TABLET BY MOUTH DAILY, Disp: 28 tablet, Rfl: 10    QUEtiapine (SEROQUEL) 300 MG tablet, Take 300 mg by mouth At Bedtime , Disp: , Rfl: 0    TRAZODONE HCL PO, Take 100 mg by mouth At Bedtime , Disp: , Rfl:     blood glucose calibration (ONETOUCH VERIO) solution, USE AS DIRECTED, Disp: 1 each, Rfl: 0    Blood Glucose Monitoring Suppl (ONETOUCH VERIO FLEX SYSTEM) w/Device KIT, USE TO TEST TWICE DAILY, Disp: 1 kit, Rfl: 0    FIBER ADULT GUMMIES PO, Take 1 chew tab by mouth daily as needed (Patient not taking: Reported on 4/26/2023), Disp: , Rfl:     fluticasone (FLONASE) 50 MCG/ACT nasal spray, Spray 1 spray into both nostrils daily (Patient not taking: Reported on 11/30/2023), Disp: 15.8 mL, Rfl: 0    guaiFENesin (ROBITUSSIN) 20 mg/mL liquid, Take 10 mLs (200 mg) by mouth every 8 hours as needed for cough (Patient not taking: Reported on 11/30/2023), Disp: 180 mL, Rfl: 0    Microlet Lancets MISC, TEST EVERY DAY, Disp: 100 each, Rfl: 1    ONETOUCH VERIO IQ test strip, TEST TWICE DAILY, Disp: 100 strip, Rfl: 1     ALLERGIES:    Allergies   Allergen Reactions    Ceclor [Cefaclor] Swelling    Erythromycin GI Disturbance        SOCIAL HISTORY:   Social History     Socioeconomic History    Marital status: Single     Spouse name: Not on file    Number of children: Not on file    Years of education: Not on file    Highest education level: Not on file   Occupational History    Not on file   Tobacco Use    Smoking status: Former     Packs/day: 1.00     Years: 0.00     Additional pack years:  0.00     Total pack years: 0.00     Types: Cigarettes     Start date: 2014     Quit date: 3/1/2014     Years since quittin.7    Smokeless tobacco: Never   Vaping Use    Vaping Use: Never used   Substance and Sexual Activity    Alcohol use: Yes     Comment: rare    Drug use: No    Sexual activity: Never   Other Topics Concern    Parent/sibling w/ CABG, MI or angioplasty before 65F 55M? No   Social History Narrative    Not on file     Social Determinants of Health     Financial Resource Strain: Unknown (2023)    Financial Resource Strain     Within the past 12 months, have you or your family members you live with been unable to get utilities (heat, electricity) when it was really needed?: Patient refused   Food Insecurity: High Risk (2023)    Food Insecurity     Within the past 12 months, did you worry that your food would run out before you got money to buy more?: Yes     Within the past 12 months, did the food you bought just not last and you didn t have money to get more?: Yes   Transportation Needs: Low Risk  (2023)    Transportation Needs     Within the past 12 months, has lack of transportation kept you from medical appointments, getting your medicines, non-medical meetings or appointments, work, or from getting things that you need?: No   Physical Activity: Not on file   Stress: Not on file   Social Connections: Not on file   Interpersonal Safety: Unknown (2023)    Interpersonal Safety     Do you feel physically and emotionally safe where you currently live?: Patient refused     Within the past 12 months, have you been hit, slapped, kicked or otherwise physically hurt by someone?: Patient refused     Within the past 12 months, have you been humiliated or emotionally abused in other ways by your partner or ex-partner?: Patient refused   Housing Stability: Low Risk  (2023)    Housing Stability     Do you have housing? : Yes     Are you worried about losing your housing?: No       "  FAMILY HISTORY:   Family History   Problem Relation Age of Onset    Mental Illness Brother     Diabetes No family hx of     Depression No family hx of     Anxiety Disorder No family hx of         EXAM:Vitals: /76 (BP Location: Left arm, Patient Position: Sitting, Cuff Size: Adult Large)   Temp (!) 95.5  F (35.3  C) (Temporal)   Ht 1.88 m (6' 2\")   Wt (!) 158.3 kg (349 lb)   BMI 44.81 kg/m    BMI= Body mass index is 44.81 kg/m .    General appearance: Patient is alert and fully cooperative with history & exam.  No sign of distress is noted during the visit.     Psychiatric: Affect is pleasant & appropriate.  Patient appears motivated to improve health.     Respiratory: Breathing is regular & unlabored while sitting.     HEENT: Hearing is intact to spoken word.  Speech is clear.  No gross evidence of visual impairment that would impact ambulation.     Vascular: DP & PT pulses are intact & regular bilaterally.  No significant edema or varicosities noted.  CFT and skin temperature is normal to both lower extremities.     Neurologic: Lower extremity sensation is intact to light touch.  No evidence of weakness or contracture in the lower extremities.  No evidence of neuropathy.    Dermatologic: Skin is intact to both lower extremities with adequate texture, turgor and tone about the integument.  No paronychia or evidence of soft tissue infection is noted.     Musculoskeletal: Patient is ambulatory without assistive device or brace.  There is palpable edema about the right lateral ankle directly over the ATF ligament.  Negative anterior drawer equal bilateral.  Subtle antalgic gait bilateral lower extremities.  Very diminished balance bilateral lower extremities.  Manual muscle strength appears to be equal bilateral.     ASSESSMENT:       ICD-10-CM    1. Pain in joint involving ankle and foot, left  M25.572 Orthopedic  Referral      2. Sprain of anterior talofibular ligament of right ankle, initial " encounter  S93.017D Physical Therapy Referral     Ankle/Foot Bracing Supplies DME Walking Boot (medium); Right; Pneumatic; Tall           PLAN:  Reviewed patient's chart in [facility].      11/30/2023   Dispensed a fracture boot and ordered and interpreted radiographs.  Order placed to begin PT in buck  Stay in boot for all weight bearing until PT proves strength and balance is the same on injured right ankle as the uninjured left ankle which will be 2-6 weeks of therapy.  Important to keep doing the PT at home as well.  Follow up with me in 4-6 weeks.   Can rest and sleep without the boot.  Can be weight bearing in the boot as much as tolerated.     oLpez Latif DPM

## 2023-12-06 ENCOUNTER — TELEPHONE (OUTPATIENT)
Dept: PODIATRY | Facility: CLINIC | Age: 33
End: 2023-12-06
Payer: COMMERCIAL

## 2023-12-06 NOTE — TELEPHONE ENCOUNTER
[facility] Call Center    Phone Message    May a detailed message be left on voicemail: yes     Reason for Call:   PT CALLING TODAY WONDERING IF HE CAN TAKE OFF THE WRAP AND BOOT TO GO SWIMMING. HE WOULD LIKE SOMEONE TO CALL HIM BACK BEFORE FRIDAY 12/8/23. CAN LEAVE A MESSAGE IF NEEDED     Action Taken: P [facility] Patient Care Specialty Pool [[id number]]    Travel Screening: Not Applicable

## 2023-12-06 NOTE — TELEPHONE ENCOUNTER
Called and spoke with patient and advised that swimming would not be recommended at this time as it could put additional strain on his ankle. He verbalized understanding and has no further questions at this time.     Kathleen Du RN   [facility]

## 2023-12-13 ENCOUNTER — HOSPITAL ENCOUNTER (EMERGENCY)
Facility: CLINIC | Age: 33
Discharge: HOME | End: 2023-12-13
Attending: STUDENT IN AN ORGANIZED HEALTH CARE EDUCATION/TRAINING PROGRAM | Admitting: STUDENT IN AN ORGANIZED HEALTH CARE EDUCATION/TRAINING PROGRAM
Payer: COMMERCIAL

## 2023-12-13 VITALS
SYSTOLIC BLOOD PRESSURE: 130 MMHG | HEART RATE: 72 BPM | TEMPERATURE: 98.1 F | RESPIRATION RATE: 16 BRPM | OXYGEN SATURATION: 99 % | DIASTOLIC BLOOD PRESSURE: 78 MMHG

## 2023-12-13 DIAGNOSIS — R45.1 AGITATION: ICD-10-CM

## 2023-12-13 DIAGNOSIS — F32.A DEPRESSION, UNSPECIFIED DEPRESSION TYPE: ICD-10-CM

## 2023-12-13 PROBLEM — F43.21 GRIEF: Status: ACTIVE | Noted: 2023-12-13

## 2023-12-13 PROCEDURE — 99285 EMERGENCY DEPT VISIT HI MDM: CPT | Performed by: STUDENT IN AN ORGANIZED HEALTH CARE EDUCATION/TRAINING PROGRAM

## 2023-12-13 PROCEDURE — 99284 EMERGENCY DEPT VISIT MOD MDM: CPT | Performed by: STUDENT IN AN ORGANIZED HEALTH CARE EDUCATION/TRAINING PROGRAM

## 2023-12-13 ASSESSMENT — ACTIVITIES OF DAILY LIVING (ADL)
ADLS_ACUITY_SCORE: 35
ADLS_ACUITY_SCORE: 35

## 2023-12-14 NOTE — ED NOTES
Pt has been cooperative . Pt talking with ZEV Nolan from group home did call. Phone number [phone] . They are able to pick pt up when cleared.
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
## 2023-12-14 NOTE — ED PROVIDER NOTES
History     Chief Complaint   Patient presents with    Suicidal     HPI  Bertram Foreman is a 33 year old male with history of obesity, intellectual disability presents for evaluation of agitation, possible suicidal ideation.  Patient reports having a depressed mood after his friend  unexpectedly within the last few months.  Then this afternoon he got into a verbal argument with one of his roommates at the group home.  The roommate accused him of stealing $15, which the patient adamantly denied.  The argument escalated to the point where both of them were yelling.  This got the patient quite agitated and he eventually stormed out of the room and expressed concern about harming himself or someone else to group home staff.  There were initial reports that he was hitting his head against the wall of his bedroom, but the patient denied this and group home staff did not see this either.  Patient called 911 and was brought to the emergency department for evaluation.  In the ED, he states that his anger has improved and he does not want to hurt himself or others.  Denies any physical complaints at present.  No other complaints today.    Allergies:  Allergies   Allergen Reactions    Ceclor [Cefaclor] Swelling    Erythromycin GI Disturbance       Problem List:    Patient Active Problem List    Diagnosis Date Noted    Grief 2023     Priority: Medium    Type 2 diabetes mellitus with hypoglycemia, without long-term current use of insulin (H) 2022     Priority: Medium    Sexual dysfunction 2022     Priority: Medium    Intellectual disability 2019     Priority: Medium    Recurrent major depressive disorder, in partial remission (H24) 2019     Priority: Medium    Morbid obesity (H) 2019     Priority: Medium    Morbid obesity with body mass index of 45.0-49.9 in adult (H) 2017     Priority: Medium    Mantoux: positive, treated 2011     Priority: Medium     Overview:   2011,  went through (more than) 9 months of isoniazid.  NOT TO HAVE MANTOUX IN FUTURE      Mild intellectual disabilities 07/15/2010     Priority: Medium        Past Medical History:    Past Medical History:   Diagnosis Date    Depressive disorder All my life almost.    Diabetes (H) sometime in 2019    Sleep apnea        Past Surgical History:    Past Surgical History:   Procedure Laterality Date    APPENDECTOMY  8/15/14    COLONOSCOPY N/A 2020    Procedure: COLONOSCOPY, WITH BIOPSY;  Surgeon: Haley Hernandez MD;  Location:  GI       Family History:    Family History   Problem Relation Age of Onset    Mental Illness Brother     Diabetes No family hx of     Depression No family hx of     Anxiety Disorder No family hx of        Social History:  Marital Status:  Single [1]  Social History     Tobacco Use    Smoking status: Former     Packs/day: 1.00     Years: 0.00     Additional pack years: 0.00     Total pack years: 0.00     Types: Cigarettes     Start date: 2014     Quit date: 3/1/2014     Years since quittin.7    Smokeless tobacco: Never   Vaping Use    Vaping Use: Never used   Substance Use Topics    Alcohol use: Yes     Comment: rare    Drug use: No        Medications:    acetaminophen (TYLENOL) 325 MG tablet  atorvastatin (LIPITOR) 20 MG tablet  blood glucose calibration (ONETOUCH VERIO) solution  Blood Glucose Monitoring Suppl (ONETOUCH VERIO FLEX SYSTEM) w/Device KIT  divalproex sodium extended-release (DEPAKOTE ER) 500 MG 24 hr tablet  FIBER ADULT GUMMIES PO  fluticasone (FLONASE) 50 MCG/ACT nasal spray  glimepiride (AMARYL) 4 MG tablet  guaiFENesin (ROBITUSSIN) 20 mg/mL liquid  hydrocortisone, Perianal, (HYDROCORTISONE) 2.5 % cream  ibuprofen (ADVIL/MOTRIN) 200 MG tablet  Microlet Lancets MISC  omeprazole (PRILOSEC) 20 MG DR capsule  ONETOUCH VERIO IQ test strip  pioglitazone (ACTOS) 30 MG tablet  QUEtiapine (SEROQUEL) 300 MG tablet  TRAZODONE HCL PO          Review of Systems   All other systems  reviewed and are negative.  See HPI.    Physical Exam   BP: 139/72  Pulse: 70  Temp: 98.1  F (36.7  C)  Resp: 18  SpO2: 99 %      Physical Exam  Vitals and nursing note reviewed.   Constitutional:       General: He is not in acute distress.     Appearance: Normal appearance. He is not ill-appearing or toxic-appearing.      Comments: Patient is cooperative with exam, answering questions appropriately.  He denies thoughts of harming himself or others.   HENT:      Head: Normocephalic and atraumatic.      Comments: No indication of head trauma, bruising, or scalp deformity.     Nose: Nose normal. No congestion or rhinorrhea.      Mouth/Throat:      Mouth: Mucous membranes are moist.   Eyes:      General: No scleral icterus.     Conjunctiva/sclera: Conjunctivae normal.   Neck:      Comments: No focal areas of tenderness.  Range of motion is entirely normal.  Cardiovascular:      Rate and Rhythm: Normal rate.      Heart sounds: Normal heart sounds.   Pulmonary:      Effort: Pulmonary effort is normal. No respiratory distress.      Breath sounds: Normal breath sounds.   Abdominal:      Palpations: Abdomen is soft.      Tenderness: There is no abdominal tenderness.   Musculoskeletal:         General: No deformity. Normal range of motion.      Cervical back: Normal range of motion and neck supple. No rigidity or tenderness.      Comments: Patient has a boot on his right leg from a previous injury.  Denies any new or worsened pain to the area.   Skin:     General: Skin is warm.      Capillary Refill: Capillary refill takes less than 2 seconds.   Neurological:      General: No focal deficit present.      Mental Status: He is alert and oriented to person, place, and time.      Cranial Nerves: No cranial nerve deficit.      Motor: No weakness.      Coordination: Coordination normal.      Gait: Gait normal.   Psychiatric:      Comments: Somewhat flat affect.  Does not appear agitated in the emergency department.  Endorsed  initial concern for possibly harming others, but adamantly denies any current SI/HI.         ED Course     Mental Health Risk Assessment        PSS-3      Date and Time Over the past 2 weeks have you felt down, depressed, or hopeless? Over the past 2 weeks have you had thoughts of killing yourself? Have you ever attempted to kill yourself? When did this last happen? User   12/13/23 1836 yes yes no -- [facility]          C-SSRS ([place])      Date and Time Q1 Wished to be Dead (Past Month) Q2 Suicidal Thoughts (Past Month) Q3 Suicidal Thought Method Q4 Suicidal Intent without Specific Plan Q5 Suicide Intent with Specific Plan Q6 Suicide Behavior (Lifetime) Within the Past 3 Months? RETIRED: Level of Risk per Screen Screening Not Complete User   12/13/23 1836 yes yes yes yes yes -- -- -- -- [facility]            Suicide assessment completed by mental health (D.E.C., LCSW, etc.)    ED Course as of 12/13/23 2215   Wed Dec 13, 2023   1906 Spoke with group [facility] staff over the phone.  They confirmed to the verbal argument with his roommate today and he made some threats about wanting to harm himself/others, was crying, but did not hit his head.  If the patient is discharged they will help make arrangements for transport home.   2126 Discussed with DEC team.  They agree that he is safe for discharge home.  Outside of anger outburst earlier, he is otherwise answering questions appropriately, appears insightful and recognizing his anger, and actually called 911 himself earlier.  Given that he has many resources at home already, will contact group [facility] for discharge.     Procedures              No results found for this or any previous visit (from the past 24 hour(s)).    Medications - No data to display    Assessments & Plan (with Medical Decision Making)     I have reviewed the nursing notes.    I have reviewed the findings, diagnosis, plan and need for follow up with the patient.    Medical Decision Making  Bertram Foreman is a 33  year old male with history of obesity, intellectual disability presents for evaluation of agitation, possible suicidal ideation.  Normal vitals on arrival.  Exam is very reassuring overall.  He is currently calm, cooperative with exam.  Although he described agitation earlier, denies any residual thoughts of harming himself or others.  I see no signs of head or neck trauma and do not believe earlier reports of him hitting his head were true.  Patient denies this is well.  Exam otherwise unremarkable.  Symptoms seem most attributable to an outburst of anger during the argument itself.  I do not think he is a threat to himself, as evidenced by his current remorse, close outpatient follow-up/resources, and the fact that he expressed concern about possible harm to others to group staff earlier tonight.  Patient was assessed by the DEC team here in the emergency department and they agreed that he is safe for discharge home.  He has an existing appointment with his therapist on Monday and feels safe with group home staff until then.  Advised therapist and PCP follow-up.  He agrees to return to the emergency department in the meantime for any new or acutely worsening symptoms.    Discharge Medication List as of 12/13/2023  9:39 PM          Final diagnoses:   Agitation   Depression, unspecified depression type       12/13/2023   [facility] EMERGENCY DEPT       Bertram Spencer MD  12/13/23 4000

## 2023-12-14 NOTE — CONSULTS
"Diagnostic Evaluation Consultation  Crisis Assessment    Patient Name: Bertram Foreman  Age:  33 year old  Legal Sex: male  Gender Identity: male  Pronouns:   Race: White  Ethnicity: Choose not to answer  Language: English      Patient was assessed: Virtual: [facility] Crisis Assessment Start Time: 2011 Crisis Assessment Stop Time: 2116  Patient location: [facility] EMERGENCY DEPT                                 Referral Data and Chief Complaint  Bertram Foreman presents to the ED via EMS. Patient is presenting to the ED for the following concerns: Verbal agitation, Suicidal ideation.   Factors that make the mental health crisis life threatening or complex are:  Bertram got into an argument with his housemate tonight.  He explained that his housemat accused him of stealing his money, breaking into his room.  Bertram shared that he never would do that and doesn't even know how to \"pick a lock\" as his roommate accused him of doing.  Bertram had thoughts to run into traffic and was concerned that he might do something to harm his housemate so he called 911.  After given some time he is no longer feeling suicidal or having thoughts to harm his roommate..      Informed Consent and Assessment Methods  Explained the crisis assessment process, including applicable information disclosures and limits to confidentiality, assessed understanding of the process, and obtained consent to proceed with the assessment.  Assessment methods included conducting a formal interview with patient, review of medical records, collaboration with medical staff, and obtaining relevant collateral information from family and community providers when available.  : done     Patient response to interventions: eager to participate, verbalizes understanding  Coping skills were attempted to reduce the crisis:  Patient called 911     History of the Crisis   Bertram has lived in his current group home since March of 2023.  He has lived in group " homes for several years.  He has therapy once every two months, meets with psychiatry monthly, has ARMHS and ILS.  He has a .  Per report Bertram's Dad is his Guardian.  Bertram's Mom passed away from ALS in November of 2019.  Bertram shared that he had a close friend die unexpectedly on December 7th.  He has been grieving the loss of her and then tonight's argument was to much.    Brief Psychosocial History  Family:  Single, Children no  Support System:  Parent(s) (Father, Friend, Staff at his Group Home, Community Supports)  Employment Status:  self-employed (, currently on leave- Dog walking, snow removal, housekeeping)  Source of Income:  disability, salary/wages  Financial Environmental Concerns:   (Receives house support, finances are tight)  Current Hobbies:  outdoor activities, gardening, music  Barriers in Personal Life:  financial concerns    Significant Clinical History  Current Anxiety Symptoms:  anxious, racing thoughts  Current Depression/Trauma:  sadness, thoughts of death/suicide, irritable  Current Somatic Symptoms:  racing thoughts, excessive worry, anxious, sweating, flushing, shaking  Current Psychosis/Thought Disturbance:  impulsive  Current Eating Symptoms:     Chemical Use History:  Alcohol: Social  Last Use:: 10/13/23 (Approximately 2 months ago)  Benzodiazepines: None  Opiates: None  Cocaine: None  Marijuana: None  Other Use: None   Past diagnosis:  PTSD, Depression, Anxiety Disorder  Family history:     Past treatment:  Individual therapy, Psychiatric Medication Management, ARMHS/CTSS, Case management, Supportive Living Environment (group home, residential house, etc), Primary Care, Inpatient Hospitalization (Lives in a Group Home, ILS worker)  Details of most recent treatment:  Meets with a therapist, Tory) once every two months ([facility] in [place], has an appointment on Monday, Briana (Psychiatry) meets with monthly [facility].  Hospitalized  at [facility] for 7 days in the early 2000's  Other relevant history:          Collateral Information  Is there collateral information: Yes     Collateral information name, relationship, phone number:  An [phone]    What happened today: He and a housemate were arguing about missing money.  Housemate said some unrelated, unkind words that upset Bertram.     What is different about patient's functioning: Was upset tonight about an argument with his roommate.     Concern about alcohol/drug use:      What do you think the patient needs:      Has patient made comments about wanting to kill themselves/others: yes    If d/c is recommended, can they take part in safety/aftercare planning:  yes    Additional collateral information:  Feels comfortable with him returning if he feels okay about it.  Will assist with transition back into group home.     Risk Assessment  Maxie Suicide Severity Rating Scale Full Clinical Version:  Suicidal Ideation  Q1 Wish to be Dead (Lifetime): Yes  Q2 Non-Specific Active Suicidal Thoughts (Lifetime): Yes  3. Active Suicidal Ideation with any Methods (Not Plan) Without Intent to Act (Lifetime): Yes  Q4 Active Suicidal Ideation with Some Intent to Act, Without Specific Plan (Lifetime): No  Q5 Active Suicidal Ideation with Specific Plan and Intent (Lifetime): No  Q6 Suicide Behavior (Lifetime): no     Suicidal Behavior (Lifetime)  Actual Attempt (Lifetime): No  Has subject engaged in non-suicidal self-injurious behavior? (Lifetime): No  Interrupted Attempts (Lifetime): No  Aborted or Self-Interrupted Attempt (Lifetime): No  Preparatory Acts or Behavior (Lifetime): No    Maxie Suicide Severity Rating Scale Recent:   Suicidal Ideation (Recent)  Q1 Wished to be Dead (Past Month): yes  Q2 Suicidal Thoughts (Past Month): yes  Q3 Suicidal Thought Method: yes  Q4 Suicidal Intent without Specific Plan: no  Q5 Suicide Intent with Specific Plan: no  Level of Risk per Screen: moderate  risk  Intensity of Ideation (Recent)  Most Severe Ideation Rating (Past 1 Month): 3  Description of Most Severe Ideation (Past 1 Month): Bertram was upset tonight related to being accused of something he had not done. Tonight he had thoughts to run into traffic.  Frequency (Past 1 Month): Less than once a week  Duration (Past 1 Month): Less than 1 hour/some of the time  Controllability (Past 1 Month): Can control thoughts with little difficulty (I think about my family, the things I would lose, my friend)  Deterrents (Past 1 Month): Deterrents definitely stopped you from attempting suicide  Reasons for Ideation (Past 1 Month): Equally to get attention, revenge, or a reaction from others and to end/stop the pain  Suicidal Behavior (Recent)  Actual Attempt (Past 3 Months): No  Total Number of Actual Attempts (Past 3 Months): 0  Has subject engaged in non-suicidal self-injurious behavior? (Past 3 Months): No  Interrupted Attempts (Past 3 Months): No  Total Number of Interrupted Attempts (Past 3 Months): 0  Interrupted Attempt Description (Past 3 Months): 0  Total Number of Aborted or Self-Interrupted Attempts (Past 3 Months): 0  Aborted or Self-Interrupted Attempt Description (Past 3 Months): 0  Preparatory Acts or Behavior (Past 3 Months): No  Total Number of Preparatory Acts (Past 3 Months): 0  Preparatory Acts or Behavior Description (Past 3 Months): 0    Environmental or Psychosocial Events: loss of a loved one, recent life events (see comment), other life stressors  Protective Factors: Protective Factors: help seeking, good treatment engagement, lives in a responsibly safe and stable environment    Does the patient have thoughts of harming others? Feels Like Hurting Others: no (Not currently, but when he was accused by a housemate of stealing was afraid he would do something.)  Previous Attempt to Hurt Others: yes (Not recently, has had two assault charges for pushing someone down and in high school kicked a  staff)  Violence Threats in Past 6 Months: Was upset this evening with a roommate for accusing him of stealing from him.  Called 911 because in the moment he was afraid he might harm himself or someone else.  He is no longer feeling this way.  Current Violence Plan or Thoughts: none  Is the patient engaging in sexually inappropriate behavior?: no  Duty to warn initiated: no    Is the patient engaging in sexually inappropriate behavior?  no        Mental Status Exam   Affect: Appropriate  Appearance: Appropriate  Attention Span/Concentration: Attentive  Eye Contact: Engaged    Fund of Knowledge: Appropriate   Language /Speech Content: Fluent  Language /Speech Volume: Normal  Language /Speech Rate/Productions: Normal  Recent Memory: Intact  Remote Memory: Intact  Mood: Normal  Orientation to Person: Yes   Orientation to Place: Yes  Orientation to Time of Day: Yes  Orientation to Date: Yes     Situation (Do they understand why they are here?): Yes  Psychomotor Behavior: Normal  Thought Content: Clear  Thought Form:       Mini-Cog Assessment  Number of Words Recalled:    Clock-Drawing Test:     Three Item Recall:    Mini-Cog Total Score:       Medication  Psychotropic medications:   Medication Orders - Psychiatric (From admission, onward)      None          Current Care Team  Patient Care Team:  Tremaine Ruvalcaba DO as PCP - General (Internal Medicine)  Chiara Hair RD as Diabetes Educator (Dietitian, Registered)  Tremaine Ruvalcaba DO as Assigned PCP  Elizabeth Flower MD as MD (Dermatology)  Prashant Iglesias PA-C as Assigned Sleep Provider  Vicky Kelly, PhD as Assigned Behavioral Health Provider  Ellis Rush RPH as Pharmacist (Pharmacist Clinician- Clinical Pharmacy Specialist)  Ellis Rush RPH as Assigned MTM Pharmacist  Lopez Latif DPM as Assigned Surgical Provider    Diagnosis  Patient Active Problem List   Diagnosis Code    Morbid obesity (H)  E66.01    Intellectual disability F79    Recurrent major depressive disorder, in partial remission (H24) F33.41    Mantoux: positive, treated R76.11    Morbid obesity with body mass index of 45.0-49.9 in adult (H) E66.01, Z68.42    Mild intellectual disabilities F70    Type 2 diabetes mellitus with hypoglycemia, without long-term current use of insulin (H) E11.649    Sexual dysfunction R37    Grief F43.21       Primary Problem This Admission  Active Hospital Problems   F33.41 *Recurrent major depressive disorder, in partial remission (H24)      Clinical Summary and Substantiation of Recommendations   After therapeutic assessment, intervention and aftercare planning by ED care team and LM and in consultation with attending provider, the patient's circumstances and mental state were appropriate for outpatient management. It is the recommendation of this clinician that pt discharge with OP MH support. A this time the pt is not presenting as an acute risk to self or others due to the following factors: Bertram is no longer having thoughts to harm himself or others.  He has time to process the events of the evening and is feeling safe to return to his group home.  Staff are willing to help him to transition back into the group home.  He has a therapy appointment already scheduled for Monday.    Patient coping skills attempted to reduce the crisis:  Patient called 911    Disposition  Recommended disposition: Individual Therapy, Group Home, Medication Management        Reviewed case and recommendations with attending provider. Attending Name: Dr. Spencer       Attending concurs with disposition: yes       Patient and/or validated legal guardian concurs with disposition:   yes       Final disposition:  discharge    Legal status on admission:      Assessment Details   Total duration spent with the patient: 65 min     CPT code(s) utilized: 51054 - Psychotherapy for Crisis - 60 (30-74*) min    AKILAH ACOSTA, [facility],  Psychotherapist  DEC - Triage & Transition Services  Callback: [phone]

## 2024-01-11 ENCOUNTER — MEDICAL CORRESPONDENCE (OUTPATIENT)
Dept: HEALTH INFORMATION MANAGEMENT | Facility: CLINIC | Age: 34
End: 2024-01-11

## 2024-01-11 ENCOUNTER — OFFICE VISIT (OUTPATIENT)
Dept: PODIATRY | Facility: CLINIC | Age: 34
End: 2024-01-11
Payer: COMMERCIAL

## 2024-01-11 VITALS
WEIGHT: 315 LBS | DIASTOLIC BLOOD PRESSURE: 82 MMHG | SYSTOLIC BLOOD PRESSURE: 118 MMHG | HEIGHT: 74 IN | BODY MASS INDEX: 40.43 KG/M2

## 2024-01-11 DIAGNOSIS — S93.491A SPRAIN OF ANTERIOR TALOFIBULAR LIGAMENT OF RIGHT ANKLE, INITIAL ENCOUNTER: ICD-10-CM

## 2024-01-11 DIAGNOSIS — E11.65 TYPE 2 DIABETES MELLITUS WITH HYPERGLYCEMIA, WITHOUT LONG-TERM CURRENT USE OF INSULIN (H): Primary | ICD-10-CM

## 2024-01-11 PROCEDURE — 99213 OFFICE O/P EST LOW 20 MIN: CPT | Performed by: PODIATRIST

## 2024-01-11 ASSESSMENT — PAIN SCALES - GENERAL: PAINLEVEL: NO PAIN (0)

## 2024-01-11 NOTE — PROGRESS NOTES
Chief Complaint   Patient presents with    RECHECK     (6w1d) discontinued fx boot 12/26/23, Right ATF ankle sprain, DOI 11/29/2023; XR R ankle 11/30/2023; LOV 11/30/2023    Diabetes     Type 2    Other     Presents with [facility], 1/11/2024     HPI:  Bertram Foreman is a 33 year old male who is seen in consultation at the request of Tremaine Ruvalcaba DO    Pt presents for eval of:   (Onset, Location, L/R, Character, Treatments, Injury if yes)    XR Right ankle 11/30/2023     Onset 11/29/2023, stepped on uneven area by the driveway in the dark, twisting Right ankle. Presents with lateral Right ankle pain, accompanied by Keri from [facility].  Was Constant swelling, dull ache. Intermittent sharp pain 9/10.    Since lst visit he has been back to normal activity and karate.      Ice, tylenol    Bertram'PostedIn.     ROS:  10 point ROS neg other than the symptoms noted above in the HPI.    Patient Active Problem List   Diagnosis    Morbid obesity (H)    Intellectual disability    Recurrent major depressive disorder, in partial remission (H24)    Mantoux: positive, treated    Morbid obesity with body mass index of 45.0-49.9 in adult (H)    Mild intellectual disabilities    Type 2 diabetes mellitus with hypoglycemia, without long-term current use of insulin (H)    Sexual dysfunction    Grief       PAST MEDICAL HISTORY:   Past Medical History:   Diagnosis Date    Depressive disorder All my life almost.    Diabetes (H) sometime in 2019    Sleep apnea         PAST SURGICAL HISTORY:   Past Surgical History:   Procedure Laterality Date    APPENDECTOMY  8/15/14    COLONOSCOPY N/A 12/28/2020    Procedure: COLONOSCOPY, WITH BIOPSY;  Surgeon: Mary, MD Haley;  Location:  GI        MEDICATIONS:   Current Outpatient Medications:     atorvastatin (LIPITOR) 20 MG tablet, TAKE 1 TABLET BY MOUTH ONCEDAILY EVERY MORNING (AMPLUSPAK), Disp: 90 tablet, Rfl: 3    blood glucose  calibration (ONETOUCH VERIO) solution, USE AS DIRECTED, Disp: 1 each, Rfl: 0    Blood Glucose Monitoring Suppl (ONETOUCH VERIO FLEX SYSTEM) w/Device KIT, USE TO TEST TWICE DAILY, Disp: 1 kit, Rfl: 0    divalproex sodium extended-release (DEPAKOTE ER) 500 MG 24 hr tablet, TAKE THREE TABLETS BY MOUTH EVERY NIGHT AT BEDTIME (HS PLUSPAK), Disp: , Rfl:     glimepiride (AMARYL) 4 MG tablet, TAKE ONE TABLET BY MOUTH DAILY IN THE MORNING BEFORE BREAKFAST, Disp: 28 tablet, Rfl: 10    Microlet Lancets MISC, TEST EVERY DAY, Disp: 100 each, Rfl: 1    omeprazole (PRILOSEC) 20 MG DR capsule, TAKE 1 CAPSULE BY MOUTH TWICE A DAY IN THE MORNING AND EVENING PLUS MELVIN, Disp: 60 capsule, Rfl: 5    ONETOUCH VERIO IQ test strip, TEST TWICE DAILY, Disp: 100 strip, Rfl: 1    pioglitazone (ACTOS) 30 MG tablet, TAKE ONE TABLET BY MOUTH DAILY, Disp: 28 tablet, Rfl: 10    QUEtiapine (SEROQUEL) 300 MG tablet, Take 300 mg by mouth At Bedtime , Disp: , Rfl: 0    TRAZODONE HCL PO, Take 100 mg by mouth At Bedtime , Disp: , Rfl:     acetaminophen (TYLENOL) 325 MG tablet, Take 325-650 mg by mouth as needed for mild pain, Disp: , Rfl:     FIBER ADULT GUMMIES PO, Take 1 chew tab by mouth daily as needed (Patient not taking: Reported on 4/26/2023), Disp: , Rfl:     fluticasone (FLONASE) 50 MCG/ACT nasal spray, Spray 1 spray into both nostrils daily (Patient not taking: Reported on 11/30/2023), Disp: 15.8 mL, Rfl: 0    guaiFENesin (ROBITUSSIN) 20 mg/mL liquid, Take 10 mLs (200 mg) by mouth every 8 hours as needed for cough (Patient not taking: Reported on 11/30/2023), Disp: 180 mL, Rfl: 0    hydrocortisone, Perianal, (HYDROCORTISONE) 2.5 % cream, Place rectally 2 times daily as needed for hemorrhoids, Disp: 30 g, Rfl: 3    ibuprofen (ADVIL/MOTRIN) 200 MG tablet, Take 3 tablets (600 mg) by mouth 3 times daily, Disp: , Rfl:      ALLERGIES:    Allergies   Allergen Reactions    Ceclor [Cefaclor] Swelling    Erythromycin GI Disturbance        SOCIAL HISTORY:    Social History     Socioeconomic History    Marital status: Single     Spouse name: Not on file    Number of children: Not on file    Years of education: Not on file    Highest education level: Not on file   Occupational History    Not on file   Tobacco Use    Smoking status: Former     Packs/day: 1.00     Years: 0.00     Additional pack years: 0.00     Total pack years: 0.00     Types: Cigarettes     Start date: 2014     Quit date: 3/1/2014     Years since quittin.8    Smokeless tobacco: Never   Vaping Use    Vaping Use: Never used   Substance and Sexual Activity    Alcohol use: Yes     Comment: rare    Drug use: No    Sexual activity: Never   Other Topics Concern    Parent/sibling w/ CABG, MI or angioplasty before 65F 55M? No   Social History Narrative    Not on file     Social Determinants of Health     Financial Resource Strain: Unknown (2023)    Financial Resource Strain     Within the past 12 months, have you or your family members you live with been unable to get utilities (heat, electricity) when it was really needed?: Patient refused   Food Insecurity: High Risk (2023)    Food Insecurity     Within the past 12 months, did you worry that your food would run out before you got money to buy more?: Yes     Within the past 12 months, did the food you bought just not last and you didn t have money to get more?: Yes   Transportation Needs: Low Risk  (2023)    Transportation Needs     Within the past 12 months, has lack of transportation kept you from medical appointments, getting your medicines, non-medical meetings or appointments, work, or from getting things that you need?: No   Physical Activity: Not on file   Stress: Not on file   Social Connections: Not on file   Interpersonal Safety: Unknown (2023)    Interpersonal Safety     Do you feel physically and emotionally safe where you currently live?: Patient refused     Within the past 12 months, have you been hit, slapped, kicked  "or otherwise physically hurt by someone?: Patient refused     Within the past 12 months, have you been humiliated or emotionally abused in other ways by your partner or ex-partner?: Patient refused   Housing Stability: Low Risk  (9/23/2023)    Housing Stability     Do you have housing? : Yes     Are you worried about losing your housing?: No        FAMILY HISTORY:   Family History   Problem Relation Age of Onset    Mental Illness Brother     Diabetes No family hx of     Depression No family hx of     Anxiety Disorder No family hx of         EXAM:Vitals: /82 (BP Location: Left arm, Patient Position: Sitting, Cuff Size: Adult Large)   Ht 1.88 m (6' 2\")   Wt (!) 156.5 kg (345 lb)   BMI 44.30 kg/m    BMI= Body mass index is 44.3 kg/m .    General appearance: Patient is alert and fully cooperative with history & exam.  No sign of distress is noted during the visit.     Psychiatric: Affect is pleasant & appropriate.  Patient appears motivated to improve health.     Respiratory: Breathing is regular & unlabored while sitting.     HEENT: Hearing is intact to spoken word.  Speech is clear.  No gross evidence of visual impairment that would impact ambulation.     Vascular: DP & PT pulses are intact & regular bilaterally.  No significant edema or varicosities noted.  CFT and skin temperature is normal to both lower extremities.     Neurologic: Lower extremity sensation is intact to light touch.  No evidence of weakness or contracture in the lower extremities.  No evidence of neuropathy.    Dermatologic: Skin is intact to both lower extremities with adequate texture, turgor and tone about the integument.  No paronychia or evidence of soft tissue infection is noted.     Musculoskeletal: Patient is ambulatory without assistive device or brace.  There is no further edema about the right lateral ankle directly over the ATF ligament.  Negative anterior drawer equal bilateral.  Subtle antalgic gait bilateral lower " extremities.  Very diminished balance bilateral lower extremities.  Manual muscle strength appears to be equal bilateral.     ASSESSMENT:       ICD-10-CM    1. Type 2 diabetes mellitus with hyperglycemia, without long-term current use of insulin (H)  E11.65       2. Sprain of anterior talofibular ligament of right ankle, initial encounter  S93.491A            PLAN:  Reviewed patient[facility] chart in [facility].      11/30/2023   Dispensed a fracture boot and ordered and interpreted radiographs.  Order placed to begin PT in buck  Stay in boot for all weight bearing until PT proves strength and balance is the same on injured right ankle as the uninjured left ankle which will be 2-6 weeks of therapy.  Important to keep doing the PT at home as well.  Follow up with me in 4-6 weeks.   Can rest and sleep without the boot.  Can be weight bearing in the boot as much as tolerated.     1/11/2024  Return to all activities without restrictions at this time.  Filled out appropriate paperwork to do so.  Follow-up if this becomes symptomatic.      Lopez Latif DPM

## 2024-01-11 NOTE — LETTER
1/11/2024         RE: Bertram Foreman  [address]  [facility] [address]        Dear Colleague,    Thank you for referring your patient, Bertram Foreman, to the [facility]. Please see a copy of my visit note below.    Chief Complaint   Patient presents with     RECHECK     (6w1d) discontinued fx boot 12/26/23, Right ATF ankle sprain, DOI 11/29/2023; XR R ankle 11/30/2023; LOV 11/30/2023     Diabetes     Type 2     Other     Presents with Keri from [facility], 1/11/2024     HPI:  Bertram Foreman is a 33 year old male who is seen in consultation at the request of Tremaine Ruvalcaba DO    Pt presents for eval of:   (Onset, Location, L/R, Character, Treatments, Injury if yes)    XR Right ankle 11/30/2023     Onset 11/29/2023, stepped on uneven area by the driveway in the dark, twisting Right ankle. Presents with lateral Right ankle pain, accompanied by K[facility] from [facility].  Was Constant swelling, dull ache. Intermittent sharp pain 9/10.    Since lst visit he has been back to normal activity and karate.      Ice, tylenol    Bertram'Nerd Attack.     ROS:  10 point ROS neg other than the symptoms noted above in the HPI.    Patient Active Problem List   Diagnosis     Morbid obesity (H)     Intellectual disability     Recurrent major depressive disorder, in partial remission (H24)     Mantoux: positive, treated     Morbid obesity with body mass index of 45.0-49.9 in adult (H)     Mild intellectual disabilities     Type 2 diabetes mellitus with hypoglycemia, without long-term current use of insulin (H)     Sexual dysfunction     Grief       PAST MEDICAL HISTORY:   Past Medical History:   Diagnosis Date     Depressive disorder All my life almost.     Diabetes (H) sometime in 2019     Sleep apnea         PAST SURGICAL HISTORY:   Past Surgical History:   Procedure Laterality Date     APPENDECTOMY  8/15/14     COLONOSCOPY N/A 12/28/2020    Procedure:  COLONOSCOPY, WITH BIOPSY;  Surgeon: Hernandez, MD Haley;  Location:  GI        MEDICATIONS:   Current Outpatient Medications:      atorvastatin (LIPITOR) 20 MG tablet, TAKE 1 TABLET BY MOUTH ONCEDAILY EVERY MORNING (AMPLUSPAK), Disp: 90 tablet, Rfl: 3     blood glucose calibration (ONETOUCH VERIO) solution, USE AS DIRECTED, Disp: 1 each, Rfl: 0     Blood Glucose Monitoring Suppl (ONETOUCH VERIO FLEX SYSTEM) w/Device KIT, USE TO TEST TWICE DAILY, Disp: 1 kit, Rfl: 0     divalproex sodium extended-release (DEPAKOTE ER) 500 MG 24 hr tablet, TAKE THREE TABLETS BY MOUTH EVERY NIGHT AT BEDTIME (HS PLUSPAK), Disp: , Rfl:      glimepiride (AMARYL) 4 MG tablet, TAKE ONE TABLET BY MOUTH DAILY IN THE MORNING BEFORE BREAKFAST, Disp: 28 tablet, Rfl: 10     Microlet Lancets MISC, TEST EVERY DAY, Disp: 100 each, Rfl: 1     omeprazole (PRILOSEC) 20 MG DR capsule, TAKE 1 CAPSULE BY MOUTH TWICE A DAY IN THE MORNING AND EVENING PLUS MELVIN, Disp: 60 capsule, Rfl: 5     ONETOUCH VERIO IQ test strip, TEST TWICE DAILY, Disp: 100 strip, Rfl: 1     pioglitazone (ACTOS) 30 MG tablet, TAKE ONE TABLET BY MOUTH DAILY, Disp: 28 tablet, Rfl: 10     QUEtiapine (SEROQUEL) 300 MG tablet, Take 300 mg by mouth At Bedtime , Disp: , Rfl: 0     TRAZODONE HCL PO, Take 100 mg by mouth At Bedtime , Disp: , Rfl:      acetaminophen (TYLENOL) 325 MG tablet, Take 325-650 mg by mouth as needed for mild pain, Disp: , Rfl:      FIBER ADULT GUMMIES PO, Take 1 chew tab by mouth daily as needed (Patient not taking: Reported on 4/26/2023), Disp: , Rfl:      fluticasone (FLONASE) 50 MCG/ACT nasal spray, Spray 1 spray into both nostrils daily (Patient not taking: Reported on 11/30/2023), Disp: 15.8 mL, Rfl: 0     guaiFENesin (ROBITUSSIN) 20 mg/mL liquid, Take 10 mLs (200 mg) by mouth every 8 hours as needed for cough (Patient not taking: Reported on 11/30/2023), Disp: 180 mL, Rfl: 0     hydrocortisone, Perianal, (HYDROCORTISONE) 2.5 % cream, Place rectally 2 times daily  as needed for hemorrhoids, Disp: 30 g, Rfl: 3     ibuprofen (ADVIL/MOTRIN) 200 MG tablet, Take 3 tablets (600 mg) by mouth 3 times daily, Disp: , Rfl:      ALLERGIES:    Allergies   Allergen Reactions     Ceclor [Cefaclor] Swelling     Erythromycin GI Disturbance        SOCIAL HISTORY:   Social History     Socioeconomic History     Marital status: Single     Spouse name: Not on file     Number of children: Not on file     Years of education: Not on file     Highest education level: Not on file   Occupational History     Not on file   Tobacco Use     Smoking status: Former     Packs/day: 1.00     Years: 0.00     Additional pack years: 0.00     Total pack years: 0.00     Types: Cigarettes     Start date: 2014     Quit date: 3/1/2014     Years since quittin.8     Smokeless tobacco: Never   Vaping Use     Vaping Use: Never used   Substance and Sexual Activity     Alcohol use: Yes     Comment: rare     Drug use: No     Sexual activity: Never   Other Topics Concern     Parent/sibling w/ CABG, MI or angioplasty before 65F 55M? No   Social History Narrative     Not on file     Social Determinants of Health     Financial Resource Strain: Unknown (2023)    Financial Resource Strain      Within the past 12 months, have you or your family members you live with been unable to get utilities (heat, electricity) when it was really needed?: Patient refused   Food Insecurity: High Risk (2023)    Food Insecurity      Within the past 12 months, did you worry that your food would run out before you got money to buy more?: Yes      Within the past 12 months, did the food you bought just not last and you didn t have money to get more?: Yes   Transportation Needs: Low Risk  (2023)    Transportation Needs      Within the past 12 months, has lack of transportation kept you from medical appointments, getting your medicines, non-medical meetings or appointments, work, or from getting things that you need?: No   Physical  "Activity: Not on file   Stress: Not on file   Social Connections: Not on file   Interpersonal Safety: Unknown (9/25/2023)    Interpersonal Safety      Do you feel physically and emotionally safe where you currently live?: Patient refused      Within the past 12 months, have you been hit, slapped, kicked or otherwise physically hurt by someone?: Patient refused      Within the past 12 months, have you been humiliated or emotionally abused in other ways by your partner or ex-partner?: Patient refused   Housing Stability: Low Risk  (9/23/2023)    Housing Stability      Do you have housing? : Yes      Are you worried about losing your housing?: No        FAMILY HISTORY:   Family History   Problem Relation Age of Onset     Mental Illness Brother      Diabetes No family hx of      Depression No family hx of      Anxiety Disorder No family hx of         EXAM:Vitals: /82 (BP Location: Left arm, Patient Position: Sitting, Cuff Size: Adult Large)   Ht 1.88 m (6' 2\")   Wt (!) 156.5 kg (345 lb)   BMI 44.30 kg/m    BMI= Body mass index is 44.3 kg/m .    General appearance: Patient is alert and fully cooperative with history & exam.  No sign of distress is noted during the visit.     Psychiatric: Affect is pleasant & appropriate.  Patient appears motivated to improve health.     Respiratory: Breathing is regular & unlabored while sitting.     HEENT: Hearing is intact to spoken word.  Speech is clear.  No gross evidence of visual impairment that would impact ambulation.     Vascular: DP & PT pulses are intact & regular bilaterally.  No significant edema or varicosities noted.  CFT and skin temperature is normal to both lower extremities.     Neurologic: Lower extremity sensation is intact to light touch.  No evidence of weakness or contracture in the lower extremities.  No evidence of neuropathy.    Dermatologic: Skin is intact to both lower extremities with adequate texture, turgor and tone about the integument.  No " paronychia or evidence of soft tissue infection is noted.     Musculoskeletal: Patient is ambulatory without assistive device or brace.  There is no further edema about the right lateral ankle directly over the ATF ligament.  Negative anterior drawer equal bilateral.  Subtle antalgic gait bilateral lower extremities.  Very diminished balance bilateral lower extremities.  Manual muscle strength appears to be equal bilateral.     ASSESSMENT:       ICD-10-CM    1. Type 2 diabetes mellitus with hyperglycemia, without long-term current use of insulin (H)  E11.65       2. Sprain of anterior talofibular ligament of right ankle, initial encounter  S93.491A            PLAN:  Reviewed patient's chart in [facility].      11/30/2023   Dispensed a fracture boot and ordered and interpreted radiographs.  Order placed to begin PT in buck  Stay in boot for all weight bearing until PT proves strength and balance is the same on injured right ankle as the uninjured left ankle which will be 2-6 weeks of therapy.  Important to keep doing the PT at home as well.  Follow up with me in 4-6 weeks.   Can rest and sleep without the boot.  Can be weight bearing in the boot as much as tolerated.     1/11/2024  Return to all activities without restrictions at this time.  Filled out appropriate paperwork to do so.  Follow-up if this becomes symptomatic.      Lopez Latif DPM            Again, thank you for allowing me to participate in the care of your patient.        Sincerely,        Lopez Latif DPM

## 2024-01-16 ASSESSMENT — ENCOUNTER SYMPTOMS
PARESTHESIAS: 0
EYE PAIN: 1
HEMATOCHEZIA: 1
DIZZINESS: 0
PALPITATIONS: 0
SHORTNESS OF BREATH: 1
NAUSEA: 0
HEARTBURN: 1
DIARRHEA: 1
FREQUENCY: 1
DYSURIA: 0
FEVER: 0
HEADACHES: 1
WEAKNESS: 0
HEMATURIA: 0
ABDOMINAL PAIN: 0
COUGH: 1
JOINT SWELLING: 0
NERVOUS/ANXIOUS: 0
CONSTIPATION: 1
CHILLS: 1
MYALGIAS: 1
ARTHRALGIAS: 1
SORE THROAT: 0

## 2024-01-16 ASSESSMENT — ACTIVITIES OF DAILY LIVING (ADL): CURRENT_FUNCTION: NO ASSISTANCE NEEDED

## 2024-01-22 ASSESSMENT — PATIENT HEALTH QUESTIONNAIRE - PHQ9
SUM OF ALL RESPONSES TO PHQ QUESTIONS 1-9: 15
10. IF YOU CHECKED OFF ANY PROBLEMS, HOW DIFFICULT HAVE THESE PROBLEMS MADE IT FOR YOU TO DO YOUR WORK, TAKE CARE OF THINGS AT HOME, OR GET ALONG WITH OTHER PEOPLE: SOMEWHAT DIFFICULT
SUM OF ALL RESPONSES TO PHQ QUESTIONS 1-9: 15

## 2024-01-23 ENCOUNTER — OFFICE VISIT (OUTPATIENT)
Dept: INTERNAL MEDICINE | Facility: CLINIC | Age: 34
End: 2024-01-23
Payer: COMMERCIAL

## 2024-01-23 VITALS
OXYGEN SATURATION: 97 % | RESPIRATION RATE: 18 BRPM | DIASTOLIC BLOOD PRESSURE: 82 MMHG | SYSTOLIC BLOOD PRESSURE: 126 MMHG | HEIGHT: 74 IN | TEMPERATURE: 98.5 F | BODY MASS INDEX: 40.43 KG/M2 | HEART RATE: 88 BPM | WEIGHT: 315 LBS

## 2024-01-23 DIAGNOSIS — E11.649 TYPE 2 DIABETES MELLITUS WITH HYPOGLYCEMIA WITHOUT COMA, WITHOUT LONG-TERM CURRENT USE OF INSULIN (H): Primary | ICD-10-CM

## 2024-01-23 DIAGNOSIS — E66.01 MORBID OBESITY WITH BODY MASS INDEX OF 45.0-49.9 IN ADULT (H): ICD-10-CM

## 2024-01-23 DIAGNOSIS — F70 MILD INTELLECTUAL DISABILITIES: ICD-10-CM

## 2024-01-23 DIAGNOSIS — K64.4 EXTERNAL HEMORRHOIDS: ICD-10-CM

## 2024-01-23 DIAGNOSIS — E78.5 HYPERLIPIDEMIA LDL GOAL <130: ICD-10-CM

## 2024-01-23 DIAGNOSIS — Z00.00 ENCOUNTER FOR MEDICARE ANNUAL WELLNESS EXAM: ICD-10-CM

## 2024-01-23 DIAGNOSIS — Z00.00 MEDICARE ANNUAL WELLNESS VISIT, SUBSEQUENT: ICD-10-CM

## 2024-01-23 LAB
ALBUMIN UR-MCNC: 30 MG/DL
ANION GAP SERPL CALCULATED.3IONS-SCNC: 13 MMOL/L (ref 7–15)
APPEARANCE UR: CLEAR
BILIRUB UR QL STRIP: NEGATIVE
BUN SERPL-MCNC: 18.2 MG/DL (ref 6–20)
CALCIUM SERPL-MCNC: 10.1 MG/DL (ref 8.6–10)
CHLORIDE SERPL-SCNC: 95 MMOL/L (ref 98–107)
COLOR UR AUTO: YELLOW
CREAT SERPL-MCNC: 0.72 MG/DL (ref 0.67–1.17)
CREAT UR-MCNC: 210.2 MG/DL
DEPRECATED HCO3 PLAS-SCNC: 26 MMOL/L (ref 22–29)
EGFRCR SERPLBLD CKD-EPI 2021: >90 ML/MIN/1.73M2
ERYTHROCYTE [DISTWIDTH] IN BLOOD BY AUTOMATED COUNT: 13.2 % (ref 10–15)
GLUCOSE SERPL-MCNC: 237 MG/DL (ref 70–99)
GLUCOSE UR STRIP-MCNC: 50 MG/DL
HBA1C MFR BLD: 8.9 %
HCT VFR BLD AUTO: 41.1 % (ref 40–53)
HGB BLD-MCNC: 13.3 G/DL (ref 13.3–17.7)
HGB UR QL STRIP: NEGATIVE
HYALINE CASTS: 1 /LPF
KETONES UR STRIP-MCNC: 20 MG/DL
LEUKOCYTE ESTERASE UR QL STRIP: NEGATIVE
MCH RBC QN AUTO: 27.7 PG (ref 26.5–33)
MCHC RBC AUTO-ENTMCNC: 32.4 G/DL (ref 31.5–36.5)
MCV RBC AUTO: 85 FL (ref 78–100)
MICROALBUMIN UR-MCNC: 50.2 MG/L
MICROALBUMIN/CREAT UR: 23.88 MG/G CR (ref 0–17)
MUCOUS THREADS #/AREA URNS LPF: PRESENT /LPF
NITRATE UR QL: NEGATIVE
PH UR STRIP: 5 [PH] (ref 5–7)
PLATELET # BLD AUTO: 302 10E3/UL (ref 150–450)
POTASSIUM SERPL-SCNC: 4.3 MMOL/L (ref 3.4–5.3)
RBC # BLD AUTO: 4.81 10E6/UL (ref 4.4–5.9)
RBC URINE: 1 /HPF
SODIUM SERPL-SCNC: 134 MMOL/L (ref 135–145)
SP GR UR STRIP: 1.03 (ref 1–1.03)
SQUAMOUS EPITHELIAL: <1 /HPF
UROBILINOGEN UR STRIP-MCNC: NORMAL MG/DL
WBC # BLD AUTO: 15.8 10E3/UL (ref 4–11)
WBC URINE: <1 /HPF

## 2024-01-23 PROCEDURE — 81001 URINALYSIS AUTO W/SCOPE: CPT | Performed by: INTERNAL MEDICINE

## 2024-01-23 PROCEDURE — 80048 BASIC METABOLIC PNL TOTAL CA: CPT | Performed by: INTERNAL MEDICINE

## 2024-01-23 PROCEDURE — 82043 UR ALBUMIN QUANTITATIVE: CPT | Performed by: INTERNAL MEDICINE

## 2024-01-23 PROCEDURE — 99395 PREV VISIT EST AGE 18-39: CPT | Performed by: INTERNAL MEDICINE

## 2024-01-23 PROCEDURE — 99214 OFFICE O/P EST MOD 30 MIN: CPT | Mod: 25 | Performed by: INTERNAL MEDICINE

## 2024-01-23 PROCEDURE — 36415 COLL VENOUS BLD VENIPUNCTURE: CPT | Performed by: INTERNAL MEDICINE

## 2024-01-23 PROCEDURE — 83036 HEMOGLOBIN GLYCOSYLATED A1C: CPT | Performed by: INTERNAL MEDICINE

## 2024-01-23 PROCEDURE — 85027 COMPLETE CBC AUTOMATED: CPT | Performed by: INTERNAL MEDICINE

## 2024-01-23 PROCEDURE — 82570 ASSAY OF URINE CREATININE: CPT | Performed by: INTERNAL MEDICINE

## 2024-01-23 ASSESSMENT — ENCOUNTER SYMPTOMS
NERVOUS/ANXIOUS: 0
EYE PAIN: 1
FEVER: 0
WEAKNESS: 0
ABDOMINAL PAIN: 0
HEADACHES: 1
PALPITATIONS: 0
NAUSEA: 0
SHORTNESS OF BREATH: 1
DIZZINESS: 0
DYSURIA: 0
FREQUENCY: 1
ARTHRALGIAS: 1
JOINT SWELLING: 0
HEMATURIA: 0
COUGH: 1
SORE THROAT: 0
CONSTIPATION: 1
MYALGIAS: 1
CHILLS: 1
DIARRHEA: 1

## 2024-01-23 ASSESSMENT — PAIN SCALES - GENERAL: PAINLEVEL: MODERATE PAIN (5)

## 2024-01-23 ASSESSMENT — ACTIVITIES OF DAILY LIVING (ADL): CURRENT_FUNCTION: NO ASSISTANCE NEEDED

## 2024-01-23 NOTE — PROGRESS NOTES
"Preventive Care Visit  Formerly [facility]  Tremaine Ruvalcaba DO, Internal Medicine  2024      SUBJECTIVE:   Bertram is a 33 year old, presenting for the following:  Physical        2024     4:22 PM   Additional Questions   Roomed by Alexsandra HARO   Accompanied by Group home staff-Keri     Healthy Habits:     In general, how would you rate your overall health?  Good    Frequency of exercise:  2-3 days/week    Duration of exercise:  15-30 minutes    Do you usually eat at least 4 servings of fruit and vegetables a day, include whole grains    & fiber and avoid regularly eating high fat or \"junk\" foods?  No    Taking medications regularly:  Yes    Medication side effects:  None    Ability to successfully perform activities of daily living:  No assistance needed    Home Safety:  No safety concerns identified    Hearing Impairment:  Difficulty following a conversation in a noisy restaurant or crowded room, feel that people are mumbling or not speaking clearly, difficulty following dialogue in the theater, need to ask people to speak up or repeat themselves and difficulty understanding soft or whispered speech    In the past 6 months, have you been bothered by leaking of urine? Yes    In general, how would you rate your overall mental or emotional health?  Poor    Additional concerns today:  No    Today's PHQ-9 Score:       2024     6:57 PM   PHQ-9 SCORE   PHQ-9 Total Score MyChart 15 (Moderately severe depression)   PHQ-9 Total Score 15         Via the Health Maintenance questionnaire, the patient has reported the following services have been completed -Foot Exam, this information has been sent to the abstraction team.        Social History     Tobacco Use    Smoking status: Former     Packs/day: 1.00     Years: 0.00     Additional pack years: 0.00     Total pack years: 0.00     Types: Cigarettes     Start date: 2014     Quit date: 3/1/2014     Years since quittin.9    " "Smokeless tobacco: Never   Substance Use Topics    Alcohol use: Yes     Comment: rare             2024     1:17 PM   Alcohol Use   Prescreen: >3 drinks/day or >7 drinks/week? Not Applicable         Last PSA: No results found for: \"PSA\"    Reviewed orders with patient. Reviewed health maintenance and updated orders accordingly - Yes  Lab work is in process  Labs reviewed in EPIC  BP Readings from Last 3 Encounters:   24 126/82   24 118/82   23 130/78    Wt Readings from Last 3 Encounters:   24 (!) 156.5 kg (345 lb)   24 (!) 156.5 kg (345 lb)   23 (!) 158.3 kg (349 lb)                  Patient Active Problem List   Diagnosis    Morbid obesity (H)    Intellectual disability    Recurrent major depressive disorder, in partial remission (H24)    Mantoux: positive, treated    Morbid obesity with body mass index of 45.0-49.9 in adult (H)    Mild intellectual disabilities    Type 2 diabetes mellitus with hypoglycemia, without long-term current use of insulin (H)    Sexual dysfunction    Grief     Past Surgical History:   Procedure Laterality Date    APPENDECTOMY  8/15/14    COLONOSCOPY N/A 2020    Procedure: COLONOSCOPY, WITH BIOPSY;  Surgeon: Haley Hernandez MD;  Location:  [facility]       Social History     Tobacco Use    Smoking status: Former     Packs/day: 1.00     Years: 0.00     Additional pack years: 0.00     Total pack years: 0.00     Types: Cigarettes     Start date: 2014     Quit date: 3/1/2014     Years since quittin.9    Smokeless tobacco: Never   Substance Use Topics    Alcohol use: Yes     Comment: rare     Family History   Problem Relation Age of Onset    Mental Illness Brother     Diabetes No family hx of     Depression No family hx of     Anxiety Disorder No family hx of          Current Outpatient Medications   Medication Sig Dispense Refill    acetaminophen (TYLENOL) 325 MG tablet Take 325-650 mg by mouth as needed for mild pain      atorvastatin (LIPITOR) " 20 MG tablet TAKE 1 TABLET BY MOUTH ONCEDAILY EVERY MORNING (AMPLUSPAK) 90 tablet 3    blood glucose calibration (ONETOUCH VERIO) solution USE AS DIRECTED 1 each 0    Blood Glucose Monitoring Suppl (ONETOUCH VERIO FLEX SYSTEM) w/Device KIT USE TO TEST TWICE DAILY 1 kit 0    divalproex sodium extended-release (DEPAKOTE ER) 500 MG 24 hr tablet TAKE THREE TABLETS BY MOUTH EVERY NIGHT AT BEDTIME (HS PLUSPAK)      FIBER ADULT GUMMIES PO Take 1 chew tab by mouth daily as needed      fluticasone (FLONASE) 50 MCG/ACT nasal spray Spray 1 spray into both nostrils daily 15.8 mL 0    glimepiride (AMARYL) 4 MG tablet TAKE ONE TABLET BY MOUTH DAILY IN THE MORNING BEFORE BREAKFAST 28 tablet 10    guaiFENesin (ROBITUSSIN) 20 mg/mL liquid Take 10 mLs (200 mg) by mouth every 8 hours as needed for cough 180 mL 0    hydrocortisone, Perianal, (HYDROCORTISONE) 2.5 % cream Place rectally 2 times daily as needed for hemorrhoids 30 g 3    ibuprofen (ADVIL/MOTRIN) 200 MG tablet Take 3 tablets (600 mg) by mouth 3 times daily      Microlet Lancets MISC TEST EVERY  each 1    omeprazole (PRILOSEC) 20 MG DR capsule TAKE 1 CAPSULE BY MOUTH TWICE A DAY IN THE MORNING AND EVENING PLUS MELVIN 60 capsule 5    ONETOUCH VERIO IQ test strip TEST TWICE DAILY 100 strip 1    pioglitazone (ACTOS) 30 MG tablet TAKE ONE TABLET BY MOUTH DAILY 28 tablet 10    QUEtiapine (SEROQUEL) 300 MG tablet Take 300 mg by mouth At Bedtime   0    TRAZODONE HCL PO Take 100 mg by mouth At Bedtime        Allergies   Allergen Reactions    Ceclor [Cefaclor] Swelling    Erythromycin GI Disturbance     Recent Labs   Lab Test 01/23/24  1717 08/17/23  1627 07/27/23  0852 07/19/23  0853 04/26/23  1517 01/17/23  1007 11/15/22  1023 11/15/22  1021 05/26/22  1043 02/09/22  1014 12/09/21  1047 10/14/21  0906 03/30/21  0952 09/08/20  1136 05/20/20  0956 08/21/19  0756 07/05/19  1009   A1C 8.9* 9.1*  --  9.6*   < > 9.7*   < >  --    < > 8.6*  --  8.3*   < > 6.8* 8.0*   < >  --    LDL  --   88  --   --   --   --   --  111*  --   --   --  104*  --  83  --    < > 152*   HDL  --  37*  --   --   --   --   --  40  --   --   --  41  --  40  --    < > 38*   TRIG  --  116  --   --   --   --   --  150*  --   --   --  197*  --  170*  --    < > 182*   ALT  --   --   --   --   --  35  --  49  --  79*   < >  --   --  55  --    < > 36   CR 0.72  --  0.50*  --   --  0.60*  --   --    < > 0.49*  --  0.73   < > 0.47* 0.63*   < > 0.56*   GFRESTIMATED >90  --  >90  --   --  >90  --   --    < > >90  --  >90   < > >90 >90   < > >90   GFRESTBLACK  --   --   --   --   --   --   --   --   --   --   --   --   --  >90 >90   < > >90   POTASSIUM 4.3  --  4.0  --   --  4.4   < >  --    < > 4.7  --  3.9   < > 4.0 4.2   < > 4.0   TSH  --   --   --   --   --   --   --   --   --   --   --   --   --  3.18  --   --  3.40    < > = values in this interval not displayed.          Reviewed and updated as needed this visit by clinical staff   Tobacco  Allergies  Meds  Problems  Med Hx  Surg Hx  Fam Hx          Reviewed and updated as needed this visit by Provider   Tobacco  Allergies  Meds  Problems  Med Hx  Surg Hx  Fam Hx          Past Medical History:   Diagnosis Date    Depressive disorder All my life almost.    Diabetes (H) sometime in 2019    Sleep apnea       Past Surgical History:   Procedure Laterality Date    APPENDECTOMY  8/15/14    COLONOSCOPY N/A 12/28/2020    Procedure: COLONOSCOPY, WITH BIOPSY;  Surgeon: Haley Hernandez MD;  Location: [facility]     Review of Systems   Constitutional:  Positive for chills. Negative for fever.   HENT:  Positive for hearing loss. Negative for congestion, ear pain and sore throat.    Eyes:  Positive for pain. Negative for visual disturbance.   Respiratory:  Positive for cough and shortness of breath.    Cardiovascular:  Positive for chest pain. Negative for palpitations.   Gastrointestinal:  Positive for constipation and diarrhea. Negative for abdominal pain and nausea.   Genitourinary:   "Positive for frequency and genital sores. Negative for dysuria, hematuria, penile discharge and urgency.   Musculoskeletal:  Positive for arthralgias and myalgias. Negative for joint swelling.   Skin:  Negative for rash.   Neurological:  Positive for headaches. Negative for dizziness and weakness.   Psychiatric/Behavioral:  The patient is not nervous/anxious.      Patient has had chills in the past but not currently.    Patient recently had audiometry studies which confirmed some hearing loss.  Not yet ready for hearing aids.    Patient occasionally has shortness of breath with exertion.  Denies excess sputum production.  Rare cough.    Patient occasionally has noncardiac chest pain.  Reproducible with palpation of the anterior chest wall.    Patient complains of some myalgias and arthralgias.  These are generally activity oriented and not spontaneous.    Patient occasionally has headaches, they are not recurrent or intractable.    OBJECTIVE:   /82   Pulse 88   Temp 98.5  F (36.9  C) (Temporal)   Resp 18   Ht 1.88 m (6' 2\")   Wt (!) 156.5 kg (345 lb)   SpO2 97%   BMI 44.30 kg/m     Estimated body mass index is 44.3 kg/m  as calculated from the following:    Height as of this encounter: 1.88 m (6' 2\").    Weight as of this encounter: 156.5 kg (345 lb).  Physical Exam  GENERAL: alert and no distress  EYES: Eyes grossly normal to inspection, PERRL and conjunctivae and sclerae normal  HENT: ear canals and TM's normal, nose and mouth without ulcers or lesions  NECK: no adenopathy, no asymmetry, masses, or scars  RESP: lungs clear to auscultation - no rales, rhonchi or wheezes  CV: regular rate and rhythm, normal S1 S2, no S3 or S4, no murmur, click or rub, no peripheral edema  ABDOMEN: soft, nontender, no hepatosplenomegaly, no masses and bowel sounds normal  MS: no gross musculoskeletal defects noted, no edema  SKIN: no suspicious lesions or rashes  NEURO: Normal strength and tone, mentation intact and " speech normal  PSYCH: mentation appears normal, affect normal/bright  LYMPH: no cervical, supraclavicular, axillary, or inguinal adenopathy    Diagnostic Test Results:  Labs reviewed in Epic  Results for orders placed or performed in visit on 01/23/24 (from the past 24 hour(s))   UA Macroscopic with reflex to Microscopic and Culture - Lab Collect    Specimen: Urine, NOS   Result Value Ref Range    Color Urine Yellow Colorless, Straw, Light Yellow, Yellow    Appearance Urine Clear Clear    Glucose Urine 50 (A) Negative mg/dL    Bilirubin Urine Negative Negative    Ketones Urine 20 (A) Negative mg/dL    Specific Gravity Urine 1.029 1.003 - 1.035    Blood Urine Negative Negative    pH Urine 5.0 5.0 - 7.0    Protein Albumin Urine 30 (A) Negative mg/dL    Urobilinogen Urine Normal Normal, 2.0 mg/dL    Nitrite Urine Negative Negative    Leukocyte Esterase Urine Negative Negative    Mucus Urine Present (A) None Seen /LPF    RBC Urine 1 <=2 /HPF    WBC Urine <1 <=5 /HPF    Squamous Epithelials Urine <1 <=1 /HPF    Hyaline Casts Urine 1 <=2 /LPF    Narrative    Urine Culture not indicated   CBC with platelets   Result Value Ref Range    WBC Count 15.8 (H) 4.0 - 11.0 10e3/uL    RBC Count 4.81 4.40 - 5.90 10e6/uL    Hemoglobin 13.3 13.3 - 17.7 g/dL    Hematocrit 41.1 40.0 - 53.0 %    MCV 85 78 - 100 fL    MCH 27.7 26.5 - 33.0 pg    MCHC 32.4 31.5 - 36.5 g/dL    RDW 13.2 10.0 - 15.0 %    Platelet Count 302 150 - 450 10e3/uL   Hemoglobin A1c   Result Value Ref Range    Hemoglobin A1C 8.9 (H) <5.7 %   Basic metabolic panel  (Ca, Cl, CO2, Creat, Gluc, K, Na, BUN)   Result Value Ref Range    Sodium 134 (L) 135 - 145 mmol/L    Potassium 4.3 3.4 - 5.3 mmol/L    Chloride 95 (L) 98 - 107 mmol/L    Carbon Dioxide (CO2) 26 22 - 29 mmol/L    Anion Gap 13 7 - 15 mmol/L    Urea Nitrogen 18.2 6.0 - 20.0 mg/dL    Creatinine 0.72 0.67 - 1.17 mg/dL    GFR Estimate >90 >60 mL/min/1.73m2    Calcium 10.1 (H) 8.6 - 10.0 mg/dL    Glucose 237 (H) 70  "- 99 mg/dL       ASSESSMENT/PLAN:   Medicare annual wellness visit, subsequent      Type 2 diabetes mellitus with hypoglycemia without coma, without long-term current use of insulin (H)  Check lab work.  Reinforced need for dietary compliance.  Reinforced need for medication compliance.    - Albumin Random Urine Quantitative with Creat Ratio; Future  - UA Macroscopic with reflex to Microscopic and Culture - Lab Collect; Future  - Hemoglobin A1c; Future  - Basic metabolic panel  (Ca, Cl, CO2, Creat, Gluc, K, Na, BUN); Future  - Albumin Random Urine Quantitative with Creat Ratio  - UA Macroscopic with reflex to Microscopic and Culture - Lab Collect  - Hemoglobin A1c  - Basic metabolic panel  (Ca, Cl, CO2, Creat, Gluc, K, Na, BUN)    External hemorrhoids  Recommend continue with Anusol.  Prescript ration H.  Will buy over-the-counter.  - CBC with platelets; Future  - CBC with platelets    Morbid obesity with body mass index of 45.0-49.9 in adult (H)  Recommend weight loss responsible caloric restriction    Hyperlipidemia LDL goal <130  Continue atorvastatin.  Check fasting lab work and adjust dosage as needed    Mild intellectual disabilities  Stable    Patient has been advised of split billing requirements and indicates understanding: Yes      Counseling        BMI  Estimated body mass index is 44.3 kg/m  as calculated from the following:    Height as of this encounter: 1.88 m (6' 2\").    Weight as of this encounter: 156.5 kg (345 lb).   Weight management plan: Discussed healthy diet and exercise guidelines      He reports that he quit smoking about 9 years ago. His smoking use included cigarettes. He started smoking about 10 years ago. He smoked an average of 1 pack per day. He has never used smokeless tobacco.      I have reviewed the patient's vaccination schedule and discussed the benefits of prophylactic vaccination in detail.  I recommend the patient contact their pharmacist for vaccinations.  Discussed that " most insurance companies now favor reimbursement to the pharmacies and it will financially behoove the patient to have vaccinations performed at their pharmacy.            Signed Electronically by: Tremaine Ruvalcaba DO  Answers submitted by the patient for this visit:      Patient Health Questionnaire (Submitted on 1/22/2024)  If you checked off any problems, how difficult have these problems made it for you to do your work, take care of things at home, or get along with other people?: Somewhat difficult  PHQ9 TOTAL SCORE: 15  Annual Preventive Visit (Submitted on 1/16/2024)  Chief Complaint: Annual Exam:  Blood in stool: Yes  heartburn: Yes  peripheral edema: No  mood changes: Yes  Skin sensation changes: No  impotence: No    The patient was counseled and encouraged to consider modifying their diet and eating habits. He was provided with information on recommended healthy diet options.  The patient was provided with written information regarding signs of hearing loss.  Information on urinary incontinence and treatment options given to patient.  The patient was provided with suggestions to help him develop a healthy emotional lifestyle.  The patient s PHQ-9 score is consistent with moderate depression. He was provided with information regarding depression and was advised to schedule a follow up appointment in 50 to weeks to further address this issue.

## 2024-01-23 NOTE — PATIENT INSTRUCTIONS
Patient Education   Personalized Prevention Plan  You are due for the preventive services outlined below.  Your care team is available to assist you in scheduling these services.  If you have already completed any of these items, please share that information with your care team to update in your medical record.  Health Maintenance Due   Topic Date Due     Pneumococcal Vaccine (1 of 2 - PCV) Never done     Diptheria Tetanus Pertussis (DTAP/TDAP/TD) Vaccine (7 - Td or Tdap) 10/15/2022     Diabetic Foot Exam  12/09/2022     Kidney Microalbumin Urine Test  10/26/2023     Eye Exam  02/01/2024     Learning About Dietary Guidelines  What are the Dietary Guidelines for Americans?     Dietary Guidelines for Americans provide tips for eating well and staying healthy. This helps reduce the risk for long-term (chronic) diseases.  These guidelines recommend that you:  Eat and drink the right amount for you. The [place] government's food guide is called MyPlate. It can help you make your own well-balanced eating plan.  Try to balance your eating with your activity. This helps you stay at a healthy weight.  Drink alcohol in moderation, if at all.  Limit foods high in salt, saturated fat, trans fat, and added sugar.  These guidelines are from the [place] Department of Agriculture and the [place] Department of Health and Human Services. They are updated every 5 years.  What is MyPlate?  MyPlate is the [place] government's food guide. It can help you make your own well-balanced eating plan. A balanced eating plan means that you eat enough, but not too much, and that your food gives you the nutrients you need to stay healthy.  MyPlate focuses on eating plenty of whole grains, fruits, and vegetables, and on limiting fat and sugar. It is available online at www.ChooseMyPlate.gov.  How can you get started?  If you're trying to eat healthier, you can slowly change your eating habits over time. You don't have to make big changes all at once. Start by  "adding one or two healthy foods to your meals each day.  Grains  Choose whole-grain breads and cereals and whole-wheat pasta and whole-grain crackers.  Vegetables  Eat a variety of vegetables every day. They have lots of nutrients and are part of a heart-healthy diet.  Fruits  Eat a variety of fruits every day. Fruits contain lots of nutrients. Choose fresh fruit instead of fruit juice.  Protein foods  Choose fish and lean poultry more often. Eat red meat and fried meats less often. Dried beans, tofu, and nuts are also good sources of protein.  Dairy  Choose low-fat or fat-free products from this food group. If you have problems digesting milk, try eating cheese or yogurt instead.  Fats and oils  Limit fats and oils if you're trying to cut calories. Choose healthy fats when you cook. These include canola oil and olive oil.  Where can you learn more?  Go to https://www.Storitz.net/patiented  Enter D676 in the search box to learn more about \"Learning About Dietary Guidelines.\"  Current as of: February 28, 2023               Content Version: 13.8    1568-7764 [employer].   Care instructions adapted under license by your healthcare professional. If you have questions about a medical condition or this instruction, always ask your healthcare professional. [employer] disclaims any warranty or liability for your use of this information.      Hearing Loss: Care Instructions  Overview     Hearing loss is a sudden or slow decrease in how well you hear. It can range from slight to profound. Permanent hearing loss can occur with aging. It also can happen when you are exposed long-term to loud noise. Examples include listening to loud music, riding motorcycles, or being around other loud machines.  Hearing loss can affect your work and home life. It can make you feel lonely or depressed. You may feel that you have lost your independence. But hearing aids and other devices can help you hear better " and feel connected to others.  Follow-up care is a key part of your treatment and safety. Be sure to make and go to all appointments, and call your doctor if you are having problems. It's also a good idea to know your test results and keep a list of the medicines you take.  How can you care for yourself at home?  Avoid loud noises whenever possible. This helps keep your hearing from getting worse.  Always wear hearing protection around loud noises.  Wear a hearing aid as directed.  A professional can help you pick a hearing aid that will work best for you.  You can also get hearing aids over the counter for mild to moderate hearing loss.  Have hearing tests as your doctor suggests. They can show whether your hearing has changed. Your hearing aid may need to be adjusted.  Use other devices as needed. These may include:  Telephone amplifiers and hearing aids that can connect to a television, stereo, radio, or microphone.  Devices that use lights or vibrations. These alert you to the doorbell, a ringing telephone, or a baby monitor.  Television closed-captioning. This shows the words at the bottom of the screen. Most new TVs can do this.  TTY (text telephone). This lets you type messages back and forth on the telephone instead of talking or listening. These devices are also called TDD. When messages are typed on the keyboard, they are sent over the phone line to a receiving TTY. The message is shown on a monitor.  Use text messaging, social media, and email if it is hard for you to communicate by telephone.  Try to learn a listening technique called speechreading. It is not lipreading. You pay attention to people's gestures, expressions, posture, and tone of voice. These clues can help you understand what a person is saying. Face the person you are talking to, and have them face you. Make sure the lighting is good. You need to see the other person's face clearly.  Think about counseling if you need help to adjust to  "your hearing loss.  When should you call for help?  Watch closely for changes in your health, and be sure to contact your doctor if:    You think your hearing is getting worse.     You have new symptoms, such as dizziness or nausea.   Where can you learn more?  Go to https://www.UXFLIP.net/patiented  Enter R798 in the search box to learn more about \"Hearing Loss: Care Instructions.\"  Current as of: February 28, 2023               Content Version: 13.8    9122-2452 [employer].   Care instructions adapted under license by your healthcare professional. If you have questions about a medical condition or this instruction, always ask your healthcare professional. [employer] disclaims any warranty or liability for your use of this information.      Bladder Training: Care Instructions  Your Care Instructions     Bladder training is used to treat urge incontinence and stress incontinence. Urge incontinence means that the need to urinate comes on so fast that you can't get to a toilet in time. Stress incontinence means that you leak urine because of pressure on your bladder. For example, it may happen when you laugh, cough, or lift something heavy.  Bladder training can increase how long you can wait before you have to urinate. It can also help your bladder hold more urine. And it can give you better control over the urge to urinate.  It is important to remember that bladder training takes a few weeks to a few months to make a difference. You may not see results right away, but don't give up.  Follow-up care is a key part of your treatment and safety. Be sure to make and go to all appointments, and call your doctor if you are having problems. It's also a good idea to know your test results and keep a list of the medicines you take.  How can you care for yourself at home?  Work with your doctor to come up with a bladder training program that is right for you. You may use one or more of the " "following methods.  Delayed urination  In the beginning, try to keep from urinating for 5 minutes after you first feel the need to go.  While you wait, take deep, slow breaths to relax. Kegel exercises can also help you delay the need to go to the bathroom.  After some practice, when you can easily wait 5 minutes to urinate, try to wait 10 minutes before you urinate.  Slowly increase the waiting period until you are able to control when you have to urinate.  Scheduled urination  Empty your bladder when you first wake up in the morning.  Schedule times throughout the day when you will urinate.  Start by going to the bathroom every hour, even if you don't need to go.  Slowly increase the time between trips to the bathroom.  When you have found a schedule that works well for you, keep doing it.  If you wake up during the night and have to urinate, do it. Apply your schedule to waking hours only.  Kegel exercises  These tighten and strengthen pelvic muscles, which can help you control the flow of urine. (If doing these exercises causes pain, stop doing them and talk with your doctor.) To do Kegel exercises:  Squeeze your muscles as if you were trying not to pass gas. Or squeeze your muscles as if you were stopping the flow of urine. Your belly, legs, and buttocks shouldn't move.  Hold the squeeze for 3 seconds, then relax for 5 to 10 seconds.  Start with 3 seconds, then add 1 second each week until you are able to squeeze for 10 seconds.  Repeat the exercise 10 times a session. Do 3 to 8 sessions a day.  When should you call for help?  Watch closely for changes in your health, and be sure to contact your doctor if:    Your incontinence is getting worse.     You do not get better as expected.   Where can you learn more?  Go to https://www.healthwise.net/patiented  Enter V684 in the search box to learn more about \"Bladder Training: Care Instructions.\"  Current as of: February 28, 2023               Content Version: " 13.8    [id number] [employer].   Care instructions adapted under license by your healthcare professional. If you have questions about a medical condition or this instruction, always ask your healthcare professional. [employer] disclaims any warranty or liability for your use of this information.      Your Health Risk Assessment indicates you feel you are not in good emotional health.    Recreation   Recreation is not limited to sports and team events. It includes any activity that provides relaxation, interest, enjoyment, and exercise. Recreation provides an outlet for physical, mental, and social energy. It can give a sense of worth and achievement. It can help you stay healthy.    Mental Exercise and Social Involvement  Mental and emotional health is as important as physical health. Keep in touch with friends and family. Stay as active as possible. Continue to learn and challenge yourself.   Things you can do to stay mentally active are:  Learn something new, like a foreign language or musical instrument.   Play SCRABBLE or do crossword puzzles. If you cannot find people to play these games with you at home, you can play them with others on your computer through the Internet.   Join a games club--anything from card games to chess or checkers or lawn bowling.   Start a new hobby.   Go back to school.   Volunteer.   Read.   Keep up with world events.  Learning About Depression Screening  What is depression screening?  Depression screening is a way to see if you have depression symptoms. It may be done by a doctor or counselor. It's often part of a routine checkup. That's because your mental health is just as important as your physical health.  Depression is a mental health condition that affects how you feel, think, and act. You may:  Have less energy.  Lose interest in your daily activities.  Feel sad and grouchy for a long time.  Depression is very common. It affects people of all  "ages.  Many things can lead to depression. Some people become depressed after they have a stroke or find out they have a major illness like cancer or heart disease. The death of a loved one or a breakup may lead to depression. It can run in families. Most experts believe that a combination of inherited genes and stressful life events can cause it.  What happens during screening?  You may be asked to fill out a form about your depression symptoms. You and the doctor will discuss your answers. The doctor may ask you more questions to learn more about how you think, act, and feel.  What happens after screening?  If you have symptoms of depression, your doctor will talk to you about your options.  Doctors usually treat depression with medicines or counseling. Often, combining the two works best. Many people don't get help because they think that they'll get over the depression on their own. But people with depression may not get better unless they get treatment.  The cause of depression is not well understood. There may be many factors involved. But if you have depression, it's not your fault.  A serious symptom of depression is thinking about death or suicide. If you or someone you care about talks about this or about feeling hopeless, get help right away.  It's important to know that depression can be treated. Medicine, counseling, and self-care may help.  Where can you learn more?  Go to https://www.TargAnox.net/patiented  Enter T185 in the search box to learn more about \"Learning About Depression Screening.\"  Current as of: June 25, 2023               Content Version: 13.8    8388-1187 [employer].   Care instructions adapted under license by your healthcare professional. If you have questions about a medical condition or this instruction, always ask your healthcare professional. [employer] disclaims any warranty or liability for your use of this information.         "

## 2024-01-24 ENCOUNTER — TELEPHONE (OUTPATIENT)
Dept: PODIATRY | Facility: CLINIC | Age: 34
End: 2024-01-24
Payer: COMMERCIAL

## 2024-01-24 DIAGNOSIS — E11.65 TYPE 2 DIABETES MELLITUS WITH HYPERGLYCEMIA, WITHOUT LONG-TERM CURRENT USE OF INSULIN (H): Primary | ICD-10-CM

## 2024-01-24 DIAGNOSIS — S93.491A SPRAIN OF ANTERIOR TALOFIBULAR LIGAMENT OF RIGHT ANKLE, INITIAL ENCOUNTER: ICD-10-CM

## 2024-01-24 DIAGNOSIS — M79.672 LEFT FOOT PAIN: ICD-10-CM

## 2024-01-24 DIAGNOSIS — E08.43 DIABETES MELLITUS DUE TO UNDERLYING CONDITION WITH DIABETIC AUTONOMIC NEUROPATHY, WITHOUT LONG-TERM CURRENT USE OF INSULIN (H): ICD-10-CM

## 2024-01-24 DIAGNOSIS — M25.572 PAIN IN JOINT INVOLVING ANKLE AND FOOT, LEFT: ICD-10-CM

## 2024-01-24 NOTE — TELEPHONE ENCOUNTER
Patient called the clinic requesting a new order for diabetic inserts. Patient was last seen by Dr. Latif on 1/11/24.      Diabetic foot exam on 1/11/24 with Dr. Latif: normal DP and PT pulses, no trophic changes or ulcerative lesions, and normal sensory exam     Zakia Duarte RN on 1/24/2024 at 2:08 PM

## 2024-01-30 ENCOUNTER — TELEPHONE (OUTPATIENT)
Dept: INTERNAL MEDICINE | Facility: CLINIC | Age: 34
End: 2024-01-30
Payer: COMMERCIAL

## 2024-01-30 DIAGNOSIS — K64.4 EXTERNAL HEMORRHOIDS: Primary | ICD-10-CM

## 2024-01-30 NOTE — TELEPHONE ENCOUNTER
Patient was seen last week.    Patient needs preparation H cream.  He uses this regularly.    They need an order for patient to have this.    Fax to  [facility] in [facility]    Radha Bell RN on 1/30/2024 at 3:04 PM

## 2024-01-30 NOTE — TELEPHONE ENCOUNTER
Keri from [facility] calling to check on status of asprin order that was faxed on 1/24.  No documentation of form received.  Confirmed fax number and she will refax order.  Please watch for it.

## 2024-02-13 ENCOUNTER — TRANSFERRED RECORDS (OUTPATIENT)
Dept: HEALTH INFORMATION MANAGEMENT | Facility: CLINIC | Age: 34
End: 2024-02-13
Payer: COMMERCIAL

## 2024-02-13 LAB — RETINOPATHY: NEGATIVE

## 2024-02-14 ENCOUNTER — TELEPHONE (OUTPATIENT)
Dept: INTERNAL MEDICINE | Facility: CLINIC | Age: 34
End: 2024-02-14
Payer: COMMERCIAL

## 2024-02-14 DIAGNOSIS — D72.829 LEUKOCYTOSIS, UNSPECIFIED TYPE: Primary | ICD-10-CM

## 2024-02-15 DIAGNOSIS — K29.00 ACUTE GASTRITIS WITHOUT HEMORRHAGE, UNSPECIFIED GASTRITIS TYPE: ICD-10-CM

## 2024-02-22 ENCOUNTER — MEDICAL CORRESPONDENCE (OUTPATIENT)
Dept: HEALTH INFORMATION MANAGEMENT | Facility: CLINIC | Age: 34
End: 2024-02-22

## 2024-02-22 ENCOUNTER — OFFICE VISIT (OUTPATIENT)
Dept: PODIATRY | Facility: CLINIC | Age: 34
End: 2024-02-22
Payer: COMMERCIAL

## 2024-02-22 VITALS
WEIGHT: 315 LBS | BODY MASS INDEX: 40.43 KG/M2 | SYSTOLIC BLOOD PRESSURE: 110 MMHG | DIASTOLIC BLOOD PRESSURE: 78 MMHG | HEIGHT: 74 IN

## 2024-02-22 DIAGNOSIS — M24.573 EQUINUS CONTRACTURE OF ANKLE: ICD-10-CM

## 2024-02-22 DIAGNOSIS — M20.42 HAMMERTOES OF BOTH FEET: ICD-10-CM

## 2024-02-22 DIAGNOSIS — M20.41 HAMMERTOES OF BOTH FEET: ICD-10-CM

## 2024-02-22 DIAGNOSIS — E08.43 DIABETES MELLITUS DUE TO UNDERLYING CONDITION WITH DIABETIC AUTONOMIC NEUROPATHY, WITHOUT LONG-TERM CURRENT USE OF INSULIN (H): Primary | ICD-10-CM

## 2024-02-22 PROCEDURE — 99213 OFFICE O/P EST LOW 20 MIN: CPT | Performed by: PODIATRIST

## 2024-02-22 ASSESSMENT — PAIN SCALES - GENERAL: PAINLEVEL: NO PAIN (0)

## 2024-02-22 NOTE — LETTER
2/22/2024         RE: Bertram Foreman  [address]  [facility] [address]        Dear Colleague,    Thank you for referring your patient, Bertram Foreman, to the [facility]. Please see a copy of my visit note below.    Chief Complaint   Patient presents with     Diabetes     DM type 2 exam; LOV 1/11/2024     Orders     DM shoes     Other     Presents with [facility] from [facility] 2/22/2024     HPI:  Bertram Foreman is a 33 year old male who is seen in consultation at the request of Tremaine Ruvalcaba DO    Pt presents for eval of:   (Onset, Location, L/R, Character, Treatments, Injury if yes)    XR Right ankle 11/30/2023     Onset 11/29/2023, stepped on uneven area by the driveway in the dark, twisting Right ankle. Presents with lateral Right ankle pain, accompanied by Keri from [facility].    Today the patient notes the right ankle is back to normal but he would like to move forward with getting new diabetic shoes he has had these in the past and they have helped reduce his injuries.  He describes some paresthesias about his feet and some deformity.  He also notes dry skin and abrasions are commonly present with minimal injury to his foot.    ROS:  10 point ROS neg other than the symptoms noted above in the HPI.    Patient Active Problem List   Diagnosis     Morbid obesity (H)     Intellectual disability     Recurrent major depressive disorder, in partial remission (H24)     Mantoux: positive, treated     Morbid obesity with body mass index of 45.0-49.9 in adult (H)     Mild intellectual disabilities     Type 2 diabetes mellitus with hypoglycemia, without long-term current use of insulin (H)     Sexual dysfunction     Grief       PAST MEDICAL HISTORY:   Past Medical History:   Diagnosis Date     Depressive disorder All my life almost.     Diabetes (H) sometime in 2019     Sleep apnea         PAST SURGICAL HISTORY:   Past Surgical History:   Procedure  Laterality Date     APPENDECTOMY  8/15/14     COLONOSCOPY N/A 12/28/2020    Procedure: COLONOSCOPY, WITH BIOPSY;  Surgeon: Haley Hernandez MD;  Location:  GI        MEDICATIONS:   Current Outpatient Medications:      atorvastatin (LIPITOR) 20 MG tablet, TAKE 1 TABLET BY MOUTH ONCEDAILY EVERY MORNING (AMPLUSPAK), Disp: 90 tablet, Rfl: 3     Blood Glucose Monitoring Suppl (ONETOUCH VERIO FLEX SYSTEM) w/Device KIT, USE TO TEST TWICE DAILY, Disp: 1 kit, Rfl: 0     divalproex sodium extended-release (DEPAKOTE ER) 500 MG 24 hr tablet, TAKE THREE TABLETS BY MOUTH EVERY NIGHT AT BEDTIME (HS PLUSPAK), Disp: , Rfl:      glimepiride (AMARYL) 4 MG tablet, TAKE ONE TABLET BY MOUTH DAILY IN THE MORNING BEFORE BREAKFAST, Disp: 28 tablet, Rfl: 10     omeprazole (PRILOSEC) 20 MG DR capsule, TAKE ONE  CAPSULE BY MOUTH TWICE DAILY (AM AND AT BEDTIME), Disp: 56 capsule, Rfl: 4     ONETOUCH VERIO IQ test strip, TEST TWICE DAILY, Disp: 100 strip, Rfl: 1     pioglitazone (ACTOS) 30 MG tablet, TAKE ONE TABLET BY MOUTH DAILY, Disp: 28 tablet, Rfl: 10     QUEtiapine (SEROQUEL) 300 MG tablet, Take 300 mg by mouth At Bedtime , Disp: , Rfl: 0     TRAZODONE HCL PO, Take 100 mg by mouth At Bedtime , Disp: , Rfl:      acetaminophen (TYLENOL) 325 MG tablet, Take 325-650 mg by mouth as needed for mild pain, Disp: , Rfl:      blood glucose calibration (ONETOUCH VERIO) solution, USE AS DIRECTED, Disp: 1 each, Rfl: 0     FIBER ADULT GUMMIES PO, Take 1 chew tab by mouth daily as needed, Disp: , Rfl:      fluticasone (FLONASE) 50 MCG/ACT nasal spray, Spray 1 spray into both nostrils daily, Disp: 15.8 mL, Rfl: 0     guaiFENesin (ROBITUSSIN) 20 mg/mL liquid, Take 10 mLs (200 mg) by mouth every 8 hours as needed for cough, Disp: 180 mL, Rfl: 0     hydrocortisone, Perianal, (HYDROCORTISONE) 2.5 % cream, Place rectally 2 times daily as needed for hemorrhoids, Disp: 30 g, Rfl: 3     ibuprofen (ADVIL/MOTRIN) 200 MG tablet, Take 3 tablets (600 mg) by mouth 3  times daily, Disp: , Rfl:      Microlet Lancets MISC, TEST EVERY DAY, Disp: 100 each, Rfl: 1     phenylephrine-shark liver oil-mineral oil-petrolatum (PREPARATION H) 0.25-3-14-71.9 % rectal ointment, Place rectally 2 times daily as needed for hemorrhoids, Disp: 57 g, Rfl: 11     ALLERGIES:    Allergies   Allergen Reactions     Ceclor [Cefaclor] Swelling     Erythromycin GI Disturbance        SOCIAL HISTORY:   Social History     Socioeconomic History     Marital status: Single     Spouse name: Not on file     Number of children: Not on file     Years of education: Not on file     Highest education level: Not on file   Occupational History     Not on file   Tobacco Use     Smoking status: Former     Packs/day: 1.00     Years: 0.00     Additional pack years: 0.00     Total pack years: 0.00     Types: Cigarettes     Start date: 2014     Quit date: 3/1/2014     Years since quittin.9     Smokeless tobacco: Never   Vaping Use     Vaping Use: Never used   Substance and Sexual Activity     Alcohol use: Yes     Comment: rare     Drug use: No     Sexual activity: Never   Other Topics Concern     Parent/sibling w/ CABG, MI or angioplasty before 65F 55M? No   Social History Narrative     Not on file     Social Determinants of Health     Financial Resource Strain: Low Risk  (2024)    Financial Resource Strain      Within the past 12 months, have you or your family members you live with been unable to get utilities (heat, electricity) when it was really needed?: No   Food Insecurity: Low Risk  (2024)    Food Insecurity      Within the past 12 months, did you worry that your food would run out before you got money to buy more?: No      Within the past 12 months, did the food you bought just not last and you didn t have money to get more?: No   Transportation Needs: Low Risk  (2024)    Transportation Needs      Within the past 12 months, has lack of transportation kept you from medical appointments, getting  "your medicines, non-medical meetings or appointments, work, or from getting things that you need?: No   Physical Activity: Not on file   Stress: Not on file   Social Connections: Not on file   Interpersonal Safety: Unknown (9/25/2023)    Interpersonal Safety      Do you feel physically and emotionally safe where you currently live?: Patient refused      Within the past 12 months, have you been hit, slapped, kicked or otherwise physically hurt by someone?: Patient refused      Within the past 12 months, have you been humiliated or emotionally abused in other ways by your partner or ex-partner?: Patient refused   Housing Stability: Low Risk  (1/16/2024)    Housing Stability      Do you have housing? : Yes      Are you worried about losing your housing?: No        FAMILY HISTORY:   Family History   Problem Relation Age of Onset     Mental Illness Brother      Diabetes No family hx of      Depression No family hx of      Anxiety Disorder No family hx of         EXAM:Vitals: /78 (BP Location: Left arm, Patient Position: Sitting, Cuff Size: Adult Large)   Ht 1.88 m (6' 2\")   Wt (!) 158.8 kg (350 lb)   BMI 44.94 kg/m    BMI= Body mass index is 44.94 kg/m .    General appearance: Patient is alert and fully cooperative with history & exam.  No sign of distress is noted during the visit.     Psychiatric: Affect is pleasant & appropriate.  Patient appears motivated to improve health.     Respiratory: Breathing is regular & unlabored while sitting.     HEENT: Hearing is intact to spoken word.  Speech is clear.  No gross evidence of visual impairment that would impact ambulation.     Vascular: DP & PT pulses are intact & regular bilaterally.  Mild generalized 2mm pitting and non pitting edema and mild varicosities in legs noted.  CFT and skin temperature is normal to both lower extremities. No hair growth on feet and toes. Warm to cool proximal to distal.     Neurologic: Plantar response is intact bilateral.  Protective " threshold is diminished +7/10 applications to most of the digits but plantar metatarsal head and lateral fifth metatarsal is somewhat diminished tested with a 5.07 monofilament.    Dermatologic: Skin is intact to both lower extremities with diminished texture, turgor and tone about the integument.  Hyperkeratosis is noted about the ball of the metatarsal heads heel and distal digits.    Musculoskeletal: Patient is ambulatory without assistive device or brace.  Mild antalgic gait is noted with ankle equinus bilateral.  Early heel lift noted.  Lesser digits are semirigid the contracted.  Ankle equinus is noted.  Gait abnormality is noted.     ASSESSMENT:       ICD-10-CM    1. Diabetes mellitus due to underlying condition with diabetic autonomic neuropathy, without long-term current use of insulin (H)  E08.43 Orthotics, Mastectomy and Custom Compression Orders      2. Hammertoes of both feet  M20.41 Orthotics, Mastectomy and Custom Compression Orders    M20.42       3. Equinus contracture of ankle  M24.573           PLAN:  Reviewed patient's chart in [facility].      11/30/2023   Dispensed a fracture boot and ordered and interpreted radiographs.  Order placed to begin PT in buck  Stay in boot for all weight bearing until PT proves strength and balance is the same on injured right ankle as the uninjured left ankle which will be 2-6 weeks of therapy.  Important to keep doing the PT at home as well.  Follow up with me in 4-6 weeks.   Can rest and sleep without the boot.  Can be weight bearing in the boot as much as tolerated.     1/11/2024  Return to all activities without restrictions at this time.  Filled out appropriate paperwork to do so.  Follow-up if this becomes symptomatic.    2/22/2024  Placed order for extra-depth diabetic shoes as this patient does have deformity hammertoes equinus changes in his skin with hyperkeratosis texture turgor tone is diminished diminished hair growth edema and reduced abnormal  sensation with abnormal gait.  Follow-up once yearly for diabetic foot exam      Lopez Latif DPM            Again, thank you for allowing me to participate in the care of your patient.        Sincerely,        Lopez Latif DPM

## 2024-02-22 NOTE — PROGRESS NOTES
Chief Complaint   Patient presents with    Diabetes     DM type 2 exam; LOV 1/11/2024    Orders     DM shoes    Other     Presents with Keri from [facility] 2/22/2024     HPI:  Bertram Foreman is a 33 year old male who is seen in consultation at the request of Tremaine Ruvalcaba DO    Pt presents for eval of:   (Onset, Location, L/R, Character, Treatments, Injury if yes)    XR Right ankle 11/30/2023     Onset 11/29/2023, stepped on uneven area by the driveway in the dark, twisting Right ankle. Presents with lateral Right ankle pain, accompanied by Keri from [facility].    Today the patient notes the right ankle is back to normal but he would like to move forward with getting new diabetic shoes he has had these in the past and they have helped reduce his injuries.  He describes some paresthesias about his feet and some deformity.  He also notes dry skin and abrasions are commonly present with minimal injury to his foot.    ROS:  10 point ROS neg other than the symptoms noted above in the HPI.    Patient Active Problem List   Diagnosis    Morbid obesity (H)    Intellectual disability    Recurrent major depressive disorder, in partial remission (H24)    Mantoux: positive, treated    Morbid obesity with body mass index of 45.0-49.9 in adult (H)    Mild intellectual disabilities    Type 2 diabetes mellitus with hypoglycemia, without long-term current use of insulin (H)    Sexual dysfunction    Grief       PAST MEDICAL HISTORY:   Past Medical History:   Diagnosis Date    Depressive disorder All my life almost.    Diabetes (H) sometime in 2019    Sleep apnea         PAST SURGICAL HISTORY:   Past Surgical History:   Procedure Laterality Date    APPENDECTOMY  8/15/14    COLONOSCOPY N/A 12/28/2020    Procedure: COLONOSCOPY, WITH BIOPSY;  Surgeon: Haley Hernandez MD;  Location:  GI        MEDICATIONS:   Current Outpatient Medications:     atorvastatin (LIPITOR) 20 MG tablet, TAKE 1 TABLET BY  MOUTH ONCEDAILY EVERY MORNING (AMPLUSPAK), Disp: 90 tablet, Rfl: 3    Blood Glucose Monitoring Suppl (ONETOUCH VERIO FLEX SYSTEM) w/Device KIT, USE TO TEST TWICE DAILY, Disp: 1 kit, Rfl: 0    divalproex sodium extended-release (DEPAKOTE ER) 500 MG 24 hr tablet, TAKE THREE TABLETS BY MOUTH EVERY NIGHT AT BEDTIME (HS PLUSPAK), Disp: , Rfl:     glimepiride (AMARYL) 4 MG tablet, TAKE ONE TABLET BY MOUTH DAILY IN THE MORNING BEFORE BREAKFAST, Disp: 28 tablet, Rfl: 10    omeprazole (PRILOSEC) 20 MG DR capsule, TAKE ONE  CAPSULE BY MOUTH TWICE DAILY (AM AND AT BEDTIME), Disp: 56 capsule, Rfl: 4    ONETOUCH VERIO IQ test strip, TEST TWICE DAILY, Disp: 100 strip, Rfl: 1    pioglitazone (ACTOS) 30 MG tablet, TAKE ONE TABLET BY MOUTH DAILY, Disp: 28 tablet, Rfl: 10    QUEtiapine (SEROQUEL) 300 MG tablet, Take 300 mg by mouth At Bedtime , Disp: , Rfl: 0    TRAZODONE HCL PO, Take 100 mg by mouth At Bedtime , Disp: , Rfl:     acetaminophen (TYLENOL) 325 MG tablet, Take 325-650 mg by mouth as needed for mild pain, Disp: , Rfl:     blood glucose calibration (ONETOUCH VERIO) solution, USE AS DIRECTED, Disp: 1 each, Rfl: 0    FIBER ADULT GUMMIES PO, Take 1 chew tab by mouth daily as needed, Disp: , Rfl:     fluticasone (FLONASE) 50 MCG/ACT nasal spray, Spray 1 spray into both nostrils daily, Disp: 15.8 mL, Rfl: 0    guaiFENesin (ROBITUSSIN) 20 mg/mL liquid, Take 10 mLs (200 mg) by mouth every 8 hours as needed for cough, Disp: 180 mL, Rfl: 0    hydrocortisone, Perianal, (HYDROCORTISONE) 2.5 % cream, Place rectally 2 times daily as needed for hemorrhoids, Disp: 30 g, Rfl: 3    ibuprofen (ADVIL/MOTRIN) 200 MG tablet, Take 3 tablets (600 mg) by mouth 3 times daily, Disp: , Rfl:     Microlet Lancets MISC, TEST EVERY DAY, Disp: 100 each, Rfl: 1    phenylephrine-shark liver oil-mineral oil-petrolatum (PREPARATION H) 0.25-3-14-71.9 % rectal ointment, Place rectally 2 times daily as needed for hemorrhoids, Disp: 57 g, Rfl: 11     ALLERGIES:     Allergies   Allergen Reactions    Ceclor [Cefaclor] Swelling    Erythromycin GI Disturbance        SOCIAL HISTORY:   Social History     Socioeconomic History    Marital status: Single     Spouse name: Not on file    Number of children: Not on file    Years of education: Not on file    Highest education level: Not on file   Occupational History    Not on file   Tobacco Use    Smoking status: Former     Packs/day: 1.00     Years: 0.00     Additional pack years: 0.00     Total pack years: 0.00     Types: Cigarettes     Start date: 2014     Quit date: 3/1/2014     Years since quittin.9    Smokeless tobacco: Never   Vaping Use    Vaping Use: Never used   Substance and Sexual Activity    Alcohol use: Yes     Comment: rare    Drug use: No    Sexual activity: Never   Other Topics Concern    Parent/sibling w/ CABG, MI or angioplasty before 65F 55M? No   Social History Narrative    Not on file     Social Determinants of Health     Financial Resource Strain: Low Risk  (2024)    Financial Resource Strain     Within the past 12 months, have you or your family members you live with been unable to get utilities (heat, electricity) when it was really needed?: No   Food Insecurity: Low Risk  (2024)    Food Insecurity     Within the past 12 months, did you worry that your food would run out before you got money to buy more?: No     Within the past 12 months, did the food you bought just not last and you didn t have money to get more?: No   Transportation Needs: Low Risk  (2024)    Transportation Needs     Within the past 12 months, has lack of transportation kept you from medical appointments, getting your medicines, non-medical meetings or appointments, work, or from getting things that you need?: No   Physical Activity: Not on file   Stress: Not on file   Social Connections: Not on file   Interpersonal Safety: Unknown (2023)    Interpersonal Safety     Do you feel physically and emotionally safe where  "you currently live?: Patient refused     Within the past 12 months, have you been hit, slapped, kicked or otherwise physically hurt by someone?: Patient refused     Within the past 12 months, have you been humiliated or emotionally abused in other ways by your partner or ex-partner?: Patient refused   Housing Stability: Low Risk  (1/16/2024)    Housing Stability     Do you have housing? : Yes     Are you worried about losing your housing?: No        FAMILY HISTORY:   Family History   Problem Relation Age of Onset    Mental Illness Brother     Diabetes No family hx of     Depression No family hx of     Anxiety Disorder No family hx of         EXAM:Vitals: /78 (BP Location: Left arm, Patient Position: Sitting, Cuff Size: Adult Large)   Ht 1.88 m (6' 2\")   Wt (!) 158.8 kg (350 lb)   BMI 44.94 kg/m    BMI= Body mass index is 44.94 kg/m .    General appearance: Patient is alert and fully cooperative with history & exam.  No sign of distress is noted during the visit.     Psychiatric: Affect is pleasant & appropriate.  Patient appears motivated to improve health.     Respiratory: Breathing is regular & unlabored while sitting.     HEENT: Hearing is intact to spoken word.  Speech is clear.  No gross evidence of visual impairment that would impact ambulation.     Vascular: DP & PT pulses are intact & regular bilaterally.  Mild generalized 2mm pitting and non pitting edema and mild varicosities in legs noted.  CFT and skin temperature is normal to both lower extremities. No hair growth on feet and toes. Warm to cool proximal to distal.     Neurologic: Plantar response is intact bilateral.  Protective threshold is diminished +7/10 applications to most of the digits but plantar metatarsal head and lateral fifth metatarsal is somewhat diminished tested with a 5.07 monofilament.    Dermatologic: Skin is intact to both lower extremities with diminished texture, turgor and tone about the integument.  Hyperkeratosis is " noted about the ball of the metatarsal heads heel and distal digits.    Musculoskeletal: Patient is ambulatory without assistive device or brace.  Mild antalgic gait is noted with ankle equinus bilateral.  Early heel lift noted.  Lesser digits are semirigid the contracted.  Ankle equinus is noted.  Gait abnormality is noted.     ASSESSMENT:       ICD-10-CM    1. Diabetes mellitus due to underlying condition with diabetic autonomic neuropathy, without long-term current use of insulin (H)  E08.43 Orthotics, Mastectomy and Custom Compression Orders      2. Hammertoes of both feet  M20.41 Orthotics, Mastectomy and Custom Compression Orders    M20.42       3. Equinus contracture of ankle  M24.573           PLAN:  Reviewed patient's chart in [facility].      11/30/2023   Dispensed a fracture boot and ordered and interpreted radiographs.  Order placed to begin PT in buck  Stay in boot for all weight bearing until PT proves strength and balance is the same on injured right ankle as the uninjured left ankle which will be 2-6 weeks of therapy.  Important to keep doing the PT at home as well.  Follow up with me in 4-6 weeks.   Can rest and sleep without the boot.  Can be weight bearing in the boot as much as tolerated.     1/11/2024  Return to all activities without restrictions at this time.  Filled out appropriate paperwork to do so.  Follow-up if this becomes symptomatic.    2/22/2024  Placed order for extra-depth diabetic shoes as this patient does have deformity hammertoes equinus changes in his skin with hyperkeratosis texture turgor tone is diminished diminished hair growth edema and reduced abnormal sensation with abnormal gait.  Follow-up once yearly for diabetic foot exam      Lopez Latif DPM

## 2024-02-27 ENCOUNTER — TELEPHONE (OUTPATIENT)
Dept: EMERGENCY MEDICINE | Facility: CLINIC | Age: 34
End: 2024-02-27
Payer: COMMERCIAL

## 2024-02-27 NOTE — TELEPHONE ENCOUNTER
Forms/Letter Request    Type of form/letter: OTHER: Insurance and special [employer]       Do we have the form/letter: Yes:     Who is the form from? Insurance comp    Where did/will the form come from? Patient or family brought in       When is form/letter needed by: ASAP    How would you like the form/letter returned:     Patient Notified form requests are processed in 5-7 business days:Yes    Could we send this information to you [facility] or would you prefer to receive a phone call?:   Patient would prefer a phone call   Okay to leave a detailed message?: Yes at Cell number on file:    Telephone Information:   Mobile [phone]       Statement Selected

## 2024-02-27 NOTE — TELEPHONE ENCOUNTER
Annual wellness rewards signed and fmailed    Special olympic's form placed in Dr Ruvalcaba's basket.

## 2024-02-28 NOTE — TELEPHONE ENCOUNTER
Completed form placed at clinic  for . No answer or voice mail at mobile or home number. Will try again.

## 2024-02-29 NOTE — TELEPHONE ENCOUNTER
MyChart has been sent as welll to [facility]. Reminder set for tomorrow to call again if not read

## 2024-03-11 DIAGNOSIS — Z79.899 HIGH RISK MEDICATION USE: Primary | ICD-10-CM

## 2024-03-14 DIAGNOSIS — E78.5 HYPERLIPIDEMIA LDL GOAL <130: ICD-10-CM

## 2024-03-14 RX ORDER — ATORVASTATIN CALCIUM 20 MG/1
TABLET, FILM COATED ORAL
Qty: 28 TABLET | Refills: 10 | OUTPATIENT
Start: 2024-03-14

## 2024-03-18 ENCOUNTER — LAB (OUTPATIENT)
Dept: LAB | Facility: CLINIC | Age: 34
End: 2024-03-18
Payer: COMMERCIAL

## 2024-03-18 DIAGNOSIS — Z79.899 HIGH RISK MEDICATION USE: ICD-10-CM

## 2024-03-18 DIAGNOSIS — D72.829 LEUKOCYTOSIS, UNSPECIFIED TYPE: ICD-10-CM

## 2024-03-18 LAB
ALBUMIN SERPL BCG-MCNC: 4.2 G/DL (ref 3.5–5.2)
ALP SERPL-CCNC: 80 U/L (ref 40–150)
ALT SERPL W P-5'-P-CCNC: 42 U/L (ref 0–70)
AMYLASE SERPL-CCNC: 68 U/L (ref 28–100)
AST SERPL W P-5'-P-CCNC: 25 U/L (ref 0–45)
BASOPHILS # BLD AUTO: 0.1 10E3/UL (ref 0–0.2)
BASOPHILS NFR BLD AUTO: 1 %
BILIRUB DIRECT SERPL-MCNC: <0.2 MG/DL (ref 0–0.3)
BILIRUB SERPL-MCNC: 0.2 MG/DL
CHOLEST SERPL-MCNC: 154 MG/DL
EOSINOPHIL # BLD AUTO: 0.1 10E3/UL (ref 0–0.7)
EOSINOPHIL NFR BLD AUTO: 1 %
ERYTHROCYTE [DISTWIDTH] IN BLOOD BY AUTOMATED COUNT: 13.3 % (ref 10–15)
FASTING STATUS PATIENT QL REPORTED: YES
FASTING STATUS PATIENT QL REPORTED: YES
GLUCOSE SERPL-MCNC: 190 MG/DL (ref 70–99)
HBA1C MFR BLD: 9.3 %
HCT VFR BLD AUTO: 40.2 % (ref 40–53)
HDLC SERPL-MCNC: 34 MG/DL
HGB BLD-MCNC: 12.9 G/DL (ref 13.3–17.7)
IMM GRANULOCYTES # BLD: 0.1 10E3/UL
IMM GRANULOCYTES NFR BLD: 1 %
LDLC SERPL CALC-MCNC: 94 MG/DL
LYMPHOCYTES # BLD AUTO: 2.8 10E3/UL (ref 0.8–5.3)
LYMPHOCYTES NFR BLD AUTO: 35 %
MCH RBC QN AUTO: 27.4 PG (ref 26.5–33)
MCHC RBC AUTO-ENTMCNC: 32.1 G/DL (ref 31.5–36.5)
MCV RBC AUTO: 86 FL (ref 78–100)
MONOCYTES # BLD AUTO: 1 10E3/UL (ref 0–1.3)
MONOCYTES NFR BLD AUTO: 12 %
NEUTROPHILS # BLD AUTO: 4.1 10E3/UL (ref 1.6–8.3)
NEUTROPHILS NFR BLD AUTO: 51 %
NONHDLC SERPL-MCNC: 120 MG/DL
NRBC # BLD AUTO: 0 10E3/UL
NRBC BLD AUTO-RTO: 0 /100
PLATELET # BLD AUTO: 227 10E3/UL (ref 150–450)
PROLACTIN SERPL 3RD IS-MCNC: 9 NG/ML (ref 4–15)
PROT SERPL-MCNC: 8.2 G/DL (ref 6.4–8.3)
RBC # BLD AUTO: 4.7 10E6/UL (ref 4.4–5.9)
TRIGL SERPL-MCNC: 131 MG/DL
VALPROATE SERPL-MCNC: <2.8 UG/ML
WBC # BLD AUTO: 8 10E3/UL (ref 4–11)

## 2024-03-18 PROCEDURE — 85025 COMPLETE CBC W/AUTO DIFF WBC: CPT

## 2024-03-18 PROCEDURE — 80061 LIPID PANEL: CPT

## 2024-03-18 PROCEDURE — 83036 HEMOGLOBIN GLYCOSYLATED A1C: CPT

## 2024-03-18 PROCEDURE — 80076 HEPATIC FUNCTION PANEL: CPT

## 2024-03-18 PROCEDURE — 82947 ASSAY GLUCOSE BLOOD QUANT: CPT

## 2024-03-18 PROCEDURE — 36415 COLL VENOUS BLD VENIPUNCTURE: CPT

## 2024-03-18 PROCEDURE — 80164 ASSAY DIPROPYLACETIC ACD TOT: CPT

## 2024-03-18 PROCEDURE — 84146 ASSAY OF PROLACTIN: CPT

## 2024-03-18 PROCEDURE — 82150 ASSAY OF AMYLASE: CPT

## 2024-03-21 DIAGNOSIS — E78.5 HYPERLIPIDEMIA LDL GOAL <130: ICD-10-CM

## 2024-03-21 RX ORDER — ATORVASTATIN CALCIUM 20 MG/1
TABLET, FILM COATED ORAL
Qty: 30 TABLET | Refills: 9 | Status: SHIPPED | OUTPATIENT
Start: 2024-03-21

## 2024-04-09 DIAGNOSIS — E11.9 TYPE 2 DIABETES MELLITUS WITHOUT COMPLICATION, WITHOUT LONG-TERM CURRENT USE OF INSULIN (H): ICD-10-CM

## 2024-04-09 RX ORDER — BLOOD SUGAR DIAGNOSTIC
STRIP MISCELLANEOUS
Qty: 50 STRIP | Refills: 10 | Status: SHIPPED | OUTPATIENT
Start: 2024-04-09

## 2024-04-10 DIAGNOSIS — E11.65 TYPE 2 DIABETES MELLITUS WITH HYPERGLYCEMIA, WITHOUT LONG-TERM CURRENT USE OF INSULIN (H): ICD-10-CM

## 2024-04-10 RX ORDER — PIOGLITAZONEHYDROCHLORIDE 30 MG/1
TABLET ORAL
Qty: 28 TABLET | Refills: 10 | Status: SHIPPED | OUTPATIENT
Start: 2024-04-10

## 2024-05-23 ENCOUNTER — TELEPHONE (OUTPATIENT)
Dept: INTERNAL MEDICINE | Facility: CLINIC | Age: 34
End: 2024-05-23
Payer: COMMERCIAL

## 2024-05-23 NOTE — TELEPHONE ENCOUNTER
"Attention!    Official \"order\" to follow:      #1 patient may use heating pad whenever, wherever, and for as long as he chooses on an as-needed basis.    2.  Patient may soak his feet whenever, wherever, and for as long as he chooses on an as-needed basis.  Additionally, he may soak in water, Epsom salts, soap, or any other nontoxic/appropriate material he should use.        Tremaine Ruvalcaba DO/DONNELL  Department of Internal Medicine  [facility]  "

## 2024-05-23 NOTE — TELEPHONE ENCOUNTER
1) Patient had an order for a heating pad and they [facility] needs to know if this is a prn order?  They are hoping this will be prn.    2) they also want to know if he is able to use his foot  as needed also?

## 2024-06-05 DIAGNOSIS — E11.65 TYPE 2 DIABETES MELLITUS WITH HYPERGLYCEMIA, WITHOUT LONG-TERM CURRENT USE OF INSULIN (H): ICD-10-CM

## 2024-06-07 RX ORDER — GLIMEPIRIDE 4 MG/1
TABLET ORAL
Qty: 90 TABLET | Refills: 1 | Status: SHIPPED | OUTPATIENT
Start: 2024-06-07

## 2024-07-10 ENCOUNTER — OFFICE VISIT (OUTPATIENT)
Dept: INTERNAL MEDICINE | Facility: CLINIC | Age: 34
End: 2024-07-10
Payer: COMMERCIAL

## 2024-07-10 VITALS
TEMPERATURE: 96.1 F | DIASTOLIC BLOOD PRESSURE: 70 MMHG | HEART RATE: 78 BPM | SYSTOLIC BLOOD PRESSURE: 116 MMHG | BODY MASS INDEX: 43.65 KG/M2 | WEIGHT: 315 LBS | OXYGEN SATURATION: 100 % | RESPIRATION RATE: 14 BRPM

## 2024-07-10 DIAGNOSIS — M77.8 TENDINITIS OF EXTENSOR TENDON OF LEFT HAND: ICD-10-CM

## 2024-07-10 DIAGNOSIS — Z91.148 NONCOMPLIANCE WITH MEDICATION REGIMEN: ICD-10-CM

## 2024-07-10 DIAGNOSIS — Z79.899 HIGH RISK MEDICATION USE: Primary | ICD-10-CM

## 2024-07-10 DIAGNOSIS — E11.65 TYPE 2 DIABETES MELLITUS WITH HYPERGLYCEMIA, WITHOUT LONG-TERM CURRENT USE OF INSULIN (H): Primary | ICD-10-CM

## 2024-07-10 DIAGNOSIS — L98.9 SKIN LESION: ICD-10-CM

## 2024-07-10 DIAGNOSIS — K64.4 EXTERNAL HEMORRHOIDS: ICD-10-CM

## 2024-07-10 PROCEDURE — G2211 COMPLEX E/M VISIT ADD ON: HCPCS | Performed by: INTERNAL MEDICINE

## 2024-07-10 PROCEDURE — 99214 OFFICE O/P EST MOD 30 MIN: CPT | Performed by: INTERNAL MEDICINE

## 2024-07-10 RX ORDER — HYDROCORTISONE 25 MG/G
CREAM TOPICAL 2 TIMES DAILY PRN
Qty: 30 G | Refills: 2 | Status: SHIPPED | OUTPATIENT
Start: 2024-07-10

## 2024-07-10 ASSESSMENT — PATIENT HEALTH QUESTIONNAIRE - PHQ9
SUM OF ALL RESPONSES TO PHQ QUESTIONS 1-9: 15
SUM OF ALL RESPONSES TO PHQ QUESTIONS 1-9: 15
10. IF YOU CHECKED OFF ANY PROBLEMS, HOW DIFFICULT HAVE THESE PROBLEMS MADE IT FOR YOU TO DO YOUR WORK, TAKE CARE OF THINGS AT HOME, OR GET ALONG WITH OTHER PEOPLE: SOMEWHAT DIFFICULT

## 2024-07-10 NOTE — PROGRESS NOTES
Tahira Burden is a 33 year old, presenting for the following health issues:  Rectal Problem      7/10/2024    11:01 AM   Additional Questions   Accompanied by Staff     History of Present Illness   He consumes 2 sweetened beverage(s) daily.He exercises with enough effort to increase his heart rate 20 to 29 minutes per day.  He exercises with enough effort to increase his heart rate 3 or less days per week.   He is taking medications regularly.           EMR reviewed including:             Complaint, History of Chief Complaint, Corresponding Review of Systems, and Complaint Specific Physical Examination.    #1   Follow-up on type 2 diabetes  Caregiver notes blood sugars frequently approaching 400  Caregiver notes patient frequently drinking soda pop and large quantities  Patient acknowledges this.  Currently taking Amaryl, Actos.  Not on metformin due to uncontrolled diarrhea.  No diarrhea currently.  Patient is a poor insulin candidate.  Occasional polyuria.  No hypoglycemic spells.  Last A1c in March was 9.3.       Exam:   LUNGS: clear bilaterally, airflow is brisk, no intercostal retraction or stridor is noted. No coughing is noted during visit.   HEART:  regular without rubs, clicks, gallops, or murmurs. PMI is nondisplaced. Upstrokes are brisk. S1,S2 are heard.   GI: Abdomen is soft, without rebound, guarding or tenderness. Bowel sounds are appropriate. No renal bruits are heard.   NEURO: Pt is alert and appropriate. No neurologic lateralization is noted. Cranial nerves 2-12 are intact. Peripheral sensory and motor function are grossly normal.       #2   Skin lesions  Patient has mole on back and abdomen.  Appears benign at this time.  Well-circumscribed margins.  Less than 5 mm in diameter.  Wants dermatology consultation.      #3   External hemorrhoid apathy  Moderate results with Preparation H.  Occasional burning and itching.  Scant bleeding.      #4   Pain in his thumb on the left.  Worse with  "extension.  No history of trauma or injury.  No redness or inflammation.  No significant joint crepitance.  No evidence of synovial thickening.  Typical extensor pollicis longus tendinitis.  Modest to mild        Patient has been interviewed, applicable history and applied review of systems have been performed.    Vital Signs:   /70   Pulse 78   Temp (!) 96.1  F (35.6  C)   Resp 14   Wt (!) 154.2 kg (340 lb)   SpO2 100%   BMI 43.65 kg/m        Decision Making    Problem and Complexity     1. Type 2 diabetes mellitus with hyperglycemia, without long-term current use of insulin (H)  Add Jardiance.  Reiterated the fact that he needs to limit his sugar intake.  Relayed this information to his \"care\" giver.  - empagliflozin (JARDIANCE) 10 MG TABS tablet; Take 1 tablet (10 mg) by mouth daily  Dispense: 90 tablet; Refill: 1    2. Noncompliance with medication regimen  As above      3. Skin lesion  Consultation requested  - Adult Dermatology  Referral; Future    4. External hemorrhoids  Anusol to be used as needed  - hydrocortisone, Perianal, (HYDROCORTISONE) 2.5 % cream; Place rectally 2 times daily as needed for hemorrhoids  Dispense: 30 g; Refill: 2    5. Tendinitis of extensor tendon of left hand  Topical diclofenac to be used as needed  - diclofenac (VOLTAREN) 1 % topical gel; Apply 2 g topically 4 times daily  Dispense: 150 g; Refill: 1                                FOLLOW UP   I have asked the patient to make an appointment for followup with in 3 to 4 months or as needed    Regarding routine vaccinations:  I have reviewed the patient's vaccination schedule and discussed the benefits of prophylactic vaccination in detail.  I recommend the patient contact their pharmacist for vaccinations.  Discussed that most insurance companies now favor reimbursement to the pharmacies and it will financially behoove the patient to have vaccinations performed at their pharmacy.        I have carefully explained " the diagnosis and treatment options to the patient.  The patient has displayed an understanding of the above, and all subsequent questions were answered.      DO DONNELL Jin    Portions of this note were produced using [employer]  Although every attempt at real-time proof reading has been made, occasional grammar/syntax errors may have been missed.

## 2024-07-12 ENCOUNTER — LAB (OUTPATIENT)
Dept: LAB | Facility: CLINIC | Age: 34
End: 2024-07-12
Payer: COMMERCIAL

## 2024-07-12 DIAGNOSIS — Z79.899 HIGH RISK MEDICATION USE: ICD-10-CM

## 2024-07-12 LAB
ALBUMIN SERPL BCG-MCNC: 3.9 G/DL (ref 3.5–5.2)
ALP SERPL-CCNC: 81 U/L (ref 40–150)
ALT SERPL W P-5'-P-CCNC: 23 U/L (ref 0–70)
AMYLASE SERPL-CCNC: 67 U/L (ref 28–100)
AST SERPL W P-5'-P-CCNC: 16 U/L (ref 0–45)
BASOPHILS # BLD AUTO: 0.1 10E3/UL (ref 0–0.2)
BASOPHILS NFR BLD AUTO: 1 %
BILIRUB DIRECT SERPL-MCNC: <0.2 MG/DL (ref 0–0.3)
BILIRUB SERPL-MCNC: 0.2 MG/DL
CHOLEST SERPL-MCNC: 145 MG/DL
EOSINOPHIL # BLD AUTO: 0.2 10E3/UL (ref 0–0.7)
EOSINOPHIL NFR BLD AUTO: 2 %
ERYTHROCYTE [DISTWIDTH] IN BLOOD BY AUTOMATED COUNT: 13.2 % (ref 10–15)
FASTING STATUS PATIENT QL REPORTED: YES
FASTING STATUS PATIENT QL REPORTED: YES
GLUCOSE SERPL-MCNC: 324 MG/DL (ref 70–99)
HBA1C MFR BLD: 9.6 %
HCT VFR BLD AUTO: 38.2 % (ref 40–53)
HDLC SERPL-MCNC: 34 MG/DL
HGB BLD-MCNC: 12 G/DL (ref 13.3–17.7)
IMM GRANULOCYTES # BLD: 0.3 10E3/UL
IMM GRANULOCYTES NFR BLD: 3 %
LDLC SERPL CALC-MCNC: 79 MG/DL
LYMPHOCYTES # BLD AUTO: 4 10E3/UL (ref 0.8–5.3)
LYMPHOCYTES NFR BLD AUTO: 34 %
MCH RBC QN AUTO: 27.1 PG (ref 26.5–33)
MCHC RBC AUTO-ENTMCNC: 31.4 G/DL (ref 31.5–36.5)
MCV RBC AUTO: 86 FL (ref 78–100)
MONOCYTES # BLD AUTO: 1 10E3/UL (ref 0–1.3)
MONOCYTES NFR BLD AUTO: 8 %
NEUTROPHILS # BLD AUTO: 6.4 10E3/UL (ref 1.6–8.3)
NEUTROPHILS NFR BLD AUTO: 54 %
NONHDLC SERPL-MCNC: 111 MG/DL
NRBC # BLD AUTO: 0 10E3/UL
NRBC BLD AUTO-RTO: 0 /100
PLATELET # BLD AUTO: 291 10E3/UL (ref 150–450)
PROLACTIN SERPL 3RD IS-MCNC: 10 NG/ML (ref 4–15)
PROT SERPL-MCNC: 7.6 G/DL (ref 6.4–8.3)
RBC # BLD AUTO: 4.43 10E6/UL (ref 4.4–5.9)
TRIGL SERPL-MCNC: 160 MG/DL
VALPROATE SERPL-MCNC: 70.5 UG/ML
WBC # BLD AUTO: 11.9 10E3/UL (ref 4–11)

## 2024-07-12 PROCEDURE — 80076 HEPATIC FUNCTION PANEL: CPT

## 2024-07-12 PROCEDURE — 80164 ASSAY DIPROPYLACETIC ACD TOT: CPT

## 2024-07-12 PROCEDURE — 84146 ASSAY OF PROLACTIN: CPT

## 2024-07-12 PROCEDURE — 85025 COMPLETE CBC W/AUTO DIFF WBC: CPT

## 2024-07-12 PROCEDURE — 36415 COLL VENOUS BLD VENIPUNCTURE: CPT

## 2024-07-12 PROCEDURE — 82947 ASSAY GLUCOSE BLOOD QUANT: CPT

## 2024-07-12 PROCEDURE — 82150 ASSAY OF AMYLASE: CPT

## 2024-07-12 PROCEDURE — 83036 HEMOGLOBIN GLYCOSYLATED A1C: CPT

## 2024-07-12 PROCEDURE — 80061 LIPID PANEL: CPT

## 2024-07-17 ENCOUNTER — TELEPHONE (OUTPATIENT)
Dept: PHARMACY | Facility: CLINIC | Age: 34
End: 2024-07-17
Payer: COMMERCIAL

## 2024-07-17 NOTE — TELEPHONE ENCOUNTER
This patient is due for MTM follow-up. I called the patient to schedule an appointment and left a message with the clinic phone number for the patient to call to schedule.    Thom Rush, JaneD, [facility]  Medication Therapy Management Pharmacist  Voicemail: [phone]

## 2024-07-26 ENCOUNTER — TELEPHONE (OUTPATIENT)
Dept: INTERNAL MEDICINE | Facility: CLINIC | Age: 34
End: 2024-07-26
Payer: COMMERCIAL

## 2024-07-26 NOTE — TELEPHONE ENCOUNTER
Forms/Letter Request    Type of form/letter: OTHER:        Do we have the form/letter: Yes:     Who is the form from? Insurance comp    Where did/will the form come from? Patient or family brought in       When is form/letter needed by: asap    How would you like the form/letter returned:  Mail to [facility]    Patient Notified form requests are processed in 5-7 business days:Yes    Could we send this information to you in [employer] or would you prefer to receive a phone call?:   No preference   Okay to leave a detailed message?: Yes at Home number on file [phone] (home)

## 2024-07-31 DIAGNOSIS — K29.00 ACUTE GASTRITIS WITHOUT HEMORRHAGE, UNSPECIFIED GASTRITIS TYPE: ICD-10-CM

## 2024-08-04 DIAGNOSIS — E11.9 TYPE 2 DIABETES MELLITUS WITHOUT COMPLICATION, WITHOUT LONG-TERM CURRENT USE OF INSULIN (H): ICD-10-CM

## 2024-08-05 RX ORDER — LANCETS 33 GAUGE
EACH MISCELLANEOUS
Qty: 100 EACH | Refills: 10 | Status: SHIPPED | OUTPATIENT
Start: 2024-08-05

## 2024-08-05 NOTE — TELEPHONE ENCOUNTER
Medication passed protocol, however, refill RN could not approve because  break in medication.  Provider, please approve or deny the prescription.

## 2024-08-20 ENCOUNTER — MYC MEDICAL ADVICE (OUTPATIENT)
Dept: INTERNAL MEDICINE | Facility: CLINIC | Age: 34
End: 2024-08-20
Payer: COMMERCIAL

## 2024-08-21 NOTE — TELEPHONE ENCOUNTER
CTS Progress Note      POD 6 s/pCoronary artery bypass graft ×4 with EVH.     Subjective  Up in chair.  No coughing present secondary to lidocaine and treatment and bronchodilator      Objective    Physical Exam:   Vital Signs   Temp:  [98.1 °F (36.7 °C)-99 °F (37.2 °C)] 98.2 °F (36.8 °C)  Heart Rate:  [74-88] 74  Resp:  [16-18] 18  BP: (114-134)/(56-74) 118/74   GEN: NAD   CV: Regular rate and rhythm    RESP: Mostly clear with a few rales at bases.   EXT: Warm with 2+ edema   Incision: Superior portion of sternal incision healing well.  Inferior portion with bandage noted with serosanguineous drainage no surrounding erythema or odor noted     Results       Results from last 7 days  Lab Units 10/16/18  0517   WBC 10*3/mm3 6.71   HEMOGLOBIN g/dL 10.1*  10.0*   HEMATOCRIT % 31.2*  31.0*   PLATELETS 10*3/mm3 288       Results from last 7 days  Lab Units 10/16/18  0517   SODIUM mmol/L 135   POTASSIUM mmol/L 4.5   CHLORIDE mmol/L 104   CO2 mmol/L 21.0   BUN mg/dL 50*   CREATININE mg/dL 1.40*   GLUCOSE mg/dL 199*   CALCIUM mg/dL 8.4*       Results from last 7 days  Lab Units 10/12/18  0323 10/11/18  1305   INR  1.02 1.07   APTT seconds  --  <24.0*         Assessment/Plan       Multivessel CAD on catheterization 7/5/18    Insulin dependent type 2 diabetes mellitus (CMS/[facility])    Hyperlipidemia    Allergic rhinitis. 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

## 2024-08-21 NOTE — TELEPHONE ENCOUNTER
It seems that the patient wants to discontinue the Jardiance.  I guess he will discontinue the Jardiance.  He should follow-up with either myself or Karis Jefferson (whoever that is)    Jordon

## 2024-08-29 ENCOUNTER — TELEPHONE (OUTPATIENT)
Dept: INTERNAL MEDICINE | Facility: CLINIC | Age: 34
End: 2024-08-29

## 2024-08-29 NOTE — TELEPHONE ENCOUNTER
Keri with [employer] Calling requesting medication discontinuation.     Patient states he no longer wants to take his jardiance. RSI requesting order to discontinue medication. Please sign order for discontinuation and fax to [phone] Attention Keri if appropriate.     Bertram Plunkett RN on 8/29/2024 at 12:21 PM

## 2024-08-29 NOTE — TELEPHONE ENCOUNTER
I have discontinued the Jardiance.  Please send the eagerly awaiting recipient what ever version of paperwork appeases said recipient's wishes.      Jordon

## 2024-09-18 DIAGNOSIS — Z79.899 DRUG THERAPY: Primary | ICD-10-CM

## 2024-09-18 DIAGNOSIS — Z79.899 HIGH RISK MEDICATION USE: ICD-10-CM

## 2024-10-08 NOTE — PROGRESS NOTES
[facility] Dermatology Note  Encounter Date: Oct 10, 2024  Office Visit     Reviewed patients past medical history and pertinent chart review prior to patients visit today.     Dermatology Problem List:  Dermatofibroma   Cyst     Family Hx: No family hisotry of ksin cancer   Personal Hx: No personal history of skin cancer.   ____________________________________________    Assessment & Plan:     # benign appearing nevi  The ABCDE criteria for melanoma was reviewed with the patient. None of the patient's nevi reach the clinical threshold for biopsy. I recommend continued sun protection, self-skin examinations and observation. Should any of the patient's nevi change in size, color, texture or shape or develop symptoms such as itching or bleeding, I recommend an immediate return visit for reassessment.     # Dermatofibroma, right upper back  -.benign nature of skin lesion(s) reviewed with patient, no further treatment needed.      # epidermal cyst, left lower chest  - Benign, no further treatment needed. Will let us know if wants treatment in the future.     Follow up: Return to clinic as needed for new or changing lesions     Mariam Smalls PA-C  [facility]  Dermatology    _______________________________________    CC: No chief complaint on file.    HPI:  Mr. Bertram Foreman is a(n) 34 year old male who presents today as a return patient for evaluation of multiple lesions of concern.  The first lesion of concern is on his left lower chest.  This lesion has been present for multiple years and he can express material from it.  He is wondering if it is of concern.  Additionally, he has a lesion of concern on his right upper back.  This has been present for several years and is firm in nature.  He is wondering if it is of concern.  Finally he has another lesion of concern on his right upper back that was pointed out to him by another provider.  He would like to make sure it is not of  concern.  It has been present for several years and it is asymptomatic.    Physical Exam:  SKIN: Focused examination of right upper back and left lower chest was performed.  - right upper back, pink, firm papule that dimple on manipulation  - right upper back, brown macule with reassuring features on dermoscopy   - left lower chest, firm, mobile, nodule with an overlying punctum       - No other lesions of concern on areas examined.     Medications:  Current Outpatient Medications   Medication Sig Dispense Refill    acetaminophen (TYLENOL) 325 MG tablet Take 325-650 mg by mouth as needed for mild pain      atorvastatin (LIPITOR) 20 MG tablet TAKE ONE TABLET BY MOUTH EVERY MORNING 30 tablet 9    blood glucose calibration (ONETOUCH VERIO) solution USE AS DIRECTED 1 each 0    Blood Glucose Monitoring Suppl (ONETOUCH VERIO FLEX SYSTEM) w/Device KIT USE TO TEST TWICE DAILY 1 kit 0    diclofenac (VOLTAREN) 1 % topical gel Apply 2 g topically 4 times daily 150 g 1    divalproex sodium extended-release (DEPAKOTE ER) 500 MG 24 hr tablet TAKE THREE TABLETS BY MOUTH EVERY NIGHT AT BEDTIME (HS PLUSPAK)      FIBER ADULT GUMMIES PO Take 1 chew tab by mouth daily as needed      fluticasone (FLONASE) 50 MCG/ACT nasal spray Spray 1 spray into both nostrils daily 15.8 mL 0    glimepiride (AMARYL) 4 MG tablet TAKE ONE TABLET BY MOUTH DAILY IN THE MORNING BEFORE BREAKFAST 90 tablet 1    guaiFENesin (ROBITUSSIN) 20 mg/mL liquid Take 10 mLs (200 mg) by mouth every 8 hours as needed for cough 180 mL 0    hydrocortisone, Perianal, (HYDROCORTISONE) 2.5 % cream Place rectally 2 times daily as needed for hemorrhoids 30 g 2    hydrocortisone, Perianal, (HYDROCORTISONE) 2.5 % cream Place rectally 2 times daily as needed for hemorrhoids 30 g 3    ibuprofen (ADVIL/MOTRIN) 200 MG tablet Take 3 tablets (600 mg) by mouth 3 times daily      Lancets (ONETOUCH DELICA PLUS [id number]) MISC USE TO TEST BLOOD SUGAR ONCE DAILY 100 each 10    omeprazole  (PRILOSEC) 20 MG DR capsule TAKE ONE  CAPSULE BY MOUTH TWICE DAILY (AM AND AT BEDTIME) 180 capsule 1    ONETOUCH VERIO IQ test strip TEST BLOOD SUGAR ONCE DAILY 50 strip 10    phenylephrine-shark liver oil-mineral oil-petrolatum (PREPARATION H) 0.25-3-14-71.9 % rectal ointment Place rectally 2 times daily as needed for hemorrhoids 57 g 11    pioglitazone (ACTOS) 30 MG tablet TAKE ONE TABLET BY MOUTH DAILY 28 tablet 10    QUEtiapine (SEROQUEL) 300 MG tablet Take 300 mg by mouth At Bedtime   0    TRAZODONE HCL PO Take 100 mg by mouth At Bedtime        No current facility-administered medications for this visit.      Past Medical History:   Patient Active Problem List   Diagnosis    Morbid obesity (H)    Intellectual disability    Recurrent major depressive disorder, in partial remission (H)    Mantoux: positive, treated    Morbid obesity with body mass index of 45.0-49.9 in adult (H)    Mild intellectual disabilities    Type 2 diabetes mellitus with hypoglycemia, without long-term current use of insulin (H)    Sexual dysfunction    Grief     Past Medical History:   Diagnosis Date    Depressive disorder All my life almost.    Diabetes (H) sometime in 2019    Sleep apnea        CC Tremaine Ruvalcaba, DO  [address] [place] [address] on close of this encounter.

## 2024-10-10 ENCOUNTER — OFFICE VISIT (OUTPATIENT)
Dept: DERMATOLOGY | Facility: CLINIC | Age: 34
End: 2024-10-10
Attending: INTERNAL MEDICINE
Payer: COMMERCIAL

## 2024-10-10 DIAGNOSIS — L98.9 SKIN LESION: ICD-10-CM

## 2024-10-10 PROCEDURE — 99213 OFFICE O/P EST LOW 20 MIN: CPT | Performed by: STUDENT IN AN ORGANIZED HEALTH CARE EDUCATION/TRAINING PROGRAM

## 2024-10-10 ASSESSMENT — PAIN SCALES - GENERAL: PAINLEVEL: NO PAIN (0)

## 2024-10-10 NOTE — NURSING NOTE
Bertram Foreman's chief complaint for this visit includes:  Chief Complaint   Patient presents with    Derm Problem     Spot check: mole on upper back.      PCP: Tremaine Ruvalcaba    Referring Provider:  Tremaine Ruvalcaba DO  [address] [place] [address] [place] [address]    There were no vitals taken for this visit.  No Pain (0)        Allergies   Allergen Reactions    Ceclor [Cefaclor] Swelling    Erythromycin GI Disturbance         Do you need any medication refills at today's visit?   No.      Neela Fleming RN on 10/10/2024 at 3:03 PM

## 2024-10-10 NOTE — LETTER
10/10/2024      Bertram Foreman  [address] [facility] [address]      Dear Colleague,    Thank you for referring your patient, Bertram Foreman, to the [facility]. Please see a copy of my visit note below.    [facility] Dermatology Note  Encounter Date: Oct 10, 2024  Office Visit     Reviewed patients past medical history and pertinent chart review prior to patients visit today.     Dermatology Problem List:  Dermatofibroma   Cyst     Family Hx: No family hisotry of ksin cancer   Personal Hx: No personal history of skin cancer.   ____________________________________________    Assessment & Plan:     # benign appearing nevi  The ABCDE criteria for melanoma was reviewed with the patient. None of the patient's nevi reach the clinical threshold for biopsy. I recommend continued sun protection, self-skin examinations and observation. Should any of the patient's nevi change in size, color, texture or shape or develop symptoms such as itching or bleeding, I recommend an immediate return visit for reassessment.     # Dermatofibroma, right upper back  -.benign nature of skin lesion(s) reviewed with patient, no further treatment needed.      # epidermal cyst, left lower chest  - Benign, no further treatment needed. Will let us know if wants treatment in the future.     Follow up: Return to clinic as needed for new or changing lesions     Mariam Smalls PA-C  [facility]  Dermatology    _______________________________________    CC: No chief complaint on file.    HPI:  Mr. Bertram Foreman is a(n) 34 year old male who presents today as a return patient for evaluation of multiple lesions of concern.  The first lesion of concern is on his left lower chest.  This lesion has been present for multiple years and he can express material from it.  He is wondering if it is of concern.  Additionally, he has a lesion of concern on his right upper back.  This has been present  for several years and is firm in nature.  He is wondering if it is of concern.  Finally he has another lesion of concern on his right upper back that was pointed out to him by another provider.  He would like to make sure it is not of concern.  It has been present for several years and it is asymptomatic.    Physical Exam:  SKIN: Focused examination of right upper back and left lower chest was performed.  - right upper back, pink, firm papule that dimple on manipulation  - right upper back, brown macule with reassuring features on dermoscopy   - left lower chest, firm, mobile, nodule with an overlying punctum       - No other lesions of concern on areas examined.     Medications:  Current Outpatient Medications   Medication Sig Dispense Refill     acetaminophen (TYLENOL) 325 MG tablet Take 325-650 mg by mouth as needed for mild pain       atorvastatin (LIPITOR) 20 MG tablet TAKE ONE TABLET BY MOUTH EVERY MORNING 30 tablet 9     blood glucose calibration (ONETOUCH VERIO) solution USE AS DIRECTED 1 each 0     Blood Glucose Monitoring Suppl (ONETOUCH VERIO FLEX SYSTEM) w/Device KIT USE TO TEST TWICE DAILY 1 kit 0     diclofenac (VOLTAREN) 1 % topical gel Apply 2 g topically 4 times daily 150 g 1     divalproex sodium extended-release (DEPAKOTE ER) 500 MG 24 hr tablet TAKE THREE TABLETS BY MOUTH EVERY NIGHT AT BEDTIME (HS PLUSPAK)       FIBER ADULT GUMMIES PO Take 1 chew tab by mouth daily as needed       fluticasone (FLONASE) 50 MCG/ACT nasal spray Spray 1 spray into both nostrils daily 15.8 mL 0     glimepiride (AMARYL) 4 MG tablet TAKE ONE TABLET BY MOUTH DAILY IN THE MORNING BEFORE BREAKFAST 90 tablet 1     guaiFENesin (ROBITUSSIN) 20 mg/mL liquid Take 10 mLs (200 mg) by mouth every 8 hours as needed for cough 180 mL 0     hydrocortisone, Perianal, (HYDROCORTISONE) 2.5 % cream Place rectally 2 times daily as needed for hemorrhoids 30 g 2     hydrocortisone, Perianal, (HYDROCORTISONE) 2.5 % cream Place rectally 2  times daily as needed for hemorrhoids 30 g 3     ibuprofen (ADVIL/MOTRIN) 200 MG tablet Take 3 tablets (600 mg) by mouth 3 times daily       Lancets (ONETOUCH DELICA PLUS [id number]) MISC USE TO TEST BLOOD SUGAR ONCE DAILY 100 each 10     omeprazole (PRILOSEC) 20 MG DR capsule TAKE ONE  CAPSULE BY MOUTH TWICE DAILY (AM AND AT BEDTIME) 180 capsule 1     ONETOUCH VERIO IQ test strip TEST BLOOD SUGAR ONCE DAILY 50 strip 10     phenylephrine-shark liver oil-mineral oil-petrolatum (PREPARATION H) 0.25-3-14-71.9 % rectal ointment Place rectally 2 times daily as needed for hemorrhoids 57 g 11     pioglitazone (ACTOS) 30 MG tablet TAKE ONE TABLET BY MOUTH DAILY 28 tablet 10     QUEtiapine (SEROQUEL) 300 MG tablet Take 300 mg by mouth At Bedtime   0     TRAZODONE HCL PO Take 100 mg by mouth At Bedtime        No current facility-administered medications for this visit.      Past Medical History:   Patient Active Problem List   Diagnosis     Morbid obesity (H)     Intellectual disability     Recurrent major depressive disorder, in partial remission (H)     Mantoux: positive, treated     Morbid obesity with body mass index of 45.0-49.9 in adult (H)     Mild intellectual disabilities     Type 2 diabetes mellitus with hypoglycemia, without long-term current use of insulin (H)     Sexual dysfunction     Grief     Past Medical History:   Diagnosis Date     Depressive disorder All my life almost.     Diabetes (H) sometime in 2019     Sleep apnea        CC Tremaine Duran Ruvalcaba, DO  [address] [facility] [place] [address] on close of this encounter.       Again, thank you for allowing me to participate in the care of your patient.        Sincerely,        Mariam Smalls PA-C

## 2024-10-16 ENCOUNTER — OFFICE VISIT (OUTPATIENT)
Dept: INTERNAL MEDICINE | Facility: CLINIC | Age: 34
End: 2024-10-16
Payer: COMMERCIAL

## 2024-10-16 ENCOUNTER — HOSPITAL ENCOUNTER (OUTPATIENT)
Dept: GENERAL RADIOLOGY | Facility: CLINIC | Age: 34
Discharge: HOME | End: 2024-10-16
Attending: INTERNAL MEDICINE | Admitting: INTERNAL MEDICINE
Payer: COMMERCIAL

## 2024-10-16 VITALS
RESPIRATION RATE: 16 BRPM | SYSTOLIC BLOOD PRESSURE: 116 MMHG | BODY MASS INDEX: 40.43 KG/M2 | DIASTOLIC BLOOD PRESSURE: 72 MMHG | HEART RATE: 91 BPM | TEMPERATURE: 97.6 F | OXYGEN SATURATION: 97 % | WEIGHT: 315 LBS | HEIGHT: 74 IN

## 2024-10-16 DIAGNOSIS — M25.531 RIGHT WRIST PAIN: Primary | ICD-10-CM

## 2024-10-16 DIAGNOSIS — Z91.148 NONCOMPLIANCE WITH MEDICATION REGIMEN: ICD-10-CM

## 2024-10-16 DIAGNOSIS — M25.531 RIGHT WRIST PAIN: ICD-10-CM

## 2024-10-16 DIAGNOSIS — E11.9 TYPE 2 DIABETES MELLITUS WITHOUT COMPLICATION, WITHOUT LONG-TERM CURRENT USE OF INSULIN (H): ICD-10-CM

## 2024-10-16 PROCEDURE — G2211 COMPLEX E/M VISIT ADD ON: HCPCS | Performed by: INTERNAL MEDICINE

## 2024-10-16 PROCEDURE — 73110 X-RAY EXAM OF WRIST: CPT | Mod: RT

## 2024-10-16 PROCEDURE — 99214 OFFICE O/P EST MOD 30 MIN: CPT | Performed by: INTERNAL MEDICINE

## 2024-10-16 ASSESSMENT — PATIENT HEALTH QUESTIONNAIRE - PHQ9
10. IF YOU CHECKED OFF ANY PROBLEMS, HOW DIFFICULT HAVE THESE PROBLEMS MADE IT FOR YOU TO DO YOUR WORK, TAKE CARE OF THINGS AT HOME, OR GET ALONG WITH OTHER PEOPLE: SOMEWHAT DIFFICULT
SUM OF ALL RESPONSES TO PHQ QUESTIONS 1-9: 6
SUM OF ALL RESPONSES TO PHQ QUESTIONS 1-9: 6

## 2024-10-16 ASSESSMENT — PAIN SCALES - GENERAL: PAINLEVEL: MILD PAIN (3)

## 2024-10-16 NOTE — PROGRESS NOTES
"      Tahira Burden is a 34 year old, presenting for the following health issues:  Wrist Pain      10/16/2024     2:10 PM   Additional Questions   Roomed by Alexsandra HARO   Accompanied by Staff-Pita     History of Present Illness       Reason for visit:  Wrist hurts  Symptom onset:  More than a month  Symptom intensity:  Moderate  Symptom progression:  Staying the same  Had these symptoms before:  Yes  Has tried/received treatment for these symptoms:  No  What makes it worse:  Using the wrist   He is taking medications regularly.              EMR reviewed including:             Complaint, History of Chief Complaint, Corresponding Review of Systems, and Complaint Specific Physical Examination.    #1   Pain in the right wrist.  Has been present for over a month.  May have been somewhat inflamed previously.  Is not inflamed now.  Patient notes no history of recent trauma.  Pain is localized in the dorsal aspect of the wrist.  No crepitance on motion.  No angulation, ecchymosis, or hand weakness.  No evidence of carpal tunnel syndrome.  Phalen's and Tinel's tests are negative.      #2   Follow-up on type 2 diabetes.  Noncompliant with diet.   notes that he eats what ever he wants and what ever quantities he chooses.  Patient acknowledges the same.  Patient acknowledges that he is also aware of the natural history of uncontrolled diabetes with respect to vascular disease, infection, renal failure, and of course the ever popular and always potential \"death\".          Exam:   LUNGS: clear bilaterally, airflow is brisk, no intercostal retraction or stridor is noted. No coughing is noted during visit.   HEART:  regular without rubs, clicks, gallops, or murmurs. PMI is nondisplaced. Upstrokes are brisk. S1,S2 are heard.   GI: Abdomen is soft, without rebound, guarding or tenderness. Bowel sounds are appropriate. No renal bruits are heard.  Weight is down 3 pounds.  He is now at 337.        Patient has been " "interviewed, applicable history and applied review of systems have been performed.    Vital Signs:   /72   Pulse 91   Temp 97.6  F (36.4  C) (Temporal)   Resp 16   Ht 1.88 m (6' 2\")   Wt (!) 152.9 kg (337 lb)   SpO2 97%   BMI 43.27 kg/m        Decision Making    Problem and Complexity     1. Right wrist pain  Will obtain uric acid level at next blood draw.  This has already been ordered by his diabetic counselor.  Unlikely gout based on current findings though may have been worse previously.  Check x-ray of the wrist for occult fracture as patient is a equivocal historian.  - Uric acid; Future  - XR Wrist Right G/E 3 Views; Future    2. Type 2 diabetes mellitus without complication, without long-term current use of insulin (H)  Await blood work.  Reinforced the need for compliance.  Reiterated all the terrible and nasty things that are associated with uncontrolled diabetes.    3. Noncompliance with medication regimen  As above                                  FOLLOW UP   I have asked the patient to make an appointment for followup with me in 3 months to reevaluate his blood sugar management and continue his ongoing longitudinal medical care.    Regarding routine vaccinations:  I have reviewed the patient's vaccination schedule and discussed the benefits of prophylactic vaccination in detail.  I recommend the patient contact their pharmacist for vaccinations.  Discussed that most insurance companies now favor reimbursement to the pharmacies and it will financially behoove the patient to have vaccinations performed at their pharmacy.        I have carefully explained the diagnosis and treatment options to the patient.  The patient has displayed an understanding of the above, and all subsequent questions were answered.      DO DONNELL Jin    Portions of this note were produced using [employer]  Although every attempt at real-time proof reading has been made, occasional grammar/syntax " errors may have been missed.

## 2024-10-17 ENCOUNTER — TELEPHONE (OUTPATIENT)
Dept: INTERNAL MEDICINE | Facility: CLINIC | Age: 34
End: 2024-10-17
Payer: COMMERCIAL

## 2024-10-17 DIAGNOSIS — M25.531 RIGHT WRIST PAIN: Primary | ICD-10-CM

## 2024-10-17 RX ORDER — OMEGA-3 FATTY ACIDS/FISH OIL 300-1000MG
CAPSULE ORAL
COMMUNITY
Start: 2024-10-17

## 2024-10-17 NOTE — TELEPHONE ENCOUNTER
Call from [facility] - patient's group home.     He was seen in office yesterday for wrist pain. Patient was given hand-written directions for taking ibuprofen BID for 7 days. Group home staff is unable to make out any further directions regarding dosing, writing is difficult to read.     They are requesting the following be faxed to [phone]  Typed orders for ibuprofen and signed by provider  Copy of AVS    Sue SZYMANSKIN, RN

## 2024-10-18 NOTE — TELEPHONE ENCOUNTER
Group home notified.    Ibuprofen is 200 mg take 2 pills twice daily for 7 days per medication list.  Radha Bell RN on 10/18/2024 at 3:07 PM

## 2024-10-18 NOTE — TELEPHONE ENCOUNTER
It is a recommendation.  If his wrist does not hurt, do not take the medicine.    As with all medication that can be purchased in the grocery store, a certain amount of patient discretion is required.    Jordon

## 2024-10-18 NOTE — TELEPHONE ENCOUNTER
Chiara is calling today to find out if the ibuprofen is a recommendation or a scheduled prescription?    Radha Bell RN on 10/18/2024 at 10:20 AM

## 2024-10-31 ENCOUNTER — MYC MEDICAL ADVICE (OUTPATIENT)
Dept: INTERNAL MEDICINE | Facility: CLINIC | Age: 34
End: 2024-10-31
Payer: COMMERCIAL

## 2024-10-31 NOTE — LETTER
" 10/31/2024      Bertram Foreman  [address] [facility] [address]                To Whom it May Concern -      He may take his morning medications when he wakes up.        He should not take them when he is sleeping.           Please send this official \"order\" to the patient's group home.            Sincerely,        Tremaine Ruvalcaba, DO      "

## 2024-11-01 ENCOUNTER — LAB (OUTPATIENT)
Dept: LAB | Facility: CLINIC | Age: 34
End: 2024-11-01
Payer: COMMERCIAL

## 2024-11-01 DIAGNOSIS — M25.531 RIGHT WRIST PAIN: ICD-10-CM

## 2024-11-01 DIAGNOSIS — Z79.899 DRUG THERAPY: ICD-10-CM

## 2024-11-01 DIAGNOSIS — Z79.899 HIGH RISK MEDICATION USE: ICD-10-CM

## 2024-11-01 LAB
ALBUMIN SERPL BCG-MCNC: 4.2 G/DL (ref 3.5–5.2)
ALP SERPL-CCNC: 77 U/L (ref 40–150)
ALT SERPL W P-5'-P-CCNC: 26 U/L (ref 0–70)
AMYLASE SERPL-CCNC: 52 U/L (ref 28–100)
AST SERPL W P-5'-P-CCNC: 15 U/L (ref 0–45)
BASOPHILS # BLD AUTO: 0 10E3/UL (ref 0–0.2)
BASOPHILS NFR BLD AUTO: 1 %
BILIRUB DIRECT SERPL-MCNC: <0.2 MG/DL (ref 0–0.3)
BILIRUB SERPL-MCNC: 0.2 MG/DL
CHOLEST SERPL-MCNC: 175 MG/DL
EOSINOPHIL # BLD AUTO: 0.1 10E3/UL (ref 0–0.7)
EOSINOPHIL NFR BLD AUTO: 1 %
ERYTHROCYTE [DISTWIDTH] IN BLOOD BY AUTOMATED COUNT: 13.9 % (ref 10–15)
EST. AVERAGE GLUCOSE BLD GHB EST-MCNC: 232 MG/DL
FASTING STATUS PATIENT QL REPORTED: YES
FASTING STATUS PATIENT QL REPORTED: YES
GLUCOSE SERPL-MCNC: 230 MG/DL (ref 70–99)
HBA1C MFR BLD: 9.7 %
HCT VFR BLD AUTO: 38.9 % (ref 40–53)
HDLC SERPL-MCNC: 40 MG/DL
HGB BLD-MCNC: 12.4 G/DL (ref 13.3–17.7)
IMM GRANULOCYTES # BLD: 0 10E3/UL
IMM GRANULOCYTES NFR BLD: 1 %
LDLC SERPL CALC-MCNC: 107 MG/DL
LYMPHOCYTES # BLD AUTO: 2.5 10E3/UL (ref 0.8–5.3)
LYMPHOCYTES NFR BLD AUTO: 35 %
MCH RBC QN AUTO: 26.7 PG (ref 26.5–33)
MCHC RBC AUTO-ENTMCNC: 31.9 G/DL (ref 31.5–36.5)
MCV RBC AUTO: 84 FL (ref 78–100)
MONOCYTES # BLD AUTO: 0.5 10E3/UL (ref 0–1.3)
MONOCYTES NFR BLD AUTO: 7 %
NEUTROPHILS # BLD AUTO: 4.1 10E3/UL (ref 1.6–8.3)
NEUTROPHILS NFR BLD AUTO: 56 %
NONHDLC SERPL-MCNC: 135 MG/DL
NRBC # BLD AUTO: 0 10E3/UL
NRBC BLD AUTO-RTO: 0 /100
PLATELET # BLD AUTO: 204 10E3/UL (ref 150–450)
PROT SERPL-MCNC: 7.9 G/DL (ref 6.4–8.3)
RBC # BLD AUTO: 4.65 10E6/UL (ref 4.4–5.9)
TRIGL SERPL-MCNC: 142 MG/DL
URATE SERPL-MCNC: 5.1 MG/DL (ref 3.4–7)
VALPROATE SERPL-MCNC: 63.4 UG/ML
WBC # BLD AUTO: 7.3 10E3/UL (ref 4–11)

## 2024-11-01 PROCEDURE — 80164 ASSAY DIPROPYLACETIC ACD TOT: CPT

## 2024-11-01 PROCEDURE — 36415 COLL VENOUS BLD VENIPUNCTURE: CPT

## 2024-11-01 PROCEDURE — 83036 HEMOGLOBIN GLYCOSYLATED A1C: CPT

## 2024-11-01 PROCEDURE — 80076 HEPATIC FUNCTION PANEL: CPT

## 2024-11-01 PROCEDURE — 85025 COMPLETE CBC W/AUTO DIFF WBC: CPT

## 2024-11-01 PROCEDURE — 82947 ASSAY GLUCOSE BLOOD QUANT: CPT

## 2024-11-01 PROCEDURE — 84146 ASSAY OF PROLACTIN: CPT

## 2024-11-01 PROCEDURE — 84550 ASSAY OF BLOOD/URIC ACID: CPT

## 2024-11-01 PROCEDURE — 82150 ASSAY OF AMYLASE: CPT

## 2024-11-01 PROCEDURE — 80061 LIPID PANEL: CPT

## 2024-11-02 LAB — PROLACTIN SERPL 3RD IS-MCNC: 10 NG/ML (ref 4–15)

## 2024-11-07 NOTE — TELEPHONE ENCOUNTER
"This is an official \"order\".  Please send this to the patient's group home.      He may take his morning medications when he wakes up.      He should not take them when he is sleeping.        Please send this unofficial \"order\" to the patient's group home.    Sincerely,   Jordon"

## 2024-12-15 ENCOUNTER — HOSPITAL ENCOUNTER (EMERGENCY)
Facility: CLINIC | Age: 34
Discharge: HOME | End: 2024-12-15
Attending: EMERGENCY MEDICINE | Admitting: EMERGENCY MEDICINE
Payer: COMMERCIAL

## 2024-12-15 ENCOUNTER — APPOINTMENT (OUTPATIENT)
Dept: GENERAL RADIOLOGY | Facility: CLINIC | Age: 34
End: 2024-12-15
Attending: EMERGENCY MEDICINE
Payer: COMMERCIAL

## 2024-12-15 VITALS
HEART RATE: 74 BPM | WEIGHT: 315 LBS | BODY MASS INDEX: 40.43 KG/M2 | HEIGHT: 74 IN | OXYGEN SATURATION: 94 % | RESPIRATION RATE: 18 BRPM | SYSTOLIC BLOOD PRESSURE: 134 MMHG | DIASTOLIC BLOOD PRESSURE: 64 MMHG | TEMPERATURE: 98.7 F

## 2024-12-15 DIAGNOSIS — M25.532 LEFT WRIST PAIN: ICD-10-CM

## 2024-12-15 DIAGNOSIS — M11.20 CHONDROCALCINOSIS: ICD-10-CM

## 2024-12-15 LAB
ANION GAP SERPL CALCULATED.3IONS-SCNC: 15 MMOL/L (ref 7–15)
BASOPHILS # BLD AUTO: 0.1 10E3/UL (ref 0–0.2)
BASOPHILS NFR BLD AUTO: 1 %
BUN SERPL-MCNC: 15.6 MG/DL (ref 6–20)
CALCIUM SERPL-MCNC: 9.8 MG/DL (ref 8.8–10.4)
CHLORIDE SERPL-SCNC: 93 MMOL/L (ref 98–107)
CREAT SERPL-MCNC: 0.58 MG/DL (ref 0.67–1.17)
EGFRCR SERPLBLD CKD-EPI 2021: >90 ML/MIN/1.73M2
EOSINOPHIL # BLD AUTO: 0.1 10E3/UL (ref 0–0.7)
EOSINOPHIL NFR BLD AUTO: 1 %
ERYTHROCYTE [DISTWIDTH] IN BLOOD BY AUTOMATED COUNT: 14.4 % (ref 10–15)
GLUCOSE SERPL-MCNC: 302 MG/DL (ref 70–99)
HCO3 SERPL-SCNC: 25 MMOL/L (ref 22–29)
HCT VFR BLD AUTO: 39.4 % (ref 40–53)
HGB BLD-MCNC: 12.7 G/DL (ref 13.3–17.7)
HOLD SPECIMEN: NORMAL
HOLD SPECIMEN: NORMAL
IMM GRANULOCYTES # BLD: 0.1 10E3/UL
IMM GRANULOCYTES NFR BLD: 1 %
LYMPHOCYTES # BLD AUTO: 3.3 10E3/UL (ref 0.8–5.3)
LYMPHOCYTES NFR BLD AUTO: 39 %
MCH RBC QN AUTO: 27.3 PG (ref 26.5–33)
MCHC RBC AUTO-ENTMCNC: 32.2 G/DL (ref 31.5–36.5)
MCV RBC AUTO: 85 FL (ref 78–100)
MONOCYTES # BLD AUTO: 0.6 10E3/UL (ref 0–1.3)
MONOCYTES NFR BLD AUTO: 8 %
NEUTROPHILS # BLD AUTO: 4.3 10E3/UL (ref 1.6–8.3)
NEUTROPHILS NFR BLD AUTO: 51 %
NRBC # BLD AUTO: 0 10E3/UL
NRBC BLD AUTO-RTO: 0 /100
PLATELET # BLD AUTO: 219 10E3/UL (ref 150–450)
POTASSIUM SERPL-SCNC: 4.3 MMOL/L (ref 3.4–5.3)
RBC # BLD AUTO: 4.66 10E6/UL (ref 4.4–5.9)
SODIUM SERPL-SCNC: 133 MMOL/L (ref 135–145)
URATE SERPL-MCNC: 4.9 MG/DL (ref 3.4–7)
WBC # BLD AUTO: 8.4 10E3/UL (ref 4–11)

## 2024-12-15 PROCEDURE — 96372 THER/PROPH/DIAG INJ SC/IM: CPT | Performed by: EMERGENCY MEDICINE

## 2024-12-15 PROCEDURE — 73110 X-RAY EXAM OF WRIST: CPT | Mod: LT

## 2024-12-15 PROCEDURE — 99284 EMERGENCY DEPT VISIT MOD MDM: CPT | Performed by: EMERGENCY MEDICINE

## 2024-12-15 PROCEDURE — 36415 COLL VENOUS BLD VENIPUNCTURE: CPT | Performed by: EMERGENCY MEDICINE

## 2024-12-15 PROCEDURE — 85025 COMPLETE CBC W/AUTO DIFF WBC: CPT | Performed by: EMERGENCY MEDICINE

## 2024-12-15 PROCEDURE — 250N000011 HC RX IP 250 OP 636: Performed by: EMERGENCY MEDICINE

## 2024-12-15 PROCEDURE — 84550 ASSAY OF BLOOD/URIC ACID: CPT | Performed by: EMERGENCY MEDICINE

## 2024-12-15 PROCEDURE — 80048 BASIC METABOLIC PNL TOTAL CA: CPT | Performed by: EMERGENCY MEDICINE

## 2024-12-15 RX ORDER — MELOXICAM 15 MG/1
15 TABLET ORAL DAILY
Qty: 10 TABLET | Refills: 0 | Status: SHIPPED | OUTPATIENT
Start: 2024-12-15 | End: 2024-12-25

## 2024-12-15 RX ORDER — KETOROLAC TROMETHAMINE 30 MG/ML
60 INJECTION, SOLUTION INTRAMUSCULAR; INTRAVENOUS ONCE
Status: COMPLETED | OUTPATIENT
Start: 2024-12-15 | End: 2024-12-15

## 2024-12-15 RX ADMIN — KETOROLAC TROMETHAMINE 60 MG: 30 INJECTION, SOLUTION INTRAMUSCULAR at 09:18

## 2024-12-15 ASSESSMENT — COLUMBIA-SUICIDE SEVERITY RATING SCALE - C-SSRS
2. HAVE YOU ACTUALLY HAD ANY THOUGHTS OF KILLING YOURSELF IN THE PAST MONTH?: NO
6. HAVE YOU EVER DONE ANYTHING, STARTED TO DO ANYTHING, OR PREPARED TO DO ANYTHING TO END YOUR LIFE?: NO
1. IN THE PAST MONTH, HAVE YOU WISHED YOU WERE DEAD OR WISHED YOU COULD GO TO SLEEP AND NOT WAKE UP?: NO

## 2024-12-15 ASSESSMENT — ACTIVITIES OF DAILY LIVING (ADL)
ADLS_ACUITY_SCORE: 41

## 2024-12-15 NOTE — ED TRIAGE NOTES
Pt reports that he started experiencing left wrist pain last night that intensified throughout the night. He states that he took Ibuprofen and Tylenol at about 0200 and iced it which has not been helpful. He also reports that his PCP has been looking into a diagnosis of gout.      Triage Assessment (Adult)       Row Name 12/15/24 0815          Triage Assessment    Airway WDL WDL        Respiratory WDL    Respiratory WDL WDL        Skin Circulation/Temperature WDL    Skin Circulation/Temperature WDL WDL        Cardiac WDL    Cardiac WDL WDL        Peripheral/Neurovascular WDL    Peripheral Neurovascular WDL WDL        Cognitive/Neuro/Behavioral WDL    Cognitive/Neuro/Behavioral WDL WDL

## 2024-12-15 NOTE — ED PROVIDER NOTES
History     Chief Complaint   Patient presents with    Wrist Pain     HPI  Bertram Foreman is a 34 year old male who presents with concern of left wrist pain.  Symptoms started last night.  Nothing triggered the symptoms, did not have fall or other injury.  He says that he uses both hands but primarily writes with his left hand.  Has been taking Tylenol and ibuprofen without any relief.  Has not been running a fever.  Is concerned about gout, and says his primary doctor thinks that may be causing his problems.  He has had a similar episode with pain in his right wrist that was attributed to gout.    Allergies:  Allergies   Allergen Reactions    Ceclor [Cefaclor] Swelling    Erythromycin GI Disturbance       Problem List:    Patient Active Problem List    Diagnosis Date Noted    Grief 12/13/2023     Priority: Medium    Type 2 diabetes mellitus with hypoglycemia, without long-term current use of insulin (H) 05/26/2022     Priority: Medium    Sexual dysfunction 05/26/2022     Priority: Medium    Intellectual disability 02/04/2019     Priority: Medium    Recurrent major depressive disorder, in partial remission (H) 02/04/2019     Priority: Medium    Morbid obesity (H) 01/08/2019     Priority: Medium    Morbid obesity with body mass index of 45.0-49.9 in adult (H) 04/04/2017     Priority: Medium    Mantoux: positive, treated 05/01/2011     Priority: Medium     Overview:   5/2011, went through (more than) 9 months of isoniazid.  NOT TO HAVE MANTOUX IN FUTURE      Mild intellectual disabilities 07/15/2010     Priority: Medium        Past Medical History:    Past Medical History:   Diagnosis Date    Depressive disorder All my life almost.    Diabetes (H) sometime in 2019    Sleep apnea        Past Surgical History:    Past Surgical History:   Procedure Laterality Date    APPENDECTOMY  8/15/14    COLONOSCOPY N/A 12/28/2020    Procedure: COLONOSCOPY, WITH BIOPSY;  Surgeon: Mary, MD Haley;  Location: [facility]  "History:    Family History   Problem Relation Age of Onset    Mental Illness Brother     Diabetes No family hx of     Depression No family hx of     Anxiety Disorder No family hx of        Social History:  Marital Status:  Single [1]  Social History     Tobacco Use    Smoking status: Former     Current packs/day: 0.00     Average packs/day: 1 pack/day for 0.2 years (0.2 ttl pk-yrs)     Types: Cigarettes     Start date: 1/1/2014     Quit date: 3/1/2014     Years since quitting: 10.8    Smokeless tobacco: Never   Vaping Use    Vaping status: Never Used   Substance Use Topics    Alcohol use: Yes     Comment: rare    Drug use: No        Medications:    meloxicam (MOBIC) 15 MG tablet  acetaminophen (TYLENOL) 325 MG tablet  atorvastatin (LIPITOR) 20 MG tablet  blood glucose calibration (ONETOUCH VERIO) solution  Blood Glucose Monitoring Suppl (ONETOUCH VERIO FLEX SYSTEM) w/Device KIT  diclofenac (VOLTAREN) 1 % topical gel  divalproex sodium extended-release (DEPAKOTE ER) 500 MG 24 hr tablet  FIBER ADULT GUMMIES PO  fluticasone (FLONASE) 50 MCG/ACT nasal spray  glimepiride (AMARYL) 4 MG tablet  guaiFENesin (ROBITUSSIN) 20 mg/mL liquid  hydrocortisone, Perianal, (HYDROCORTISONE) 2.5 % cream  hydrocortisone, Perianal, (HYDROCORTISONE) 2.5 % cream  ibuprofen (ADVIL/MOTRIN) 200 MG capsule  ibuprofen (ADVIL/MOTRIN) 200 MG tablet  Lancets (ONETOUCH DELICA PLUS [id number]) MISC  omeprazole (PRILOSEC) 20 MG DR capsule  ONETOUCH VERIO IQ test strip  phenylephrine-shark liver oil-mineral oil-petrolatum (PREPARATION H) 0.25-3-14-71.9 % rectal ointment  pioglitazone (ACTOS) 30 MG tablet  QUEtiapine (SEROQUEL) 300 MG tablet  TRAZODONE HCL PO          Review of Systems   All other systems reviewed and are negative.      Physical Exam   BP: 134/64  Pulse: 74  Temp: 98.7  F (37.1  C)  Resp: 18  Height: 188 cm (6' 2\")  Weight: (!) 152.9 kg (337 lb)  SpO2: 96 %      Physical Exam  Vitals and nursing note reviewed.   Constitutional:       " General: He is not in acute distress.     Appearance: He is obese. He is not toxic-appearing.   Cardiovascular:      Rate and Rhythm: Normal rate.      Pulses:           Radial pulses are 2+ on the right side and 2+ on the left side.   Pulmonary:      Effort: Pulmonary effort is normal.   Musculoskeletal:      Right wrist: Tenderness present. No swelling, deformity, snuff box tenderness or crepitus.      Left wrist: Normal.   Skin:     General: Skin is warm and dry.      Capillary Refill: Capillary refill takes less than 2 seconds.      Findings: No bruising, erythema or rash.   Neurological:      Mental Status: He is alert.         ED Course        Procedures              Critical Care time:  none              Results for orders placed or performed during the hospital encounter of 12/15/24 (from the past 24 hours)   XR Wrist Left G/E 3 Views    Narrative    EXAM: XR WRIST LEFT G/E 3 VIEWS  LOCATION: [facility]  DATE: 12/15/2024    INDICATION: Wrist pain, no known injury.  COMPARISON: None.      Impression    IMPRESSION: There is mineralization in the soft tissues along the volar wrist, which can be seen with hydroxyapatite deposition or chondrocalcinosis. There is no evidence of an acute fracture or significant joint space narrowing.     CBC with platelets differential    Narrative    The following orders were created for panel order CBC with platelets differential.  Procedure                               Abnormality         Status                     ---------                               -----------         ------                     CBC with platelets and d...[[id number]]  Abnormal            Final result                 Please view results for these tests on the individual orders.   Basic metabolic panel   Result Value Ref Range    Sodium 133 (L) 135 - 145 mmol/L    Potassium 4.3 3.4 - 5.3 mmol/L    Chloride 93 (L) 98 - 107 mmol/L    Carbon Dioxide (CO2) 25 22 - 29 mmol/L     Anion Gap 15 7 - 15 mmol/L    Urea Nitrogen 15.6 6.0 - 20.0 mg/dL    Creatinine 0.58 (L) 0.67 - 1.17 mg/dL    GFR Estimate >90 >60 mL/min/1.73m2    Calcium 9.8 8.8 - 10.4 mg/dL    Glucose 302 (H) 70 - 99 mg/dL   Uric acid   Result Value Ref Range    Uric Acid 4.9 3.4 - 7.0 mg/dL   Extra Tube    Narrative    The following orders were created for panel order Extra Tube.  Procedure                               Abnormality         Status                     ---------                               -----------         ------                     Extra Blue Top Tube[[id number]]                              Final result               Extra Purple Top Tube[[id number]]                            Final result                 Please view results for these tests on the individual orders.   Extra Blue Top Tube   Result Value Ref Range    Hold Specimen JIC    Extra Purple Top Tube   Result Value Ref Range    Hold Specimen JIC    CBC with platelets and differential   Result Value Ref Range    WBC Count 8.4 4.0 - 11.0 10e3/uL    RBC Count 4.66 4.40 - 5.90 10e6/uL    Hemoglobin 12.7 (L) 13.3 - 17.7 g/dL    Hematocrit 39.4 (L) 40.0 - 53.0 %    MCV 85 78 - 100 fL    MCH 27.3 26.5 - 33.0 pg    MCHC 32.2 31.5 - 36.5 g/dL    RDW 14.4 10.0 - 15.0 %    Platelet Count 219 150 - 450 10e3/uL    % Neutrophils 51 %    % Lymphocytes 39 %    % Monocytes 8 %    % Eosinophils 1 %    % Basophils 1 %    % Immature Granulocytes 1 %    NRBCs per 100 WBC 0 <1 /100    Absolute Neutrophils 4.3 1.6 - 8.3 10e3/uL    Absolute Lymphocytes 3.3 0.8 - 5.3 10e3/uL    Absolute Monocytes 0.6 0.0 - 1.3 10e3/uL    Absolute Eosinophils 0.1 0.0 - 0.7 10e3/uL    Absolute Basophils 0.1 0.0 - 0.2 10e3/uL    Absolute Immature Granulocytes 0.1 <=0.4 10e3/uL    Absolute NRBCs 0.0 10e3/uL       Medications   ketorolac (TORADOL) injection 60 mg (60 mg Intramuscular $Given 12/15/24 2786)       Assessments & Plan (with Medical Decision Making)  Bertram is a 34-year-old male presenting  via EMS from group home over concern of left wrist pain that started last night.  See history and physical exam as above.  34-year-old male in no acute distress, is vitally stable and afebrile.  Does not have any obvious deformity of his wrists, normal cap refill and strong radial pulses bilaterally.  There is no overlying erythema or skin lesion.  Has tenderness of his left wrist in general but no snuffbox tenderness.  Has decreased range of motion secondary to pain.  No pain at his elbow or shoulder and has normal range of motion of both these joints.  Will plan to check blood work including a uric acid level and get an x-ray.  Will give him a dose of Toradol to help with pain.  X-ray results as above.  He does have some mineralization that could be attributed to chondrocalcinosis.  There is no evidence of fracture or dislocation  Lab results as above.  Uric acid level is within normal limits, CBC and metabolic panel are generally within normal limits.  Had a slightly low sodium at 133.  Glucose is elevated at 302, but it is noted that patient has diagnosis of type 2 diabetes.  His symptoms could be related to gout even with a normal uric acid level, but could also be related to findings of chondrocalcinosis noted on x-ray.  Will plan to treat with anti-inflammatory medication and have him follow-up with his primary provider.  Contacted his group home to get a ride home since he was brought in by ambulance.  Discharged in stable condition     I have reviewed the nursing notes.    I have reviewed the findings, diagnosis, plan and need for follow up with the patient.           Medical Decision Making  The patient's presentation was of low complexity (an acute and uncomplicated illness or injury).    The patient's evaluation involved:  ordering and/or review of 3+ test(s) in this encounter (see separate area of note for details)    The patient's management necessitated moderate risk (prescription drug management  including medications given in the ED).        Discharge Medication List as of 12/15/2024  9:50 AM        START taking these medications    Details   meloxicam (MOBIC) 15 MG tablet Take 1 tablet (15 mg) by mouth daily for 10 days., Disp-10 tablet, R-0, E-Prescribe             Final diagnoses:   Left wrist pain   Chondrocalcinosis       12/15/2024   [facility] EMERGENCY DEPT       Betty Aceves DO  12/15/24 1125

## 2024-12-15 NOTE — DISCHARGE INSTRUCTIONS
Your x-ray did not show any sign of broken bones or dislocation.  You do have some hardening deposits in the wrist, and this could be due to to stressors or injury.  This could be causing your pain.    Your lab work was normal including a uric acid level.  However, even though uric acid level is normal, you could still have gout.    Both of these possible conditions can be treated with anti-inflammatory medication.  Prescription was sent to the pharmacy that you can start today.  It will be 1 tablet daily which you can take with your morning medication    You should not take ibuprofen when you are taking this medicine since they are similar and can cause upset stomach.  It is okay to still take Tylenol and all of your other medications as prescribed    Follow-up with your primary doctor within 1 to 2 weeks    If you develop any significant new or worsening symptoms do not hesitate to return to the emergency room for evaluation

## 2025-01-08 ENCOUNTER — TELEPHONE (OUTPATIENT)
Dept: INTERNAL MEDICINE | Facility: CLINIC | Age: 35
End: 2025-01-08
Payer: COMMERCIAL

## 2025-01-08 NOTE — TELEPHONE ENCOUNTER
Patient has had an itchy irritated left eye for about a month.  Staff states doesn't appear to be pink eye.  She doesn't believe he's having any vision concerns.  Doesn't feel there's any drainage, no redness of whites of eye or around eye.  No swelling around eye.  No known injury.  Did offer appointment on 1/9.  She states has no staff available.  Offered following week;  she declined.  [facility] has appointment already scheduled end of January; [facility] will discuss at that appointment.  Marie LO RN

## 2025-01-14 ENCOUNTER — TELEPHONE (OUTPATIENT)
Dept: INTERNAL MEDICINE | Facility: CLINIC | Age: 35
End: 2025-01-14
Payer: COMMERCIAL

## 2025-01-14 NOTE — TELEPHONE ENCOUNTER
Reason for Call:  Other appointment    Detailed comments: Rsi calling to schedule a hospital follow up would like appts on Tuesday or Wednesday afternoons please call and advise not appts     Phone Number Patient can be reached at: Other phone number:  [phone]    Best Time: anytime leave note for Keri    Can we leave a detailed message on this number? YES    Call taken on 1/14/2025 at 12:34 PM by Alejandro Valenzuela

## 2025-01-15 DIAGNOSIS — E78.5 HYPERLIPIDEMIA LDL GOAL <130: ICD-10-CM

## 2025-01-15 RX ORDER — ATORVASTATIN CALCIUM 20 MG/1
TABLET, FILM COATED ORAL
Qty: 90 TABLET | Refills: 1 | Status: SHIPPED | OUTPATIENT
Start: 2025-01-15

## 2025-01-21 ENCOUNTER — HOSPITAL ENCOUNTER (EMERGENCY)
Facility: CLINIC | Age: 35
Discharge: HOME | End: 2025-01-21
Attending: STUDENT IN AN ORGANIZED HEALTH CARE EDUCATION/TRAINING PROGRAM | Admitting: STUDENT IN AN ORGANIZED HEALTH CARE EDUCATION/TRAINING PROGRAM
Payer: COMMERCIAL

## 2025-01-21 VITALS
SYSTOLIC BLOOD PRESSURE: 135 MMHG | DIASTOLIC BLOOD PRESSURE: 102 MMHG | OXYGEN SATURATION: 97 % | RESPIRATION RATE: 20 BRPM | HEART RATE: 88 BPM | TEMPERATURE: 98.9 F

## 2025-01-21 DIAGNOSIS — R45.851 SUICIDAL IDEATION: ICD-10-CM

## 2025-01-21 PROBLEM — F33.41 RECURRENT MAJOR DEPRESSIVE DISORDER, IN PARTIAL REMISSION: Status: ACTIVE | Noted: 2019-02-04

## 2025-01-21 PROCEDURE — 99283 EMERGENCY DEPT VISIT LOW MDM: CPT | Performed by: STUDENT IN AN ORGANIZED HEALTH CARE EDUCATION/TRAINING PROGRAM

## 2025-01-21 PROCEDURE — 250N000009 HC RX 250: Performed by: STUDENT IN AN ORGANIZED HEALTH CARE EDUCATION/TRAINING PROGRAM

## 2025-01-21 RX ORDER — GINSENG 100 MG
CAPSULE ORAL ONCE
Status: COMPLETED | OUTPATIENT
Start: 2025-01-21 | End: 2025-01-21

## 2025-01-21 RX ADMIN — BACITRACIN: 500 OINTMENT TOPICAL at 21:06

## 2025-01-21 ASSESSMENT — ACTIVITIES OF DAILY LIVING (ADL): ADLS_ACUITY_SCORE: 41

## 2025-01-22 NOTE — ED NOTES
Group home staff phoned to let us know that they are on their way to pick pt up from the ED and transport him back to the home
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
## 2025-01-22 NOTE — ED NOTES
Writer spoke with An ([facility] staff) whom had called the ED inquiring on pt's condition. An states that pt's guardian has been updated and is aware that pt is currently being treated in the ED.

## 2025-01-27 ENCOUNTER — TELEPHONE (OUTPATIENT)
Dept: INTERNAL MEDICINE | Facility: CLINIC | Age: 35
End: 2025-01-27

## 2025-01-27 NOTE — TELEPHONE ENCOUNTER
Chiara with [facility] at group home calling. No C2C on file. Writer asked if patient was with caller to verify consent to communicate. Patient not available. Writer advised a message could be taken.    Chiara states patient had annual appointment with PCP scheduled for 1/29/25 but appointment was cancelled and Chiara is asking why. On chart review, appears appointment was cancelled on 1/21/25 via [employer] but no reason why.     Attempted to call patient. No answer. Left voicemail to call back.     Upon callback, see if patient cancelled appointment.     Vanessa Narvaez RN on 1/27/2025 at 8:54 AM

## 2025-01-27 NOTE — TELEPHONE ENCOUNTER
Patient called back and confirmed that he wanted the appointment with Dr. Ruvalcaba cancelled and no longer wants to see this provider and has another appointment scheduled with Dr. Hart.

## 2025-01-30 ENCOUNTER — TELEPHONE (OUTPATIENT)
Dept: INTERNAL MEDICINE | Facility: CLINIC | Age: 35
End: 2025-01-30
Payer: COMMERCIAL

## 2025-01-30 NOTE — TELEPHONE ENCOUNTER
INCOMING FORMS    Sender: residential services    Type of Form, letter or note (What is requested?): orders    How was the form received?: Fax    How should forms be returned?:  Fax : [phone]    Form placed in TC hold  bin for review/signature if appropriate.       Pending until appt on 2/14. In TC hold bin.

## 2025-02-03 ENCOUNTER — HOSPITAL ENCOUNTER (EMERGENCY)
Facility: CLINIC | Age: 35
Discharge: HOME | End: 2025-02-04
Attending: EMERGENCY MEDICINE | Admitting: EMERGENCY MEDICINE
Payer: COMMERCIAL

## 2025-02-03 DIAGNOSIS — T50.902A SUICIDE ATTEMPT BY DRUG INGESTION, INITIAL ENCOUNTER (H): ICD-10-CM

## 2025-02-03 LAB
ALBUMIN SERPL BCG-MCNC: 4.1 G/DL (ref 3.5–5.2)
ALP SERPL-CCNC: 73 U/L (ref 40–150)
ALT SERPL W P-5'-P-CCNC: 20 U/L (ref 0–70)
AMPHETAMINES UR QL SCN: ABNORMAL
ANION GAP SERPL CALCULATED.3IONS-SCNC: 17 MMOL/L (ref 7–15)
ANION GAP SERPL CALCULATED.3IONS-SCNC: 17 MMOL/L (ref 7–15)
APAP SERPL-MCNC: <5 UG/ML (ref 10–30)
APAP SERPL-MCNC: <5 UG/ML (ref 10–30)
AST SERPL W P-5'-P-CCNC: 11 U/L (ref 0–45)
BARBITURATES UR QL SCN: ABNORMAL
BASOPHILS # BLD AUTO: 0.1 10E3/UL (ref 0–0.2)
BASOPHILS NFR BLD AUTO: 1 %
BENZODIAZ UR QL SCN: ABNORMAL
BILIRUB SERPL-MCNC: 0.2 MG/DL
BUN SERPL-MCNC: 11.1 MG/DL (ref 6–20)
BUN SERPL-MCNC: 12.3 MG/DL (ref 6–20)
BZE UR QL SCN: ABNORMAL
CALCIUM SERPL-MCNC: 8.8 MG/DL (ref 8.8–10.4)
CALCIUM SERPL-MCNC: 9.3 MG/DL (ref 8.8–10.4)
CANNABINOIDS UR QL SCN: ABNORMAL
CHLORIDE SERPL-SCNC: 96 MMOL/L (ref 98–107)
CHLORIDE SERPL-SCNC: 97 MMOL/L (ref 98–107)
CREAT SERPL-MCNC: 0.66 MG/DL (ref 0.67–1.17)
CREAT SERPL-MCNC: 0.95 MG/DL (ref 0.67–1.17)
EGFRCR SERPLBLD CKD-EPI 2021: >90 ML/MIN/1.73M2
EGFRCR SERPLBLD CKD-EPI 2021: >90 ML/MIN/1.73M2
EOSINOPHIL # BLD AUTO: 0.1 10E3/UL (ref 0–0.7)
EOSINOPHIL NFR BLD AUTO: 1 %
ERYTHROCYTE [DISTWIDTH] IN BLOOD BY AUTOMATED COUNT: 12.7 % (ref 10–15)
ETHANOL SERPL-MCNC: <0.01 G/DL
FENTANYL UR QL: ABNORMAL
GLUCOSE SERPL-MCNC: 209 MG/DL (ref 70–99)
GLUCOSE SERPL-MCNC: 245 MG/DL (ref 70–99)
HCO3 SERPL-SCNC: 23 MMOL/L (ref 22–29)
HCO3 SERPL-SCNC: 24 MMOL/L (ref 22–29)
HCT VFR BLD AUTO: 38.8 % (ref 40–53)
HGB BLD-MCNC: 12.6 G/DL (ref 13.3–17.7)
IMM GRANULOCYTES # BLD: 0.1 10E3/UL
IMM GRANULOCYTES NFR BLD: 1 %
LYMPHOCYTES # BLD AUTO: 2.6 10E3/UL (ref 0.8–5.3)
LYMPHOCYTES NFR BLD AUTO: 28 %
MCH RBC QN AUTO: 27.5 PG (ref 26.5–33)
MCHC RBC AUTO-ENTMCNC: 32.5 G/DL (ref 31.5–36.5)
MCV RBC AUTO: 85 FL (ref 78–100)
MONOCYTES # BLD AUTO: 0.7 10E3/UL (ref 0–1.3)
MONOCYTES NFR BLD AUTO: 8 %
NEUTROPHILS # BLD AUTO: 5.8 10E3/UL (ref 1.6–8.3)
NEUTROPHILS NFR BLD AUTO: 62 %
NRBC # BLD AUTO: 0 10E3/UL
NRBC BLD AUTO-RTO: 0 /100
OPIATES UR QL SCN: ABNORMAL
PCP QUAL URINE (ROCHE): ABNORMAL
PLATELET # BLD AUTO: 181 10E3/UL (ref 150–450)
POTASSIUM SERPL-SCNC: 4 MMOL/L (ref 3.4–5.3)
POTASSIUM SERPL-SCNC: 4.3 MMOL/L (ref 3.4–5.3)
PROT SERPL-MCNC: 7.4 G/DL (ref 6.4–8.3)
RBC # BLD AUTO: 4.58 10E6/UL (ref 4.4–5.9)
SALICYLATES SERPL-MCNC: <0.3 MG/DL
SODIUM SERPL-SCNC: 137 MMOL/L (ref 135–145)
SODIUM SERPL-SCNC: 137 MMOL/L (ref 135–145)
WBC # BLD AUTO: 9.2 10E3/UL (ref 4–11)

## 2025-02-03 PROCEDURE — 80143 DRUG ASSAY ACETAMINOPHEN: CPT | Performed by: EMERGENCY MEDICINE

## 2025-02-03 PROCEDURE — 82077 ASSAY SPEC XCP UR&BREATH IA: CPT | Performed by: EMERGENCY MEDICINE

## 2025-02-03 PROCEDURE — 82310 ASSAY OF CALCIUM: CPT | Performed by: EMERGENCY MEDICINE

## 2025-02-03 PROCEDURE — 36415 COLL VENOUS BLD VENIPUNCTURE: CPT | Performed by: EMERGENCY MEDICINE

## 2025-02-03 PROCEDURE — 80307 DRUG TEST PRSMV CHEM ANLYZR: CPT | Performed by: EMERGENCY MEDICINE

## 2025-02-03 PROCEDURE — 85004 AUTOMATED DIFF WBC COUNT: CPT | Performed by: EMERGENCY MEDICINE

## 2025-02-03 PROCEDURE — 80179 DRUG ASSAY SALICYLATE: CPT | Performed by: EMERGENCY MEDICINE

## 2025-02-03 PROCEDURE — 82040 ASSAY OF SERUM ALBUMIN: CPT | Performed by: EMERGENCY MEDICINE

## 2025-02-03 PROCEDURE — 99283 EMERGENCY DEPT VISIT LOW MDM: CPT | Performed by: EMERGENCY MEDICINE

## 2025-02-03 PROCEDURE — 82374 ASSAY BLOOD CARBON DIOXIDE: CPT | Performed by: EMERGENCY MEDICINE

## 2025-02-03 PROCEDURE — 99285 EMERGENCY DEPT VISIT HI MDM: CPT | Performed by: EMERGENCY MEDICINE

## 2025-02-03 RX ORDER — ONDANSETRON 4 MG/1
4 TABLET, ORALLY DISINTEGRATING ORAL
Status: DISCONTINUED | OUTPATIENT
Start: 2025-02-03 | End: 2025-02-04 | Stop reason: HOSPADM

## 2025-02-03 ASSESSMENT — ACTIVITIES OF DAILY LIVING (ADL)
ADLS_ACUITY_SCORE: 41

## 2025-02-03 ASSESSMENT — COLUMBIA-SUICIDE SEVERITY RATING SCALE - C-SSRS
6. HAVE YOU EVER DONE ANYTHING, STARTED TO DO ANYTHING, OR PREPARED TO DO ANYTHING TO END YOUR LIFE?: YES
1. IN THE PAST MONTH, HAVE YOU WISHED YOU WERE DEAD OR WISHED YOU COULD GO TO SLEEP AND NOT WAKE UP?: YES
2. HAVE YOU ACTUALLY HAD ANY THOUGHTS OF KILLING YOURSELF IN THE PAST MONTH?: YES
4. HAVE YOU HAD THESE THOUGHTS AND HAD SOME INTENTION OF ACTING ON THEM?: YES
3. HAVE YOU BEEN THINKING ABOUT HOW YOU MIGHT KILL YOURSELF?: YES
5. HAVE YOU STARTED TO WORK OUT OR WORKED OUT THE DETAILS OF HOW TO KILL YOURSELF? DO YOU INTEND TO CARRY OUT THIS PLAN?: YES
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
## 2025-02-03 NOTE — Clinical Note
Bertram Foreman was seen and treated in our emergency department on 2/3/2025.  He may return to work on 02/06/2025.       If you have any questions or concerns, please don't hesitate to call.      Bertram Spencer MD

## 2025-02-04 VITALS
OXYGEN SATURATION: 97 % | WEIGHT: 315 LBS | TEMPERATURE: 98 F | SYSTOLIC BLOOD PRESSURE: 111 MMHG | HEART RATE: 81 BPM | BODY MASS INDEX: 43.27 KG/M2 | RESPIRATION RATE: 18 BRPM | DIASTOLIC BLOOD PRESSURE: 66 MMHG

## 2025-02-04 PROBLEM — F32.A DEPRESSION: Status: ACTIVE | Noted: 2025-02-04

## 2025-02-04 LAB — GLUCOSE BLDC GLUCOMTR-MCNC: 218 MG/DL (ref 70–99)

## 2025-02-04 PROCEDURE — 250N000013 HC RX MED GY IP 250 OP 250 PS 637: Performed by: FAMILY MEDICINE

## 2025-02-04 PROCEDURE — 82962 GLUCOSE BLOOD TEST: CPT

## 2025-02-04 RX ORDER — DIVALPROEX SODIUM 250 MG/1
1500 TABLET, FILM COATED, EXTENDED RELEASE ORAL DAILY
Status: DISCONTINUED | OUTPATIENT
Start: 2025-02-04 | End: 2025-02-04 | Stop reason: HOSPADM

## 2025-02-04 RX ORDER — TRAZODONE HYDROCHLORIDE 50 MG/1
100 TABLET ORAL AT BEDTIME
Status: DISCONTINUED | OUTPATIENT
Start: 2025-02-04 | End: 2025-02-04 | Stop reason: HOSPADM

## 2025-02-04 RX ORDER — PANTOPRAZOLE SODIUM 40 MG/1
40 TABLET, DELAYED RELEASE ORAL
Status: DISCONTINUED | OUTPATIENT
Start: 2025-02-04 | End: 2025-02-04 | Stop reason: HOSPADM

## 2025-02-04 RX ORDER — GLIMEPIRIDE 1 MG/1
4 TABLET ORAL
Status: DISCONTINUED | OUTPATIENT
Start: 2025-02-04 | End: 2025-02-04 | Stop reason: HOSPADM

## 2025-02-04 RX ORDER — QUETIAPINE FUMARATE 100 MG/1
300 TABLET, FILM COATED ORAL AT BEDTIME
Status: DISCONTINUED | OUTPATIENT
Start: 2025-02-04 | End: 2025-02-04 | Stop reason: HOSPADM

## 2025-02-04 RX ORDER — PIOGLITAZONE 15 MG/1
30 TABLET ORAL DAILY
Status: DISCONTINUED | OUTPATIENT
Start: 2025-02-04 | End: 2025-02-04 | Stop reason: HOSPADM

## 2025-02-04 RX ADMIN — PANTOPRAZOLE SODIUM 40 MG: 40 TABLET, DELAYED RELEASE ORAL at 08:05

## 2025-02-04 RX ADMIN — DIVALPROEX SODIUM 1500 MG: 250 TABLET, EXTENDED RELEASE ORAL at 08:05

## 2025-02-04 RX ADMIN — PIOGLITAZONE 30 MG: 15 TABLET ORAL at 08:05

## 2025-02-04 RX ADMIN — GLIMEPIRIDE 4 MG: 1 TABLET ORAL at 08:05

## 2025-02-04 ASSESSMENT — ACTIVITIES OF DAILY LIVING (ADL)
ADLS_ACUITY_SCORE: 41

## 2025-02-04 ASSESSMENT — COLUMBIA-SUICIDE SEVERITY RATING SCALE - C-SSRS
1. SINCE LAST CONTACT, HAVE YOU WISHED YOU WERE DEAD OR WISHED YOU COULD GO TO SLEEP AND NOT WAKE UP?: NO
6. HAVE YOU EVER DONE ANYTHING, STARTED TO DO ANYTHING, OR PREPARED TO DO ANYTHING TO END YOUR LIFE?: NO
ATTEMPT SINCE LAST CONTACT: NO
TOTAL  NUMBER OF INTERRUPTED ATTEMPTS SINCE LAST CONTACT: NO
TOTAL  NUMBER OF ABORTED OR SELF INTERRUPTED ATTEMPTS SINCE LAST CONTACT: NO
2. HAVE YOU ACTUALLY HAD ANY THOUGHTS OF KILLING YOURSELF?: NO
SUICIDE, SINCE LAST CONTACT: NO

## 2025-02-04 NOTE — ED NOTES
PT's group home staff An was updated on PT. She would like an update when his disposition is known. The phone number for the facility is [phone]

## 2025-02-04 NOTE — ED NOTES
Patient belongings were returned to him. Group home called and report given to Keri. Provided discharge paperwork.

## 2025-02-04 NOTE — ED PROVIDER NOTES
Transfer of Care Note  This patient was signed out to me and I assumed care from Dr. Aceves at change of shift.  Bertram Foreman is a 34 year old male who presented to the emergency department with a chief complaint of Suicidal and Drug Overdose  .  Please see the original providers history and physical for complete details.    The following issues were signed out to me to follow up on:  Repeat labs and DEC evaluation      Pertant Lab/Imaging Findings During this Visit was     Results for orders placed or performed during the hospital encounter of 02/03/25 (from the past 24 hours)   CBC with platelets differential    Narrative    The following orders were created for panel order CBC with platelets differential.  Procedure                               Abnormality         Status                     ---------                               -----------         ------                     CBC with platelets and d...[[id number]]  Abnormal            Final result                 Please view results for these tests on the individual orders.   Comprehensive metabolic panel   Result Value Ref Range    Sodium 137 135 - 145 mmol/L    Potassium 4.3 3.4 - 5.3 mmol/L    Carbon Dioxide (CO2) 24 22 - 29 mmol/L    Anion Gap 17 (H) 7 - 15 mmol/L    Urea Nitrogen 11.1 6.0 - 20.0 mg/dL    Creatinine 0.66 (L) 0.67 - 1.17 mg/dL    GFR Estimate >90 >60 mL/min/1.73m2    Calcium 9.3 8.8 - 10.4 mg/dL    Chloride 96 (L) 98 - 107 mmol/L    Glucose 245 (H) 70 - 99 mg/dL    Alkaline Phosphatase 73 40 - 150 U/L    AST 11 0 - 45 U/L    ALT 20 0 - 70 U/L    Protein Total 7.4 6.4 - 8.3 g/dL    Albumin 4.1 3.5 - 5.2 g/dL    Bilirubin Total 0.2 <=1.2 mg/dL   Alcohol ethyl   Result Value Ref Range    Alcohol ethyl <0.01 <=0.01 g/dL   Salicylate level   Result Value Ref Range    Salicylate <0.3   mg/dL   Acetaminophen level   Result Value Ref Range    Acetaminophen <5.0 (L) 10.0 - 30.0 ug/mL   CBC with platelets and differential   Result Value Ref  Range    WBC Count 9.2 4.0 - 11.0 10e3/uL    RBC Count 4.58 4.40 - 5.90 10e6/uL    Hemoglobin 12.6 (L) 13.3 - 17.7 g/dL    Hematocrit 38.8 (L) 40.0 - 53.0 %    MCV 85 78 - 100 fL    MCH 27.5 26.5 - 33.0 pg    MCHC 32.5 31.5 - 36.5 g/dL    RDW 12.7 10.0 - 15.0 %    Platelet Count 181 150 - 450 10e3/uL    % Neutrophils 62 %    % Lymphocytes 28 %    % Monocytes 8 %    % Eosinophils 1 %    % Basophils 1 %    % Immature Granulocytes 1 %    NRBCs per 100 WBC 0 <1 /100    Absolute Neutrophils 5.8 1.6 - 8.3 10e3/uL    Absolute Lymphocytes 2.6 0.8 - 5.3 10e3/uL    Absolute Monocytes 0.7 0.0 - 1.3 10e3/uL    Absolute Eosinophils 0.1 0.0 - 0.7 10e3/uL    Absolute Basophils 0.1 0.0 - 0.2 10e3/uL    Absolute Immature Granulocytes 0.1 <=0.4 10e3/uL    Absolute NRBCs 0.0 10e3/uL   Urine Drug Screen    Narrative    The following orders were created for panel order Urine Drug Screen.  Procedure                               Abnormality         Status                     ---------                               -----------         ------                     Urine Drug Screen Panel[[id number]]      Abnormal            Final result                 Please view results for these tests on the individual orders.   Urine Drug Screen Panel   Result Value Ref Range    Amphetamines Urine Screen Positive (A) Screen Negative    Barbituates Urine Screen Negative Screen Negative    Benzodiazepine Urine Screen Negative Screen Negative    Cannabinoids Urine Screen Negative Screen Negative    Cocaine Urine Screen Negative Screen Negative    Fentanyl Qual Urine Screen Negative Screen Negative    Opiates Urine Screen Negative Screen Negative    PCP Urine Screen Negative Screen Negative   Basic metabolic panel   Result Value Ref Range    Sodium 137 135 - 145 mmol/L    Potassium 4.0 3.4 - 5.3 mmol/L    Chloride 97 (L) 98 - 107 mmol/L    Carbon Dioxide (CO2) 23 22 - 29 mmol/L    Anion Gap 17 (H) 7 - 15 mmol/L    Urea Nitrogen 12.3 6.0 - 20.0 mg/dL     Creatinine 0.95 0.67 - 1.17 mg/dL    GFR Estimate >90 >60 mL/min/1.73m2    Calcium 8.8 8.8 - 10.4 mg/dL    Glucose 209 (H) 70 - 99 mg/dL   Acetaminophen level   Result Value Ref Range    Acetaminophen <5.0 (L) 10.0 - 30.0 ug/mL         My focused follow up physical exam shows:   Vitals:  B/P: 103/58, T: 98, P: 73, R: 18  Gen:  Pt appears stable and no apparent distress      ED Course & Medical Decision Making:  Patient's repeat labs came back and were essentially unchanged from earlier there is no signs of a worsening anion gap or metabolic acidosis.  At this point patient is medically cleared.  Patient was seen by the DEC and initially they were thinking that patient could be discharged back home to the group home but patient is perseverating on wanting to go to [facility] and the concern is if he goes back he is just going to do something more to continue to escalate the possibly hurt himself.  After discussion with DEC we decided to observe the patient overnight in the hospital and to have the patient reevaluated in the morning.  If patient still cannot safety plan or still perseverating on wanting to be admitted for mental health emergency, we will look at putting the patient on the mental health wait list in the morning.  6 AM: Patient slept throughout the night did not have any issues.  Home meds were written for.  Patient will be signed out at change of shift to Dr. Norwood while we await for repeat evaluation in the morning.         Impression and Disposition:  Suicidal ideation           This note was completed in part using Dragon voice recognition, and may contain word and grammatical errors.        Duc Wellington MD  02/04/25 0643

## 2025-02-04 NOTE — PLAN OF CARE
Bertram Foreman  February 3, 2025  Plan of Care Hand-off Note     Patient Recommended Care Path: observation    Clinical Substantiation:  Patient presents following a suicide attempt via overdose in the context of an argument with housemate.  Patient expresses his intentions to be hospitalized as a means of respite from his housemate and group home in general.  As patient continues to express frustration with his group home and his desire to return to the emergency room if he is to return home.  Patient will be observed overnight in the emergency room and complete a reassessment in the morning with the desired resolution for him to safety plan and return home.  At this time patient continues to have an elevated level of frustration with his housemate that is impeding his ability to make safe choices.  After reassessment if patient is able to return home he will follow-up with his therapist on Wednesday but this appointment will need to be rescheduled if he is admitted to mental health.    Goals for crisis stabilization:  Medical clearance and safety planning.    Next steps for Care Team:  Patient will require a reassessment on 2/4/25 after he is medically cleared to assess for safety.    Treatment Objectives Addressed:  rapport building, processing feelings, identifying an appropriate aftercare plan    Therapeutic Interventions:  Engaged in safety planning, Taught the link between thoughts, feelings, and behaviors.    Has a specific means been identified for suicidal.homicide actions:    If yes, describe:    Explain action steps toward mitigation:    Document completion of mitigation action:    The follow up action still needed prior to discharge:      Patient coping skills attempted to reduce the crisis:  Called his friend    Collateral contact information:  Andrea Foreman (Father)  [phone]    Legal Status: Guardian/ad litum                                                                  Reviewed court  records: yes     Psychiatry Consult:     Lizette Balderas, LICSW

## 2025-02-04 NOTE — ED NOTES
"Patient is calm in room at this time, no thoughts of self harm. Although he states he is frustrated with his roommate he has stated that he intends to go back to group home and be civil with his living situation because \"he only has to be there 5 more months\".   "
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
## 2025-02-04 NOTE — DISCHARGE INSTRUCTIONS
"  Recommendations:    1)  Return to the ER or call 911 if feeling unsafe   2)  Continue to follow up with psychiatry and therapist  3)  Continue to engage in weekly meetings with \[facility] [place] [facility]\""/[facility]    If I am feeling unsafe or I am in a crisis, I will:  - Contact my established care providers   - Call the National Suicide Prevention Lifeline: 171.895.3514   - Por espanol, texto  NIDHI a [id number] o texto a 442-AYUDAME en WhatsApp  - [place] CRISIS TEXT Line 982811  - Go to the nearest emergency room   - Call 911 or 358 or 211    Below is a list of FREE Mental Health Options in the [facility] Area:     [facility] (Free Services)   [address], [place] [id number] [phone]  [email or web address]    Peer Support  Call the [facility] Helpline at  [phone]  Or text \"HelpLine\" to 89712    Coordinators from [facility] ([facility]) will be calling you in the next 24-48 hours to ensure that you have the resources you need. For additonal need of mental health services, you can also contact [facility] coordinators directly at [phone].           "

## 2025-02-04 NOTE — CONSULTS
Diagnostic Evaluation Consultation  Crisis Assessment    Patient Name: Bertram Foreman  Age:  34 year old  Legal Sex: male  Gender Identity: male  Pronouns:   Race: White  Ethnicity: Not  or   Language: English      Patient was assessed: Virtual: [place]   Crisis Assessment Start Date: 02/03/25  Crisis Assessment Start Time: 1958  Crisis Assessment Stop Time: 2029  Patient location: [facility] Emergency Dept                             ED04    Referral Data and Chief Complaint  Bertram Foreman presents to the ED with family/friends. Patient is presenting to the ED for the following concerns:  . Factors that make the mental health crisis life threatening or complex are: Patient presents to the emergency room after ingesting an overdose of ibuprofen following an argument with his housemate.  Patient reports that he heard his housemates talking behind his back and confronted them at which time they denied saying anything about him.  Patient's became upset and went to his room and took an overdose of ibuprofen that he had bought on his own.  Patient reports that he has suicidal thoughts when he gets upset and he has been dealing with a lot of frustration surrounding his housemates recently.  Patient reports overdosing with the intention of being hospitalized at [facility] for 2 weeks.  Patient no longer wants to live at his group home and wants to stay in the hospital until they find him another living arrangement.  Patient does not identify further plans to end his life but reports that if he does return home he will find another way to attempt and be back in the hospital tomorrow..      Informed Consent and Assessment Methods  Explained the crisis assessment process, including applicable information disclosures and limits to confidentiality, assessed understanding of the process, and obtained consent to proceed with the assessment.  Assessment methods included conducting a  formal interview with patient, review of medical records, collaboration with medical staff, and obtaining relevant collateral information from family and community providers when available.  : done     History of the Crisis   Bertram has lived in his current group home since March of 2023. He has lived in group homes for several years. He has a new therapist whom he will start seeing this Wednesday, meets with psychiatry monthly, has ARMHS and ILS. He has a . Per report Bertram's Dad is his Guardian. Bertram's Mom passed away from ALS in November of 2019. Patient has a long hx of suicidal ideation and threats according to dad.    Brief Psychosocial History  Family:  Single, Children no  Support System:  Parent(s), Friend  Employment Status:  self-employed  Source of Income:  salary/wages  Financial Environmental Concerns:  none  Current Hobbies:  outdoor activities, gardening, music  Barriers in Personal Life:  mental health concerns    Significant Clinical History  Current Anxiety Symptoms:     Current Depression/Trauma:  thoughts of death/suicide, excessive guilt, sadness, irritable  Current Somatic Symptoms:     Current Psychosis/Thought Disturbance:  impulsive  Current Eating Symptoms:     Chemical Use History:      Past diagnosis:  PTSD, Anxiety Disorder, Depression  Family history:  No known history of mental health or chemical health concerns  Past treatment:  Individual therapy, Psychiatric Medication Management, Case management, ARMHS/CTSS, Supportive Living Environment (group home, care home house, etc), Inpatient Hospitalization  Details of most recent treatment:  Briana (Psychiatry) meets with monthly [facility].  Hospitalized at [facility] for 7 days in the early 2000's  Other relevant history:       Have there been any medication changes in the past two weeks:  no       Is the patient compliant with medications:  yes        Collateral Information  Is there collateral information: Yes      Collateral information name, relationship, phone number:  Andrea Foreman (Father)  [phone]    What happened today: Dad is not exactly sure what set patient off today but he reports that he gets upset like this regularly.  He notes that patient frequently makes suicidal statements and threats to end his life when he is upset with someone.     What is different about patient's functioning: Nothing has changed this is how he behaviors when he is frustrated.     What do you think the patient needs:      Has patient made comments about wanting to kill themselves/others: yes    If d/c is recommended, can they take part in safety/aftercare planning:  yes    Additional collateral information:  Spoke with An [phone] who reports that pt is able to return home if he is able to confirm his safety.     Risk Assessment  [place] Suicide Severity Rating Scale Full Clinical Version:  Suicidal Ideation  Q1 Wish to be Dead (Lifetime): Yes  Q2 Non-Specific Active Suicidal Thoughts (Lifetime): Yes  3. Active Suicidal Ideation with any Methods (Not Plan) Without Intent to Act (Lifetime): Yes  4. Active Suicidal Ideation with Some Intent to Act, Without Specific Plan (Lifetime): Yes  5. Active Suicidal Ideation with Specific Plan and Intent (Lifetime): Yes  Q6 Suicide Behavior (Lifetime): yes  Intensity of Ideation (Lifetime)  Most Severe Ideation Rating (Lifetime): 5  Frequency (Lifetime): Once a week  Duration (Lifetime): Fleeting, few seconds or minutes  Controllability (Lifetime): Can control thoughts with a lot of difficulty  Deterrents (Lifetime): Uncertain that deterrents stopped you  Reasons for Ideation (Lifetime): Mostly to get attention, revenge, or a reaction from others  Suicidal Behavior (Lifetime)  Actual Attempt (Lifetime): Yes  Total Number of Actual Attempts (Lifetime): 1  Actual Attempt Description (Lifetime): overdose  Has subject engaged in non-suicidal self-injurious behavior? (Lifetime):  No  Interrupted Attempts (Lifetime): No  Aborted or Self-Interrupted Attempt (Lifetime): No  Preparatory Acts or Behavior (Lifetime): No    [place] Suicide Severity Rating Scale Recent:   Suicidal Ideation (Recent)  Q1 Wished to be Dead (Past Month): yes  Q2 Suicidal Thoughts (Past Month): yes  Q3 Suicidal Thought Method: yes  Q4 Suicidal Intent without Specific Plan: yes  Q5 Suicide Intent with Specific Plan: yes  If yes to Q6, within past 3 months?: yes  Level of Risk per Screen: high risk  Intensity of Ideation (Recent)  Most Severe Ideation Rating (Past 1 Month): 5  Frequency (Past 1 Month): Less than once a week  Duration (Past 1 Month): Fleeting, few seconds or minutes  Controllability (Past 1 Month): Can control thoughts with little difficulty  Deterrents (Past 1 Month): Deterrents definitely stopped you from attempting suicide  Reasons for Ideation (Past 1 Month): Equally to get attention, revenge, or a reaction from others and to end/stop the pain  Suicidal Behavior (Recent)  Actual Attempt (Past 3 Months): Yes  Total Number of Actual Attempts (Past 3 Months): 1  Actual Attempt Description (Past 3 Months): overdose today  Has subject engaged in non-suicidal self-injurious behavior? (Past 3 Months): No  Interrupted Attempts (Past 3 Months): No  Aborted or Self-Interrupted Attempt (Past 3 Months): No  Preparatory Acts or Behavior (Past 3 Months): No    Environmental or Psychosocial Events: challenging interpersonal relationships  Protective Factors: Protective Factors: help seeking, lives in a responsibly safe and stable environment    Does the patient have thoughts of harming others? Feels Like Hurting Others: no  Previous Attempt to Hurt Others: no  Is the patient engaging in sexually inappropriate behavior?: no  Does Patient have a known history of aggressive behavior: No    Is the patient engaging in sexually inappropriate behavior?  no        Mental Status Exam   Affect: Labile  Appearance:  Appropriate  Attention Span/Concentration: Attentive  Eye Contact: Engaged    Fund of Knowledge: Appropriate   Language /Speech Content: Fluent  Language /Speech Volume: Normal  Language /Speech Rate/Productions: Normal  Recent Memory: Intact  Remote Memory: Intact  Mood: Angry  Orientation to Person: Yes   Orientation to Place: Yes  Orientation to Time of Day: Yes  Orientation to Date: Yes     Situation (Do they understand why they are here?): Yes  Psychomotor Behavior: Normal  Thought Content: Suicidal  Thought Form: Intact     Mini-Cog Assessment  Number of Words Recalled:    Clock-Drawing Test:     Three Item Recall:    Mini-Cog Total Score:       Medication  Psychotropic medications:   Medication Orders - Psychiatric (From admission, onward)      None             Current Care Team  Patient Care Team:  Tremaine Ruvalcaba DO as PCP - General (Internal Medicine)  Chiara Hair RD as Diabetes Educator (Dietitian, Registered)  Tremaine Ruvalcaba DO as Assigned PCP  Elizabeth Flower MD as MD (Dermatology)  Ellis Rush [facility] as Pharmacist (Pharmacist Clinician- Clinical Pharmacy Specialist)  Lopez Latif DPM as Assigned Surgical Provider  Mariam Smalls PA-C (Dermatology)    Diagnosis  Patient Active Problem List   Diagnosis Code    Morbid obesity (H) E66.01    Intellectual disability F79    Recurrent major depressive disorder, in partial remission F33.41    Mantoux: positive, treated R76.11    Morbid obesity with body mass index of 45.0-49.9 in adult (H) E66.01, Z68.42    Mild intellectual disabilities F70    Type 2 diabetes mellitus with hypoglycemia, without long-term current use of insulin (H) E11.649    Sexual dysfunction R37    Grief F43.21       Primary Problem This Admission  Active Hospital Problems    *Recurrent major depressive disorder, in partial remission        Clinical Summary and Substantiation of Recommendations   Clinical Substantiation:  Patient presents  following a suicide attempt via overdose in the context of an argument with housemate.  Patient expresses his intentions to be hospitalized as a means of respite from his housemate and group home in general.  As patient continues to express frustration with his group home and his desire to return to the emergency room if he is to return home.  Patient will be observed overnight in the emergency room and complete a reassessment in the morning with the desired resolution for him to safety plan and return home.  At this time patient continues to have an elevated level of frustration with his housemate that is impeding his ability to make safe choices.  After reassessment if patient is able to return home he will follow-up with his therapist on Wednesday but this appointment will need to be rescheduled if he is admitted to mental health.    Goals for crisis stabilization:  Medical clearance and safety planning.    Next steps for Care Team:  Patient will require a reassessment on 2/4/25 after he is medically cleared to assess for safety.    Treatment Objectives Addressed:  rapport building, processing feelings, identifying an appropriate aftercare plan    Therapeutic Interventions:  Engaged in safety planning, Taught the link between thoughts, feelings, and behaviors.    Has a specific means been identified for suicidal/homicide actions:      If yes, describe:       Explain action steps toward mitigation:       Document completion of mitigation actions:       The follow up action still needed prior to discharge:       Patient coping skills attempted to reduce the crisis:  Called his friend    Disposition  Recommended referrals: Individual Therapy, Medication Management        Reviewed case and recommendations with attending provider. Attending Name: Dr. Wellington       Attending concurs with disposition: yes       Patient and/or validated legal guardian concurs with disposition:   yes       Final disposition:  observation                             Legal status: Guardian/ad litum                                                                                                                                 Reviewed court records: yes       Assessment Details   Total duration spent with the patient: 30 min     CPT code(s) utilized: 61809 - Psychotherapy for Crisis - 60 (30-74*) min    ASHLI Cotton, Psychotherapist  [facility] - Triage & Transition Services  Callback: [phone]

## 2025-02-04 NOTE — ED PROVIDER NOTES
34-year-old male with history of intellectual disability, diabetes who presents for evaluation after medication overdose/ingestion, suicidal ideation.  Please see previous notes for details regarding presentation and workup.  In brief, this gentleman resides at a group home and has made a few separate suicide gestures over the last month.  Apparently he has had some conflicts with his roommate and has been perseverating on getting into a new group home.  Patient was examined by DEC last night and behavior was thought to be related to secondary gain, malingering.  However, the nighttime physician felt that his actions have been escalating and that we should reevaluate by DEC in the morning to hopefully get a better idea of suicidality versus malingering type behavior.  He has otherwise been calm, cooperative here in the emergency department.    Prior to arrival last night the patient ingested large amounts of ibuprofen.  He has since been cleared by poison control and the nighttime staff medically.  We will be awaiting reevaluation and recommendations from DEC today.    Labs Ordered and Resulted from Time of ED Arrival to Time of ED Departure   COMPREHENSIVE METABOLIC PANEL - Abnormal       Result Value    Sodium 137      Potassium 4.3      Carbon Dioxide (CO2) 24      Anion Gap 17 (*)     Urea Nitrogen 11.1      Creatinine 0.66 (*)     GFR Estimate >90      Calcium 9.3      Chloride 96 (*)     Glucose 245 (*)     Alkaline Phosphatase 73      AST 11      ALT 20      Protein Total 7.4      Albumin 4.1      Bilirubin Total 0.2     ACETAMINOPHEN LEVEL - Abnormal    Acetaminophen <5.0 (*)    CBC WITH PLATELETS AND DIFFERENTIAL - Abnormal    WBC Count 9.2      RBC Count 4.58      Hemoglobin 12.6 (*)     Hematocrit 38.8 (*)     MCV 85      MCH 27.5      MCHC 32.5      RDW 12.7      Platelet Count 181      % Neutrophils 62      % Lymphocytes 28      % Monocytes 8      % Eosinophils 1      % Basophils 1      % Immature  Granulocytes 1      NRBCs per 100 WBC 0      Absolute Neutrophils 5.8      Absolute Lymphocytes 2.6      Absolute Monocytes 0.7      Absolute Eosinophils 0.1      Absolute Basophils 0.1      Absolute Immature Granulocytes 0.1      Absolute NRBCs 0.0     URINE DRUG SCREEN PANEL - Abnormal    Amphetamines Urine Screen Positive (*)     Barbituates Urine Screen Negative      Benzodiazepine Urine Screen Negative      Cannabinoids Urine Screen Negative      Cocaine Urine Screen Negative      Fentanyl Qual Urine Screen Negative      Opiates Urine Screen Negative      PCP Urine Screen Negative     BASIC METABOLIC PANEL - Abnormal    Sodium 137      Potassium 4.0      Chloride 97 (*)     Carbon Dioxide (CO2) 23      Anion Gap 17 (*)     Urea Nitrogen 12.3      Creatinine 0.95      GFR Estimate >90      Calcium 8.8      Glucose 209 (*)    ACETAMINOPHEN LEVEL - Abnormal    Acetaminophen <5.0 (*)    GLUCOSE BY METER - Abnormal    GLUCOSE BY METER POCT 218 (*)    ETHYL ALCOHOL LEVEL - Normal    Alcohol ethyl <0.01     SALICYLATE LEVEL - Normal    Salicylate <0.3       1050: Spoke with DEC personnel.  They just spoke with the patient again.  He denies having any suicidal thoughts and is willing/wants to return to his group home.  Patient apparently has plans to change living situations and only a few months, states that he can deal with the current situation until then.  He has a therapist, father is a guardian.  I agree that the patient seems stable for discharge home at this time.  Appropriate return precautions discussed.    Final diagnoses:   Suicide attempt by drug ingestion, initial encounter (H)        Bertram Spencer MD  02/04/25 1111

## 2025-02-05 ENCOUNTER — TELEPHONE (OUTPATIENT)
Dept: BEHAVIORAL HEALTH | Facility: CLINIC | Age: 35
End: 2025-02-05
Payer: COMMERCIAL

## 2025-02-05 NOTE — TELEPHONE ENCOUNTER
Triage and Transition Services- Patient Follow Up Call  Service Line Making Phone Call: Extended Care    Who did Writer Talk to: Patient    Details of Call: Pt declined needing any assistance at the moment.    Jeny Narvaez 2/5/2025 9:48 AM

## 2025-02-05 NOTE — PROGRESS NOTES
"Triage and Transition Services Extended Care Reassessment     Patient: Bertram goes by \"Bertram,\" uses he/him pronouns  Date of Service: February 4, 2025  Site of Service: [facility] Emergency Dept                               Patient was seen yes  Mode of Assessment: Virtual: [facility]     Reason for Reassessment: suicidal ideation, suicide attempt    History of Patient's Original Emergency Room Encounter: Bertram has lived in his current group home since March of 2023. He has lived in group homes for several years. He has a new therapist whom he will start seeing this Wednesday, meets with psychiatry monthly, has ARM and ILS. He has a . Per report Bertram's Dad is his Guardian. Bertram's Mom passed away from ALS in November of 2019. Patient has a long hx of suicidal ideation and threats according to dad.    Current Patient Presentation: Pt presents as calm, cooperative, alert, and oriented x4. He denies having active suicidal ideation, plans, or intent. He denies homcidal thinking, auditory or visual hallucinations, or self-injurious behaviors.    Presentation Summary: Pt is seen by extended care for therapeutic check-in and reassessment. Exchanged greeting, introduced self and role. Pt was observed to be able to participate in the assessment as evidenced by verbal consent. Pt seems honest and forthcoming. He reports to be feeling much better today and is requesting discharge to the group home. He looks forward to June when he is moving out and will have his own apartment. He reports to have OP providers of ILS, ARHMS, a therapist, and medication management. Both providers of therapy and medication managment are with [facility]. He reports he is tired of living at the group home with [facility]. He says there are 4 other residents and they get on his nerves. Pt states he is losing hearing in one ear and when he is trying to watch TV there is too much noise and " commotions which makes it hard for him to relax. He reports his mother passed away from ALS about 6 years ago. He expresses to miss her and feels sad for the loss. Pt indicates he is feeling safe to return home and able to engage in safety planning.    Changes Observed Since Initial Assessment: decrease in presenting symptoms    Therapeutic Interventions Provided: Engaged in cognitive restructuring/ reframing, looked at common cognitive distortions and challenged negative thoughts., Engaged in safety planning, Engaged in guided discovery, explored patient's perspectives and helped expand them through socratic dialogue.    Current Symptoms: anxious sadness, helplessness, sense of doom, decreased libido, impaired decision making anxious impulsive      Mental Status Exam   Affect: Appropriate  Appearance: Appropriate  Attention Span/Concentration: Attentive  Eye Contact: Engaged    Fund of Knowledge: Appropriate   Language /Speech Content: Fluent  Language /Speech Volume: Normal  Language /Speech Rate/Productions: Normal  Recent Memory: Intact  Remote Memory: Intact  Mood: Anxious  Orientation to Person: Yes   Orientation to Place: Yes  Orientation to Time of Day: Yes  Orientation to Date: Yes     Situation (Do they understand why they are here?): Yes  Psychomotor Behavior: Normal  Thought Content: Clear  Thought Form: Intact    Treatment Objective(s) Addressed: rapport building, orienting the patient to therapy, processing feelings, assessing safety, building skills    Patient Response to Interventions: acceptance expressed, verbalizes understanding    Progress Towards Goals:  Patient Reports Symptoms Are: stable  Patient Progress Toward Goals: is making progress  Comment: Pt is considered to be stable and at baseline. He denies having SI, HI, AVHs, and denies SIB. Pt does not require acute stabilization services.    Case Management:      C-SSRS Since Last Contact:   1. Wish to be Dead (Since Last Contact): No  2.  Non-Specific Active Suicidal Thoughts (Since Last Contact): No     Actual Attempt (Since Last Contact): No  Has subject engaged in non-suicidal self-injurious behavior? (Since Last Contact): No  Interrupted Attempts (Since Last Contact): No  Aborted or Self-Interrupted Attempt (Since Last Contact): No  Preparatory Acts or Behavior (Since Last Contact): No  Suicide (Since Last Contact): No     Calculated C-SSRS Risk Score (Since Last Contact): No Risk Indicated    Plan: Final Disposition / Recommended Care Path: observation  Plan for Care reviewed with assigned Medical Provider: yes  Plan for Care Team Review: provider    Comments: Consulted with Bertram Spencer and recommended for discharge back to the group home.    Clinical Substantiation: Patient presents following a suicide attempt via overdose in the context of an argument with housemate.  Patient expresses his intentions to be hospitalized as a means of respite from his housemate and group home in general.  As patient continues to express frustration with his group home and his desire to return to the emergency room if he is to return home.  After reassessment if patient is able to return home he will follow-up with his therapist on Wednesday. Pt presents as calm, cooperative, alert, and oriented x4. He denies having active suicidal ideation, plans, or intent. He denies homcidal thinking, auditory or visual hallucinations, or self-injurious behaviors.    Legal Status: Legal Status: Guardian/ad litum    Session Status: Time session started: 1020  Time session ended: 1038  Session Duration (minutes): 18 minutes  Session Number: 1  Anticipated number of sessions or this episode of care: 1    Session Start Time: 1020  Session Stop Time: 1038  CPT codes: 40187 - Psychotherapy (with patient) - 30 (16-37*) min  Time Spent: 18 minutes      CPT code(s) utilized: 98030 - Psychotherapy (with patient) - 30 (16-37*) min    Diagnosis:   Patient Active Problem List   Diagnosis  Code    Morbid obesity (H) E66.01    Intellectual disability F79    Recurrent major depressive disorder, in partial remission F33.41    Mantoux: positive, treated R76.11    Morbid obesity with body mass index of 45.0-49.9 in adult (H) E66.01, Z68.42    Mild intellectual disabilities F70    Type 2 diabetes mellitus with hypoglycemia, without long-term current use of insulin (H) E11.649    Sexual dysfunction R37    Grief F43.21    Depression F32.A       Primary Problem This Admission: Active Hospital Problems    *Depression      Recurrent major depressive disorder, in partial remission    F32.a      Shakeel Ruelas, [facility]   Licensed Mental Health Professional (LMHP), [facility]  [phone]

## 2025-02-13 DIAGNOSIS — E11.65 TYPE 2 DIABETES MELLITUS WITH HYPERGLYCEMIA, WITHOUT LONG-TERM CURRENT USE OF INSULIN (H): ICD-10-CM

## 2025-02-13 RX ORDER — GLIMEPIRIDE 4 MG/1
TABLET ORAL
Qty: 90 TABLET | Refills: 0 | Status: SHIPPED | OUTPATIENT
Start: 2025-02-13

## 2025-02-13 NOTE — TELEPHONE ENCOUNTER
Medication is being filled for 1 time refill only due to:  Patient needs labs A1C. Patient needs to be seen because due for 6 m appt, appt scheduled for 2/14/25.  Yesi Hewitt RN, BSN  [facility]

## 2025-02-14 PROBLEM — G47.30 SEVERE SLEEP APNEA: Status: ACTIVE | Noted: 2022-09-16

## 2025-02-14 PROBLEM — Q02 MICROCEPHALIC (H): Status: ACTIVE | Noted: 2024-09-10

## 2025-02-14 PROBLEM — E78.5 HYPERLIPIDEMIA, UNSPECIFIED: Status: ACTIVE | Noted: 2023-10-09

## 2025-02-17 DIAGNOSIS — F70 MILD INTELLECTUAL DISABILITIES: ICD-10-CM

## 2025-02-17 DIAGNOSIS — E11.649 TYPE 2 DIABETES MELLITUS WITH HYPOGLYCEMIA WITHOUT COMA, WITHOUT LONG-TERM CURRENT USE OF INSULIN (H): Primary | ICD-10-CM

## 2025-02-18 ENCOUNTER — TELEPHONE (OUTPATIENT)
Dept: FAMILY MEDICINE | Facility: OTHER | Age: 35
End: 2025-02-18

## 2025-02-18 ENCOUNTER — MEDICAL CORRESPONDENCE (OUTPATIENT)
Dept: HEALTH INFORMATION MANAGEMENT | Facility: CLINIC | Age: 35
End: 2025-02-18

## 2025-02-18 NOTE — TELEPHONE ENCOUNTER
Patient Contact    Attempt # 1    Was call answered?  No.  Left message on voicemail with information to call me back.    Sue SZYMANSKIN, RN

## 2025-02-18 NOTE — TELEPHONE ENCOUNTER
----- Message from Ted Hart sent at 2/17/2025  4:09 PM CST -----  Please call patient to inform him that his A1c level has increased from 9.7% to 11.3%. the target for him is 7.0%. He mentioned that he wanted to live in his own apartment. In order to do this he will need to be able to better control his blood sugar levels. I would like him to meet with our diabetic educator to learn more about how to do this. [facility] will call you to coordinate your care as prescribed by your provider. If you don't hear from a representative within 2 business days, please call [phone]    I also recommend that he be scheduled to see his PCP in the next month to discuss additional diabetic treatments. Please help him schedule this.

## 2025-02-18 NOTE — TELEPHONE ENCOUNTER
Patient called back, reports that diabetic education called to reach out today, he is not interested in meeting with the has tried this in the past and doesn't think they are helpful.     Assisted with scheduling follow up with PCP    Appointments in Next Year        Feb 24, 2025 2:40 PM  (Arrive by 2:20 PM)  Provider Visit with Tremaine Ruvalcaba DO  [facility] ([facility] ) [phone]          Shaylee Parra, RN, BSN

## 2025-02-24 ENCOUNTER — OFFICE VISIT (OUTPATIENT)
Dept: INTERNAL MEDICINE | Facility: CLINIC | Age: 35
End: 2025-02-24
Payer: COMMERCIAL

## 2025-02-24 ENCOUNTER — OFFICE VISIT (OUTPATIENT)
Dept: PODIATRY | Facility: CLINIC | Age: 35
End: 2025-02-24
Payer: COMMERCIAL

## 2025-02-24 VITALS
HEART RATE: 78 BPM | DIASTOLIC BLOOD PRESSURE: 78 MMHG | RESPIRATION RATE: 16 BRPM | OXYGEN SATURATION: 99 % | SYSTOLIC BLOOD PRESSURE: 116 MMHG | BODY MASS INDEX: 40.43 KG/M2 | WEIGHT: 315 LBS | HEIGHT: 74 IN | TEMPERATURE: 99.9 F

## 2025-02-24 VITALS — BODY MASS INDEX: 40.43 KG/M2 | HEIGHT: 74 IN | WEIGHT: 315 LBS

## 2025-02-24 DIAGNOSIS — E66.01 MORBID OBESITY WITH BODY MASS INDEX OF 45.0-49.9 IN ADULT (H): ICD-10-CM

## 2025-02-24 DIAGNOSIS — F79 INTELLECTUAL DISABILITY: ICD-10-CM

## 2025-02-24 DIAGNOSIS — E11.65 TYPE 2 DIABETES MELLITUS WITH HYPERGLYCEMIA, WITHOUT LONG-TERM CURRENT USE OF INSULIN (H): ICD-10-CM

## 2025-02-24 DIAGNOSIS — M20.41 HAMMERTOES OF BOTH FEET: Primary | ICD-10-CM

## 2025-02-24 DIAGNOSIS — M20.42 HAMMERTOES OF BOTH FEET: Primary | ICD-10-CM

## 2025-02-24 DIAGNOSIS — Z91.148 NONCOMPLIANCE WITH MEDICATION REGIMEN: ICD-10-CM

## 2025-02-24 DIAGNOSIS — M24.573 EQUINUS CONTRACTURE OF ANKLE: ICD-10-CM

## 2025-02-24 DIAGNOSIS — E11.649 TYPE 2 DIABETES MELLITUS WITH HYPOGLYCEMIA WITHOUT COMA, WITHOUT LONG-TERM CURRENT USE OF INSULIN (H): Primary | ICD-10-CM

## 2025-02-24 PROCEDURE — 99214 OFFICE O/P EST MOD 30 MIN: CPT | Performed by: INTERNAL MEDICINE

## 2025-02-24 PROCEDURE — 99213 OFFICE O/P EST LOW 20 MIN: CPT | Performed by: PODIATRIST

## 2025-02-24 PROCEDURE — G2211 COMPLEX E/M VISIT ADD ON: HCPCS | Performed by: INTERNAL MEDICINE

## 2025-02-24 ASSESSMENT — PAIN SCALES - GENERAL: PAINLEVEL_OUTOF10: NO PAIN (0)

## 2025-02-24 NOTE — PROGRESS NOTES
Tahira Burden is a 34 year old, presenting for the following health issues:  Diabetes        2/24/2025     2:29 PM   Additional Questions   Roomed by Maddie     History of Present Illness       Reason for visit:  Diabetes follow up    He eats 2-3 servings of fruits and vegetables daily.He consumes 1 sweetened beverage(s) daily.He exercises with enough effort to increase his heart rate 30 to 60 minutes per day.  He exercises with enough effort to increase his heart rate 3 or less days per week.   He is taking medications regularly.           EMR reviewed including:             Complaint, History of Chief Complaint, Corresponding Review of Systems, and Complaint Specific Physical Examination.    #1   Follow-up on type 2 diabetes  Recent laboratory work reviewed.  A1c has reached a high of 11.3.  Patient noncompliant with diet.  Patient modestly compliant with blood sugar monitoring  Patient notes that he has taking his medications as directed.  Patient acknowledges some tingling and paresthesias in the feet.  Notes occasional polyuria and polydipsia.  No history of DKA or hyperosmolar coma.  Patient denies symptomatic hypoglycemia.          Exam:   LUNGS: clear bilaterally, airflow is brisk, no intercostal retraction or stridor is noted. No coughing is noted during visit.   HEART:  regular without rubs, clicks, gallops, or murmurs. PMI is nondisplaced. Upstrokes are brisk. S1,S2 are heard.   GI: Abdomen is soft, without rebound, guarding or tenderness. Bowel sounds are appropriate. No renal bruits are heard.  Abdomen is morbidly obese   NEURO: Pt is alert and appropriate. No neurologic lateralization is noted. Cranial nerves 2-12 are intact. Peripheral sensory function is somewhat hyperacute with some mild neuropathy in the toes   SKIN:  warm and dry. No erythema, or rashes are noted. No specific lesions of concern are noted.  No skin breakdown or ulceration noted at this time.        #2   Morbid  "obesity.  Patient has obstructive sleep apnea.  Does not use CPAP.  Does not restrict his diet.  Patient has lost about 15 pounds in the last couple weeks.  Maximum weight was 381 in March 2022.          Exam:  As noted above    #3   Intellectual disability.  Patient is pleasant conversational.  He is trying to convince his parents that he does not need to live in a group home.  He would like to live independently.  He is trying to impress them with his ability to maintain his appointments and care for himself.        Patient has been interviewed, applicable history and applied review of systems have been performed.    Vital Signs:   /78 (BP Location: Right arm, Patient Position: Sitting, Cuff Size: Adult Large)   Pulse 78   Temp 99.9  F (37.7  C) (Temporal)   Resp 16   Ht 1.89 m (6' 2.4\")   Wt (!) 146.5 kg (323 lb)   SpO2 99%   BMI 41.03 kg/m        Decision Making    Problem and Complexity     1. Type 2 diabetes mellitus with hypoglycemia without coma, without long-term current use of insulin (H) (Primary)  Patient clearly refuses (and I do not believe he is capable) of doing subcutaneous injections with insulin or even a GLP-1 agonist.  Will try to see if we can get him some Rybelsus.  This would be helpful with both his diabetes and weight loss.  In the meantime, he will continue the glimepiride and Actos as current.  He is not on metformin as it caused significant gastrointestinal problems at all dosages.  - Semaglutide (RYBELSUS) 3 MG tablet; Take 3 mg by mouth daily.  Dispense: 30 tablet; Refill: 0    2. Noncompliance with medication regimen  Patient refuses diabetic education.  States that he is \"too busy\".  We have had a lengthy discussion regarding his need to manage his diabetes.  That is we had a lengthy and Bertolino blunt discussion regarding the natural history of diabetes with respect to amputations, dialysis, blindness, and of course the ever popular \"grueling and painful death\".  None " of this seemed to be new for him..    3. Intellectual disability  I strongly question his ability to maintain independent living due to his lack of self-control with respect to diet and his diabetes.    4. Morbid obesity with body mass index of 45.0-49.9 in adult (H)  As above                                  FOLLOW UP   I have asked the patient to make an appointment for followup with me in 2 to 4 weeks.  [facility] not [facility], we will need to come up with a better idea.  I do not believe that independent living and any form of insulin is a viable combination.    Regarding routine vaccinations:  I have reviewed the patient's vaccination schedule and discussed the benefits of prophylactic vaccination in detail.  I recommend the patient contact their pharmacist for vaccinations.  Discussed that most insurance companies now favor reimbursement to the pharmacies and it will financially behoove the patient to have vaccinations performed at their pharmacy.        I have carefully explained the diagnosis and treatment options to the patient.  The patient has displayed an understanding of the above, and all subsequent questions were answered.      DO DONNELL Jin    Portions of this note were produced using [employer]  Although every attempt at real-time proof reading has been made, occasional grammar/syntax errors may have been missed.

## 2025-02-24 NOTE — LETTER
2/24/2025      Bertram Foreman  [address]  [facility] [address]      Dear Colleague,    Thank you for referring your patient, Bertram Foreman, to the [facility]. Please see a copy of my visit note below.    Chief Complaint   Patient presents with     Diabetes     DM type 2 exam, DM shoes; LOV 2/22/2024     RECHECK     Still having pain and instability, Right ankle injury 11/29/2023     HPI:  Patient is requesting diabetic shoes today as his current diabetic shoes are completely crushed as are all of his inserts.  Complains of deformity about his feet unusual sensations about his feet and uncontrolled diabetes.    ROS:  10 point ROS neg other than the symptoms noted above in the HPI.    Patient Active Problem List   Diagnosis     Morbid obesity (H)     Intellectual disability     Recurrent major depressive disorder, in partial remission     Mantoux: positive, treated     Morbid obesity with body mass index of 45.0-49.9 in adult (H)     Mild intellectual disabilities     Type 2 diabetes mellitus with hypoglycemia, without long-term current use of insulin (H)     Sexual dysfunction     Grief     Depression     Severe sleep apnea     Microcephalic (H)     Hyperlipidemia, unspecified       PAST MEDICAL HISTORY:   Past Medical History:   Diagnosis Date     Depressive disorder All my life almost.     Diabetes (H) sometime in 2019     Sleep apnea         PAST SURGICAL HISTORY:   Past Surgical History:   Procedure Laterality Date     APPENDECTOMY  8/15/14     COLONOSCOPY N/A 12/28/2020    Procedure: COLONOSCOPY, WITH BIOPSY;  Surgeon: Haley Hernandez MD;  Location:  GI        MEDICATIONS:   Current Outpatient Medications:      acetaminophen (TYLENOL) 325 MG tablet, Take 325-650 mg by mouth as needed for mild pain, Disp: , Rfl:      atorvastatin (LIPITOR) 20 MG tablet, TAKE ONE TABLET BY MOUTH EVERY MORNING, Disp: 90 tablet, Rfl: 1     blood glucose calibration (ONETOUCH VERIO) solution, USE  AS DIRECTED, Disp: 1 each, Rfl: 0     Blood Glucose Monitoring Suppl (ONETOUCH VERIO FLEX SYSTEM) w/Device KIT, USE TO TEST TWICE DAILY, Disp: 1 kit, Rfl: 0     diclofenac (VOLTAREN) 1 % topical gel, Apply 2 g topically 4 times daily, Disp: 150 g, Rfl: 1     divalproex sodium extended-release (DEPAKOTE ER) 500 MG 24 hr tablet, TAKE THREE TABLETS BY MOUTH EVERY NIGHT AT BEDTIME ( PLUSPAK), Disp: , Rfl:      glimepiride (AMARYL) 4 MG tablet, TAKE ONE TABLET BY MOUTH DAILY IN THE MORNING BEFORE BREAKFAST ., Disp: 90 tablet, Rfl: 0     hydrocortisone, Perianal, (HYDROCORTISONE) 2.5 % cream, Place rectally 2 times daily as needed for hemorrhoids, Disp: 30 g, Rfl: 2     ibuprofen (ADVIL/MOTRIN) 200 MG capsule, Take 2 pills twice daily for 7 days., Disp: , Rfl:      ibuprofen (ADVIL/MOTRIN) 200 MG tablet, Take 3 tablets (600 mg) by mouth 3 times daily, Disp: , Rfl:      Lancets (ONETOUCH DELICA PLUS QOBAWT08C) MISC, USE TO TEST BLOOD SUGAR ONCE DAILY, Disp: 100 each, Rfl: 10     omeprazole (PRILOSEC) 20 MG DR capsule, TAKE ONE  CAPSULE BY MOUTH TWICE DAILY (AM AND AT BEDTIME), Disp: 180 capsule, Rfl: 1     ONETOUCH VERIO IQ test strip, TEST BLOOD SUGAR ONCE DAILY, Disp: 50 strip, Rfl: 10     pioglitazone (ACTOS) 30 MG tablet, TAKE ONE TABLET BY MOUTH DAILY, Disp: 28 tablet, Rfl: 10     QUEtiapine (SEROQUEL) 300 MG tablet, Take 300 mg by mouth At Bedtime , Disp: , Rfl: 0     TRAZODONE HCL PO, Take 100 mg by mouth At Bedtime , Disp: , Rfl:      fluticasone (FLONASE) 50 MCG/ACT nasal spray, Spray 1 spray into both nostrils daily (Patient not taking: Reported on 2/24/2025), Disp: 15.8 mL, Rfl: 0     guaiFENesin (ROBITUSSIN) 20 mg/mL liquid, Take 10 mLs (200 mg) by mouth every 8 hours as needed for cough (Patient not taking: Reported on 2/24/2025), Disp: 180 mL, Rfl: 0     meloxicam (MOBIC) 15 MG tablet, Take 1 tablet (15 mg) by mouth daily for 10 days., Disp: 10 tablet, Rfl: 0     ALLERGIES:    Allergies   Allergen Reactions      Ceclor [Cefaclor] Swelling     Erythromycin GI Disturbance        SOCIAL HISTORY:   Social History     Socioeconomic History     Marital status: Single     Spouse name: Not on file     Number of children: Not on file     Years of education: Not on file     Highest education level: Not on file   Occupational History     Not on file   Tobacco Use     Smoking status: Former     Current packs/day: 0.00     Average packs/day: 1 pack/day for 0.2 years (0.2 ttl pk-yrs)     Types: Cigarettes     Start date: 1/1/2014     Quit date: 3/1/2014     Years since quitting: 10.9     Smokeless tobacco: Never   Vaping Use     Vaping status: Never Used   Substance and Sexual Activity     Alcohol use: Yes     Comment: rare     Drug use: No     Sexual activity: Never   Other Topics Concern     Parent/sibling w/ CABG, MI or angioplasty before 65F 55M? No   Social History Narrative     Not on file     Social Drivers of Health     Financial Resource Strain: Unknown (2/9/2025)    Financial Resource Strain      Within the past 12 months, have you or your family members you live with been unable to get utilities (heat, electricity) when it was really needed?: Patient declined   Food Insecurity: Low Risk  (2/9/2025)    Food Insecurity      Within the past 12 months, did you worry that your food would run out before you got money to buy more?: No      Within the past 12 months, did the food you bought just not last and you didn t have money to get more?: No   Transportation Needs: Low Risk  (2/9/2025)    Transportation Needs      Within the past 12 months, has lack of transportation kept you from medical appointments, getting your medicines, non-medical meetings or appointments, work, or from getting things that you need?: No   Physical Activity: Insufficiently Active (2/9/2025)    Exercise Vital Sign      Days of Exercise per Week: 3 days      Minutes of Exercise per Session: 30 min   Stress: Stress Concern Present (2/9/2025)    [place]  "[place] [employer] Occup[employer] - Occupational Stress Questionnaire      Feeling of Stress : To some extent   Social Connections: Unknown (2/9/2025)    Social Connection and Isolation Panel [NHANES]      Frequency of Communication with Friends and Family: Not on file      Frequency of Social Gatherings with Friends and Family: Once a week      Attends Holiness Services: Not on file      Active Member of Clubs or Organizations: Not on file      Attends Club or Organization Meetings: Not on file      Marital Status: Not on file   Interpersonal Safety: Low Risk  (2/14/2025)    Interpersonal Safety      Do you feel physically and emotionally safe where you currently live?: Yes      Within the past 12 months, have you been hit, slapped, kicked or otherwise physically hurt by someone?: No      Within the past 12 months, have you been humiliated or emotionally abused in other ways by your partner or ex-partner?: Patient declined   Housing Stability: Low Risk  (2/9/2025)    Housing Stability      Do you have housing? : Patient declined      Are you worried about losing your housing?: No        FAMILY HISTORY:   Family History   Problem Relation Age of Onset     Mental Illness Brother      Diabetes No family hx of      Depression No family hx of      Anxiety Disorder No family hx of         EXAM:Vitals: Ht 1.89 m (6' 2.4\")   Wt (!) 146.5 kg (323 lb)   BMI 41.03 kg/m    BMI= Body mass index is 41.03 kg/m .    General appearance: Patient is alert and fully cooperative with history & exam.  No sign of distress is noted during the visit.     Psychiatric: Affect is pleasant & appropriate.  Patient appears motivated to improve health.     Respiratory: Breathing is regular & unlabored while sitting.     HEENT: Hearing is intact to spoken word.  Speech is clear.  No gross evidence of visual impairment that would impact ambulation.     Vascular: DP & PT pulses are intact & regular bilaterally.  Mild generalized 2mm pitting and " non pitting edema and mild varicosities in legs noted.  CFT and skin temperature is normal to both lower extremities. No hair growth on feet and toes. Warm to cool proximal to distal.     Neurologic: Plantar response is intact bilateral.  Protective threshold is diminished +7/10 applications to most of the digits but plantar metatarsal head and lateral fifth metatarsal is somewhat diminished tested with a 5.07 monofilament.  Patient also describes paresthesias about his feet.    Dermatologic: Skin is intact to both lower extremities with diminished texture, turgor and tone about the integument.  Hyperkeratosis is noted about the ball of the metatarsal heads heel and distal digits.    Musculoskeletal: Patient is ambulatory with diabetic shoes.  Lesser digits are semirigid the contracted.  Ankle joint equinus is noted bilateral.    Hemoglobin A1C (%)   Date Value   02/14/2025 11.3 (H)   11/01/2024 9.7 (H)   07/12/2024 9.6 (H)   03/18/2024 9.3 (H)   01/23/2024 8.9 (H)   08/17/2023 9.1 (H)   07/19/2023 9.6 (H)   04/26/2023 9.9 (H)   03/30/2021 7.5 (H)   09/08/2020 6.8 (H)   05/20/2020 8.0 (H)   01/16/2020 7.2 (H)   10/11/2019 6.9 (H)   02/04/2019 6.8 (H)     Creatinine (mg/dL)   Date Value   02/03/2025 0.95   02/03/2025 0.66 (L)   12/15/2024 0.58 (L)   01/23/2024 0.72   07/27/2023 0.50 (L)   01/17/2023 0.60 (L)   09/08/2020 0.47 (L)   05/20/2020 0.63 (L)   01/16/2020 0.67   08/21/2019 0.59 (L)   07/05/2019 0.56 (L)   01/08/2019 0.72        ASSESSMENT:       ICD-10-CM    1. Hammertoes of both feet  M20.41     M20.42       2. Equinus contracture of ankle  M24.573       3. Type 2 diabetes mellitus with hyperglycemia, without long-term current use of insulin (H)  E11.65         PLAN:  Reviewed patient's chart in [facility].      11/30/2023   Dispensed a fracture boot and ordered and interpreted radiographs.  Order placed to begin PT in buck  Stay in boot for all weight bearing until PT proves strength and balance is the same  on injured right ankle as the uninjured left ankle which will be 2-6 weeks of therapy.  Important to keep doing the PT at home as well.  Follow up with me in 4-6 weeks.   Can rest and sleep without the boot.  Can be weight bearing in the boot as much as tolerated.     1/11/2024  Return to all activities without restrictions at this time.  Filled out appropriate paperwork to do so.  Follow-up if this becomes symptomatic.    2/22/2024  Placed order for extra-depth diabetic shoes as this patient does have deformity hammertoes equinus changes in his skin with hyperkeratosis texture turgor tone is diminished diminished hair growth edema and reduced abnormal sensation with abnormal gait.  Follow-up once yearly for diabetic foot exam    2/24/2025  Order for DM shoes  Recommend Cetaphil cream or similar every day.  Recommend improved management of DM with A1C of 11.3.  Discussed risk factors of not managing blood glucose properly including risk of ulceration deformity neuropathy admission cellulitis amputation renal disease dialysis cardiovascular disease heart attack stroke and death.  Follow-up for foot exam once yearly.      Lopez Latif DPM            Again, thank you for allowing me to participate in the care of your patient.        Sincerely,        Lopez Latif DPM    Electronically signed

## 2025-02-24 NOTE — PATIENT INSTRUCTIONS
"DIABETES AND YOUR FEET    What effect does diabetes have on the feet?  Diabetes can result in several problems in the feet including contractures of the tendons leading to deformities and reduced function of the bones, skin ulcers or open sores on pressure points or prominent deformities, reduced sensation, reduced blood flow and thus reduced oxygen and immune cells to the tiny vessels in our feet. This all leads to higher risk of hospitalization, infections, and amputations.     What is neuropathy?  Neuropathy is a term used to describe a loss of nerve function.  Patients with diabetes are at risk of developing neuropathy if their sugars continue to run high and are above the normal value of 140.  The elevated blood sugar in the body enters the nerves causing it to swell and impair nerve function.  The higher the blood sugar and the longer it is elevated, the more damage is done to nerves.  This damage is permanent and irreversible.  These damaged sensory nerves can then cause reduced feeling or cause pain.  Damaged motor nerves can reduce blood flow and white blood cells into into your foot, skin and bones reducing your ability to heal a small problem. And neuropathy can cause tendons to become unbalanced and contribute to the formation of deformity and contractures in our feet. Often times, neuropathy can be prevented by controlling your blood sugar.  Your risk of developing neuropathy goes up dramatically as your hemoglobin A1C raises above 7.5.      How do I know if I have neuropathy?  When a person develops neuropathy, they usually begin to feel numbness or tingling in their feet and sometimes in their legs.  Other symptoms may include painful burning or hot feet, tingling, electrical sensations or feeling like insects or ants are crawling on your feet or legs.  If blood sugar remains above 140  for long periods of time, neuropathy can also occur in the hands.  When a person loses their \"protective threshold\" " or ability to detect a 5.07 [place] monofilament is when they have elevated risk for developing foot deformity, contractures, foot infections, amputations, Charcot arthropathy, or other complications. Keep your hemoglobin A1C below 7.5 to reduce this risk.    What is vascular disease?  Peripheral vascular disease is a term used to describe a loss or decrease in circulation (blood flow).  There is a problem in getting blood, immune cells, and oxygen to areas that need it.  Similar to neuropathy, sugars can build up in the walls of the arteries (blood vessels) and cause them to become swollen, thickened and hardened.  This decreases the amount of blood that can go to an area that needs it.  Though this is common in the legs of diabetic patients, it can also affect other arteries (blood vessels) in the body such as in the heart, kidney, eyes, and the blood flow into bones.  It is often seen first in the small vessels of her body notably our feet and toes.    How do I know if I have vascular disease?  In the legs, vascular disease usually results in cramping.  Patients who develop leg cramps after walking the same distance every time (i.e. One block, half a mile, ect.) need to let their doctors know so that their circulation may be checked.  Cramps causing severe pain in the feet and/or legs while sleeping and the cramps go away when you stand or hang your legs off the side of the bed, may also be a sign of poor blood circulation.  Occasional cramping in cold weather or on rare occasions with activity may not be due to poor circulation, but you should inform your doctor.    How can these problems be prevented?  The key to prevention is good blood sugar control all day every day.  Inadequate blood sugar control is the most common way patients experience these problems. Reducing, controlling and measuring your daily consumption of sugar or carbohydrates is essential to understanding and managing diabetes.   Physical activity (exercise) is a very good way to help decrease your blood sugars.  Exercise can lower your blood sugar, blood pressure, and cholesterol.  It also reduces your risk for heart disease and stroke, relieves stress, and strengthens your heart, muscles and bones. Physical activity also increases your balance and reduces development of contractures and foot deformities over time. In addition, regular activity helps insulin work better, improves your blood circulation, and keeps your joints flexible.  If you're trying to lose weight, a combination of exercise and wise food choices can help you reach your target weight and maintain it.  Activity and exercise alone can not make up for poor diet choices, eating too much, or eating too many sugars or carbohydrates.  Ask your doctor for help when you are not meeting your blood sugar goals. Changes or increases in medication are powerful tools in reducing your blood sugar.    Know your blood sugar and hemoglobin A1C trend.  Upon first diagnosis or during acute illness, checking your blood sugar 4 times a day can help you understand how your diet, activity, and lifestyle affect your blood sugar.  Monitoring your hemoglobin A1C can help you understand how well you are managing blood sugar over the long run.  Your hemoglobin A1C tells you what your blood sugar averages all day, every day, over the past 90 days.       To experience the lowest risk of complications associated with diabetes such as neuropathy, loss of blood flow, bone or joint infection, charcot arthropathy, or amputation, the American Diabetes Association recommends a target hemoglobin A1C of less than 7.0%, while the American Association of Clinical Endocrinologists' recommendation is 6.5% or less.  Both organizations advise that the goals be individualized based on patient factors such as other health conditions, history of hypoglycemia, education, and life expectancy.  A patients risk of  experiencing complications associated with diabetes is only slightly elevated with a hemoglobin A1C above 6.0.  However, this risk goes up exponentially when the hemoglobin A1C is above 7.5.  The longer the hemoglobin A1C is elevated, the more risk that patient will experience in their lifetime. The damage that occurs to nerves, blood vessels, tendons, bones and body organs, while their hemoglobin A1C is elevated is mostly irreversible and worsens with each additional time period of elevated hemoglobin A1C.     You must understand and manage your disease.  Your health insurance or medical team cannot manage this disease for you.  When you take responsibility for understanding and managing your disease, you can expect to experience fewer problems associated with diabetes in your lifetime.  You will  Also experience a higher quality of life and health and reduced cost of health care.          Diabetic Foot Care Recommendations  The following are recommendations for avoiding serious foot problems or injury    DO'S  1. Be aware of your hemoglobin A1C and continue to follow up with your medical team for adjustments in your lifestyle and medication until your reach your A1C goal.  Keep this below 7.5 to reduce your risk of developing complications associated with diabetes.    2.  Wash your feet with lukewarm water and a mild soap and then dry them thoroughly, especially between the toes.  Gently floss your towel or washcloth between each toe at every bath.  Soaking your feet in water cannot clean dead skin, debris, and bacteria from your feet and is not necessary.   3. Examine your feet daily looking for cuts, corns, blisters, cracks, ect..., especially after wearing new shoes or increased or changed activities.  Make sure to look between your toes.  If you cannot see the bottom of your feet, set a mirror on the floor and hold your foot over it, or ask a family member to examine your feet for you daily.  Contact your  doctor immediately if new problems are noted or if sores are not healing.  4.  Immediately apply moisturizer cream such as Cetaphil to the tops and bottoms of your feet, avoiding areas between the toes.  Apply sunscreen or cover your feet if they will be exposed to extended sunlight.  5.Use clean comfortable shoes.  Socks should not have thick seams or cut off the circulation around the leg.  Break in new shoes slowly and rotate with older shoes until broken in.  Check the inside of your shoes with your hand to look for areas of irritation or objects that may have fallen into your shoes.    6. Keep slippers by the side of your bed for use during the night.  7. Shoes should be fitted by a professional and should not cause areas of irritation.  Check your feet regularly when wearing a new pair of shoes and replace them as needed.  8.  Talk to your doctor about proper exercise.  Exercise and stretching stimulate blood flow to your feet and maintain proper glucose levels.  Use it or lose it!  9.  Monitor your blood glucose level and your hemoglobin A1C.  Notify your doctor immediately if your blood sugar is abnormally high or low.  10.  Cut your nails straight across, but then gently round any sharp edges with a nail file.  If you have neuropathy, peripheral vascular disease or cannot see that well to trim your own toenails, see a medical professional for care.    DONT'S  1.  Do not soak your feet if you have an open sore or your provider has informed you that you have neuropathy or loss of protective threshold.  Use only lukewarm water and always check the temperature with your hand as hot water can easily burn your feet.    2.  Never use a hot water bottle or heating pad on your feet.  Also do not apply hot or cold compresses to your feet.  With decreased sensation, you could burn or freeze your feet.  Do not rest your feet near a heat source such as a heater or heat register.    3.  Do not apply any of these to your  feet:    - over the counter medicine for corns or warts    -  Harsh chemicals like boric acid    -  Do not self-treat corns, cuts, blisters or infections.  Always consult your doctor.   4.  Do not wear sandals, slippers or walk barefoot, especially on harsh surfaces.  5.  If you smoke, stop!!!

## 2025-02-24 NOTE — PROGRESS NOTES
Chief Complaint   Patient presents with    Diabetes     DM type 2 exam, DM shoes; LOV 2/22/2024    RECHECK     Still having pain and instability, Right ankle injury 11/29/2023     HPI:  Patient is requesting diabetic shoes today as his current diabetic shoes are completely crushed as are all of his inserts.  Complains of deformity about his feet unusual sensations about his feet and uncontrolled diabetes.    ROS:  10 point ROS neg other than the symptoms noted above in the HPI.    Patient Active Problem List   Diagnosis    Morbid obesity (H)    Intellectual disability    Recurrent major depressive disorder, in partial remission    Mantoux: positive, treated    Morbid obesity with body mass index of 45.0-49.9 in adult (H)    Mild intellectual disabilities    Type 2 diabetes mellitus with hypoglycemia, without long-term current use of insulin (H)    Sexual dysfunction    Grief    Depression    Severe sleep apnea    Microcephalic (H)    Hyperlipidemia, unspecified       PAST MEDICAL HISTORY:   Past Medical History:   Diagnosis Date    Depressive disorder All my life almost.    Diabetes (H) sometime in 2019    Sleep apnea         PAST SURGICAL HISTORY:   Past Surgical History:   Procedure Laterality Date    APPENDECTOMY  8/15/14    COLONOSCOPY N/A 12/28/2020    Procedure: COLONOSCOPY, WITH BIOPSY;  Surgeon: Mary, MD Haley;  Location:  GI        MEDICATIONS:   Current Outpatient Medications:     acetaminophen (TYLENOL) 325 MG tablet, Take 325-650 mg by mouth as needed for mild pain, Disp: , Rfl:     atorvastatin (LIPITOR) 20 MG tablet, TAKE ONE TABLET BY MOUTH EVERY MORNING, Disp: 90 tablet, Rfl: 1    blood glucose calibration (ONETOUCH VERIO) solution, USE AS DIRECTED, Disp: 1 each, Rfl: 0    Blood Glucose Monitoring Suppl (ONETOUCH VERIO FLEX SYSTEM) w/Device KIT, USE TO TEST TWICE DAILY, Disp: 1 kit, Rfl: 0    diclofenac (VOLTAREN) 1 % topical gel, Apply 2 g topically 4 times daily, Disp: 150 g, Rfl: 1     divalproex sodium extended-release (DEPAKOTE ER) 500 MG 24 hr tablet, TAKE THREE TABLETS BY MOUTH EVERY NIGHT AT BEDTIME (HS PLUSPAK), Disp: , Rfl:     glimepiride (AMARYL) 4 MG tablet, TAKE ONE TABLET BY MOUTH DAILY IN THE MORNING BEFORE BREAKFAST ., Disp: 90 tablet, Rfl: 0    hydrocortisone, Perianal, (HYDROCORTISONE) 2.5 % cream, Place rectally 2 times daily as needed for hemorrhoids, Disp: 30 g, Rfl: 2    ibuprofen (ADVIL/MOTRIN) 200 MG capsule, Take 2 pills twice daily for 7 days., Disp: , Rfl:     ibuprofen (ADVIL/MOTRIN) 200 MG tablet, Take 3 tablets (600 mg) by mouth 3 times daily, Disp: , Rfl:     Lancets (ONETOUCH DELICA PLUS [id number]) MISC, USE TO TEST BLOOD SUGAR ONCE DAILY, Disp: 100 each, Rfl: 10    omeprazole (PRILOSEC) 20 MG DR capsule, TAKE ONE  CAPSULE BY MOUTH TWICE DAILY (AM AND AT BEDTIME), Disp: 180 capsule, Rfl: 1    ONETOUCH VERIO IQ test strip, TEST BLOOD SUGAR ONCE DAILY, Disp: 50 strip, Rfl: 10    pioglitazone (ACTOS) 30 MG tablet, TAKE ONE TABLET BY MOUTH DAILY, Disp: 28 tablet, Rfl: 10    QUEtiapine (SEROQUEL) 300 MG tablet, Take 300 mg by mouth At Bedtime , Disp: , Rfl: 0    TRAZODONE HCL PO, Take 100 mg by mouth At Bedtime , Disp: , Rfl:     fluticasone (FLONASE) 50 MCG/ACT nasal spray, Spray 1 spray into both nostrils daily (Patient not taking: Reported on 2/24/2025), Disp: 15.8 mL, Rfl: 0    guaiFENesin (ROBITUSSIN) 20 mg/mL liquid, Take 10 mLs (200 mg) by mouth every 8 hours as needed for cough (Patient not taking: Reported on 2/24/2025), Disp: 180 mL, Rfl: 0    meloxicam (MOBIC) 15 MG tablet, Take 1 tablet (15 mg) by mouth daily for 10 days., Disp: 10 tablet, Rfl: 0     ALLERGIES:    Allergies   Allergen Reactions    Ceclor [Cefaclor] Swelling    Erythromycin GI Disturbance        SOCIAL HISTORY:   Social History     Socioeconomic History    Marital status: Single     Spouse name: Not on file    Number of children: Not on file    Years of education: Not on file    Highest  education level: Not on file   Occupational History    Not on file   Tobacco Use    Smoking status: Former     Current packs/day: 0.00     Average packs/day: 1 pack/day for 0.2 years (0.2 ttl pk-yrs)     Types: Cigarettes     Start date: 1/1/2014     Quit date: 3/1/2014     Years since quitting: 10.9    Smokeless tobacco: Never   Vaping Use    Vaping status: Never Used   Substance and Sexual Activity    Alcohol use: Yes     Comment: rare    Drug use: No    Sexual activity: Never   Other Topics Concern    Parent/sibling w/ CABG, MI or angioplasty before 65F 55M? No   Social History Narrative    Not on file     Social Drivers of Health     Financial Resource Strain: Unknown (2/9/2025)    Financial Resource Strain     Within the past 12 months, have you or your family members you live with been unable to get utilities (heat, electricity) when it was really needed?: Patient declined   Food Insecurity: Low Risk  (2/9/2025)    Food Insecurity     Within the past 12 months, did you worry that your food would run out before you got money to buy more?: No     Within the past 12 months, did the food you bought just not last and you didn t have money to get more?: No   Transportation Needs: Low Risk  (2/9/2025)    Transportation Needs     Within the past 12 months, has lack of transportation kept you from medical appointments, getting your medicines, non-medical meetings or appointments, work, or from getting things that you need?: No   Physical Activity: Insufficiently Active (2/9/2025)    Exercise Vital Sign     Days of Exercise per Week: 3 days     Minutes of Exercise per Session: 30 min   Stress: Stress Concern Present (2/9/2025)    [employer] [place] [employer] - Occupational Stress Questionnaire     Feeling of Stress : To some extent   Social Connections: Unknown (2/9/2025)    Social Connection and Isolation Panel [NHANES]     Frequency of Communication with Friends and Family: Not on file     Frequency of  "Social Gatherings with Friends and Family: Once a week     Attends [employer] Services: Not on file     Active Member of Clubs or Organizations: Not on file     Attends Club or Organization Meetings: Not on file     Marital Status: Not on file   Interpersonal Safety: Low Risk  (2/14/2025)    Interpersonal Safety     Do you feel physically and emotionally safe where you currently live?: Yes     Within the past 12 months, have you been hit, slapped, kicked or otherwise physically hurt by someone?: No     Within the past 12 months, have you been humiliated or emotionally abused in other ways by your partner or ex-partner?: Patient declined   Housing Stability: Low Risk  (2/9/2025)    Housing Stability     Do you have housing? : Patient declined     Are you worried about losing your housing?: No        FAMILY HISTORY:   Family History   Problem Relation Age of Onset    Mental Illness Brother     Diabetes No family hx of     Depression No family hx of     Anxiety Disorder No family hx of         EXAM:Vitals: Ht 1.89 m (6' 2.4\")   Wt (!) 146.5 kg (323 lb)   BMI 41.03 kg/m    BMI= Body mass index is 41.03 kg/m .    General appearance: Patient is alert and fully cooperative with history & exam.  No sign of distress is noted during the visit.     Psychiatric: Affect is pleasant & appropriate.  Patient appears motivated to improve health.     Respiratory: Breathing is regular & unlabored while sitting.     HEENT: Hearing is intact to spoken word.  Speech is clear.  No gross evidence of visual impairment that would impact ambulation.     Vascular: DP & PT pulses are intact & regular bilaterally.  Mild generalized 2mm pitting and non pitting edema and mild varicosities in legs noted.  CFT and skin temperature is normal to both lower extremities. No hair growth on feet and toes. Warm to cool proximal to distal.     Neurologic: Plantar response is intact bilateral.  Protective threshold is diminished +7/10 applications to most " of the digits but plantar metatarsal head and lateral fifth metatarsal is somewhat diminished tested with a 5.07 monofilament.  Patient also describes paresthesias about his feet.    Dermatologic: Skin is intact to both lower extremities with diminished texture, turgor and tone about the integument.  Hyperkeratosis is noted about the ball of the metatarsal heads heel and distal digits.    Musculoskeletal: Patient is ambulatory with diabetic shoes.  Lesser digits are semirigid the contracted.  Ankle joint equinus is noted bilateral.    Hemoglobin A1C (%)   Date Value   02/14/2025 11.3 (H)   11/01/2024 9.7 (H)   07/12/2024 9.6 (H)   03/18/2024 9.3 (H)   01/23/2024 8.9 (H)   08/17/2023 9.1 (H)   07/19/2023 9.6 (H)   04/26/2023 9.9 (H)   03/30/2021 7.5 (H)   09/08/2020 6.8 (H)   05/20/2020 8.0 (H)   01/16/2020 7.2 (H)   10/11/2019 6.9 (H)   02/04/2019 6.8 (H)     Creatinine (mg/dL)   Date Value   02/03/2025 0.95   02/03/2025 0.66 (L)   12/15/2024 0.58 (L)   01/23/2024 0.72   07/27/2023 0.50 (L)   01/17/2023 0.60 (L)   09/08/2020 0.47 (L)   05/20/2020 0.63 (L)   01/16/2020 0.67   08/21/2019 0.59 (L)   07/05/2019 0.56 (L)   01/08/2019 0.72        ASSESSMENT:       ICD-10-CM    1. Hammertoes of both feet  M20.41     M20.42       2. Equinus contracture of ankle  M24.573       3. Type 2 diabetes mellitus with hyperglycemia, without long-term current use of insulin (H)  E11.65         PLAN:  Reviewed patient's [facility] in [facility].      11/30/2023   Dispensed a fracture boot and ordered and interpreted radiographs.  Order placed to begin PT in buck  Stay in boot for all weight bearing until PT proves strength and balance is the same on injured right ankle as the uninjured left ankle which will be 2-6 weeks of therapy.  Important to keep doing the PT at home as well.  Follow up with me in 4-6 weeks.   Can rest and sleep without the boot.  Can be weight bearing in the boot as much as tolerated.     1/11/2024  Return to all  activities without restrictions at this time.  Filled out appropriate paperwork to do so.  Follow-up if this becomes symptomatic.    2/22/2024  Placed order for extra-depth diabetic shoes as this patient does have deformity hammertoes equinus changes in his skin with hyperkeratosis texture turgor tone is diminished diminished hair growth edema and reduced abnormal sensation with abnormal gait.  Follow-up once yearly for diabetic foot exam    2/24/2025  Order for DM shoes  Recommend Cetaphil cream or similar every day.  Recommend improved management of DM with A1C of 11.3.  Discussed risk factors of not managing blood glucose properly including risk of ulceration deformity neuropathy admission cellulitis amputation renal disease dialysis cardiovascular disease heart attack stroke and death.  Follow-up for foot exam once yearly.      Lopez Latif DPM

## 2025-03-12 DIAGNOSIS — E11.65 TYPE 2 DIABETES MELLITUS WITH HYPERGLYCEMIA, WITHOUT LONG-TERM CURRENT USE OF INSULIN (H): ICD-10-CM

## 2025-03-12 RX ORDER — PIOGLITAZONE 30 MG/1
TABLET ORAL
Qty: 28 TABLET | Refills: 5 | Status: SHIPPED | OUTPATIENT
Start: 2025-03-12

## 2025-03-19 DIAGNOSIS — E11.649 TYPE 2 DIABETES MELLITUS WITH HYPOGLYCEMIA WITHOUT COMA, WITHOUT LONG-TERM CURRENT USE OF INSULIN (H): ICD-10-CM

## 2025-03-19 RX ORDER — ORAL SEMAGLUTIDE 3 MG/1
TABLET ORAL DAILY
Qty: 30 TABLET | Refills: 1 | Status: SHIPPED | OUTPATIENT
Start: 2025-03-19

## 2025-03-24 ENCOUNTER — LAB (OUTPATIENT)
Dept: LAB | Facility: CLINIC | Age: 35
End: 2025-03-24
Payer: COMMERCIAL

## 2025-03-24 DIAGNOSIS — Z79.899 ENCOUNTER FOR LONG-TERM (CURRENT) USE OF MEDICATIONS: Primary | ICD-10-CM

## 2025-03-24 LAB
ALBUMIN SERPL BCG-MCNC: 4.1 G/DL (ref 3.5–5.2)
ALP SERPL-CCNC: 74 U/L (ref 40–150)
ALT SERPL W P-5'-P-CCNC: 22 U/L (ref 0–70)
AMYLASE SERPL-CCNC: 73 U/L (ref 28–100)
AST SERPL W P-5'-P-CCNC: 16 U/L (ref 0–45)
BASOPHILS # BLD AUTO: 0 10E3/UL (ref 0–0.2)
BASOPHILS NFR BLD AUTO: 0 %
BILIRUB DIRECT SERPL-MCNC: <0.08 MG/DL (ref 0–0.3)
BILIRUB SERPL-MCNC: 0.2 MG/DL
CHOLEST SERPL-MCNC: 152 MG/DL
EOSINOPHIL # BLD AUTO: 0.1 10E3/UL (ref 0–0.7)
EOSINOPHIL NFR BLD AUTO: 1 %
ERYTHROCYTE [DISTWIDTH] IN BLOOD BY AUTOMATED COUNT: 13.2 % (ref 10–15)
EST. AVERAGE GLUCOSE BLD GHB EST-MCNC: 278 MG/DL
FASTING STATUS PATIENT QL REPORTED: YES
FASTING STATUS PATIENT QL REPORTED: YES
GLUCOSE SERPL-MCNC: 274 MG/DL (ref 70–99)
HBA1C MFR BLD: 11.3 %
HCT VFR BLD AUTO: 38.6 % (ref 40–53)
HDLC SERPL-MCNC: 39 MG/DL
HGB BLD-MCNC: 12.5 G/DL (ref 13.3–17.7)
IMM GRANULOCYTES # BLD: 0 10E3/UL
IMM GRANULOCYTES NFR BLD: 1 %
LDLC SERPL CALC-MCNC: 87 MG/DL
LYMPHOCYTES # BLD AUTO: 3 10E3/UL (ref 0.8–5.3)
LYMPHOCYTES NFR BLD AUTO: 36 %
MCH RBC QN AUTO: 27.5 PG (ref 26.5–33)
MCHC RBC AUTO-ENTMCNC: 32.4 G/DL (ref 31.5–36.5)
MCV RBC AUTO: 85 FL (ref 78–100)
MONOCYTES # BLD AUTO: 0.6 10E3/UL (ref 0–1.3)
MONOCYTES NFR BLD AUTO: 7 %
NEUTROPHILS # BLD AUTO: 4.5 10E3/UL (ref 1.6–8.3)
NEUTROPHILS NFR BLD AUTO: 55 %
NONHDLC SERPL-MCNC: 113 MG/DL
NRBC # BLD AUTO: 0 10E3/UL
NRBC BLD AUTO-RTO: 0 /100
PLATELET # BLD AUTO: 218 10E3/UL (ref 150–450)
PROLACTIN SERPL 3RD IS-MCNC: 8 NG/ML (ref 4–15)
PROT SERPL-MCNC: 7.8 G/DL (ref 6.4–8.3)
RBC # BLD AUTO: 4.55 10E6/UL (ref 4.4–5.9)
TRIGL SERPL-MCNC: 131 MG/DL
VALPROATE SERPL-MCNC: 79.2 UG/ML
WBC # BLD AUTO: 8.2 10E3/UL (ref 4–11)

## 2025-03-24 PROCEDURE — 85025 COMPLETE CBC W/AUTO DIFF WBC: CPT

## 2025-03-24 PROCEDURE — 83036 HEMOGLOBIN GLYCOSYLATED A1C: CPT

## 2025-03-24 PROCEDURE — 80061 LIPID PANEL: CPT

## 2025-03-24 PROCEDURE — 82150 ASSAY OF AMYLASE: CPT

## 2025-03-24 PROCEDURE — 84146 ASSAY OF PROLACTIN: CPT

## 2025-03-24 PROCEDURE — 80076 HEPATIC FUNCTION PANEL: CPT

## 2025-03-24 PROCEDURE — 82947 ASSAY GLUCOSE BLOOD QUANT: CPT

## 2025-03-24 PROCEDURE — 36415 COLL VENOUS BLD VENIPUNCTURE: CPT

## 2025-03-24 PROCEDURE — 80164 ASSAY DIPROPYLACETIC ACD TOT: CPT

## 2025-04-09 DIAGNOSIS — K29.00 ACUTE GASTRITIS WITHOUT HEMORRHAGE, UNSPECIFIED GASTRITIS TYPE: ICD-10-CM

## 2025-04-09 RX ORDER — OMEPRAZOLE 20 MG/1
CAPSULE, DELAYED RELEASE ORAL
Qty: 56 CAPSULE | Refills: 2 | Status: SHIPPED | OUTPATIENT
Start: 2025-04-09

## 2025-04-11 ENCOUNTER — TRANSFERRED RECORDS (OUTPATIENT)
Dept: HEALTH INFORMATION MANAGEMENT | Facility: CLINIC | Age: 35
End: 2025-04-11
Payer: COMMERCIAL

## 2025-04-11 LAB — RETINOPATHY: NEGATIVE

## 2025-04-17 ENCOUNTER — MEDICAL CORRESPONDENCE (OUTPATIENT)
Dept: HEALTH INFORMATION MANAGEMENT | Facility: CLINIC | Age: 35
End: 2025-04-17
Payer: COMMERCIAL

## 2025-04-21 ENCOUNTER — TELEPHONE (OUTPATIENT)
Dept: FAMILY MEDICINE | Facility: OTHER | Age: 35
End: 2025-04-21
Payer: COMMERCIAL

## 2025-04-21 NOTE — TELEPHONE ENCOUNTER
INCOMING FORMS    Sender: [facility] [facility]    Type of Form, letter or note (What is requested?): therapeutic shoes    How was the form received?: Fax    How should forms be returned?:  Fax : [phone]    Form placed in JR bin for review/signature if appropriate.

## 2025-04-22 ENCOUNTER — MEDICAL CORRESPONDENCE (OUTPATIENT)
Dept: HEALTH INFORMATION MANAGEMENT | Facility: CLINIC | Age: 35
End: 2025-04-22
Payer: COMMERCIAL

## 2025-04-29 ASSESSMENT — ANXIETY QUESTIONNAIRES
8. IF YOU CHECKED OFF ANY PROBLEMS, HOW DIFFICULT HAVE THESE MADE IT FOR YOU TO DO YOUR WORK, TAKE CARE OF THINGS AT HOME, OR GET ALONG WITH OTHER PEOPLE?: SOMEWHAT DIFFICULT
3. WORRYING TOO MUCH ABOUT DIFFERENT THINGS: MORE THAN HALF THE DAYS
GAD7 TOTAL SCORE: 7
IF YOU CHECKED OFF ANY PROBLEMS ON THIS QUESTIONNAIRE, HOW DIFFICULT HAVE THESE PROBLEMS MADE IT FOR YOU TO DO YOUR WORK, TAKE CARE OF THINGS AT HOME, OR GET ALONG WITH OTHER PEOPLE: SOMEWHAT DIFFICULT
5. BEING SO RESTLESS THAT IT IS HARD TO SIT STILL: NOT AT ALL
6. BECOMING EASILY ANNOYED OR IRRITABLE: NEARLY EVERY DAY
7. FEELING AFRAID AS IF SOMETHING AWFUL MIGHT HAPPEN: NOT AT ALL
1. FEELING NERVOUS, ANXIOUS, OR ON EDGE: NOT AT ALL
GAD7 TOTAL SCORE: 7
GAD7 TOTAL SCORE: 7
4. TROUBLE RELAXING: NOT AT ALL
7. FEELING AFRAID AS IF SOMETHING AWFUL MIGHT HAPPEN: NOT AT ALL
2. NOT BEING ABLE TO STOP OR CONTROL WORRYING: MORE THAN HALF THE DAYS

## 2025-04-29 ASSESSMENT — PATIENT HEALTH QUESTIONNAIRE - PHQ9
SUM OF ALL RESPONSES TO PHQ QUESTIONS 1-9: 8
10. IF YOU CHECKED OFF ANY PROBLEMS, HOW DIFFICULT HAVE THESE PROBLEMS MADE IT FOR YOU TO DO YOUR WORK, TAKE CARE OF THINGS AT HOME, OR GET ALONG WITH OTHER PEOPLE: SOMEWHAT DIFFICULT
SUM OF ALL RESPONSES TO PHQ QUESTIONS 1-9: 8

## 2025-04-30 ENCOUNTER — OFFICE VISIT (OUTPATIENT)
Dept: INTERNAL MEDICINE | Facility: CLINIC | Age: 35
End: 2025-04-30
Payer: COMMERCIAL

## 2025-04-30 VITALS
OXYGEN SATURATION: 97 % | RESPIRATION RATE: 16 BRPM | HEART RATE: 84 BPM | DIASTOLIC BLOOD PRESSURE: 70 MMHG | WEIGHT: 314 LBS | BODY MASS INDEX: 40.3 KG/M2 | HEIGHT: 74 IN | TEMPERATURE: 96.8 F | SYSTOLIC BLOOD PRESSURE: 110 MMHG

## 2025-04-30 DIAGNOSIS — R10.11 RUQ ABDOMINAL PAIN: ICD-10-CM

## 2025-04-30 DIAGNOSIS — E66.01 MORBID OBESITY WITH BODY MASS INDEX OF 45.0-49.9 IN ADULT (H): ICD-10-CM

## 2025-04-30 DIAGNOSIS — R11.2 NAUSEA AND VOMITING, UNSPECIFIED VOMITING TYPE: Primary | ICD-10-CM

## 2025-04-30 DIAGNOSIS — Z91.119 NONCOMPLIANCE WITH DIETARY RESTRICTION: ICD-10-CM

## 2025-04-30 DIAGNOSIS — G47.30 SEVERE SLEEP APNEA: ICD-10-CM

## 2025-04-30 DIAGNOSIS — E78.5 HYPERLIPIDEMIA LDL GOAL <130: ICD-10-CM

## 2025-04-30 DIAGNOSIS — Z91.148 NONCOMPLIANCE WITH MEDICATION REGIMEN: ICD-10-CM

## 2025-04-30 DIAGNOSIS — E11.65 TYPE 2 DIABETES MELLITUS WITH HYPERGLYCEMIA, WITHOUT LONG-TERM CURRENT USE OF INSULIN (H): ICD-10-CM

## 2025-04-30 PROCEDURE — 3078F DIAST BP <80 MM HG: CPT | Performed by: INTERNAL MEDICINE

## 2025-04-30 PROCEDURE — 1126F AMNT PAIN NOTED NONE PRSNT: CPT | Performed by: INTERNAL MEDICINE

## 2025-04-30 PROCEDURE — 99214 OFFICE O/P EST MOD 30 MIN: CPT | Performed by: INTERNAL MEDICINE

## 2025-04-30 PROCEDURE — 3074F SYST BP LT 130 MM HG: CPT | Performed by: INTERNAL MEDICINE

## 2025-04-30 PROCEDURE — G2211 COMPLEX E/M VISIT ADD ON: HCPCS | Performed by: INTERNAL MEDICINE

## 2025-04-30 ASSESSMENT — PAIN SCALES - GENERAL: PAINLEVEL_OUTOF10: NO PAIN (0)

## 2025-04-30 NOTE — PROGRESS NOTES
"      Tahira Burden is a 34 year old, presenting for the following health issues:  Vomiting (Patient states he throws up 60% time after eating)      4/30/2025    10:31 AM   Additional Questions   Roomed by Leti GUSMAN reviewed including:             Complaint, History of Chief Complaint, Corresponding Review of Systems, and Complaint Specific Physical Examination.    #1   Persistent nausea and vomiting.  Generally follows a meal.  Sometimes he is not even done with the meal.  Frequently involves bilious vomitus.  Often associated with abdominal cramping and occasionally diarrhea.  Prior history of gastritis.  That responded to omeprazole.  Denies melena or hematochezia.  Denies hematemesis.  Notes that greasy foods such as \"sloppy Thom's and hamburgers\" make this much worse.          Exam:   GI: Abdomen is soft, without rebound, guarding.  Modest tenderness is noted with palpation of the right upper quadrant.. Bowel sounds are appropriate. No renal bruits are heard.  Abdomen is morbidly obese.   LUNGS: clear bilaterally, airflow is brisk, no intercostal retraction or stridor is noted. No coughing is noted during visit.   HEART:  regular without rubs, clicks, gallops, or murmurs. PMI is nondisplaced. Upstrokes are brisk. S1,S2 are heard.      #2   Follow-up on type 2 diabetes  Patient currently taking glimepiride, Actos, and semaglutide orally.  Have been hesitant in the past to consider insulin as the patient \"self administers\" his medications.  Strong concerns regarding medicinal misadventure are entertained.  Patient is essentially noncompliant with his diet.  Notes that he is now drinking diet soda pop.  Recent A1c was 11.3.  Denies any recent polyuria or polydipsia.  Denies hypoglycemic episodes.  Is reportedly taking the atorvastatin, but not on an ACE inhibitor or ARB medication due to hypotensive response.          Exam:  As above   SKIN:  warm and dry. No erythema, or rashes are noted. No " "specific lesions of concern are noted.    NEURO: Pt is alert and appropriate. No neurologic lateralization is noted. Cranial nerves 2-12 are intact. Peripheral sensory and motor function are grossly normal.         Patient has been interviewed, applicable history and applied review of systems have been performed.    Vital Signs:   /70   Pulse 84   Temp 96.8  F (36  C) (Temporal)   Resp 16   Ht 1.88 m (6' 2\")   Wt (!) 142.4 kg (314 lb)   SpO2 97%   BMI 40.32 kg/m        Decision Making    Problem and Complexity     1. Nausea and vomiting, unspecified vomiting type (Primary)  Suspect subacute gallbladder disease.  Will try to obtain ultrasound of the abdomen (pending technical capabilities of the equipment/his obesity)  - US Abdomen Complete; Future    2. RUQ abdominal pain  As above    - US Abdomen Complete; Future    3. Type 2 diabetes mellitus with hyperglycemia, without long-term current use of insulin (H)  Discussed potential for conversion to insulin.  Patient states that he is working on controlling his blood sugar and his compliance will improve.  Await upcoming A1c.    4. Hyperlipidemia LDL goal <130  Continue statin medicine.  Follow hepatic function and lipids    5. Noncompliance with medication regimen  Discussed in detail    6. Noncompliance with dietary restriction  Discussed in detail    7. Morbid obesity with body mass index of 45.0-49.9 in adult (H)  Discussed the need for weight loss again    8. Severe sleep apnea  Patient notes that he uses the CPAP regularly.  gilbert.                                FOLLOW UP   I have asked the patient to make an appointment for followup with me following the ultrasound for further recommendations.    Patient continues to petition for independent living.  I am uncertain how this will go if he is to be placed on insulin.      Regarding routine vaccinations:  I have reviewed the patient's vaccination schedule and discussed the benefits of prophylactic " vaccination in detail.  I recommend the patient contact their pharmacist for vaccinations.  Discussed that most insurance companies now favor reimbursement to the pharmacies and it will financially behoove the patient to have vaccinations performed at their pharmacy.        I have carefully explained the diagnosis and treatment options to the patient.  The patient has displayed an understanding of the above, and all subsequent questions were answered.      DO DONENLL Jin    Portions of this note were produced using [employer]  Although every attempt at real-time proof reading has been made, occasional grammar/syntax errors may have been missed.

## 2025-05-01 ENCOUNTER — TELEPHONE (OUTPATIENT)
Dept: INTERNAL MEDICINE | Facility: CLINIC | Age: 35
End: 2025-05-01
Payer: COMMERCIAL

## 2025-05-01 NOTE — TELEPHONE ENCOUNTER
[facility]idential [facility] is calling to check for medication order changes.  Nothing noted in chart notes.    Group home notified.  Radha Bell RN on 5/1/2025 at 11:44 AM

## 2025-05-06 ENCOUNTER — HOSPITAL ENCOUNTER (OUTPATIENT)
Dept: ULTRASOUND IMAGING | Facility: CLINIC | Age: 35
Discharge: HOME | End: 2025-05-06
Attending: INTERNAL MEDICINE | Admitting: INTERNAL MEDICINE
Payer: COMMERCIAL

## 2025-05-06 DIAGNOSIS — R10.11 RUQ ABDOMINAL PAIN: ICD-10-CM

## 2025-05-06 DIAGNOSIS — R11.2 NAUSEA AND VOMITING, UNSPECIFIED VOMITING TYPE: ICD-10-CM

## 2025-05-06 PROCEDURE — 76705 ECHO EXAM OF ABDOMEN: CPT

## 2025-05-09 ENCOUNTER — RESULTS FOLLOW-UP (OUTPATIENT)
Dept: FAMILY MEDICINE | Facility: CLINIC | Age: 35
End: 2025-05-09

## 2025-05-28 ENCOUNTER — TELEPHONE (OUTPATIENT)
Dept: INTERNAL MEDICINE | Facility: CLINIC | Age: 35
End: 2025-05-28
Payer: COMMERCIAL

## 2025-05-28 NOTE — TELEPHONE ENCOUNTER
Patient Quality Outreach    Patient is due for the following:   unsure    Action(s) Taken:   Patient has upcoming appointment, these items will be addressed at that time.    Type of outreach:    Chart review performed, no outreach needed.    Questions for provider review:    None         Leti Gilman MA  Chart routed to None.

## 2025-06-25 ASSESSMENT — PATIENT HEALTH QUESTIONNAIRE - PHQ9
SUM OF ALL RESPONSES TO PHQ QUESTIONS 1-9: 11
SUM OF ALL RESPONSES TO PHQ QUESTIONS 1-9: 11
10. IF YOU CHECKED OFF ANY PROBLEMS, HOW DIFFICULT HAVE THESE PROBLEMS MADE IT FOR YOU TO DO YOUR WORK, TAKE CARE OF THINGS AT HOME, OR GET ALONG WITH OTHER PEOPLE: SOMEWHAT DIFFICULT

## 2025-06-26 ENCOUNTER — OFFICE VISIT (OUTPATIENT)
Dept: INTERNAL MEDICINE | Facility: CLINIC | Age: 35
End: 2025-06-26
Payer: COMMERCIAL

## 2025-06-26 VITALS
OXYGEN SATURATION: 95 % | WEIGHT: 315 LBS | HEART RATE: 87 BPM | RESPIRATION RATE: 21 BRPM | DIASTOLIC BLOOD PRESSURE: 72 MMHG | TEMPERATURE: 96.9 F | SYSTOLIC BLOOD PRESSURE: 115 MMHG | HEIGHT: 74 IN | BODY MASS INDEX: 40.43 KG/M2

## 2025-06-26 DIAGNOSIS — E78.5 HYPERLIPIDEMIA LDL GOAL <130: ICD-10-CM

## 2025-06-26 DIAGNOSIS — E11.65 TYPE 2 DIABETES MELLITUS WITH HYPERGLYCEMIA, WITHOUT LONG-TERM CURRENT USE OF INSULIN (H): Primary | ICD-10-CM

## 2025-06-26 DIAGNOSIS — Z91.119 NONCOMPLIANCE WITH DIETARY RESTRICTION: ICD-10-CM

## 2025-06-26 DIAGNOSIS — G89.29 CHRONIC PAIN OF RIGHT KNEE: ICD-10-CM

## 2025-06-26 DIAGNOSIS — F79 INTELLECTUAL DISABILITY: ICD-10-CM

## 2025-06-26 DIAGNOSIS — G89.29 CHRONIC PAIN OF LEFT KNEE: ICD-10-CM

## 2025-06-26 DIAGNOSIS — E66.01 MORBID OBESITY WITH BODY MASS INDEX OF 45.0-49.9 IN ADULT (H): ICD-10-CM

## 2025-06-26 DIAGNOSIS — M25.561 CHRONIC PAIN OF RIGHT KNEE: ICD-10-CM

## 2025-06-26 DIAGNOSIS — M25.562 CHRONIC PAIN OF LEFT KNEE: ICD-10-CM

## 2025-06-26 LAB
EST. AVERAGE GLUCOSE BLD GHB EST-MCNC: 266 MG/DL
HBA1C MFR BLD: 10.9 %

## 2025-06-26 PROCEDURE — 83036 HEMOGLOBIN GLYCOSYLATED A1C: CPT | Performed by: INTERNAL MEDICINE

## 2025-06-26 PROCEDURE — 1125F AMNT PAIN NOTED PAIN PRSNT: CPT | Performed by: INTERNAL MEDICINE

## 2025-06-26 PROCEDURE — 3078F DIAST BP <80 MM HG: CPT | Performed by: INTERNAL MEDICINE

## 2025-06-26 PROCEDURE — 3074F SYST BP LT 130 MM HG: CPT | Performed by: INTERNAL MEDICINE

## 2025-06-26 PROCEDURE — 36415 COLL VENOUS BLD VENIPUNCTURE: CPT | Performed by: INTERNAL MEDICINE

## 2025-06-26 PROCEDURE — G2211 COMPLEX E/M VISIT ADD ON: HCPCS | Performed by: INTERNAL MEDICINE

## 2025-06-26 PROCEDURE — 99214 OFFICE O/P EST MOD 30 MIN: CPT | Performed by: INTERNAL MEDICINE

## 2025-06-26 RX ORDER — MELOXICAM 15 MG/1
15 TABLET ORAL DAILY
Qty: 30 TABLET | Refills: 0 | Status: SHIPPED | OUTPATIENT
Start: 2025-06-26 | End: 2025-07-18

## 2025-06-26 ASSESSMENT — PAIN SCALES - GENERAL: PAINLEVEL_OUTOF10: MODERATE PAIN (4)

## 2025-06-27 ENCOUNTER — RESULTS FOLLOW-UP (OUTPATIENT)
Dept: FAMILY MEDICINE | Facility: CLINIC | Age: 35
End: 2025-06-27

## 2025-07-24 ENCOUNTER — OFFICE VISIT (OUTPATIENT)
Dept: INTERNAL MEDICINE | Facility: CLINIC | Age: 35
End: 2025-07-24
Payer: COMMERCIAL

## 2025-07-24 VITALS
BODY MASS INDEX: 40.43 KG/M2 | DIASTOLIC BLOOD PRESSURE: 60 MMHG | HEART RATE: 78 BPM | SYSTOLIC BLOOD PRESSURE: 110 MMHG | RESPIRATION RATE: 20 BRPM | OXYGEN SATURATION: 99 % | HEIGHT: 74 IN | WEIGHT: 315 LBS | TEMPERATURE: 97.4 F

## 2025-07-24 DIAGNOSIS — E11.65 TYPE 2 DIABETES MELLITUS WITH HYPERGLYCEMIA, WITHOUT LONG-TERM CURRENT USE OF INSULIN (H): Primary | ICD-10-CM

## 2025-07-24 DIAGNOSIS — T14.8XXA BRUISING: ICD-10-CM

## 2025-07-24 LAB
BASOPHILS # BLD AUTO: 0.1 10E3/UL (ref 0–0.2)
BASOPHILS NFR BLD AUTO: 1 %
EOSINOPHIL # BLD AUTO: 0.1 10E3/UL (ref 0–0.7)
EOSINOPHIL NFR BLD AUTO: 1 %
ERYTHROCYTE [DISTWIDTH] IN BLOOD BY AUTOMATED COUNT: 12.7 % (ref 10–15)
EST. AVERAGE GLUCOSE BLD GHB EST-MCNC: 278 MG/DL
HBA1C MFR BLD: 11.3 %
HCT VFR BLD AUTO: 37.6 % (ref 40–53)
HGB BLD-MCNC: 12.1 G/DL (ref 13.3–17.7)
IMM GRANULOCYTES # BLD: 0.1 10E3/UL
IMM GRANULOCYTES NFR BLD: 1 %
LYMPHOCYTES # BLD AUTO: 3.7 10E3/UL (ref 0.8–5.3)
LYMPHOCYTES NFR BLD AUTO: 38 %
MCH RBC QN AUTO: 27.6 PG (ref 26.5–33)
MCHC RBC AUTO-ENTMCNC: 32.2 G/DL (ref 31.5–36.5)
MCV RBC AUTO: 86 FL (ref 78–100)
MONOCYTES # BLD AUTO: 0.6 10E3/UL (ref 0–1.3)
MONOCYTES NFR BLD AUTO: 6 %
NEUTROPHILS # BLD AUTO: 5.2 10E3/UL (ref 1.6–8.3)
NEUTROPHILS NFR BLD AUTO: 54 %
NRBC # BLD AUTO: 0 10E3/UL
NRBC BLD AUTO-RTO: 0 /100
PLATELET # BLD AUTO: 190 10E3/UL (ref 150–450)
RBC # BLD AUTO: 4.39 10E6/UL (ref 4.4–5.9)
WBC # BLD AUTO: 9.7 10E3/UL (ref 4–11)

## 2025-07-24 ASSESSMENT — PAIN SCALES - GENERAL: PAINLEVEL_OUTOF10: NO PAIN (0)

## 2025-07-24 NOTE — PROGRESS NOTES
Tahira Burden is a 34 year old, presenting for the following health issues:  Derm Problem (Check something on thigh)      7/24/2025     1:06 PM   Additional Questions   Roomed by Leti     History of Present Illness       Reason for visit:  Na He is missing 1 dose(s) of medications per week.  He is not taking prescribed medications regularly due to remembering to take and other.               EMR reviewed including:             Complaint, History of Chief Complaint, Corresponding Review of Systems, and Complaint Specific Physical Examination.    #1   Bruising on legs  Patient has a total of 4, nearly imperceptible, superficial bruises, less than a centimeter in diameter, on his bilateral anterior legs.  Does not remember any trauma or injury.  Denies melena, hematochezia, hematuria.  Previous CBC demonstrated normal platelet level.          Exam:  As above      #2   Follow-up on type 2 diabetes  Last A1c 10.9.  Patient admits that he has put essentially no effort into maintaining his low carbohydrate diet.  Drinks several soda pops per day.  Is maintaining an unlimited caloric intake.  Does note that he takes his medication compliantly most days but not always.  Sheepishly acknowledges that he really does not care about treating his diabetes.          Exam:   LUNGS: clear bilaterally, airflow is brisk, no intercostal retraction or stridor is noted. No coughing is noted during visit.   HEART:  regular without rubs, clicks, gallops, or murmurs. PMI is nondisplaced. Upstrokes are brisk. S1,S2 are heard.   GI: Abdomen is soft, without rebound, guarding or tenderness. Bowel sounds are appropriate. No renal bruits are heard.   NEURO: Pt is alert and appropriate. No neurologic lateralization is noted. Cranial nerves 2-12 are intact. Peripheral sensory and motor function are grossly normal.         Patient has been interviewed, applicable history and applied review of systems have been performed.    Vital  "Signs:   /60   Pulse 78   Temp 97.4  F (36.3  C) (Temporal)   Resp 20   Ht 1.88 m (6' 2\")   Wt (!) 144.7 kg (319 lb)   SpO2 99%   BMI 40.96 kg/m        Decision Making    Problem and Complexity     1. Type 2 diabetes mellitus with hyperglycemia, without long-term current use of insulin (H) (Primary)  Continue glimepiride and Actos.  Continue Rybelsus as current.  Continue statin.  Given his bruising, will hold off on the addition of aspirin for the time being.  - Hemoglobin A1c; Future  - Hemoglobin A1c    2. Bruising  As above.  Will check platelet count for the sake of completeness.  I assured the patient that these minimal bruises are not pathologic or progressive.  - CBC with platelets and differential; Future  - CBC with platelets and differential                                FOLLOW UP   I have asked the patient to make an appointment for followup with me in the next 2 months    Regarding routine vaccinations:  I have reviewed the patient's vaccination schedule and discussed the benefits of prophylactic vaccination in detail.  I recommend the patient contact their pharmacist for vaccinations.  Discussed that most insurance companies now favor reimbursement to the pharmacies and it will financially behoove the patient to have vaccinations performed at their pharmacy.        I have carefully explained the diagnosis and treatment options to the patient.  The patient has displayed an understanding of the above, and all subsequent questions were answered.      DO DONNELL Jin    Portions of this note were produced using [employer]  Although every attempt at real-time proof reading has been made, occasional grammar/syntax errors may have been missed.    "

## 2025-07-28 ENCOUNTER — TELEPHONE (OUTPATIENT)
Dept: INTERNAL MEDICINE | Facility: CLINIC | Age: 35
End: 2025-07-28
Payer: COMMERCIAL

## 2025-07-28 ENCOUNTER — TRANSFERRED RECORDS (OUTPATIENT)
Dept: HEALTH INFORMATION MANAGEMENT | Facility: CLINIC | Age: 35
End: 2025-07-28
Payer: COMMERCIAL

## 2025-07-28 DIAGNOSIS — E11.649 TYPE 2 DIABETES MELLITUS WITH HYPOGLYCEMIA WITHOUT COMA, WITHOUT LONG-TERM CURRENT USE OF INSULIN (H): ICD-10-CM

## 2025-07-28 RX ORDER — ORAL SEMAGLUTIDE 3 MG/1
TABLET ORAL DAILY
Qty: 30 TABLET | Refills: 2 | Status: SHIPPED | OUTPATIENT
Start: 2025-07-28

## 2025-07-28 NOTE — TELEPHONE ENCOUNTER
Nia bowles [facility] is calling and asking if patient's prescription for Rybelsus has been sent   Informed Nia that this prescription was sent this morning with the pharmacy confirmation at the [facility] at 8:09 am this morning, 7/28/2025 per documentation.  Nia stated understanding.    Alicia Freeman RN

## 2025-07-29 ENCOUNTER — TELEPHONE (OUTPATIENT)
Dept: INTERNAL MEDICINE | Facility: CLINIC | Age: 35
End: 2025-07-29
Payer: COMMERCIAL

## 2025-07-29 NOTE — TELEPHONE ENCOUNTER
[facility] referral from: [facility] visit (referral by provider)    [facility] referral outreach attempt #2 on July 29, 2025 at 11:30 AM      Outcome: Spoke with patient he said I woke him up and not to call when he's sleeping    Use [employer] [facility] for the carrier/Plan on the flowsheet          BETH Sheridan   [phone]

## 2025-07-31 DIAGNOSIS — K29.00 ACUTE GASTRITIS WITHOUT HEMORRHAGE, UNSPECIFIED GASTRITIS TYPE: ICD-10-CM

## 2025-07-31 RX ORDER — OMEPRAZOLE 20 MG/1
CAPSULE, DELAYED RELEASE ORAL
Qty: 56 CAPSULE | Refills: 10 | Status: SHIPPED | OUTPATIENT
Start: 2025-07-31

## 2025-08-06 ASSESSMENT — PATIENT HEALTH QUESTIONNAIRE - PHQ9
SUM OF ALL RESPONSES TO PHQ QUESTIONS 1-9: 16
10. IF YOU CHECKED OFF ANY PROBLEMS, HOW DIFFICULT HAVE THESE PROBLEMS MADE IT FOR YOU TO DO YOUR WORK, TAKE CARE OF THINGS AT HOME, OR GET ALONG WITH OTHER PEOPLE: SOMEWHAT DIFFICULT
SUM OF ALL RESPONSES TO PHQ QUESTIONS 1-9: 16

## 2025-08-07 ENCOUNTER — OFFICE VISIT (OUTPATIENT)
Dept: INTERNAL MEDICINE | Facility: CLINIC | Age: 35
End: 2025-08-07
Payer: COMMERCIAL

## 2025-08-07 VITALS
RESPIRATION RATE: 16 BRPM | DIASTOLIC BLOOD PRESSURE: 60 MMHG | TEMPERATURE: 97 F | HEIGHT: 74 IN | HEART RATE: 72 BPM | WEIGHT: 309 LBS | BODY MASS INDEX: 39.66 KG/M2 | OXYGEN SATURATION: 96 % | SYSTOLIC BLOOD PRESSURE: 110 MMHG

## 2025-08-07 DIAGNOSIS — M19.031 PRIMARY OSTEOARTHRITIS OF RIGHT WRIST: Primary | ICD-10-CM

## 2025-08-07 ASSESSMENT — PAIN SCALES - GENERAL: PAINLEVEL_OUTOF10: NO PAIN (0)

## 2025-08-14 DIAGNOSIS — Z79.899 HIGH RISK MEDICATION USE: Primary | ICD-10-CM

## 2025-08-14 SDOH — HEALTH STABILITY: PHYSICAL HEALTH: ON AVERAGE, HOW MANY MINUTES DO YOU ENGAGE IN EXERCISE AT THIS LEVEL?: 30 MIN

## 2025-08-14 SDOH — HEALTH STABILITY: PHYSICAL HEALTH: ON AVERAGE, HOW MANY DAYS PER WEEK DO YOU ENGAGE IN MODERATE TO STRENUOUS EXERCISE (LIKE A BRISK WALK)?: 3 DAYS

## 2025-08-18 DIAGNOSIS — E11.65 TYPE 2 DIABETES MELLITUS WITH HYPERGLYCEMIA, WITHOUT LONG-TERM CURRENT USE OF INSULIN (H): ICD-10-CM

## 2025-08-20 RX ORDER — GLIMEPIRIDE 4 MG/1
TABLET ORAL
Qty: 90 TABLET | Refills: 0 | Status: SHIPPED | OUTPATIENT
Start: 2025-08-20

## 2025-08-21 ENCOUNTER — OFFICE VISIT (OUTPATIENT)
Dept: ORTHOPEDICS | Facility: CLINIC | Age: 35
End: 2025-08-21
Payer: COMMERCIAL

## 2025-08-21 DIAGNOSIS — M25.532 BILATERAL WRIST PAIN: Primary | ICD-10-CM

## 2025-08-21 DIAGNOSIS — M25.531 BILATERAL WRIST PAIN: Primary | ICD-10-CM

## 2025-09-03 ENCOUNTER — MYC MEDICAL ADVICE (OUTPATIENT)
Dept: INTERNAL MEDICINE | Facility: CLINIC | Age: 35
End: 2025-09-03
Payer: COMMERCIAL

## 2025-09-03 DIAGNOSIS — M25.532 BILATERAL WRIST PAIN: Primary | ICD-10-CM

## 2025-09-03 DIAGNOSIS — M25.531 BILATERAL WRIST PAIN: Primary | ICD-10-CM
